# Patient Record
Sex: FEMALE | Race: WHITE | NOT HISPANIC OR LATINO | Employment: OTHER | URBAN - METROPOLITAN AREA
[De-identification: names, ages, dates, MRNs, and addresses within clinical notes are randomized per-mention and may not be internally consistent; named-entity substitution may affect disease eponyms.]

---

## 2017-01-13 ENCOUNTER — HOSPITAL ENCOUNTER (OUTPATIENT)
Dept: PHYSICAL THERAPY | Facility: HOSPITAL | Age: 67
Setting detail: RECURRING SERIES
Discharge: HOME | End: 2017-03-15
Attending: INTERNAL MEDICINE | Admitting: INTERNAL MEDICINE

## 2017-04-18 ENCOUNTER — CONVERSION ENCOUNTER (OUTPATIENT)
Dept: FAMILY MEDICINE CLINIC | Facility: CLINIC | Age: 67
End: 2017-04-18

## 2017-04-18 LAB
ALBUMIN SERPL-MCNC: 4.1 G/DL (ref 3.6–5.1)
ALBUMIN/GLOB SERPL: ABNORMAL {RATIO} (ref 1–2.5)
ALP SERPL-CCNC: 64 UNITS/L (ref 33–130)
ALT SERPL-CCNC: 13 UNITS/L (ref 6–29)
AST SERPL-CCNC: 17 UNITS/L (ref 10–35)
BASOPHILS # BLD AUTO: ABNORMAL 10*3/MM3 (ref 0–200)
BASOPHILS NFR BLD AUTO: 0.4 %
BILIRUB SERPL-MCNC: 0.3 MG/DL (ref 0.2–1.2)
BUN SERPL-MCNC: 26 MG/DL (ref 7–25)
BUN/CREAT SERPL: ABNORMAL (ref 6–22)
CALCIUM SERPL-MCNC: 9.4 MG/DL (ref 8.6–10.4)
CHLORIDE SERPL-SCNC: 105 MMOL/L (ref 98–110)
CHOLEST SERPL-MCNC: 209 MG/DL (ref 125–200)
CHOLEST/HDLC SERPL: ABNORMAL {RATIO}
CO2 CONTENT VENOUS: 28 MMOL/L (ref 20–31)
CONV NEUTROPHILS/100 LEUKOCYTES IN BODY FLUID BY MANUAL COUNT: 60.7 %
CONV TOTAL PROTEIN: 6.7 G/DL (ref 6.1–8.1)
CREAT UR-MCNC: 1.05 MG/DL (ref 0.5–0.99)
EOSINOPHIL # BLD AUTO: 1.6 %
EOSINOPHIL # BLD AUTO: ABNORMAL 10*3/MM3 (ref 15–500)
ERYTHROCYTE [DISTWIDTH] IN BLOOD BY AUTOMATED COUNT: 12.6 % (ref 11–15)
GLOBULIN UR ELPH-MCNC: ABNORMAL G/DL (ref 1.9–3.7)
GLUCOSE SERPL-MCNC: 104 MG/DL (ref 65–99)
HCT VFR BLD AUTO: 41.9 % (ref 35–45)
HDLC SERPL-MCNC: 48 MG/DL
HGB BLD-MCNC: 13.7 G/DL (ref 11.7–15.5)
LDLC SERPL CALC-MCNC: ABNORMAL MG/DL
LYMPHOCYTES # BLD AUTO: ABNORMAL 10*3/MM3 (ref 850–3900)
LYMPHOCYTES NFR BLD AUTO: 30.6 %
MCH RBC QN AUTO: 30.4 PG (ref 27–33)
MCHC RBC AUTO-ENTMCNC: ABNORMAL % (ref 32–36)
MCV RBC AUTO: 92.9 FL (ref 80–100)
MONOCYTES # BLD AUTO: ABNORMAL 10*3/MICROLITER (ref 200–950)
MONOCYTES NFR BLD AUTO: 6.7 %
NEUTROPHILS # BLD AUTO: ABNORMAL 10*3/MM3 (ref 1500–7800)
PLATELET # BLD AUTO: ABNORMAL 10*3/MM3 (ref 140–400)
PMV BLD AUTO: 8.1 FL (ref 7.5–12.5)
POTASSIUM SERPL-SCNC: 4.5 MMOL/L (ref 3.5–5.3)
RBC # BLD AUTO: ABNORMAL 10*6/MM3 (ref 3.8–5.1)
SODIUM SERPL-SCNC: 142 MMOL/L (ref 135–146)
TRIGL SERPL-MCNC: 125 MG/DL
TSH SERPL-ACNC: 0.94 MICROINTL UNITS/ML (ref 0.4–4.5)
WBC # BLD AUTO: ABNORMAL K/UL (ref 3.8–10.8)

## 2017-04-27 ENCOUNTER — HOSPITAL ENCOUNTER (OUTPATIENT)
Dept: MAMMOGRAPHY | Facility: HOSPITAL | Age: 67
Discharge: HOME | End: 2017-04-27
Attending: INTERNAL MEDICINE | Admitting: INTERNAL MEDICINE

## 2018-04-09 ENCOUNTER — HOSPITAL ENCOUNTER (OUTPATIENT)
Dept: FAMILY MEDICINE CLINIC | Facility: CLINIC | Age: 68
Setting detail: SPECIMEN
Discharge: HOME | End: 2018-04-09
Attending: INTERNAL MEDICINE | Admitting: INTERNAL MEDICINE

## 2018-04-09 LAB
ALBUMIN SERPL-MCNC: 3.9 G/DL (ref 3.5–4.8)
ALBUMIN/GLOB SERPL: 1.6 {RATIO} (ref 1–1.7)
ALP SERPL-CCNC: 56 IU/L (ref 32–91)
ALT SERPL-CCNC: 27 IU/L (ref 14–54)
ANION GAP SERPL CALC-SCNC: 9.6 MMOL/L (ref 10–20)
AST SERPL-CCNC: 25 IU/L (ref 15–41)
BASOPHILS # BLD AUTO: 0.1 10*3/UL (ref 0–0.2)
BASOPHILS NFR BLD AUTO: 1 % (ref 0–2)
BILIRUB SERPL-MCNC: 0.5 MG/DL (ref 0.3–1.2)
BUN SERPL-MCNC: 24 MG/DL (ref 8–20)
BUN/CREAT SERPL: 24 (ref 5.4–26.2)
CALCIUM SERPL-MCNC: 9.2 MG/DL (ref 8.9–10.3)
CHLORIDE SERPL-SCNC: 106 MMOL/L (ref 101–111)
CHOLEST SERPL-MCNC: 251 MG/DL
CHOLEST/HDLC SERPL: 4.6 {RATIO}
CONV CO2: 29 MMOL/L (ref 22–32)
CONV LDL CHOLESTEROL DIRECT: 170 MG/DL (ref 0–100)
CONV TOTAL PROTEIN: 6.3 G/DL (ref 6.1–7.9)
CREAT UR-MCNC: 1 MG/DL (ref 0.4–1)
DIFFERENTIAL METHOD BLD: (no result)
EOSINOPHIL # BLD AUTO: 0.1 10*3/UL (ref 0–0.3)
EOSINOPHIL # BLD AUTO: 3 % (ref 0–3)
ERYTHROCYTE [DISTWIDTH] IN BLOOD BY AUTOMATED COUNT: 12.9 % (ref 11.5–14.5)
GLOBULIN UR ELPH-MCNC: 2.4 G/DL (ref 2.5–3.8)
GLUCOSE SERPL-MCNC: 103 MG/DL (ref 65–99)
HCT VFR BLD AUTO: 41.4 % (ref 35–49)
HDLC SERPL-MCNC: 55 MG/DL
HGB BLD-MCNC: 13.9 G/DL (ref 12–15)
LDLC/HDLC SERPL: 3.1 {RATIO}
LIPID INTERPRETATION: ABNORMAL
LYMPHOCYTES # BLD AUTO: 2.2 10*3/UL (ref 0.8–4.8)
LYMPHOCYTES NFR BLD AUTO: 43 % (ref 18–42)
MCH RBC QN AUTO: 31 PG (ref 26–32)
MCHC RBC AUTO-ENTMCNC: 33.5 G/DL (ref 32–36)
MCV RBC AUTO: 92.7 FL (ref 80–94)
MONOCYTES # BLD AUTO: 0.4 10*3/UL (ref 0.1–1.3)
MONOCYTES NFR BLD AUTO: 9 % (ref 2–11)
NEUTROPHILS # BLD AUTO: 2.3 10*3/UL (ref 2.3–8.6)
NEUTROPHILS NFR BLD AUTO: 44 % (ref 50–75)
NRBC BLD AUTO-RTO: 0 /100{WBCS}
NRBC/RBC NFR BLD MANUAL: 0 10*3/UL
PLATELET # BLD AUTO: 263 10*3/UL (ref 150–450)
PMV BLD AUTO: 7.5 FL (ref 7.4–10.4)
POTASSIUM SERPL-SCNC: 4.6 MMOL/L (ref 3.6–5.1)
RBC # BLD AUTO: 4.47 10*6/UL (ref 4–5.4)
SODIUM SERPL-SCNC: 140 MMOL/L (ref 136–144)
TRIGL SERPL-MCNC: 87 MG/DL
VLDLC SERPL CALC-MCNC: 25.8 MG/DL
WBC # BLD AUTO: 5.1 10*3/UL (ref 4.5–11.5)

## 2018-04-30 ENCOUNTER — HOSPITAL ENCOUNTER (OUTPATIENT)
Dept: OTHER | Facility: HOSPITAL | Age: 68
Discharge: HOME | End: 2018-04-30
Attending: INTERNAL MEDICINE | Admitting: INTERNAL MEDICINE

## 2018-05-14 ENCOUNTER — HOSPITAL ENCOUNTER (OUTPATIENT)
Dept: MAMMOGRAPHY | Facility: HOSPITAL | Age: 68
Discharge: HOME | End: 2018-05-14
Attending: INTERNAL MEDICINE | Admitting: INTERNAL MEDICINE

## 2018-06-12 ENCOUNTER — HOSPITAL ENCOUNTER (OUTPATIENT)
Dept: MRI IMAGING | Facility: HOSPITAL | Age: 68
Discharge: HOME | End: 2018-06-12
Attending: INTERNAL MEDICINE | Admitting: INTERNAL MEDICINE

## 2018-06-26 ENCOUNTER — HOSPITAL ENCOUNTER (OUTPATIENT)
Dept: MAMMOGRAPHY | Facility: HOSPITAL | Age: 68
Discharge: HOME | End: 2018-06-26
Attending: INTERNAL MEDICINE | Admitting: INTERNAL MEDICINE

## 2018-09-04 ENCOUNTER — HOSPITAL ENCOUNTER (OUTPATIENT)
Dept: FAMILY MEDICINE CLINIC | Facility: CLINIC | Age: 68
Setting detail: SPECIMEN
Discharge: HOME | End: 2018-09-04
Attending: INTERNAL MEDICINE | Admitting: INTERNAL MEDICINE

## 2018-09-04 LAB
ALBUMIN SERPL-MCNC: 3.9 G/DL (ref 3.5–4.8)
ALBUMIN/GLOB SERPL: 1.4 {RATIO} (ref 1–1.7)
ALP SERPL-CCNC: 56 IU/L (ref 32–91)
ALT SERPL-CCNC: 22 IU/L (ref 14–54)
ANION GAP SERPL CALC-SCNC: 12.5 MMOL/L (ref 10–20)
AST SERPL-CCNC: 25 IU/L (ref 15–41)
BILIRUB SERPL-MCNC: 0.7 MG/DL (ref 0.3–1.2)
BUN SERPL-MCNC: 24 MG/DL (ref 8–20)
BUN/CREAT SERPL: 21.8 (ref 5.4–26.2)
CALCIUM SERPL-MCNC: 9.1 MG/DL (ref 8.9–10.3)
CHLORIDE SERPL-SCNC: 104 MMOL/L (ref 101–111)
CHOLEST SERPL-MCNC: 235 MG/DL
CHOLEST/HDLC SERPL: 4.8 {RATIO}
CONV CO2: 28 MMOL/L (ref 22–32)
CONV LDL CHOLESTEROL DIRECT: 178 MG/DL (ref 0–100)
CONV TOTAL PROTEIN: 6.6 G/DL (ref 6.1–7.9)
CREAT UR-MCNC: 1.1 MG/DL (ref 0.4–1)
GLOBULIN UR ELPH-MCNC: 2.7 G/DL (ref 2.5–3.8)
GLUCOSE SERPL-MCNC: 107 MG/DL (ref 65–99)
HDLC SERPL-MCNC: 49 MG/DL
LDLC/HDLC SERPL: 3.7 {RATIO}
LIPID INTERPRETATION: ABNORMAL
POTASSIUM SERPL-SCNC: 4.5 MMOL/L (ref 3.6–5.1)
SODIUM SERPL-SCNC: 140 MMOL/L (ref 136–144)
TRIGL SERPL-MCNC: 88 MG/DL
VLDLC SERPL CALC-MCNC: 8.2 MG/DL

## 2018-12-28 ENCOUNTER — HOSPITAL ENCOUNTER (OUTPATIENT)
Dept: MAMMOGRAPHY | Facility: HOSPITAL | Age: 68
Discharge: HOME | End: 2018-12-28
Attending: INTERNAL MEDICINE | Admitting: INTERNAL MEDICINE

## 2019-06-06 ENCOUNTER — HOSPITAL ENCOUNTER (OUTPATIENT)
Dept: FAMILY MEDICINE CLINIC | Facility: CLINIC | Age: 69
Setting detail: SPECIMEN
Discharge: HOME | End: 2019-06-06
Attending: INTERNAL MEDICINE | Admitting: INTERNAL MEDICINE

## 2019-06-06 LAB
ALBUMIN SERPL-MCNC: 3.6 G/DL (ref 3.5–4.8)
ALBUMIN/GLOB SERPL: 1.2 {RATIO} (ref 1–1.7)
ALP SERPL-CCNC: 67 IU/L (ref 32–91)
ALT SERPL-CCNC: 55 IU/L (ref 14–54)
ANION GAP SERPL CALC-SCNC: 13.6 MMOL/L (ref 10–20)
AST SERPL-CCNC: 50 IU/L (ref 15–41)
BASOPHILS # BLD AUTO: 0.1 10*3/UL (ref 0–0.2)
BASOPHILS NFR BLD AUTO: 1 % (ref 0–2)
BILIRUB SERPL-MCNC: 1 MG/DL (ref 0.3–1.2)
BUN SERPL-MCNC: 23 MG/DL (ref 8–20)
BUN/CREAT SERPL: 20.9 (ref 5.4–26.2)
CALCIUM SERPL-MCNC: 8.9 MG/DL (ref 8.9–10.3)
CHLORIDE SERPL-SCNC: 103 MMOL/L (ref 101–111)
CHOLEST SERPL-MCNC: 193 MG/DL
CHOLEST/HDLC SERPL: 3.7 {RATIO}
CONV CO2: 27 MMOL/L (ref 22–32)
CONV LDL CHOLESTEROL DIRECT: 129 MG/DL (ref 0–100)
CONV TOTAL PROTEIN: 6.7 G/DL (ref 6.1–7.9)
CREAT UR-MCNC: 1.1 MG/DL (ref 0.4–1)
DIFFERENTIAL METHOD BLD: (no result)
EOSINOPHIL # BLD AUTO: 0.3 10*3/UL (ref 0–0.3)
EOSINOPHIL # BLD AUTO: 3 % (ref 0–3)
ERYTHROCYTE [DISTWIDTH] IN BLOOD BY AUTOMATED COUNT: 13.1 % (ref 11.5–14.5)
GLOBULIN UR ELPH-MCNC: 3.1 G/DL (ref 2.5–3.8)
GLUCOSE SERPL-MCNC: 116 MG/DL (ref 65–99)
HCT VFR BLD AUTO: 38.3 % (ref 35–49)
HDLC SERPL-MCNC: 52 MG/DL
HGB BLD-MCNC: 12.6 G/DL (ref 12–15)
LDLC/HDLC SERPL: 2.5 {RATIO}
LIPID INTERPRETATION: ABNORMAL
LYMPHOCYTES # BLD AUTO: 1.5 10*3/UL (ref 0.8–4.8)
LYMPHOCYTES NFR BLD AUTO: 19 % (ref 18–42)
MCH RBC QN AUTO: 30.6 PG (ref 26–32)
MCHC RBC AUTO-ENTMCNC: 32.9 G/DL (ref 32–36)
MCV RBC AUTO: 93 FL (ref 80–94)
MONOCYTES # BLD AUTO: 0.8 10*3/UL (ref 0.1–1.3)
MONOCYTES NFR BLD AUTO: 10 % (ref 2–11)
NEUTROPHILS # BLD AUTO: 5.3 10*3/UL (ref 2.3–8.6)
NEUTROPHILS NFR BLD AUTO: 67 % (ref 50–75)
NRBC BLD AUTO-RTO: 0 /100{WBCS}
NRBC/RBC NFR BLD MANUAL: 0 10*3/UL
PLATELET # BLD AUTO: 267 10*3/UL (ref 150–450)
PMV BLD AUTO: 7.9 FL (ref 7.4–10.4)
POTASSIUM SERPL-SCNC: 4.6 MMOL/L (ref 3.6–5.1)
RBC # BLD AUTO: 4.12 10*6/UL (ref 4–5.4)
SODIUM SERPL-SCNC: 139 MMOL/L (ref 136–144)
TRIGL SERPL-MCNC: 88 MG/DL
TSH SERPL-ACNC: 1.18 UIU/ML (ref 0.34–5.6)
VLDLC SERPL CALC-MCNC: 13 MG/DL
WBC # BLD AUTO: 7.9 10*3/UL (ref 4.5–11.5)

## 2019-06-07 LAB — 25(OH)D3 SERPL-MCNC: 37 NG/ML (ref 30–100)

## 2019-06-11 ENCOUNTER — CONVERSION ENCOUNTER (OUTPATIENT)
Dept: FAMILY MEDICINE CLINIC | Facility: CLINIC | Age: 69
End: 2019-06-11

## 2019-06-17 ENCOUNTER — TELEPHONE (OUTPATIENT)
Dept: FAMILY MEDICINE CLINIC | Facility: CLINIC | Age: 69
End: 2019-06-17

## 2019-06-17 DIAGNOSIS — I21.19 STEMI INVOLVING OTH CORONARY ARTERY OF INFERIOR WALL (HCC): Primary | ICD-10-CM

## 2019-06-17 NOTE — TELEPHONE ENCOUNTER
You are wanting patient to have a 2D Echo. Please create order/referral and diagnosis in chart then this will fall in to my work queue to process referral.   PER Epic TEAM

## 2019-06-18 ENCOUNTER — OFFICE VISIT (OUTPATIENT)
Dept: CARDIOLOGY | Facility: CLINIC | Age: 69
End: 2019-06-18

## 2019-06-18 VITALS
WEIGHT: 156.4 LBS | SYSTOLIC BLOOD PRESSURE: 124 MMHG | BODY MASS INDEX: 25.13 KG/M2 | HEIGHT: 66 IN | DIASTOLIC BLOOD PRESSURE: 64 MMHG | HEART RATE: 73 BPM

## 2019-06-18 DIAGNOSIS — E78.5 DYSLIPIDEMIA: ICD-10-CM

## 2019-06-18 DIAGNOSIS — I25.119 CORONARY ARTERY DISEASE INVOLVING NATIVE CORONARY ARTERY OF NATIVE HEART WITH ANGINA PECTORIS (HCC): ICD-10-CM

## 2019-06-18 DIAGNOSIS — I21.9 MYOCARDIAL INFARCTION LESS THAN 4 WEEKS AGO (HCC): Primary | ICD-10-CM

## 2019-06-18 DIAGNOSIS — I10 ESSENTIAL HYPERTENSION: ICD-10-CM

## 2019-06-18 PROCEDURE — 99204 OFFICE O/P NEW MOD 45 MIN: CPT | Performed by: NURSE PRACTITIONER

## 2019-06-18 RX ORDER — CARVEDILOL 3.12 MG/1
3.12 TABLET ORAL 2 TIMES DAILY
COMMUNITY
Start: 2019-06-11 | End: 2019-12-10 | Stop reason: SDUPTHER

## 2019-06-18 RX ORDER — ESCITALOPRAM OXALATE 10 MG/1
TABLET ORAL
COMMUNITY
Start: 2019-05-16 | End: 2019-12-10 | Stop reason: SDUPTHER

## 2019-06-18 RX ORDER — ATORVASTATIN CALCIUM 40 MG/1
40 TABLET, FILM COATED ORAL DAILY
Qty: 30 TABLET | Refills: 11 | Status: SHIPPED | OUTPATIENT
Start: 2019-06-18 | End: 2019-08-21 | Stop reason: SDUPTHER

## 2019-06-18 RX ORDER — ASPIRIN 81 MG
81 TABLET,CHEWABLE ORAL DAILY
Refills: 0 | COMMUNITY
Start: 2019-06-04 | End: 2022-04-04

## 2019-06-18 RX ORDER — BUPROPION HYDROCHLORIDE 150 MG/1
TABLET ORAL EVERY 24 HOURS
COMMUNITY
Start: 2018-08-06 | End: 2019-12-10 | Stop reason: SDUPTHER

## 2019-06-18 NOTE — PROGRESS NOTES
Cardiology Office Visit Note      Referring physician: Dr. Koo    Reason For Followup: Recent MI    HPI:  Jennifer Blackmon is a 68 y.o. female.  She has a history of coronary artery disease.  Back in the late May she was an inpatient [facility] where she was admitted due to chest pain radiating to her left arm.  She underwent a Lexiscan Myoview.  Lexiscan Myoview did not show any reversible ischemia or fixed defects.  She was discharged from the hospital.  She unfortunately continued to have chest pain.      She went on a trip with her family to [place] and while there started having severe chest pain and was flown to [facility] where she underwent emergent left heart catheterization.  The LAD had proximal 30 to 40% stenosis with mild irregularities the first diagonal was small and had mild irregularities circumflex was moderate caliber and had proximal 20 to 30% stenosis gives off a small first marginal which had a proximal 20 to 30% stenosis the second OM is within normal limits right coronary artery is dominant and had a 99% stenosis in the mid segment ejection fraction was estimated at 45%.  The patient underwent angioplasty with drug-eluting stent placement to the right coronary artery.    At this time she denies chest pain recurrent arm pain, dyspnea PND orthopnea palpitations near syncope lower extremity edema or feelings of her heart racing.  She is been compliant with medications.    Past Medical History:   Diagnosis Date   • Allergic rhinitis    • DDD (degenerative disc disease), lumbar    • Depression    • Hyperlipidemia    • Insomnia    • Osteoarthritis    • Toenail fungus        Past Surgical History:   Procedure Laterality Date   • ADENOIDECTOMY      Adenoids removed   • APPENDECTOMY     • BREAST IMPLANT SURGERY     • CATARACT EXTRACTION Bilateral 2017   • CERVICAL DISC SURGERY      ruptured and removed    •  SECTION     • CHEST SURGERY       attempted pectus repair    • KNEE ARTHROSCOPY Right    • LUMBAR DISC SURGERY      ruptured and removed    • NASAL SEPTAL RECONSTRUCTION     • REFRACTIVE SURGERY      RK/AK both eyes    • SOMNOPLASTY RADIAL FREQUENCY ABLATION     • TONSILLECTOMY     • UVULOPALATOPHARYNGOPLASTY           Current Outpatient Medications:   •  buPROPion XL (WELLBUTRIN XL) 150 MG 24 hr tablet, Daily., Disp: , Rfl:   •  ticagrelor (BRILINTA) 90 MG tablet tablet, Every 12 (Twelve) Hours., Disp: , Rfl:   •  vitamin E 100 UNIT capsule, Daily., Disp: , Rfl:   •  atorvastatin (LIPITOR) 40 MG tablet, Take 1 tablet by mouth Daily., Disp: 30 tablet, Rfl: 11  •  HUNTER LOW DOSE 81 MG chewable tablet, Chew 81 mg Daily., Disp: , Rfl: 0  •  carvedilol (COREG) 3.125 MG tablet, 3.125 mg 2 (Two) Times a Day., Disp: , Rfl:   •  escitalopram (LEXAPRO) 10 MG tablet, , Disp: , Rfl:     Social History     Socioeconomic History   • Marital status:      Spouse name: Not on file   • Number of children: Not on file   • Years of education: Not on file   • Highest education level: Not on file   Tobacco Use   • Smoking status: Former Smoker   Substance and Sexual Activity   • Alcohol use: No     Frequency: Never   • Drug use: No       Family History   Problem Relation Age of Onset   • Alzheimer's disease Mother    • Other Mother         CVA, acute myocardial infarction   • Leukemia Father    • Other Father         Parkinson's    • Heart attack Sister    • Heart attack Brother    • Bipolar disorder Daughter          Review of Systems   Constitution: Negative for decreased appetite, diaphoresis and weakness.   HENT: Negative for congestion, hearing loss and nosebleeds.    Cardiovascular: Negative for chest pain, claudication, dyspnea on exertion, irregular heartbeat, leg swelling, near-syncope, orthopnea, palpitations, paroxysmal nocturnal dyspnea and syncope.   Respiratory: Negative for cough, shortness of breath and sleep disturbances due to breathing.   "  Endocrine: Negative for polyuria.   Hematologic/Lymphatic: Does not bruise/bleed easily.   Skin: Negative for itching and rash.   Musculoskeletal: Negative for back pain, muscle weakness and myalgias.   Gastrointestinal: Negative for abdominal pain, change in bowel habit and nausea.   Genitourinary: Negative for dysuria, flank pain, frequency and hesitancy.   Neurological: Negative for dizziness and tremors.   Psychiatric/Behavioral: Negative for altered mental status. The patient does not have insomnia.        Objective     /64   Pulse 73   Ht 167.6 cm (66\")   Wt 70.9 kg (156 lb 6.4 oz)   BMI 25.24 kg/m² .    Physical Exam   Constitutional: She is oriented to person, place, and time. She appears well-developed and well-nourished. No distress.   HENT:   Head: Normocephalic and atraumatic.   Eyes: Pupils are equal, round, and reactive to light.   Neck: Normal range of motion. Neck supple. No JVD present.   Cardiovascular: Normal rate, regular rhythm, S1 normal, S2 normal, normal heart sounds and intact distal pulses.   No murmur heard.  Pulmonary/Chest: Effort normal and breath sounds normal.   Abdominal: Soft. Normal appearance. She exhibits no distension. There is no tenderness.   Musculoskeletal: Normal range of motion. She exhibits no edema.   Neurological: She is alert and oriented to person, place, and time. Gait normal.   Skin: Skin is warm and dry.   Psychiatric: She has a normal mood and affect. Her speech is normal and behavior is normal. Thought content normal.         EKG: normal EKG, normal sinus rhythm, unchanged from previous tracings, normal sinus rhythm, diffuse T wave inversion in inferior and lateral leads.  Heart rate 73  .   Diagnoses and all orders for this visit:    1. Myocardial infarction less than 4 weeks ago (CMS/HCC) (Primary)  -     Adult Transthoracic Echo Complete W/ Cont if Necessary Per Protocol; Future  -     Ambulatory Referral to Cardiac Rehab    2. Coronary artery " disease involving native coronary artery of native heart with angina pectoris (CMS/HCC)  -     Adult Transthoracic Echo Complete W/ Cont if Necessary Per Protocol; Future  -     Ambulatory Referral to Cardiac Rehab  -No signs and symptoms of chest pain or angina at this time    3. Dyslipidemia continue statin    4. Essential hypertension: Stable on current medications    Other orders  -     atorvastatin (LIPITOR) 40 MG tablet; Take 1 tablet by mouth Daily.  Dispense: 30 tablet; Refill: 11       Problem List Items Addressed This Visit        Cardiovascular and Mediastinum    Myocardial infarction less than 4 weeks ago (CMS/[facility]) - Primary    Relevant Medications    carvedilol (COREG) 3.125 MG tablet    ticagrelor (BRILINTA) 90 MG tablet tablet    Other Relevant Orders    Adult Transthoracic Echo Complete W/ Cont if Necessary Per Protocol    Ambulatory Referral to Cardiac Rehab    Coronary artery disease involving native coronary artery of native heart with angina pectoris (CMS/HCC)    Relevant Medications    carvedilol (COREG) 3.125 MG tablet    ticagrelor (BRILINTA) 90 MG tablet tablet    Other Relevant Orders    Adult Transthoracic Echo Complete W/ Cont if Necessary Per Protocol    Ambulatory Referral to Cardiac Rehab    Essential hypertension    Relevant Medications    carvedilol (COREG) 3.125 MG tablet       Other    Dyslipidemia          Plan: As above, will proceed with 2D echocardiogram to assess LV function and for wall motion abnormalities.  We will also refer patient to cardiac rehab.  We will follow-up in this office in 8 weeks for follow-up advised to contact office sooner if new or worsening problems      NIKHIL Sanchez  6/18/2019 4:40 PM

## 2019-06-21 ENCOUNTER — HOSPITAL ENCOUNTER (OUTPATIENT)
Dept: CARDIOLOGY | Facility: HOSPITAL | Age: 69
Discharge: HOME | End: 2019-06-21
Admitting: NURSE PRACTITIONER

## 2019-06-21 VITALS
DIASTOLIC BLOOD PRESSURE: 61 MMHG | HEIGHT: 66 IN | SYSTOLIC BLOOD PRESSURE: 114 MMHG | OXYGEN SATURATION: 96 % | RESPIRATION RATE: 20 BRPM | HEART RATE: 67 BPM | WEIGHT: 156 LBS | BODY MASS INDEX: 25.07 KG/M2

## 2019-06-21 DIAGNOSIS — I25.119 CORONARY ARTERY DISEASE INVOLVING NATIVE CORONARY ARTERY OF NATIVE HEART WITH ANGINA PECTORIS (HCC): ICD-10-CM

## 2019-06-21 DIAGNOSIS — I21.9 MYOCARDIAL INFARCTION LESS THAN 4 WEEKS AGO (HCC): ICD-10-CM

## 2019-06-21 PROCEDURE — 0399T HC MYOCARDL STRAIN IMAG QUAN ASSMT PER SESS: CPT

## 2019-06-21 PROCEDURE — 93306 TTE W/DOPPLER COMPLETE: CPT

## 2019-06-23 LAB
BH CV ECHO MEAS - % IVS THICK: 15.4 %
BH CV ECHO MEAS - % LVPW THICK: 37.7 %
BH CV ECHO MEAS - ACS: 1.2 CM
BH CV ECHO MEAS - AO MAX PG (FULL): 3.3 MMHG
BH CV ECHO MEAS - AO MAX PG: 5.8 MMHG
BH CV ECHO MEAS - AO MEAN PG (FULL): 2.4 MMHG
BH CV ECHO MEAS - AO MEAN PG: 3.8 MMHG
BH CV ECHO MEAS - AO ROOT AREA (BSA CORRECTED): 1.6
BH CV ECHO MEAS - AO ROOT AREA: 6.8 CM^2
BH CV ECHO MEAS - AO ROOT DIAM: 2.9 CM
BH CV ECHO MEAS - AO V2 MAX: 120.5 CM/SEC
BH CV ECHO MEAS - AO V2 MEAN: 95.3 CM/SEC
BH CV ECHO MEAS - AO V2 VTI: 29.4 CM
BH CV ECHO MEAS - ASC AORTA: 3.1 CM
BH CV ECHO MEAS - AVA(I,A): 1.5 CM^2
BH CV ECHO MEAS - AVA(I,D): 1.5 CM^2
BH CV ECHO MEAS - AVA(V,A): 1.7 CM^2
BH CV ECHO MEAS - AVA(V,D): 1.7 CM^2
BH CV ECHO MEAS - BSA(HAYCOCK): 1.8 M^2
BH CV ECHO MEAS - BSA: 1.8 M^2
BH CV ECHO MEAS - BZI_BMI: 25.2 KILOGRAMS/M^2
BH CV ECHO MEAS - BZI_METRIC_HEIGHT: 167.6 CM
BH CV ECHO MEAS - BZI_METRIC_WEIGHT: 70.8 KG
BH CV ECHO MEAS - EDV(CUBED): 66.9 ML
BH CV ECHO MEAS - EDV(MOD-SP2): 32.3 ML
BH CV ECHO MEAS - EDV(MOD-SP4): 61.1 ML
BH CV ECHO MEAS - EDV(TEICH): 72.5 ML
BH CV ECHO MEAS - EF(CUBED): 61 %
BH CV ECHO MEAS - EF(MOD-BP): 68 %
BH CV ECHO MEAS - EF(MOD-SP2): 63.2 %
BH CV ECHO MEAS - EF(MOD-SP4): 65.1 %
BH CV ECHO MEAS - EF(TEICH): 53.1 %
BH CV ECHO MEAS - ESV(CUBED): 26.1 ML
BH CV ECHO MEAS - ESV(MOD-SP2): 11.9 ML
BH CV ECHO MEAS - ESV(MOD-SP4): 21.3 ML
BH CV ECHO MEAS - ESV(TEICH): 34 ML
BH CV ECHO MEAS - FS: 26.9 %
BH CV ECHO MEAS - IVS/LVPW: 1.2
BH CV ECHO MEAS - IVSD: 1.2 CM
BH CV ECHO MEAS - IVSS: 1.3 CM
BH CV ECHO MEAS - LA DIMENSION(2D): 2.9 CM
BH CV ECHO MEAS - LV DIASTOLIC VOL/BSA (35-75): 34 ML/M^2
BH CV ECHO MEAS - LV MASS(C)D: 142.5 GRAMS
BH CV ECHO MEAS - LV MASS(C)DI: 79.2 GRAMS/M^2
BH CV ECHO MEAS - LV MASS(C)S: 128.4 GRAMS
BH CV ECHO MEAS - LV MASS(C)SI: 71.4 GRAMS/M^2
BH CV ECHO MEAS - LV MAX PG: 2.5 MMHG
BH CV ECHO MEAS - LV MEAN PG: 1.4 MMHG
BH CV ECHO MEAS - LV SYSTOLIC VOL/BSA (12-30): 11.8 ML/M^2
BH CV ECHO MEAS - LV V1 MAX: 79.1 CM/SEC
BH CV ECHO MEAS - LV V1 MEAN: 55.9 CM/SEC
BH CV ECHO MEAS - LV V1 VTI: 17.5 CM
BH CV ECHO MEAS - LVIDD: 4.1 CM
BH CV ECHO MEAS - LVIDS: 3 CM
BH CV ECHO MEAS - LVOT AREA: 2.5 CM^2
BH CV ECHO MEAS - LVOT DIAM: 1.8 CM
BH CV ECHO MEAS - LVPWD: 0.97 CM
BH CV ECHO MEAS - LVPWS: 1.3 CM
BH CV ECHO MEAS - MV A MAX VEL: 98.8 CM/SEC
BH CV ECHO MEAS - MV DEC SLOPE: 322.5 CM/SEC^2
BH CV ECHO MEAS - MV DEC TIME: 0.25 SEC
BH CV ECHO MEAS - MV E MAX VEL: 79.4 CM/SEC
BH CV ECHO MEAS - MV E/A: 0.8
BH CV ECHO MEAS - MV MAX PG: 4.2 MMHG
BH CV ECHO MEAS - MV MEAN PG: 1.8 MMHG
BH CV ECHO MEAS - MV V2 MAX: 102.5 CM/SEC
BH CV ECHO MEAS - MV V2 MEAN: 57.4 CM/SEC
BH CV ECHO MEAS - MV V2 VTI: 28.9 CM
BH CV ECHO MEAS - MVA(VTI): 1.5 CM^2
BH CV ECHO MEAS - PA MAX PG: 2.1 MMHG
BH CV ECHO MEAS - PA MEAN PG: 1.3 MMHG
BH CV ECHO MEAS - PA V2 MAX: 73.2 CM/SEC
BH CV ECHO MEAS - PA V2 MEAN: 54.8 CM/SEC
BH CV ECHO MEAS - PA V2 VTI: 14.9 CM
BH CV ECHO MEAS - PI END-D VEL: 114 CM/SEC
BH CV ECHO MEAS - PI MAX PG: 7.4 MMHG
BH CV ECHO MEAS - PI MAX VEL: 133.5 CM/SEC
BH CV ECHO MEAS - RAP SYSTOLE: 5 MMHG
BH CV ECHO MEAS - RVDD: 2.5 CM
BH CV ECHO MEAS - RVSP: 19.4 MMHG
BH CV ECHO MEAS - SI(AO): 111.3 ML/M^2
BH CV ECHO MEAS - SI(CUBED): 22.7 ML/M^2
BH CV ECHO MEAS - SI(LVOT): 24.6 ML/M^2
BH CV ECHO MEAS - SI(MOD-SP2): 11.3 ML/M^2
BH CV ECHO MEAS - SI(MOD-SP4): 22.1 ML/M^2
BH CV ECHO MEAS - SI(TEICH): 21.4 ML/M^2
BH CV ECHO MEAS - SV(AO): 200.3 ML
BH CV ECHO MEAS - SV(CUBED): 40.8 ML
BH CV ECHO MEAS - SV(LVOT): 44.2 ML
BH CV ECHO MEAS - SV(MOD-SP2): 20.4 ML
BH CV ECHO MEAS - SV(MOD-SP4): 39.8 ML
BH CV ECHO MEAS - SV(TEICH): 38.4 ML
BH CV ECHO MEAS - TR MAX VEL: 189.8 CM/SEC

## 2019-06-23 PROCEDURE — 93306 TTE W/DOPPLER COMPLETE: CPT | Performed by: INTERNAL MEDICINE

## 2019-06-23 PROCEDURE — 0399T ADULT TRANSTHORACIC ECHO COMPLETE W/ CONT IF NECESSARY PER PROTOCOL: CPT | Performed by: INTERNAL MEDICINE

## 2019-06-25 DIAGNOSIS — R94.5 ABNORMAL LIVER FUNCTION: Primary | ICD-10-CM

## 2019-06-26 ENCOUNTER — LAB (OUTPATIENT)
Dept: FAMILY MEDICINE CLINIC | Facility: CLINIC | Age: 69
End: 2019-06-26

## 2019-06-26 DIAGNOSIS — R94.5 ABNORMAL LIVER FUNCTION: ICD-10-CM

## 2019-06-26 LAB
ALBUMIN SERPL-MCNC: 3.9 G/DL (ref 3.5–4.8)
ALBUMIN/GLOB SERPL: 1.3 G/DL (ref 1–1.7)
ALP SERPL-CCNC: 80 U/L (ref 32–91)
ALT SERPL W P-5'-P-CCNC: 32 U/L (ref 14–54)
ANION GAP SERPL CALCULATED.3IONS-SCNC: 11 MMOL/L (ref 10–20)
AST SERPL-CCNC: 27 U/L (ref 15–41)
BILIRUB SERPL-MCNC: 0.7 MG/DL (ref 0.3–1.2)
BUN BLD-MCNC: 21 MG/DL (ref 8–20)
BUN/CREAT SERPL: 21 (ref 5.4–26.2)
CALCIUM SPEC-SCNC: 9.2 MG/DL (ref 8.9–10.3)
CHLORIDE SERPL-SCNC: 102 MMOL/L (ref 101–111)
CO2 SERPL-SCNC: 25 MMOL/L (ref 22–32)
CREAT BLD-MCNC: 1 MG/DL (ref 0.4–1)
GFR SERPL CREATININE-BSD FRML MDRD: 55 ML/MIN/1.73
GLOBULIN UR ELPH-MCNC: 3.1 GM/DL (ref 2.5–3.8)
GLUCOSE BLD-MCNC: 112 MG/DL (ref 65–99)
POTASSIUM BLD-SCNC: 4.4 MMOL/L (ref 3.6–5.1)
PROT SERPL-MCNC: 7 G/DL (ref 6.1–7.9)
SODIUM BLD-SCNC: 138 MMOL/L (ref 136–144)

## 2019-06-26 PROCEDURE — 80053 COMPREHEN METABOLIC PANEL: CPT | Performed by: INTERNAL MEDICINE

## 2019-06-27 VITALS
WEIGHT: 157.6 LBS | HEART RATE: 78 BPM | BODY MASS INDEX: 25.33 KG/M2 | OXYGEN SATURATION: 98 % | SYSTOLIC BLOOD PRESSURE: 110 MMHG | DIASTOLIC BLOOD PRESSURE: 70 MMHG | HEIGHT: 66 IN | RESPIRATION RATE: 20 BRPM

## 2019-06-28 NOTE — PROGRESS NOTES
Vital Signs:    Patient Profile:    68 Years Old Female  Height:     66 inches  Weight:     157.6 pounds  BMI:        25.43     O2 Sat:     98 %  Temp:       98.2 degrees F oral  Pulse rate: 78 / minute  Resp:       20 per minute  BP Sittin / 70  (right arm)    Cuff size:  regular      Problems: Active problems were reviewed with the patient during this visit.  Medications: Medications were reviewed with the patient during this visit.  Allergies: Allergies were reviewed with the patient during this visit.  No Known Allergy.        Vitals Entered By: Rere Padilla CMA (2019 4:06 PM)      Referring Provider:  Ana María Genao MD  Primary Provider:  Helen Koo    CC:  Hospital f/u of heart attack.    History of Present Illness:  here to follow up after 2 hospitalizations  first presented to [facility] on  with c/o chest pain and tingling down left arm  was admitted overnight, had normal myoview and ct chest  so dc'd home    went to [place] for a family reunion  was still having chest and arm pain  but worsened in severity, so EMS was called  and pt was re-admited on   had a cardiac cath, and found to have 99% stenosis of RCA  had a ALISA placed, started on brilinta and statin and beta blocker  no echo was done as far as she can recall  pt was dc'd on     since dc, has some fatigue  but chest pain has resolved  and so has arm pain  very upset that this was not caught at [facility]  and that she didn't feel like she was being listened to  also c/o not seeing physican but for brief minutes  during her hospital stay at [facility]      Past Medical History:     Reviewed history from 2018 and no changes required:        Osteoarthritis        Depression        Insomnia        Allergic rhinitis        Toe fungus        hpld        lumbar ddd                pap - no h/o abnormal         dexa - 3/2015         mammogram - 3/2016        colonoscopy 2014 - 10 years                eye - melchor    Past  Surgical History:     Reviewed history from 04/16/2018 and no changes required:        Appendectomy-1952        Chest surgery (attempted pectus repair)-1961        Breast implants-1984        Ruptured lumbar disk - removed        Ruptured cervical disk - removed        RK/AK both eyes        Sleep apnea surgery (Tonsillectomy, Adenoids removed, Uvulopalatopharyngoplasty, septal reconstruction, somnoplasty)        R knee Arthroscopic surgery        c section        cataract surgery b/l 2017    Family History Summary:      Reviewed history Last on 09/11/2018 and no changes required:06/27/2019  Father - Has Family History of Other Cancer - leukemia - Entered On: 3/2/2016    General Comments - FH:  Mother past away of CVA,Alzheimers disease  Mother had an acute myocardial infarction (65)  Father passed away of leukemia, ?parkinson's  2 children - daughter - bipolar; daughter - healthy  3 brothers - one with heart attack (58); one with mental health issues  1 sister - heart attack (50s)    Social History:     Reviewed history from 02/14/2018 and no changes required:        Patient is a former smoker.        Passive Smoke: N        Alcohol Use: N        Drug Use: N        HIV/High Risk: N        Regular Exercise: N        Hx Domestic Abuse: N        Mormonism Affecting Care: N        Marital Status:         Children:         Occupation:         Risk Factors:     Smoked Tobacco Use:  Former smoker     Cigarettes:  Yes        Year quit:  1974        Years Since Last Quit:  43  Smokeless Tobacco Use:  Never  Passive smoke exposure:  no  Drug use:  no  HIV high-risk behavior:  no  Caffeine use:  1 drinks per day  Alcohol use:  no  Exercise:  no  Seatbelt use:  100 %  Sun Exposure:  occasionally    Previous Tobacco Use: Signed On - 09/11/2018  Smoked Tobacco Use:  Former smoker     Cigarettes:  Yes        Year quit:  1974        Years Since Last Quit:  45 years, 5 months, 26 days  Smokeless Tobacco Use:  Never  Passive  smoke exposure:  no  Drug use:  no  HIV high-risk behavior:  no  Caffeine use:  1 drinks per day    Previous Alcohol Use: Signed On - 09/11/2018  Alcohol use:  no  Exercise:  no  Seatbelt use:  100 %  Sun Exposure:  occasionally    Colonoscopy History:     Date of Last Colonoscopy:  01/01/2014    Mammogram History:     Date of Last Mammogram:  04/30/2018    PAP Smear History:     Date of Last PAP Smear:  01/01/2009        Physical Exam   Height:  66  Weight:  153.8  BP:  110/70 mm HG    Medication List:  ASPIR-81 81 MG ORAL TABLET DELAYED RELEASE (ASPIRIN) take one daily  COREG 3.125 MG ORAL TABLET (CARVEDILOL) take one tablet twice daily  ATORVASTATIN CALCIUM 20 MG ORAL TABLET (ATORVASTATIN CALCIUM) take one tab daily  BRILINTA 90 MG ORAL TABLET (TICAGRELOR) PO BID  CYCLOBENZAPRINE HCL 10 MG ORAL TABLET (CYCLOBENZAPRINE HCL) Take 1/2 to 1 talet 3 times a day as needed for pain  CENTRUM ORAL TABLET (MULTIPLE VITAMINS-MINERALS)   VITAMIN E CAPSULE (VITAMIN E CAPS)   VITAMIN B-6 TABLET (PYRIDOXINE HCL TABS)   ESCITALOPRAM TABS 10MG (ESCITALOPRAM OXALATE) TAKE 1 TABLET TWICE A DAY  MONTELUKAST SOD TABS 10MG (MONTELUKAST SODIUM) TAKE 1 TABLET DAILY  BUPROPION HCL XL TABS 150MG (BUPROPION HCL) TAKE 3 TABLETS IN THE MORNING      Surgical History   Appendectomy-1952  Chest surgery (attempted pectus repair)-1961  Breast implants-1984  Ruptured lumbar disk - removed  Ruptured cervical disk - removed  RK/AK both eyes  Sleep apnea surgery (Tonsillectomy, Adenoids removed, Uvulopalatopharyngoplasty, septal reconstruction, somnoplasty)  R knee Arthroscopic surgery  c section  cataract surgery b/l 2017,    Risk Factors  Tobacco Use: Former smoker  Passive smoke exposure: no  Exercise: no  Exercise: 0 times per week  Illicit Drug use: no      Physical Examination   General Appearance   In no acute distress.  Alert & oriented.  Behavior and affect appropriate to situation  HEENT   PERRLA, EOMI, TM's normal.  Pharynx  clear  Cardiovascular   Regular rate and rhythm  Lungs   Clear to auscultation        Impression & Recommendations:    Problem # 1:  CAD CORONARY ARTERY DISEASE (ICD-414.01) (INI70-J63.10)  Assessment: New  not sure why pt was given brilinta instead of plavix  will get records from [facility]  will order echo  agree with statin and coreg  may need ACEi or ARB if bp can tolerate  Her updated medication list for this problem includes:     Aspir-81 81 Mg Oral Tablet Delayed Release (Aspirin) ..... Take one daily     Coreg 3.125 Mg Oral Tablet (Carvedilol) ..... Take one tablet twice daily     Brilinta 90 Mg Oral Tablet (Ticagrelor) ..... Po bid      Medications Added to Medication List This Visit:  1)  Aspir-81 81 Mg Oral Tablet Delayed Release (Aspirin) .... Take one daily  2)  Coreg 3.125 Mg Oral Tablet (Carvedilol) .... Take one tablet twice daily  3)  Atorvastatin Calcium 40 Mg Oral Tablet (Atorvastatin calcium) .... Take one daily  4)  Atorvastatin Calcium 20 Mg Oral Tablet (Atorvastatin calcium) .... Take one tab daily  5)  Brilinta 90 Mg Oral Tablet (Ticagrelor) .... Po bid    Other Orders:  Transitional Care Mngmt [facility], high complex within 7 day dischrg ([id number])      Patient Instructions:  1)  During this office visit we discussed the pertinent aspects of the visit and the treatment recommendations.  Patient was given the opportunity to ask questions and discuss other concerns.  2)  pt was contacted within 2 days for tcm note and f/u appt  3)  reviewed dc instructions that pt brought with her  4)  will get records [facility]                Medication Administration    Orders Added:  1)  Transitional Care Mngmt [facility], high complex within 7 day dischrg [[id number]]        Electronically signed by Helen Koo on 06/27/2019 at 7:39 PM  ________________________________________________________________________       Disclaimer: Converted Note message may not contain all data elements that existed in the legacy source system. Please  see [facility] [facility] for the original note details.

## 2019-07-08 ENCOUNTER — TELEPHONE (OUTPATIENT)
Dept: CARDIOLOGY | Facility: CLINIC | Age: 69
End: 2019-07-08

## 2019-07-24 ENCOUNTER — TELEPHONE (OUTPATIENT)
Dept: FAMILY MEDICINE CLINIC | Facility: CLINIC | Age: 69
End: 2019-07-24

## 2019-07-24 NOTE — TELEPHONE ENCOUNTER
Patient left  stating that she has finished script for   ticagrelor (BRILINTA) 90 MG   Patient states that you wanted her to let you know when she finished script for different script could be sent to [facility].

## 2019-07-25 RX ORDER — CLOPIDOGREL BISULFATE 75 MG/1
75 TABLET ORAL DAILY
Qty: 90 TABLET | Refills: 1 | Status: SHIPPED | OUTPATIENT
Start: 2019-07-25 | End: 2020-01-27

## 2019-07-25 NOTE — TELEPHONE ENCOUNTER
Patient was called and informed.     Also, patient states that she is still having pain in joints in back. Patient is asking if there is something she can take. Patient states that she has taken DICLOFENAC in the past.

## 2019-07-25 NOTE — TELEPHONE ENCOUNTER
Ok, we'll restart diclofenac and see if that helps  Does she want me to send that the [facility] as well, or to a local pharmacy?

## 2019-08-13 ENCOUNTER — OFFICE VISIT (OUTPATIENT)
Dept: CARDIOLOGY | Facility: CLINIC | Age: 69
End: 2019-08-13

## 2019-08-13 VITALS
BODY MASS INDEX: 24.59 KG/M2 | HEART RATE: 74 BPM | OXYGEN SATURATION: 98 % | SYSTOLIC BLOOD PRESSURE: 114 MMHG | HEIGHT: 66 IN | WEIGHT: 153 LBS | DIASTOLIC BLOOD PRESSURE: 62 MMHG

## 2019-08-13 DIAGNOSIS — I21.9 MYOCARDIAL INFARCTION LESS THAN 4 WEEKS AGO (HCC): ICD-10-CM

## 2019-08-13 DIAGNOSIS — I10 ESSENTIAL HYPERTENSION: ICD-10-CM

## 2019-08-13 DIAGNOSIS — I25.119 CORONARY ARTERY DISEASE INVOLVING NATIVE CORONARY ARTERY OF NATIVE HEART WITH ANGINA PECTORIS (HCC): Primary | ICD-10-CM

## 2019-08-13 PROBLEM — E78.2 HYPERLIPIDEMIA, MIXED: Status: ACTIVE | Noted: 2019-08-13

## 2019-08-13 PROBLEM — E78.5 DYSLIPIDEMIA: Status: RESOLVED | Noted: 2019-06-18 | Resolved: 2019-08-13

## 2019-08-13 PROBLEM — R07.89 CHEST PAIN, ATYPICAL: Status: ACTIVE | Noted: 2019-08-13

## 2019-08-13 PROCEDURE — 99213 OFFICE O/P EST LOW 20 MIN: CPT | Performed by: INTERNAL MEDICINE

## 2019-08-13 PROCEDURE — 93000 ELECTROCARDIOGRAM COMPLETE: CPT | Performed by: INTERNAL MEDICINE

## 2019-08-13 RX ORDER — UBIDECARENONE 100 MG
100 CAPSULE ORAL
COMMUNITY

## 2019-08-20 ENCOUNTER — TELEPHONE (OUTPATIENT)
Dept: FAMILY MEDICINE CLINIC | Facility: CLINIC | Age: 69
End: 2019-08-20

## 2019-08-20 NOTE — TELEPHONE ENCOUNTER
Patient called stating that she saw Dr. Townsend on 8/13. Patient states that Dr. Townsend suggested that she have atorvastatin (LIPITOR) upped to 40 MG. Patient would like new script sent to [facility]

## 2019-08-21 RX ORDER — ATORVASTATIN CALCIUM 40 MG/1
40 TABLET, FILM COATED ORAL DAILY
Qty: 90 TABLET | Refills: 3 | Status: SHIPPED | OUTPATIENT
Start: 2019-08-21 | End: 2019-08-21 | Stop reason: SDUPTHER

## 2019-08-21 RX ORDER — ATORVASTATIN CALCIUM 40 MG/1
40 TABLET, FILM COATED ORAL DAILY
Qty: 90 TABLET | Refills: 3 | Status: SHIPPED | OUTPATIENT
Start: 2019-08-21 | End: 2020-11-19

## 2019-09-06 DIAGNOSIS — E78.5 HYPERLIPIDEMIA, UNSPECIFIED HYPERLIPIDEMIA TYPE: ICD-10-CM

## 2019-09-06 DIAGNOSIS — Z79.899 HIGH RISK MEDICATIONS (NOT ANTICOAGULANTS) LONG-TERM USE: ICD-10-CM

## 2019-09-06 DIAGNOSIS — R94.5 ABNORMAL LIVER FUNCTION: Primary | ICD-10-CM

## 2019-09-09 ENCOUNTER — LAB (OUTPATIENT)
Dept: FAMILY MEDICINE CLINIC | Facility: CLINIC | Age: 69
End: 2019-09-09

## 2019-09-09 DIAGNOSIS — E78.5 HYPERLIPIDEMIA, UNSPECIFIED HYPERLIPIDEMIA TYPE: ICD-10-CM

## 2019-09-09 DIAGNOSIS — Z79.899 HIGH RISK MEDICATIONS (NOT ANTICOAGULANTS) LONG-TERM USE: ICD-10-CM

## 2019-09-09 DIAGNOSIS — R94.5 ABNORMAL LIVER FUNCTION: ICD-10-CM

## 2019-09-09 LAB
ALBUMIN SERPL-MCNC: 3.6 G/DL (ref 3.5–4.8)
ALBUMIN/GLOB SERPL: 1.6 G/DL (ref 1–1.7)
ALP SERPL-CCNC: 67 U/L (ref 32–91)
ALT SERPL W P-5'-P-CCNC: 32 U/L (ref 14–54)
ANION GAP SERPL CALCULATED.3IONS-SCNC: 14.6 MMOL/L (ref 5–15)
ARTICHOKE IGE QN: 83 MG/DL (ref 0–100)
AST SERPL-CCNC: 27 U/L (ref 15–41)
BILIRUB SERPL-MCNC: 0.5 MG/DL (ref 0.3–1.2)
BUN BLD-MCNC: 27 MG/DL (ref 8–20)
BUN/CREAT SERPL: 24.5 (ref 5.4–26.2)
CALCIUM SPEC-SCNC: 9 MG/DL (ref 8.9–10.3)
CHLORIDE SERPL-SCNC: 102 MMOL/L (ref 101–111)
CHOLEST SERPL-MCNC: 140 MG/DL
CO2 SERPL-SCNC: 28 MMOL/L (ref 22–32)
CREAT BLD-MCNC: 1.1 MG/DL (ref 0.4–1)
GFR SERPL CREATININE-BSD FRML MDRD: 49 ML/MIN/1.73
GLOBULIN UR ELPH-MCNC: 2.3 GM/DL (ref 2.5–3.8)
GLUCOSE BLD-MCNC: 93 MG/DL (ref 65–99)
HBA1C MFR BLD: 5.6 % (ref 3.5–5.6)
HDLC SERPL QL: 3.04
HDLC SERPL-MCNC: 46 MG/DL
LDLC/HDLC SERPL: 1.76 {RATIO}
POTASSIUM BLD-SCNC: 4.6 MMOL/L (ref 3.6–5.1)
PROT SERPL-MCNC: 5.9 G/DL (ref 6.1–7.9)
SODIUM BLD-SCNC: 140 MMOL/L (ref 136–144)
TRIGL SERPL-MCNC: 65 MG/DL
VLDLC SERPL-MCNC: 13 MG/DL

## 2019-09-09 PROCEDURE — 80053 COMPREHEN METABOLIC PANEL: CPT | Performed by: INTERNAL MEDICINE

## 2019-09-09 PROCEDURE — 80061 LIPID PANEL: CPT | Performed by: INTERNAL MEDICINE

## 2019-09-09 PROCEDURE — 83036 HEMOGLOBIN GLYCOSYLATED A1C: CPT | Performed by: INTERNAL MEDICINE

## 2019-09-16 ENCOUNTER — OFFICE VISIT (OUTPATIENT)
Dept: FAMILY MEDICINE CLINIC | Facility: CLINIC | Age: 69
End: 2019-09-16

## 2019-09-16 VITALS
HEIGHT: 66 IN | TEMPERATURE: 97.7 F | BODY MASS INDEX: 25.26 KG/M2 | WEIGHT: 157.2 LBS | SYSTOLIC BLOOD PRESSURE: 114 MMHG | HEART RATE: 65 BPM | RESPIRATION RATE: 12 BRPM | DIASTOLIC BLOOD PRESSURE: 66 MMHG | OXYGEN SATURATION: 97 %

## 2019-09-16 DIAGNOSIS — L98.9 FACIAL SKIN LESION: ICD-10-CM

## 2019-09-16 DIAGNOSIS — M51.36 DDD (DEGENERATIVE DISC DISEASE), LUMBAR: ICD-10-CM

## 2019-09-16 DIAGNOSIS — R92.8 ABNORMAL SCREENING MAMMOGRAM: ICD-10-CM

## 2019-09-16 DIAGNOSIS — M46.1 SACROILIAC INFLAMMATION (HCC): ICD-10-CM

## 2019-09-16 DIAGNOSIS — Z00.00 PREVENTATIVE HEALTH CARE: Primary | ICD-10-CM

## 2019-09-16 PROCEDURE — 96160 PT-FOCUSED HLTH RISK ASSMT: CPT | Performed by: INTERNAL MEDICINE

## 2019-09-16 PROCEDURE — G0439 PPPS, SUBSEQ VISIT: HCPCS | Performed by: INTERNAL MEDICINE

## 2019-09-16 RX ORDER — MONTELUKAST SODIUM 10 MG/1
TABLET ORAL
COMMUNITY
Start: 2019-09-07 | End: 2020-03-05

## 2019-09-16 NOTE — PROGRESS NOTES
The ABCs of the Annual Wellness Visit  Subsequent Medicare Wellness Visit    Chief Complaint   Patient presents with   • Medicare Wellness-subsequent       Subjective   History of Present Illness:  Jennifer Blackmon is a 69 y.o. female who presents for a Subsequent Medicare Wellness Visit.    HEALTH RISK ASSESSMENT    Recent Hospitalizations:  No hospitalization(s) within the last year.    Current Medical Providers:  Patient Care Team:  Helen Koo MD as PCP - General  Provider, No Known as PCP - Family Medicine    Smoking Status:  Social History     Tobacco Use   Smoking Status Former Smoker       Alcohol Consumption:  Social History     Substance and Sexual Activity   Alcohol Use No   • Frequency: Never       Depression Screen:   PHQ-2/PHQ-9 Depression Screening 9/16/2019   Little interest or pleasure in doing things 0   Feeling down, depressed, or hopeless 0   Total Score 0       Fall Risk Screen:  TEENAADI Fall Risk Assessment was completed, and patient is at LOW risk for falls.Assessment completed on:9/16/2019    Health Habits and Functional and Cognitive Screening:  Functional & Cognitive Status 9/16/2019   Do you have difficulty preparing food and eating? No   Do you have difficulty bathing yourself, getting dressed or grooming yourself? No   Do you have difficulty using the toilet? No   Do you have difficulty moving around from place to place? No   Do you have trouble with steps or getting out of a bed or a chair? No   Current Diet Unhealthy Diet   Dental Exam Up to date   Eye Exam Up to date   Exercise (times per week) (No Data)   Do you need help using the phone?  No   Are you deaf or do you have serious difficulty hearing?  No   Do you need help with transportation? No   Do you need help shopping? No   Do you need help preparing meals?  No   Do you need help with housework?  No   Do you need help with laundry? No   Do you need help taking your medications? No   Do you need help managing money? No   Do  you ever drive or ride in a car without wearing a seat belt? No   Have you felt unusual stress, anger or loneliness in the last month? No   Who do you live with? Spouse   If you need help, do you have trouble finding someone available to you? No   Have you been bothered in the last four weeks by sexual problems? No   Do you have difficulty concentrating, remembering or making decisions? No         Does the patient have evidence of cognitive impairment? No    Asprin use counseling:Taking ASA appropriately as indicated    Age-appropriate Screening Schedule:  Refer to the list below for future screening recommendations based on patient's age, sex and/or medical conditions. Orders for these recommended tests are listed in the plan section. The patient has been provided with a written plan.    Health Maintenance   Topic Date Due   • TDAP/TD VACCINES (1 - Tdap) 08/09/1967   • ZOSTER VACCINE (1 of 2) 08/09/1998   • PNEUMOCOCCAL VACCINES (65+ LOW/MEDIUM RISK) (2 of 2 - PPSV23) 04/16/2019   • INFLUENZA VACCINE  08/01/2019   • LIPID PANEL  09/09/2020   • MAMMOGRAM  12/28/2020   • COLONOSCOPY  04/01/2024          The following portions of the patient's history were reviewed and updated as appropriate: allergies, current medications, past family history, past medical history, past social history, past surgical history and problem list.    Outpatient Medications Prior to Visit   Medication Sig Dispense Refill   • atorvastatin (LIPITOR) 40 MG tablet Take 1 tablet by mouth Daily. 90 tablet 3   • HUNTER LOW DOSE 81 MG chewable tablet Chew 81 mg Daily.  0   • buPROPion XL (WELLBUTRIN XL) 150 MG 24 hr tablet Daily.     • calcium citrate-vitamin d (CITRACAL) 200-250 MG-UNIT tablet tablet Take  by mouth Daily.     • carvedilol (COREG) 3.125 MG tablet 3.125 mg 2 (Two) Times a Day.     • clopidogrel (PLAVIX) 75 MG tablet Take 1 tablet by mouth Daily. 90 tablet 1   • coenzyme Q10 100 MG capsule Take 100 mg by mouth.     • diclofenac  (VOLTAREN) 50 MG EC tablet Take 1 tablet by mouth 3 (Three) Times a Day As Needed (back pain). (Patient taking differently: Take 50 mg by mouth 2 (Two) Times a Day.) 270 tablet 0   • escitalopram (LEXAPRO) 10 MG tablet      • montelukast (SINGULAIR) 10 MG tablet      • vitamin E 100 UNIT capsule Daily.       No facility-administered medications prior to visit.        Patient Active Problem List   Diagnosis   • Myocardial infarction less than 4 weeks ago (CMS/[facility])   • Coronary artery disease involving native coronary artery of native heart with angina pectoris (CMS/[facility])   • Essential hypertension   • Hyperlipidemia, mixed   • Chest pain, atypical       Advanced Care Planning:  Patient does not have an advance directive - information provided to the patient today    Review of Systems   Constitutional: Negative for activity change, fatigue and fever.   HENT: Negative for congestion, sinus pain and sore throat.    Eyes: Negative for pain and discharge.   Respiratory: Negative for cough and shortness of breath.    Cardiovascular: Negative for chest pain and palpitations.   Gastrointestinal: Negative for abdominal pain, diarrhea, nausea and vomiting.   Endocrine: Negative for polydipsia, polyphagia and polyuria.   Genitourinary: Negative for dysuria and hematuria.   Musculoskeletal: Positive for back pain and gait problem. Negative for arthralgias and myalgias.   Skin: Negative for rash and wound.   Neurological: Negative for dizziness, weakness and headaches.   Hematological: Negative for adenopathy. Does not bruise/bleed easily.   Psychiatric/Behavioral: Negative for dysphoric mood. The patient is not nervous/anxious.        Compared to one year ago, the patient feels her physical health is worse.  Compared to one year ago, the patient feels her mental health is better.    Reviewed chart for potential of high risk medication in the elderly: yes  Reviewed chart for potential of harmful drug interactions in the  "elderly:yes    Objective         Vitals:    09/16/19 1440   BP: 114/66   BP Location: Left arm   Patient Position: Sitting   Cuff Size: Adult   Pulse: 65   Resp: 12   Temp: 97.7 °F (36.5 °C)   TempSrc: Oral   SpO2: 97%   Weight: 71.3 kg (157 lb 3.2 oz)   Height: 167.6 cm (66\")       Body mass index is 25.37 kg/m².  Discussed the patient's BMI with her. The BMI is in the acceptable range.    Physical Exam   Constitutional: She is oriented to person, place, and time. She appears well-developed and well-nourished. No distress.   HENT:   Head: Normocephalic and atraumatic.   Right Ear: External ear normal.   Left Ear: External ear normal.   Mouth/Throat: Oropharynx is clear and moist.   Eyes: Conjunctivae and EOM are normal. No scleral icterus.   Neck: Normal range of motion. Neck supple.   Cardiovascular: Normal rate, regular rhythm and normal heart sounds. Exam reveals no gallop and no friction rub.   No murmur heard.  Pulmonary/Chest: Effort normal and breath sounds normal. No respiratory distress. She has no wheezes. She has no rales.   Abdominal: Soft. Bowel sounds are normal. There is no tenderness. There is no guarding.   Musculoskeletal: Normal range of motion. She exhibits no edema or deformity.   Lymphadenopathy:     She has no cervical adenopathy.   Neurological: She is alert and oriented to person, place, and time. No cranial nerve deficit. She exhibits normal muscle tone.   Skin: Skin is warm and dry. No rash noted. She is not diaphoretic.   Psychiatric: She has a normal mood and affect. Her behavior is normal.   Vitals reviewed.      Lab Results   Component Value Date    TRIG 65 09/09/2019    HDL 46 09/09/2019    LDL 83 09/09/2019    VLDL 13 09/09/2019    HGBA1C 5.6 09/09/2019        Assessment/Plan   Medicare Risks and Personalized Health Plan  CMS Preventative Services Quick Reference  Advance Directive Discussion  Chronic Pain     The above risks/problems have been discussed with the patient.  Pertinent " information has been shared with the patient in the After Visit Summary.  Follow up plans and orders are seen below in the Assessment/Plan Section.    Diagnoses and all orders for this visit:    1. Preventative health care (Primary)    2. DDD (degenerative disc disease), lumbar  -     Ambulatory Referral to Spine Surgery    3. Sacroiliac inflammation (CMS/HCC)  -     Ambulatory Referral to Spine Surgery    4. Facial skin lesion  -     Ambulatory Referral to Dermatology    5. Abnormal screening mammogram  -     Mammo Diagnostic Digital Tomosynthesis Bilateral With CAD; Future      Pt has not had any luck with PT and meds aren't helping at this point  Will refer to spine surgery for evaluation  Will refer to derm for facial lesion  Due for mammogram    Follow Up:  Return in about 3 months (around 12/16/2019).     An After Visit Summary and PPPS were given to the patient.

## 2019-09-27 ENCOUNTER — HOSPITAL ENCOUNTER (OUTPATIENT)
Dept: MAMMOGRAPHY | Facility: HOSPITAL | Age: 69
Discharge: HOME | End: 2019-09-27
Admitting: INTERNAL MEDICINE

## 2019-09-27 DIAGNOSIS — R92.8 ABNORMAL SCREENING MAMMOGRAM: ICD-10-CM

## 2019-09-27 PROCEDURE — 77066 DX MAMMO INCL CAD BI: CPT

## 2019-09-27 PROCEDURE — G0279 TOMOSYNTHESIS, MAMMO: HCPCS

## 2019-10-09 ENCOUNTER — FLU SHOT (OUTPATIENT)
Dept: FAMILY MEDICINE CLINIC | Facility: CLINIC | Age: 69
End: 2019-10-09

## 2019-10-09 DIAGNOSIS — Z23 IMMUNIZATION DUE: Primary | ICD-10-CM

## 2019-10-09 PROCEDURE — G0008 ADMIN INFLUENZA VIRUS VAC: HCPCS | Performed by: INTERNAL MEDICINE

## 2019-10-09 PROCEDURE — 90653 IIV ADJUVANT VACCINE IM: CPT | Performed by: INTERNAL MEDICINE

## 2019-10-15 ENCOUNTER — OFFICE VISIT (OUTPATIENT)
Dept: ORTHOPEDIC SURGERY | Facility: CLINIC | Age: 69
End: 2019-10-15

## 2019-10-15 VITALS
DIASTOLIC BLOOD PRESSURE: 68 MMHG | HEART RATE: 67 BPM | HEIGHT: 66 IN | WEIGHT: 157 LBS | SYSTOLIC BLOOD PRESSURE: 109 MMHG | BODY MASS INDEX: 25.23 KG/M2

## 2019-10-15 DIAGNOSIS — M48.062 LUMBAR STENOSIS WITH NEUROGENIC CLAUDICATION: ICD-10-CM

## 2019-10-15 DIAGNOSIS — G89.29 CHRONIC BILATERAL THORACIC BACK PAIN: ICD-10-CM

## 2019-10-15 DIAGNOSIS — M41.55 SCOLIOSIS OF THORACOLUMBAR REGION DUE TO DEGENERATIVE DISEASE OF SPINE IN ADULT: ICD-10-CM

## 2019-10-15 DIAGNOSIS — R94.5 ABNORMAL LIVER FUNCTION: Primary | ICD-10-CM

## 2019-10-15 DIAGNOSIS — M54.6 CHRONIC BILATERAL THORACIC BACK PAIN: ICD-10-CM

## 2019-10-15 DIAGNOSIS — M54.50 CHRONIC BILATERAL LOW BACK PAIN, UNSPECIFIED WHETHER SCIATICA PRESENT: Primary | ICD-10-CM

## 2019-10-15 DIAGNOSIS — G89.29 CHRONIC BILATERAL LOW BACK PAIN, UNSPECIFIED WHETHER SCIATICA PRESENT: Primary | ICD-10-CM

## 2019-10-15 PROCEDURE — 99204 OFFICE O/P NEW MOD 45 MIN: CPT | Performed by: PHYSICIAN ASSISTANT

## 2019-10-15 RX ORDER — GABAPENTIN 100 MG/1
CAPSULE ORAL
Qty: 60 CAPSULE | Refills: 0 | Status: SHIPPED | OUTPATIENT
Start: 2019-10-15 | End: 2019-12-11 | Stop reason: SDUPTHER

## 2019-10-15 NOTE — PROGRESS NOTES
[facility] New patient    Patient Name: Jennifer Blackmon    History of Present Illness:  Jennifer Blackmon is a 69 y.o. year old female here today with chief complaint of had concerns including Pain and Initial Evaluation of the Lumbar Spine; Pain and Initial Evaluation of the Left Hip; and Pain and Initial Evaluation of the Right Hip..has chronic dull constant back ach with activity especially walking carrying something. When she walkes carrying weight she limps and her legs don't want to work properly. She notices her left foot rotatating ourward when walking. Legs feel heavy when walking. Improves whith sitting with knees bent or using a shopping cart. Has some mid back painas well.      Had tension headaches in the past which got better with exercises of the neck. No arm symptoms.    Has hx of mI earlier this year with subsaquent placement of stent. Now on plavix.    Has had previous history of cervical and lumbar surgery and she states her lumbar micro discectomy many years ago.    Imaging: We reviewed AP and lateral x-rays of thoracic spine which shows mild scoliosis and age-related thoracic osteophyte formation.  No acute ab normality.  We reviewed AP lateral lumbar spine my interpretation is degenerative scoliosis, at the level disc height loss and facet arthropathy.    Impression:   1. Chronic bilateral low back pain, unspecified whether sciatica present    2. Lumbar stenosis with neurogenic claudication    3. Scoliosis of thoracolumbar region due to degenerative disease of spine in adult    4. Chronic bilateral thoracic back pain         Plan: Physical therapy for lumbar traction, gabapentin, compounding pain medication.  At home exercises and follow-up here in 6 weeks.    Vitals:  Vitals:    10/15/19 1029   BP: 109/68   Pulse: 67       Patient's Family and Social History:  Family History   Problem Relation Age of Onset   • Alzheimer's disease Mother    • Other Mother         CVA, acute  myocardial infarction   • Leukemia Father    • Other Father         Parkinson's    • Heart attack Sister    • Heart attack Brother    • Bipolar disorder Daughter      Social History     Socioeconomic History   • Marital status:      Spouse name: Not on file   • Number of children: Not on file   • Years of education: Not on file   • Highest education level: Not on file   Tobacco Use   • Smoking status: Former Smoker   • Smokeless tobacco: Never Used   Substance and Sexual Activity   • Alcohol use: No     Frequency: Never   • Drug use: No   • Sexual activity: Not Currently       Patients Medical and Surgical History:  Past Medical History:   Diagnosis Date   • Allergic rhinitis    • DDD (degenerative disc disease), lumbar    • Depression    • Depression    • Hyperlipidemia    • Hypertension    • Insomnia    • Osteoarthritis    • Toenail fungus      Past Surgical History:   Procedure Laterality Date   • ADENOIDECTOMY      Adenoids removed   • APPENDECTOMY     • AUGMENTATION MAMMAPLASTY     • BREAST IMPLANT SURGERY     • CATARACT EXTRACTION Bilateral 2017   • CERVICAL DISC SURGERY      ruptured and removed    •  SECTION     • CHEST SURGERY      attempted pectus repair    • HYSTERECTOMY     • KNEE ARTHROSCOPY Right    • LUMBAR DISC SURGERY      ruptured and removed    • NASAL SEPTAL RECONSTRUCTION     • PECTUS CARNATUM REPAIR      attempted   • REFRACTIVE SURGERY      RK/AK both eyes    • SOMNOPLASTY RADIAL FREQUENCY ABLATION     • TONSILLECTOMY     • UVULOPALATOPHARYNGOPLASTY         Review of Systems   Constitutional: Negative for activity change, appetite change and fatigue.   HENT: Negative for congestion.    Eyes: Negative for visual disturbance.   Respiratory: Negative for shortness of breath.    Gastrointestinal: Negative for abdominal pain.   Genitourinary: Negative for flank pain.   Musculoskeletal: Positive for arthralgias, back pain and gait problem.   Skin: Negative for wound.    Neurological: Positive for weakness. Negative for headaches.   Psychiatric/Behavioral: Negative for behavioral problems. The patient is not nervous/anxious.        Physical Exam   Constitutional: She is oriented to person, place, and time. She appears well-developed.   HENT:   Head: Normocephalic and atraumatic.   Eyes: Conjunctivae are normal.   Neck: Normal range of motion.   Cardiovascular: Normal rate.   Pulmonary/Chest: Effort normal.   Abdominal: There is no guarding.   Musculoskeletal: She exhibits tenderness.        Right hip: Normal.        Left hip: Normal.        Lumbar back: She exhibits decreased range of motion, tenderness and pain.   Neurological: She is oriented to person, place, and time.   Reflex Scores:       Patellar reflexes are 2+ on the right side and 2+ on the left side.  Skin: Skin is warm.   Psychiatric: Her behavior is normal.   Vitals reviewed.    Ortho Exam    Orders Placed This Encounter   Procedures   • XR Spine Lumbar 2 or 3 View     Scheduling Instructions:      rm 20      LSpine ap/lat      NP - lbp with bilateral hip and TSpine pain.  Pt has had pain for years.  Previous lumbar spine surgery 1987      10:33     Order Specific Question:   Reason for Exam:     Answer:   back pain   • XR Spine Thoracic 2 View     Scheduling Instructions:      rm 20      TSpine ap/lat      NP - lbp with bilateral hip and TSpine pain.  Pt has had pain for years.  Previous lumbar spine surgery 1987      10:33     Order Specific Question:   Reason for Exam:     Answer:   back pain   • Ambulatory Referral to Physical Therapy Evaluate and treat; Soft Tissue Mobilizaton (lumbar traction); ROM     Referral Priority:   Routine     Referral Type:   Therapy     Referral Reason:   Specialty Services Required     Requested Specialty:   Physical Therapy     Number of Visits Requested:   1

## 2019-10-16 ENCOUNTER — LAB (OUTPATIENT)
Dept: FAMILY MEDICINE CLINIC | Facility: CLINIC | Age: 69
End: 2019-10-16

## 2019-10-16 DIAGNOSIS — R94.5 ABNORMAL LIVER FUNCTION: ICD-10-CM

## 2019-10-16 LAB
ANION GAP SERPL CALCULATED.3IONS-SCNC: 10.6 MMOL/L (ref 5–15)
BUN BLD-MCNC: 23 MG/DL (ref 8–23)
BUN/CREAT SERPL: 22.8 (ref 7–25)
CALCIUM SPEC-SCNC: 9.3 MG/DL (ref 8.6–10.5)
CHLORIDE SERPL-SCNC: 98 MMOL/L (ref 98–107)
CO2 SERPL-SCNC: 29.4 MMOL/L (ref 22–29)
CREAT BLD-MCNC: 1.01 MG/DL (ref 0.57–1)
GFR SERPL CREATININE-BSD FRML MDRD: 54 ML/MIN/1.73
GLUCOSE BLD-MCNC: 101 MG/DL (ref 65–99)
POTASSIUM BLD-SCNC: 4.9 MMOL/L (ref 3.5–5.2)
SODIUM BLD-SCNC: 138 MMOL/L (ref 136–145)

## 2019-10-16 PROCEDURE — 36415 COLL VENOUS BLD VENIPUNCTURE: CPT

## 2019-10-16 PROCEDURE — 80048 BASIC METABOLIC PNL TOTAL CA: CPT | Performed by: INTERNAL MEDICINE

## 2019-11-26 ENCOUNTER — OFFICE VISIT (OUTPATIENT)
Dept: ORTHOPEDIC SURGERY | Facility: CLINIC | Age: 69
End: 2019-11-26

## 2019-11-26 VITALS
BODY MASS INDEX: 25.39 KG/M2 | HEIGHT: 66 IN | SYSTOLIC BLOOD PRESSURE: 121 MMHG | DIASTOLIC BLOOD PRESSURE: 72 MMHG | HEART RATE: 69 BPM | WEIGHT: 158 LBS

## 2019-11-26 DIAGNOSIS — M48.062 LUMBAR STENOSIS WITH NEUROGENIC CLAUDICATION: Primary | ICD-10-CM

## 2019-11-26 PROCEDURE — 99213 OFFICE O/P EST LOW 20 MIN: CPT | Performed by: PHYSICIAN ASSISTANT

## 2019-12-01 NOTE — PROGRESS NOTES
[facility]-up     patient Name: Jennifer Blackmon    History of Present Illness:  Jennifer Blackmon is a 69 y.o. year old female here today with chief complaint of had concerns including Follow-up and Pain of the Lumbar Spine..has chronic dull constant back ach with activity especially walking carrying something. When she walkes carrying weight she limps and her legs don't want to work properly. She notices her left foot rotatating ourward when walking. Legs feel heavy when walking. Improves whith sitting with knees bent or using a shopping cart. Has some mid back painas well.      Had tension headaches in the past which got better with exercises of the neck. No arm symptoms.    Has hx of mI earlier this year with subsaquent placement of stent. Now on plavix.    Has had previous history of cervical and lumbar surgery and she states her lumbar micro discectomy many years ago.    When she was here last she was sent for a course of physical therapy and isometric home exercises.  She states she has been doing exercise at home regularly and continues to have significant pain.    Imaging: AP and lateral x-rays of thoracic spine which shows mild scoliosis and age-related thoracic osteophyte formation.  No acute ab normality.  AP lateral lumbar spine my interpretation is degenerative scoliosis, at the level disc height loss and facet arthropathy.    Impression:   1. Lumbar stenosis with neurogenic claudication         Plan: MRI of lumbar spine follow-up after.    Vitals:  Vitals:    11/26/19 1244   BP: 121/72   Pulse: 69       Patient's Family and Social History:  Family History   Problem Relation Age of Onset   • Alzheimer's disease Mother    • Other Mother         CVA, acute myocardial infarction   • Leukemia Father    • Other Father         Parkinson's    • Heart attack Sister    • Heart attack Brother    • Bipolar disorder Daughter      Social History     Socioeconomic History   • Marital status:       Spouse name: Not on file   • Number of children: Not on file   • Years of education: Not on file   • Highest education level: Not on file   Tobacco Use   • Smoking status: Former Smoker   • Smokeless tobacco: Never Used   Substance and Sexual Activity   • Alcohol use: No     Frequency: Never   • Drug use: No   • Sexual activity: Not Currently       Patients Medical and Surgical History:  Past Medical History:   Diagnosis Date   • Allergic rhinitis    • DDD (degenerative disc disease), lumbar    • Depression    • Depression    • Heart attack (CMS/HCC)     2019   • Hyperlipidemia    • Hypertension    • Insomnia    • Osteoarthritis    • Toenail fungus      Past Surgical History:   Procedure Laterality Date   • ADENOIDECTOMY      Adenoids removed   • APPENDECTOMY     • AUGMENTATION MAMMAPLASTY     • BREAST IMPLANT SURGERY     • CATARACT EXTRACTION Bilateral 2017   • CERVICAL DISC SURGERY      ruptured and removed    •  SECTION     • CHEST SURGERY      attempted pectus repair    • EYE SURGERY  1997    AK   • HYSTERECTOMY     • KNEE ARTHROSCOPY Right    • LUMBAR DISC SURGERY      ruptured and removed    • NASAL SEPTAL RECONSTRUCTION     • PECTUS CARNATUM REPAIR      attempted   • RECTAL SURGERY      Repair   • REFRACTIVE SURGERY      RK/AK both eyes    • REFRACTIVE SURGERY     • SOMNOPLASTY RADIAL FREQUENCY ABLATION     • TONSILLECTOMY     • UVULOPALATOPHARYNGOPLASTY         Review of Systems   Constitutional: Negative for activity change, appetite change and fatigue.   HENT: Negative for congestion.    Eyes: Negative for visual disturbance.   Respiratory: Negative for shortness of breath.    Gastrointestinal: Negative for abdominal pain.   Genitourinary: Negative for flank pain.   Musculoskeletal: Positive for arthralgias, back pain and gait problem.   Skin: Negative for wound.   Neurological: Positive for weakness. Negative for headaches.   Psychiatric/Behavioral: Negative for  behavioral problems. The patient is not nervous/anxious.        Physical Exam   Constitutional: She is oriented to person, place, and time. She appears well-developed.   HENT:   Head: Normocephalic and atraumatic.   Eyes: Conjunctivae are normal.   Neck: Normal range of motion.   Cardiovascular: Normal rate.   Pulmonary/Chest: Effort normal.   Abdominal: There is no guarding.   Musculoskeletal: She exhibits tenderness.        Right hip: Normal.        Left hip: Normal.        Lumbar back: She exhibits decreased range of motion, tenderness and pain.   Neurological: She is oriented to person, place, and time.   Reflex Scores:       Patellar reflexes are 2+ on the right side and 2+ on the left side.  Skin: Skin is warm.   Psychiatric: Her behavior is normal.   Vitals reviewed.    Ortho Exam    Orders Placed This Encounter   Procedures   • MRI Lumbar Spine Without Contrast

## 2019-12-09 ENCOUNTER — HOSPITAL ENCOUNTER (OUTPATIENT)
Dept: MRI IMAGING | Facility: HOSPITAL | Age: 69
Discharge: HOME | End: 2019-12-09
Admitting: PHYSICIAN ASSISTANT

## 2019-12-09 PROCEDURE — 72148 MRI LUMBAR SPINE W/O DYE: CPT

## 2019-12-10 RX ORDER — CARVEDILOL 3.12 MG/1
TABLET ORAL
Qty: 180 TABLET | Refills: 4 | Status: SHIPPED | OUTPATIENT
Start: 2019-12-10 | End: 2021-01-04

## 2019-12-10 RX ORDER — ESCITALOPRAM OXALATE 10 MG/1
TABLET ORAL
Qty: 180 TABLET | Refills: 4 | Status: SHIPPED | OUTPATIENT
Start: 2019-12-10 | End: 2020-03-30 | Stop reason: SDUPTHER

## 2019-12-10 RX ORDER — BUPROPION HYDROCHLORIDE 150 MG/1
TABLET ORAL
Qty: 270 TABLET | Refills: 4 | Status: SHIPPED | OUTPATIENT
Start: 2019-12-10 | End: 2021-01-04

## 2019-12-11 ENCOUNTER — OFFICE VISIT (OUTPATIENT)
Dept: ORTHOPEDIC SURGERY | Facility: CLINIC | Age: 69
End: 2019-12-11

## 2019-12-11 VITALS
SYSTOLIC BLOOD PRESSURE: 102 MMHG | DIASTOLIC BLOOD PRESSURE: 67 MMHG | HEIGHT: 66 IN | BODY MASS INDEX: 25.07 KG/M2 | WEIGHT: 156 LBS | HEART RATE: 69 BPM

## 2019-12-11 DIAGNOSIS — M48.062 LUMBAR STENOSIS WITH NEUROGENIC CLAUDICATION: Primary | ICD-10-CM

## 2019-12-11 PROCEDURE — 99214 OFFICE O/P EST MOD 30 MIN: CPT | Performed by: PHYSICIAN ASSISTANT

## 2019-12-11 RX ORDER — GABAPENTIN 100 MG/1
CAPSULE ORAL
Qty: 60 CAPSULE | Refills: 0 | Status: SHIPPED | OUTPATIENT
Start: 2019-12-11 | End: 2020-01-10

## 2019-12-11 NOTE — PROGRESS NOTES
[facility]     patient Name: Jennifer Blackmon    History of Present Illness:  Jennifer Blackmon is a 69 y.o. year old female here today with chief complaint of had concerns including Follow-up and Pain of the Lumbar Spine..has chronic dull constant back ach with activity especially walking carrying something. When she walkes carrying weight she limps and her legs don't want to work properly. She notices her left foot rotatating ourward when walking. Legs feel heavy when walking. Improves whith sitting with knees bent or using a shopping cart. Has some mid back painas well.      Had tension headaches in the past which got better with exercises of the neck. No arm symptoms.    Has hx of mI earlier this year with subsaquent placement of stent. Now on plavix.    Has had previous history of cervical and lumbar surgery and she states her lumbar micro discectomy many years ago.    When she was here last she was sent for a course of physical therapy and isometric home exercises.  She states she has been doing exercise at home regularly and continues to have significant pain.    Imaging:     My interpretation of Mri lumbar L2-L5 severe stenosis, degenerative scoliosis    Impression:   No diagnosis found.     Plan: We have discussed using an inversion table regularly and getting started on gabapentin.  We will also send her for pain management and epidural injections.    Vitals:  Vitals:    12/11/19 0814   BP: 102/67   Pulse: 69       Patient's Family and Social History:  Family History   Problem Relation Age of Onset   • Alzheimer's disease Mother    • Other Mother         CVA, acute myocardial infarction   • Leukemia Father    • Other Father         Parkinson's    • Heart attack Sister    • Heart attack Brother    • Bipolar disorder Daughter      Social History     Socioeconomic History   • Marital status:      Spouse name: Not on file   • Number of children: Not on file   • Years of  education: Not on file   • Highest education level: Not on file   Tobacco Use   • Smoking status: Former Smoker   • Smokeless tobacco: Never Used   Substance and Sexual Activity   • Alcohol use: No     Frequency: Never   • Drug use: No   • Sexual activity: Not Currently       Patients Medical and Surgical History:  Past Medical History:   Diagnosis Date   • Allergic rhinitis    • DDD (degenerative disc disease), lumbar    • Depression    • Depression    • Heart attack (CMS/HCC)     2019   • Hyperlipidemia    • Hypertension    • Insomnia    • Osteoarthritis    • Toenail fungus      Past Surgical History:   Procedure Laterality Date   • ADENOIDECTOMY      Adenoids removed   • APPENDECTOMY     • AUGMENTATION MAMMAPLASTY     • BREAST IMPLANT SURGERY     • CATARACT EXTRACTION Bilateral 2017   • CERVICAL DISC SURGERY      ruptured and removed    •  SECTION     • CHEST SURGERY      attempted pectus repair    • EYE SURGERY  1997    AK   • HYSTERECTOMY     • KNEE ARTHROSCOPY Right    • LUMBAR DISC SURGERY      ruptured and removed    • NASAL SEPTAL RECONSTRUCTION     • PECTUS CARNATUM REPAIR      attempted   • RECTAL SURGERY      Repair   • REFRACTIVE SURGERY      RK/AK both eyes    • REFRACTIVE SURGERY     • SOMNOPLASTY RADIAL FREQUENCY ABLATION     • TONSILLECTOMY     • UVULOPALATOPHARYNGOPLASTY         Review of Systems   Constitutional: Negative for activity change, appetite change and fatigue.   HENT: Negative for congestion.    Eyes: Negative for visual disturbance.   Respiratory: Negative for shortness of breath.    Gastrointestinal: Negative for abdominal pain.   Genitourinary: Negative for flank pain.   Musculoskeletal: Positive for arthralgias, back pain and gait problem.   Skin: Negative for wound.   Neurological: Positive for weakness. Negative for headaches.   Psychiatric/Behavioral: Negative for behavioral problems. The patient is not nervous/anxious.        Physical Exam    Constitutional: She is oriented to person, place, and time. She appears well-developed.   HENT:   Head: Normocephalic and atraumatic.   Eyes: Conjunctivae are normal.   Neck: Normal range of motion.   Cardiovascular: Normal rate.   Pulmonary/Chest: Effort normal.   Abdominal: There is no guarding.   Musculoskeletal: She exhibits tenderness.        Right hip: Normal.        Left hip: Normal.        Lumbar back: She exhibits decreased range of motion, tenderness and pain.   Neurological: She is oriented to person, place, and time.   Reflex Scores:       Patellar reflexes are 2+ on the right side and 2+ on the left side.  Skin: Skin is warm.   Psychiatric: Her behavior is normal.   Vitals reviewed.    Ortho Exam    No orders of the defined types were placed in this encounter.

## 2020-01-10 ENCOUNTER — HOSPITAL ENCOUNTER (OUTPATIENT)
Dept: GENERAL RADIOLOGY | Facility: HOSPITAL | Age: 70
Discharge: HOME | End: 2020-01-10
Admitting: INTERNAL MEDICINE

## 2020-01-10 ENCOUNTER — OFFICE VISIT (OUTPATIENT)
Dept: FAMILY MEDICINE CLINIC | Facility: CLINIC | Age: 70
End: 2020-01-10

## 2020-01-10 VITALS
RESPIRATION RATE: 8 BRPM | SYSTOLIC BLOOD PRESSURE: 124 MMHG | HEART RATE: 76 BPM | TEMPERATURE: 98.3 F | DIASTOLIC BLOOD PRESSURE: 60 MMHG | WEIGHT: 158 LBS | BODY MASS INDEX: 25.5 KG/M2

## 2020-01-10 DIAGNOSIS — M79.644 FINGER PAIN, RIGHT: ICD-10-CM

## 2020-01-10 DIAGNOSIS — G89.29 CHRONIC LOW BACK PAIN, UNSPECIFIED BACK PAIN LATERALITY, UNSPECIFIED WHETHER SCIATICA PRESENT: Primary | ICD-10-CM

## 2020-01-10 DIAGNOSIS — M54.50 CHRONIC LOW BACK PAIN, UNSPECIFIED BACK PAIN LATERALITY, UNSPECIFIED WHETHER SCIATICA PRESENT: Primary | ICD-10-CM

## 2020-01-10 PROCEDURE — 99214 OFFICE O/P EST MOD 30 MIN: CPT | Performed by: INTERNAL MEDICINE

## 2020-01-10 PROCEDURE — 73130 X-RAY EXAM OF HAND: CPT

## 2020-01-10 NOTE — PROGRESS NOTES
Subjective   Jennifer Blackmon is a 69 y.o. female.     Here for med check scoliosis and lumbar DDD  Also notes that about 3 weeks ago, was carrying a frozen turkey to the car  Dropped it and caught her finger in the netting  [place] [facility]  But still swollen and painful now  Can passively move finger, but can't actively move it    Back pain is the same  Ins refused to cover gabapentin  Topical cream did not help  Is supposed to have first epidural with pain management on 1/22       The following portions of the patient's history were reviewed and updated as appropriate: allergies, current medications, past family history, past medical history, past social history, past surgical history and problem list.    Review of Systems   Constitutional: Negative for fatigue and fever.   HENT: Negative for congestion, ear pain, rhinorrhea and sore throat.    Eyes: Negative for blurred vision and itching.   Respiratory: Negative for cough and shortness of breath.    Cardiovascular: Negative for chest pain and palpitations.   Gastrointestinal: Negative for abdominal pain, diarrhea and vomiting.   Endocrine: Negative for polydipsia and polyuria.   Genitourinary: Negative for dysuria, frequency, hematuria and urgency.   Musculoskeletal: Positive for back pain and gait problem. Negative for joint swelling and myalgias.   Skin: Negative for rash and skin lesions.   Neurological: Negative for dizziness, numbness and headache.   Psychiatric/Behavioral: Negative for depressed mood. The patient is not nervous/anxious.          Current Outpatient Medications:   •  atorvastatin (LIPITOR) 40 MG tablet, Take 1 tablet by mouth Daily., Disp: 90 tablet, Rfl: 3  •  HUNTER LOW DOSE 81 MG chewable tablet, Chew 81 mg Daily., Disp: , Rfl: 0  •  buPROPion XL (WELLBUTRIN XL) 150 MG 24 hr tablet, TAKE 3 TABLETS IN THE MORNING, Disp: 270 tablet, Rfl: 4  •  calcium citrate-vitamin d (CITRACAL) 200-250 MG-UNIT tablet tablet, Take  by mouth  Daily., Disp: , Rfl:   •  carvedilol (COREG) 3.125 MG tablet, TAKE 1 TABLET TWICE A DAY, Disp: 180 tablet, Rfl: 4  •  clopidogrel (PLAVIX) 75 MG tablet, Take 1 tablet by mouth Daily., Disp: 90 tablet, Rfl: 1  •  coenzyme Q10 100 MG capsule, Take 100 mg by mouth., Disp: , Rfl:   •  diclofenac (VOLTAREN) 50 MG EC tablet, Take 1 tablet by mouth 3 (Three) Times a Day As Needed (back pain). (Patient taking differently: Take 50 mg by mouth 2 (Two) Times a Day.), Disp: 270 tablet, Rfl: 0  •  escitalopram (LEXAPRO) 10 MG tablet, TAKE 1 TABLET TWICE A DAY, Disp: 180 tablet, Rfl: 4  •  montelukast (SINGULAIR) 10 MG tablet, , Disp: , Rfl:   •  gabapentin (NEURONTIN) 100 MG capsule, 1 capsule nightly for 1 week then increase to every 12 hours, Disp: 60 capsule, Rfl: 0  •  vitamin E 100 UNIT capsule, Daily., Disp: , Rfl:     Objective   /60 (BP Location: Left arm, Patient Position: Sitting, Cuff Size: Adult)   Pulse 76   Temp 98.3 °F (36.8 °C) (Oral)   Resp 8   Wt 71.7 kg (158 lb)   BMI 25.50 kg/m²   Physical Exam   Constitutional: She is oriented to person, place, and time. She appears well-developed and well-nourished. No distress.   HENT:   Head: Normocephalic and atraumatic.   Mouth/Throat: Oropharynx is clear and moist. No oropharyngeal exudate.   Eyes: Conjunctivae and EOM are normal. Right eye exhibits no discharge. Left eye exhibits no discharge. No scleral icterus.   Neck: Normal range of motion. Neck supple. No thyromegaly present.   Cardiovascular: Normal rate, regular rhythm and normal heart sounds. Exam reveals no gallop and no friction rub.   No murmur heard.  Pulmonary/Chest: Effort normal and breath sounds normal. No respiratory distress. She has no wheezes. She has no rales.   Musculoskeletal: Normal range of motion. She exhibits deformity. She exhibits no edema.   Lymphadenopathy:     She has no cervical adenopathy.   Neurological: She is alert and oriented to person, place, and time. No cranial  nerve deficit.   Skin: Skin is warm and dry. No rash noted. She is not diaphoretic. No erythema.   Psychiatric: She has a normal mood and affect. Her behavior is normal. Thought content normal.   Vitals reviewed.        Assessment/Plan   Problems Addressed this Visit     None      Visit Diagnoses     Chronic low back pain, unspecified back pain laterality, unspecified whether sciatica present    -  Primary    Finger pain, right        Relevant Orders    Ambulatory Referral to Hand Surgery    XR Hand 3+ View Right          At this point, pt declines gabapentin, will just wait to see how epidural goes  Also, spine surgeon no longer with [facility]  So if epidurals do not help, will need new referral  Will order new xray since fractures are not always obvious right after trauma  If no fracture, may have damaged tendons  Will also refer to hand surgery to evaluate       Procedures

## 2020-01-22 ENCOUNTER — TELEPHONE (OUTPATIENT)
Dept: FAMILY MEDICINE CLINIC | Facility: CLINIC | Age: 70
End: 2020-01-22

## 2020-01-22 NOTE — TELEPHONE ENCOUNTER
Patient left  stating that she was just at [facility] in [place] for injection in lower back. Patient states that because she is on PLAVIX they could not do injection with out permission from PCP. Patient is asking if [facility] can be called at #[phone].

## 2020-01-27 RX ORDER — CLOPIDOGREL BISULFATE 75 MG/1
TABLET ORAL
Qty: 90 TABLET | Refills: 4 | Status: SHIPPED | OUTPATIENT
Start: 2020-01-27 | End: 2021-02-09 | Stop reason: SDUPTHER

## 2020-02-03 PROBLEM — I25.2 OLD MI (MYOCARDIAL INFARCTION): Status: ACTIVE | Noted: 2019-06-18

## 2020-02-07 ENCOUNTER — TELEPHONE (OUTPATIENT)
Dept: FAMILY MEDICINE CLINIC | Facility: CLINIC | Age: 70
End: 2020-02-07

## 2020-02-07 NOTE — TELEPHONE ENCOUNTER
[facility] called to say they need a new letter faxed that it is okay for patient to stop her Plavix 7 days prior to procedures and restart the day following.  Please fax to [phone].

## 2020-02-13 ENCOUNTER — OFFICE VISIT (OUTPATIENT)
Dept: CARDIOLOGY | Facility: CLINIC | Age: 70
End: 2020-02-13

## 2020-02-13 VITALS
HEART RATE: 69 BPM | HEIGHT: 66 IN | OXYGEN SATURATION: 98 % | SYSTOLIC BLOOD PRESSURE: 118 MMHG | BODY MASS INDEX: 25.43 KG/M2 | WEIGHT: 158.2 LBS | DIASTOLIC BLOOD PRESSURE: 60 MMHG

## 2020-02-13 DIAGNOSIS — G89.29 CHRONIC LOW BACK PAIN, UNSPECIFIED BACK PAIN LATERALITY, UNSPECIFIED WHETHER SCIATICA PRESENT: ICD-10-CM

## 2020-02-13 DIAGNOSIS — M54.50 CHRONIC LOW BACK PAIN, UNSPECIFIED BACK PAIN LATERALITY, UNSPECIFIED WHETHER SCIATICA PRESENT: ICD-10-CM

## 2020-02-13 DIAGNOSIS — I10 ESSENTIAL HYPERTENSION: ICD-10-CM

## 2020-02-13 DIAGNOSIS — I25.119 CORONARY ARTERY DISEASE INVOLVING NATIVE CORONARY ARTERY OF NATIVE HEART WITH ANGINA PECTORIS (HCC): Primary | ICD-10-CM

## 2020-02-13 DIAGNOSIS — E78.2 HYPERLIPIDEMIA, MIXED: ICD-10-CM

## 2020-02-13 PROBLEM — M51.36 DEGENERATION OF LUMBAR INTERVERTEBRAL DISC: Status: ACTIVE | Noted: 2020-01-22

## 2020-02-13 PROBLEM — M47.816 LUMBAR SPONDYLOSIS: Status: ACTIVE | Noted: 2020-01-22

## 2020-02-13 PROBLEM — M51.369 DEGENERATION OF LUMBAR INTERVERTEBRAL DISC: Status: ACTIVE | Noted: 2020-01-22

## 2020-02-13 PROCEDURE — 99213 OFFICE O/P EST LOW 20 MIN: CPT | Performed by: INTERNAL MEDICINE

## 2020-02-13 PROCEDURE — 93000 ELECTROCARDIOGRAM COMPLETE: CPT | Performed by: INTERNAL MEDICINE

## 2020-02-13 NOTE — PROGRESS NOTES
Cardiology Office Visit Note      Referring physician:  MD Hanane    Reason For Followup: 6 month/CAD    HPI:  Jennifer Blackmon is a 69 y.o. female with hx CAD with previous PCI to high-grade RCA lesion in May 2019 while in [place]..  A previous Lexiscan Myoview performed earlier that same month had: revealed no  reversible ischemia or fixed defects.      Emergent left heart catheterization May 2019 in [place]:  LAD had proximal 30 to 40% stenosis with mild irregularities the first diagonal   was small and had mild irregularities circumflex was moderate caliber and had proximal 20 to 30% stenosis gives off a small first marginal   which had a proximal 20 to 30% stenosis the second OM is within normal limits.      The right coronary artery is dominant and had a 99% stenosis in the mid segment.  The patient underwent angioplasty with drug-eluting stent placement to the right coronary artery.    LVEF at time of emergent cardiac catheterization was 45%; essentially normal Echo 6/2/19  with EF 68%,     Does report inability to do regular exercise due to back pain and bilat leg pain.  She is known to have scoliosis and spinal stenosis    Specifically  denies chest pain, dyspnea PND, orthopnea, palpitations, near syncope, lower extremity edema or feelings of her heart racing.    She remains compliant with medications as listed and reviewed today.        Past Medical History:   Diagnosis Date   • Allergic rhinitis    • Coronary artery disease involving native coronary artery of native heart with angina pectoris (CMS/HCC) 6/18/2019    Status post successful PCI of the coronary stent deployment to high-grade RCA stenosis after presenting with ST segment elevated microinfarction in May 2019   • DDD (degenerative disc disease), lumbar    • Depression    • Depression    • Essential hypertension 6/18/2019   • Heart attack (CMS/HCC)     6/2/2019   • Hyperlipidemia    • Hyperlipidemia, mixed 8/13/2019   • Hypertension    • Insomnia    •  Myocardial infarction less than 4 weeks ago (CMS/HCC) 2019    Presented initially with ST segment elevated microinfarction treated emergently in [place] May 2019   • Old MI (myocardial infarction) 2019    Presented initially with ST segment elevated microinfarction treated emergently in [place] May 2019   • Osteoarthritis    • Toenail fungus        Past Surgical History:   Procedure Laterality Date   • ADENOIDECTOMY      Adenoids removed   • APPENDECTOMY     • AUGMENTATION MAMMAPLASTY     • BREAST IMPLANT SURGERY     • CATARACT EXTRACTION Bilateral 2017   • CERVICAL DISC SURGERY      ruptured and removed    •  SECTION     • CHEST SURGERY      attempted pectus repair    • EYE SURGERY  1997    AK   • HYSTERECTOMY     • KNEE ARTHROSCOPY Right    • LUMBAR DISC SURGERY      ruptured and removed    • NASAL SEPTAL RECONSTRUCTION     • PECTUS CARNATUM REPAIR      attempted   • RECTAL SURGERY      Repair   • REFRACTIVE SURGERY      RK/AK both eyes    • REFRACTIVE SURGERY     • SOMNOPLASTY RADIAL FREQUENCY ABLATION     • TONSILLECTOMY     • UVULOPALATOPHARYNGOPLASTY           Current Outpatient Medications:   •  atorvastatin (LIPITOR) 40 MG tablet, Take 1 tablet by mouth Daily., Disp: 90 tablet, Rfl: 3  •  HUNTER LOW DOSE 81 MG chewable tablet, Chew 81 mg Daily., Disp: , Rfl: 0  •  buPROPion XL (WELLBUTRIN XL) 150 MG 24 hr tablet, TAKE 3 TABLETS IN THE MORNING, Disp: 270 tablet, Rfl: 4  •  calcium citrate-vitamin d (CITRACAL) 200-250 MG-UNIT tablet tablet, Take  by mouth Daily., Disp: , Rfl:   •  carvedilol (COREG) 3.125 MG tablet, TAKE 1 TABLET TWICE A DAY, Disp: 180 tablet, Rfl: 4  •  clopidogrel (PLAVIX) 75 MG tablet, TAKE 1 TABLET DAILY, Disp: 90 tablet, Rfl: 4  •  coenzyme Q10 100 MG capsule, Take 100 mg by mouth., Disp: , Rfl:   •  diclofenac (VOLTAREN) 50 MG EC tablet, Take 1 tablet by mouth 2 (Two) Times a Day., Disp: 180 tablet, Rfl: 1  •  escitalopram (LEXAPRO) 10 MG  "tablet, TAKE 1 TABLET TWICE A DAY, Disp: 180 tablet, Rfl: 4  •  montelukast (SINGULAIR) 10 MG tablet, , Disp: , Rfl:   •  vitamin E 100 UNIT capsule, Daily., Disp: , Rfl:     Social History     Socioeconomic History   • Marital status:      Spouse name: Not on file   • Number of children: Not on file   • Years of education: Not on file   • Highest education level: Not on file   Tobacco Use   • Smoking status: Former Smoker   • Smokeless tobacco: Never Used   Substance and Sexual Activity   • Alcohol use: No     Frequency: Never   • Drug use: No   • Sexual activity: Not Currently       Family History   Problem Relation Age of Onset   • Alzheimer's disease Mother    • Other Mother         CVA, acute myocardial infarction   • Leukemia Father    • Other Father         Parkinson's    • Heart attack Sister    • Heart attack Brother    • Bipolar disorder Daughter          Review of Systems   General: denies fever, chills, anorexia, weight loss  Eyes: denies blurring, diplopia  Ear/Nose/Throat: denies ear pain, nosebleeds, hoarseness  Cardiovascular: See HPI  Respiratory: denies excessive sputum, hemoptysis, wheezing  Gastrointestinal: denies nausea, vomiting, change in bowel habits, abdominal pain  Genitourinary: denies dysuria and hematuria  Musculoskeletal: denies joint swelling,  weakness  Skin: denies rashes, itching, suspicious lesions  Neurologic: denies focal neuro deficits  Psychiatric: denies depression, anxiety  Endocrine: denies cold intolerance, heat intolerance  Hematologic/Lymphatic: denies abnormal bruising, bleeding  Allergic/Immunologic: denies urticaria or persistent infections      Objective     Visit Vitals  /60   Pulse 69   Ht 167.6 cm (66\")   Wt 71.8 kg (158 lb 3.2 oz)   SpO2 98% Comment: room air   BMI 25.53 kg/m²           Physical Exam  General:     Mildly Obese, well developed,, in no acute distress.    Head:     normocephalic and atraumatic.    Eyes:    PERRL/EOM intact, conjunctiva " "and sclera clear with out nystagmus.    Neck:    no jvd or bruits  Chest Wall: Mild spinal stenosis with prominent pectus excavatum      Lungs:    clear bilaterally to auscultation with adequate global airflow   Heart:    non-displaced PMI; regular rate and rhythm, normal S1, S2 without murmurs, rubs, or gallops  Abdomen:  Soft, nontender without HSM  Msk:    fifth digit R hand bandaged due to recent fx  Pulses:    pulses normal in all 4 extremities.    Extremities:    no clubbing, cyanosis, edema   Neurologic:    no focal sensory or motor deficits  Skin:    intact without lesions or rashes.    Psych:    alert and cooperative; normal mood and affect; normal attention span and concentration.            ECG 12 Lead  Date/Time: 2/13/2020 10:49 AM  Performed by: RAFAEL Townsend MD  Authorized by: RAFAEL Townsend MD   Comparison: compared with previous ECG from 8/13/2019  Similar to previous ECG  Comments: SR HR 69 bpm              Assessment:   1. Coronary artery disease involving native coronary artery of native heart with angina pectoris (CMS/HCC)-status post PCI with intracoronary stent deployment to high-grade RCA occlusion presenting with ST segment elevated microinfarction May 2019  Remains hemodynamically stable and angina free    2. Essential hypertension  Well-regulated on current medications    3. Hyperlipidemia, mixed  Maintained on atorvastatin    4. Chronic low back pain, unspecified back pain laterality, unspecified whether sciatica present  Known to have lumbar disc disease and spinal stenosis of same region        Plan:  Continue current medical regimen\" is factor modification in view of the fact the patient remains hemodynamically stable and angina free.  Return to clinic 1 year or sooner if needed    RAFAEL Townsend MD  2/15/2020 4:25 PM    This report was generated using the Dragon voice recognition system.    "

## 2020-03-05 RX ORDER — MONTELUKAST SODIUM 10 MG/1
TABLET ORAL
Qty: 90 TABLET | Refills: 4 | Status: SHIPPED | OUTPATIENT
Start: 2020-03-05 | End: 2021-12-07

## 2020-03-16 ENCOUNTER — TELEPHONE (OUTPATIENT)
Dept: FAMILY MEDICINE CLINIC | Facility: CLINIC | Age: 70
End: 2020-03-16

## 2020-03-16 NOTE — TELEPHONE ENCOUNTER
Patient called in and stated she suppose to get a back injection from the pain clinic today but they refused to give her the injection because she hadn't stop taking the blood thinner medication for 7 days.    Patient wants to know if it will be ok to stop the blood thinner?    Patient call back [phone]

## 2020-03-30 ENCOUNTER — TELEPHONE (OUTPATIENT)
Dept: FAMILY MEDICINE CLINIC | Facility: CLINIC | Age: 70
End: 2020-03-30

## 2020-03-30 RX ORDER — ESCITALOPRAM OXALATE 20 MG/1
TABLET ORAL
Qty: 90 TABLET | Refills: 3 | Status: SHIPPED | OUTPATIENT
Start: 2020-03-30 | End: 2021-03-18 | Stop reason: SDUPTHER

## 2020-03-30 NOTE — TELEPHONE ENCOUNTER
ANTIONE FROM [employer] CALLED TO GET A PA FOR escitalopram (LEXAPRO) 10 MG tablet. INSURANCE ALLOWS ONLY 1 PER DAY. SINCE IT WAS PRESCRIBED 2ICE A DAY, NEEDS A PA.  IF YOU HAVE ANY QUESTIONS YOU CAN CALL ANTIONE @ [phone] RF#: [id number]  INSURANCE PHONE NUMBER FOR PA [phone]

## 2020-05-06 ENCOUNTER — TELEPHONE (OUTPATIENT)
Dept: FAMILY MEDICINE CLINIC | Facility: CLINIC | Age: 70
End: 2020-05-06

## 2020-05-06 NOTE — TELEPHONE ENCOUNTER
Spoke with patient at 2:38pm.  She said she is overly worried about her balance and unsteadiness.  She will be standing still and fall backwards.  Yesterday she went to dump some food waste over the fence and fell into the fence three times.  She went to a neurologist previously for her lower back and he said her spine was all bent up.  She got one shot from him and they kept rescheduling her 2nd shot and it was going to be June and she told them to forget it.  The neurologist that she saw is no longer with that office.  At this point she is ready to start over with a neuro.  She isn't sure if her problems are muscle related or spine related.  Do you want to see her, have a telephone visit or refer her to a neurologist?

## 2020-05-06 NOTE — TELEPHONE ENCOUNTER
Gave message to patient at 3:10pm and scheduled a telephone visit with Dr Koo on 5/8/2020 at 10:30am.

## 2020-05-06 NOTE — TELEPHONE ENCOUNTER
PATIENT CALLED IN TO REQUEST A CALL BACK FROM DOCTOR KRISTIAN . PATIENT CALL BACK IS  [phone]

## 2020-05-08 ENCOUNTER — OFFICE VISIT (OUTPATIENT)
Dept: FAMILY MEDICINE CLINIC | Facility: CLINIC | Age: 70
End: 2020-05-08

## 2020-05-08 DIAGNOSIS — M48.061 SPINAL STENOSIS OF LUMBAR REGION, UNSPECIFIED WHETHER NEUROGENIC CLAUDICATION PRESENT: Primary | ICD-10-CM

## 2020-05-08 DIAGNOSIS — I25.119 CORONARY ARTERY DISEASE INVOLVING NATIVE CORONARY ARTERY OF NATIVE HEART WITH ANGINA PECTORIS (HCC): ICD-10-CM

## 2020-05-08 DIAGNOSIS — E55.9 VITAMIN D DEFICIENCY: ICD-10-CM

## 2020-05-08 DIAGNOSIS — I10 ESSENTIAL HYPERTENSION: ICD-10-CM

## 2020-05-08 PROCEDURE — 99443 PR PHYS/QHP TELEPHONE EVALUATION 21-30 MIN: CPT | Performed by: INTERNAL MEDICINE

## 2020-05-08 NOTE — PROGRESS NOTES
Subjective   Jennifer Blackmon is a 69 y.o. female.     Today's visit is to discuss spinal stenosis and also CAD  She is overdue for labs    She has been trying injections for pain  But it has not been helping  bc of pandemic, procedure continues to be delayed  Meanwhile, she is having more trouble with balance and falls  So far, no fractures resulting from fall    She has not had any chest pain or PAULINO  No headaches  She has been compliant with her medications         The following portions of the patient's history were reviewed and updated as appropriate: allergies, current medications, past family history, past medical history, past social history, past surgical history and problem list.    Review of Systems   Constitutional: Negative for fatigue and fever.   HENT: Negative for congestion, ear pain, rhinorrhea and sore throat.    Eyes: Negative for blurred vision and itching.   Respiratory: Negative for cough and shortness of breath.    Cardiovascular: Negative for chest pain and palpitations.   Gastrointestinal: Negative for abdominal pain, diarrhea and vomiting.   Endocrine: Negative for polydipsia and polyuria.   Genitourinary: Negative for dysuria, frequency, hematuria and urgency.   Musculoskeletal: Positive for back pain and gait problem. Negative for joint swelling and myalgias.   Skin: Negative for rash and skin lesions.   Neurological: Negative for dizziness, numbness and headache.   Psychiatric/Behavioral: Negative for depressed mood. The patient is not nervous/anxious.          Current Outpatient Medications:   •  atorvastatin (LIPITOR) 40 MG tablet, Take 1 tablet by mouth Daily., Disp: 90 tablet, Rfl: 3  •  HUNTER LOW DOSE 81 MG chewable tablet, Chew 81 mg Daily., Disp: , Rfl: 0  •  buPROPion XL (WELLBUTRIN XL) 150 MG 24 hr tablet, TAKE 3 TABLETS IN THE MORNING, Disp: 270 tablet, Rfl: 4  •  calcium citrate-vitamin d (CITRACAL) 200-250 MG-UNIT tablet tablet, Take  by mouth Daily., Disp: , Rfl:   •   carvedilol (COREG) 3.125 MG tablet, TAKE 1 TABLET TWICE A DAY, Disp: 180 tablet, Rfl: 4  •  clopidogrel (PLAVIX) 75 MG tablet, TAKE 1 TABLET DAILY, Disp: 90 tablet, Rfl: 4  •  coenzyme Q10 100 MG capsule, Take 100 mg by mouth., Disp: , Rfl:   •  diclofenac (VOLTAREN) 50 MG EC tablet, Take 1 tablet by mouth 2 (Two) Times a Day., Disp: 180 tablet, Rfl: 1  •  escitalopram (LEXAPRO) 20 MG tablet, 1/2 tablet twice daily, Disp: 90 tablet, Rfl: 3  •  montelukast (SINGULAIR) 10 MG tablet, TAKE 1 TABLET DAILY, Disp: 90 tablet, Rfl: 4  •  vitamin E 100 UNIT capsule, Daily., Disp: , Rfl:     Objective   There were no vitals taken for this visit.  Physical Exam   Constitutional: She is oriented to person, place, and time.   Neurological: She is alert and oriented to person, place, and time.   Psychiatric: She has a normal mood and affect. Her behavior is normal. Thought content normal.         Assessment/Plan   Problems Addressed this Visit        Cardiovascular and Mediastinum    Coronary artery disease involving native coronary artery of native heart with angina pectoris (CMS/[facility])    Relevant Orders    CBC Auto Differential    Comprehensive Metabolic Panel    Lipid Panel    Essential hypertension    Relevant Orders    CBC Auto Differential    Comprehensive Metabolic Panel    Lipid Panel    TSH      Other Visit Diagnoses     Spinal stenosis of lumbar region, unspecified whether neurogenic claudication present    -  Primary    Relevant Orders    Ambulatory Referral to Neurosurgery    Vitamin D deficiency        Relevant Orders    Vitamin D 25 Hydroxy        I am concerned that falls will lead to fractures  Her heart attack was 6/2/19  So should pt need surgery, will likely have been close enough to 1 year  That pt can hold plavix safely for surgery  Will refer to nsgy  And will put in orders for labs - pt is due to recheck her lipid panel, cmp, etc        I spent 25 total minutes caring for Jennifer today.  Greater than 50% of  this time involved counseling and/or coordination of care as documented in the note above      You have chosen to receive care through a telephone visit. Do you consent to use a telephone visit for your medical care today? Yes      Procedures

## 2020-05-25 ENCOUNTER — TELEPHONE (OUTPATIENT)
Dept: FAMILY MEDICINE CLINIC | Facility: CLINIC | Age: 70
End: 2020-05-25

## 2020-06-08 ENCOUNTER — TELEPHONE (OUTPATIENT)
Dept: FAMILY MEDICINE CLINIC | Facility: CLINIC | Age: 70
End: 2020-06-08

## 2020-06-08 DIAGNOSIS — M47.816 LUMBAR SPONDYLOSIS: ICD-10-CM

## 2020-06-08 DIAGNOSIS — M48.061 SPINAL STENOSIS OF LUMBAR REGION, UNSPECIFIED WHETHER NEUROGENIC CLAUDICATION PRESENT: Primary | ICD-10-CM

## 2020-06-08 DIAGNOSIS — M94.9 DISORDER OF CARTILAGE, UNSPECIFIED: ICD-10-CM

## 2020-07-13 ENCOUNTER — HOSPITAL ENCOUNTER (OUTPATIENT)
Dept: BONE DENSITY | Facility: HOSPITAL | Age: 70
Discharge: HOME | End: 2020-07-13
Admitting: INTERNAL MEDICINE

## 2020-07-13 DIAGNOSIS — M94.9 DISORDER OF CARTILAGE, UNSPECIFIED: ICD-10-CM

## 2020-07-13 DIAGNOSIS — M48.061 SPINAL STENOSIS OF LUMBAR REGION, UNSPECIFIED WHETHER NEUROGENIC CLAUDICATION PRESENT: ICD-10-CM

## 2020-07-13 DIAGNOSIS — M47.816 LUMBAR SPONDYLOSIS: ICD-10-CM

## 2020-07-13 PROCEDURE — 77080 DXA BONE DENSITY AXIAL: CPT

## 2020-10-29 ENCOUNTER — LAB (OUTPATIENT)
Dept: FAMILY MEDICINE CLINIC | Facility: CLINIC | Age: 70
End: 2020-10-29

## 2020-10-29 DIAGNOSIS — I25.119 CORONARY ARTERY DISEASE INVOLVING NATIVE CORONARY ARTERY OF NATIVE HEART WITH ANGINA PECTORIS (HCC): ICD-10-CM

## 2020-10-29 DIAGNOSIS — I10 ESSENTIAL HYPERTENSION: ICD-10-CM

## 2020-10-29 DIAGNOSIS — E55.9 VITAMIN D DEFICIENCY: ICD-10-CM

## 2020-10-29 LAB
25(OH)D3 SERPL-MCNC: 44 NG/ML (ref 30–100)
ALBUMIN SERPL-MCNC: 4.6 G/DL (ref 3.5–5.2)
ALBUMIN/GLOB SERPL: 1.9 G/DL
ALP SERPL-CCNC: 92 U/L (ref 39–117)
ALT SERPL W P-5'-P-CCNC: 27 U/L (ref 1–33)
ANION GAP SERPL CALCULATED.3IONS-SCNC: 6.7 MMOL/L (ref 5–15)
AST SERPL-CCNC: 26 U/L (ref 1–32)
BASOPHILS # BLD AUTO: 0.04 10*3/MM3 (ref 0–0.2)
BASOPHILS NFR BLD AUTO: 0.6 % (ref 0–1.5)
BILIRUB SERPL-MCNC: 0.3 MG/DL (ref 0–1.2)
BUN SERPL-MCNC: 28 MG/DL (ref 8–23)
BUN/CREAT SERPL: 25.7 (ref 7–25)
CALCIUM SPEC-SCNC: 9.5 MG/DL (ref 8.6–10.5)
CHLORIDE SERPL-SCNC: 104 MMOL/L (ref 98–107)
CHOLEST SERPL-MCNC: 138 MG/DL (ref 0–200)
CO2 SERPL-SCNC: 32.3 MMOL/L (ref 22–29)
CREAT SERPL-MCNC: 1.09 MG/DL (ref 0.57–1)
DEPRECATED RDW RBC AUTO: 42.1 FL (ref 37–54)
EOSINOPHIL # BLD AUTO: 0.22 10*3/MM3 (ref 0–0.4)
EOSINOPHIL NFR BLD AUTO: 3.4 % (ref 0.3–6.2)
ERYTHROCYTE [DISTWIDTH] IN BLOOD BY AUTOMATED COUNT: 12 % (ref 12.3–15.4)
GFR SERPL CREATININE-BSD FRML MDRD: 49 ML/MIN/1.73
GLOBULIN UR ELPH-MCNC: 2.4 GM/DL
GLUCOSE SERPL-MCNC: 99 MG/DL (ref 65–99)
HCT VFR BLD AUTO: 42.9 % (ref 34–46.6)
HDLC SERPL-MCNC: 55 MG/DL (ref 40–60)
HGB BLD-MCNC: 13.7 G/DL (ref 12–15.9)
IMM GRANULOCYTES # BLD AUTO: 0.02 10*3/MM3 (ref 0–0.05)
IMM GRANULOCYTES NFR BLD AUTO: 0.3 % (ref 0–0.5)
LDLC SERPL CALC-MCNC: 69 MG/DL (ref 0–100)
LDLC/HDLC SERPL: 1.26 {RATIO}
LYMPHOCYTES # BLD AUTO: 2.39 10*3/MM3 (ref 0.7–3.1)
LYMPHOCYTES NFR BLD AUTO: 37.3 % (ref 19.6–45.3)
MCH RBC QN AUTO: 30.4 PG (ref 26.6–33)
MCHC RBC AUTO-ENTMCNC: 31.9 G/DL (ref 31.5–35.7)
MCV RBC AUTO: 95.1 FL (ref 79–97)
MONOCYTES # BLD AUTO: 0.56 10*3/MM3 (ref 0.1–0.9)
MONOCYTES NFR BLD AUTO: 8.7 % (ref 5–12)
NEUTROPHILS NFR BLD AUTO: 3.18 10*3/MM3 (ref 1.7–7)
NEUTROPHILS NFR BLD AUTO: 49.7 % (ref 42.7–76)
NRBC BLD AUTO-RTO: 0 /100 WBC (ref 0–0.2)
PLATELET # BLD AUTO: 282 10*3/MM3 (ref 140–450)
PMV BLD AUTO: 9.9 FL (ref 6–12)
POTASSIUM SERPL-SCNC: 4.6 MMOL/L (ref 3.5–5.2)
PROT SERPL-MCNC: 7 G/DL (ref 6–8.5)
RBC # BLD AUTO: 4.51 10*6/MM3 (ref 3.77–5.28)
SODIUM SERPL-SCNC: 143 MMOL/L (ref 136–145)
TRIGL SERPL-MCNC: 68 MG/DL (ref 0–150)
TSH SERPL DL<=0.05 MIU/L-ACNC: 1.97 UIU/ML (ref 0.27–4.2)
VLDLC SERPL-MCNC: 14 MG/DL (ref 5–40)
WBC # BLD AUTO: 6.41 10*3/MM3 (ref 3.4–10.8)

## 2020-10-29 PROCEDURE — 80053 COMPREHEN METABOLIC PANEL: CPT | Performed by: INTERNAL MEDICINE

## 2020-10-29 PROCEDURE — 84443 ASSAY THYROID STIM HORMONE: CPT | Performed by: INTERNAL MEDICINE

## 2020-10-29 PROCEDURE — 85025 COMPLETE CBC W/AUTO DIFF WBC: CPT | Performed by: INTERNAL MEDICINE

## 2020-10-29 PROCEDURE — 82306 VITAMIN D 25 HYDROXY: CPT | Performed by: INTERNAL MEDICINE

## 2020-10-29 PROCEDURE — 36415 COLL VENOUS BLD VENIPUNCTURE: CPT

## 2020-10-29 PROCEDURE — 80061 LIPID PANEL: CPT | Performed by: INTERNAL MEDICINE

## 2020-11-19 RX ORDER — ATORVASTATIN CALCIUM 40 MG/1
TABLET, FILM COATED ORAL
Qty: 90 TABLET | Refills: 3 | Status: SHIPPED | OUTPATIENT
Start: 2020-11-19 | End: 2021-12-14 | Stop reason: SDUPTHER

## 2020-11-23 ENCOUNTER — OFFICE VISIT (OUTPATIENT)
Dept: FAMILY MEDICINE CLINIC | Facility: CLINIC | Age: 70
End: 2020-11-23

## 2020-11-23 VITALS
OXYGEN SATURATION: 96 % | HEIGHT: 66 IN | SYSTOLIC BLOOD PRESSURE: 94 MMHG | WEIGHT: 154.2 LBS | DIASTOLIC BLOOD PRESSURE: 58 MMHG | HEART RATE: 68 BPM | TEMPERATURE: 97.1 F | RESPIRATION RATE: 16 BRPM | BODY MASS INDEX: 24.78 KG/M2

## 2020-11-23 DIAGNOSIS — Z12.31 ENCOUNTER FOR SCREENING MAMMOGRAM FOR BREAST CANCER: ICD-10-CM

## 2020-11-23 DIAGNOSIS — M48.061 SPINAL STENOSIS OF LUMBAR REGION, UNSPECIFIED WHETHER NEUROGENIC CLAUDICATION PRESENT: ICD-10-CM

## 2020-11-23 DIAGNOSIS — E78.5 HYPERLIPIDEMIA, UNSPECIFIED HYPERLIPIDEMIA TYPE: ICD-10-CM

## 2020-11-23 DIAGNOSIS — L98.9 LESION OF SKIN OF NOSE: ICD-10-CM

## 2020-11-23 DIAGNOSIS — Z00.00 PREVENTATIVE HEALTH CARE: Primary | ICD-10-CM

## 2020-11-23 DIAGNOSIS — I25.119 CORONARY ARTERY DISEASE INVOLVING NATIVE CORONARY ARTERY OF NATIVE HEART WITH ANGINA PECTORIS (HCC): ICD-10-CM

## 2020-11-23 PROCEDURE — G0439 PPPS, SUBSEQ VISIT: HCPCS | Performed by: INTERNAL MEDICINE

## 2020-11-23 PROCEDURE — 96160 PT-FOCUSED HLTH RISK ASSMT: CPT | Performed by: INTERNAL MEDICINE

## 2020-12-04 ENCOUNTER — HOSPITAL ENCOUNTER (OUTPATIENT)
Dept: MAMMOGRAPHY | Facility: HOSPITAL | Age: 70
Discharge: HOME | End: 2020-12-04
Admitting: INTERNAL MEDICINE

## 2020-12-04 DIAGNOSIS — Z12.31 ENCOUNTER FOR SCREENING MAMMOGRAM FOR BREAST CANCER: ICD-10-CM

## 2020-12-04 PROCEDURE — 77067 SCR MAMMO BI INCL CAD: CPT

## 2020-12-04 PROCEDURE — 77063 BREAST TOMOSYNTHESIS BI: CPT

## 2021-01-04 RX ORDER — BUPROPION HYDROCHLORIDE 150 MG/1
TABLET ORAL
Qty: 270 TABLET | Refills: 3 | Status: SHIPPED | OUTPATIENT
Start: 2021-01-04 | End: 2022-01-10

## 2021-01-04 RX ORDER — CARVEDILOL 3.12 MG/1
TABLET ORAL
Qty: 180 TABLET | Refills: 3 | Status: SHIPPED | OUTPATIENT
Start: 2021-01-04 | End: 2022-01-11 | Stop reason: SDUPTHER

## 2021-02-09 RX ORDER — CLOPIDOGREL BISULFATE 75 MG/1
75 TABLET ORAL DAILY
Qty: 90 TABLET | Refills: 0 | Status: SHIPPED | OUTPATIENT
Start: 2021-02-09 | End: 2021-05-13

## 2021-02-15 ENCOUNTER — OFFICE VISIT (OUTPATIENT)
Dept: CARDIOLOGY | Facility: CLINIC | Age: 71
End: 2021-02-15

## 2021-02-15 VITALS
WEIGHT: 152.8 LBS | DIASTOLIC BLOOD PRESSURE: 62 MMHG | BODY MASS INDEX: 24.56 KG/M2 | HEIGHT: 66 IN | HEART RATE: 69 BPM | OXYGEN SATURATION: 98 % | SYSTOLIC BLOOD PRESSURE: 112 MMHG

## 2021-02-15 DIAGNOSIS — Z01.810 ENCOUNTER FOR PREOPERATIVE ASSESSMENT FOR NONCORONARY CARDIAC SURGERY: ICD-10-CM

## 2021-02-15 DIAGNOSIS — I10 ESSENTIAL HYPERTENSION: ICD-10-CM

## 2021-02-15 DIAGNOSIS — I25.119 CORONARY ARTERY DISEASE INVOLVING NATIVE CORONARY ARTERY OF NATIVE HEART WITH ANGINA PECTORIS (HCC): Primary | ICD-10-CM

## 2021-02-15 PROCEDURE — 93000 ELECTROCARDIOGRAM COMPLETE: CPT | Performed by: INTERNAL MEDICINE

## 2021-02-15 PROCEDURE — 99214 OFFICE O/P EST MOD 30 MIN: CPT | Performed by: INTERNAL MEDICINE

## 2021-02-15 RX ORDER — NEOMYCIN/POLYMYXIN B/PRAMOXINE 3.5-10K-1
CREAM (GRAM) TOPICAL
COMMUNITY

## 2021-02-15 NOTE — PROGRESS NOTES
Cardiology Office Visit Note      Referring physician:  Helen Koo MD    Reason For Followup: Annual check up and Cardiac clearance    HPI:  Jennifer Blackmon is a 70 y.o. female who presents to the office for a 1 year follow up and cardiac clearance. She has a personal medical history of CAD S/P PCI in 2019, HTN, and HLD. She is going to be having nasal biopsy and reconstructive surgery soon at [facility] and they are wanting her to be off of Plavix for 5 days prior to her surgery.     She is doing well from a cardiac standpoint, she denies chest pain, shortness of air, dyspnea on exertion, palpitations or dizziness.     Her medication list was reviewed during her visit today- she voiced compliance    Past Medical History:   Diagnosis Date   • Allergic rhinitis    • Coronary artery disease involving native coronary artery of native heart with angina pectoris (CMS/[facility]) 6/18/2019    Status post successful PCI of the coronary stent deployment to high-grade RCA stenosis after presenting with ST segment elevated microinfarction in May 2019   • DDD (degenerative disc disease), lumbar    • Depression    • Depression    • Essential hypertension 6/18/2019   • Heart attack (CMS/[facility])     6/2/2019   • Hyperlipidemia    • Hyperlipidemia, mixed 8/13/2019   • Hypertension    • Insomnia    • Myocardial infarction less than 4 weeks ago (CMS/[facility]) 6/18/2019    Presented initially with ST segment elevated microinfarction treated emergently in [place] May 2019   • Old MI (myocardial infarction) 6/18/2019    Presented initially with ST segment elevated microinfarction treated emergently in [place] May 2019   • Osteoarthritis    • Toenail fungus        Past Surgical History:   Procedure Laterality Date   • ADENOIDECTOMY      Adenoids removed   • APPENDECTOMY  1952   • AUGMENTATION MAMMAPLASTY     • BREAST IMPLANT SURGERY  1984   • CATARACT EXTRACTION Bilateral 2017   • CERVICAL DISC SURGERY      ruptured and removed    •   SECTION     • CHEST SURGERY      attempted pectus repair    • EYE SURGERY  1997    AK   • HYSTERECTOMY     • KNEE ARTHROSCOPY Right    • LUMBAR DISC SURGERY      ruptured and removed    • NASAL SEPTAL RECONSTRUCTION     • PECTUS CARNATUM REPAIR      attempted   • RECTAL SURGERY      Repair   • REFRACTIVE SURGERY      RK/AK both eyes    • REFRACTIVE SURGERY     • SOMNOPLASTY RADIAL FREQUENCY ABLATION     • TONSILLECTOMY     • UVULOPALATOPHARYNGOPLASTY           Current Outpatient Medications:   •  atorvastatin (LIPITOR) 40 MG tablet, TAKE 1 TABLET DAILY, Disp: 90 tablet, Rfl: 3  •  HUNTER LOW DOSE 81 MG chewable tablet, Chew 81 mg Daily., Disp: , Rfl: 0  •  buPROPion XL (WELLBUTRIN XL) 150 MG 24 hr tablet, TAKE 3 TABLETS IN THE MORNING, Disp: 270 tablet, Rfl: 3  •  Calcium Citrate-Vitamin D 500-400 MG-UNIT chewable tablet, Chew Daily., Disp: , Rfl:   •  carvedilol (COREG) 3.125 MG tablet, TAKE 1 TABLET TWICE A DAY, Disp: 180 tablet, Rfl: 3  •  clopidogrel (PLAVIX) 75 MG tablet, Take 1 tablet by mouth Daily., Disp: 90 tablet, Rfl: 0  •  coenzyme Q10 100 MG capsule, Take 100 mg by mouth., Disp: , Rfl:   •  diclofenac (VOLTAREN) 50 MG EC tablet, TAKE 1 TABLET TWICE A DAY, Disp: 180 tablet, Rfl: 3  •  escitalopram (LEXAPRO) 20 MG tablet, 1/2 tablet twice daily, Disp: 90 tablet, Rfl: 3  •  Melatonin 10 MG sublingual tablet, Place  under the tongue., Disp: , Rfl:   •  montelukast (SINGULAIR) 10 MG tablet, TAKE 1 TABLET DAILY, Disp: 90 tablet, Rfl: 4  •  Multiple Vitamins-Minerals (Multi-Vitamin Gummies) chewable tablet, Chew., Disp: , Rfl:     Social History     Socioeconomic History   • Marital status:      Spouse name: Not on file   • Number of children: Not on file   • Years of education: Not on file   • Highest education level: Not on file   Tobacco Use   • Smoking status: Former Smoker   • Smokeless tobacco: Never Used   Substance and Sexual Activity   • Alcohol use: No     Frequency: Never  "  • Drug use: No   • Sexual activity: Not Currently       Family History   Problem Relation Age of Onset   • Alzheimer's disease Mother    • Other Mother         CVA, acute myocardial infarction   • Leukemia Father    • Other Father         Parkinson's    • Heart attack Sister    • Heart attack Brother    • Bipolar disorder Daughter          Review of Systems   General: denies fever, chills, anorexia, weight loss  Eyes: denies blurring, diplopia  Ear/Nose/Throat: denies ear pain, nosebleeds, hoarseness  Cardiovascular: See HPI  Respiratory: denies excessive sputum, hemoptysis, wheezing  Gastrointestinal: denies nausea, vomiting, change in bowel habits, abdominal pain  Genitourinary: Denies hematuria or dysuria  Musculoskeletal: No significant arthralgias or myalgias  Skin: denies rashes, itching, suspicious lesions  Neurologic: denies focal neuro deficits  Psychiatric: denies depression, anxiety  Endocrine: denies cold intolerance, heat intolerance  Hematologic/Lymphatic: denies abnormal bruising, bleeding  Allergic/Immunologic: denies urticaria or persistent infections      Objective     Visit Vitals  /62   Pulse 69   Ht 167.6 cm (65.98\")   Wt 69.3 kg (152 lb 12.8 oz)   SpO2 98%   BMI 24.67 kg/m²           Physical Exam  General:     Obese, well developed,, in no acute distress.    Head:     normocephalic and atraumatic.  Prominent 1+ centimeter basal cell carcinoma on right side of nose   Eyes:    PERRL/EOM intact, conjunctiva and sclera clear with out nystagmus.    Neck:    no jvd or bruits  Chest Wall:    no deformities   Lungs:    clear bilaterally to auscultation with adequate global airflow   Heart:    non-displaced PMI; regular rate and rhythm, normal S1, S2 without murmurs, rubs, or gallops  Abdomen:  Soft, nontender without HSM  Msk:    no deformity; adequate R OM  Pulses:    pulses normal in all 4 extremities.    Extremities:    no clubbing, cyanosis, edema   Neurologic:    no focal sensory or motor " deficits  Skin:    intact without lesions or rashes.    Psych:    alert and cooperative; normal mood and affect; normal attention span and concentration.            ECG 12 Lead    Date/Time: 2/15/2021 11:07 AM  Performed by: RAFAEL Townsend MD  Authorized by: RAFAEL Townsend MD   Comparison: compared with previous ECG from 2/13/2020  Similar to previous ECG  Rhythm: sinus rhythm    Clinical impression: normal ECG              Assessment:   Problems Addressed this Visit        Other    Coronary artery disease involving native coronary artery of native heart with angina pectoris (CMS/HCC) - Primary    --Post complex PCI to RCA in [place] in 2019 with no recurrence despite known mild to moderate residual disease involving LAD, LAD diagonal branch and LCx OM branch with 20 to 30% lesions described in each  Nonetheless, remains hemodynamically stable well compensated and angina free        Essential hypertension    -Well-regulated on current medication listed and reviewed in detail       Cardiac preoperative clearance  -Currently reports no signs or symptoms of unstable angina pectoris, acute microinfarction or clinical congestive heart failure that would be prohibitive  -Nonetheless, in view of known coronary disease with residual blockage, I have advised her to undergo stress Myoview exam prior to providing cardiac clearance.  I did recommend she go ahead and discontinue her Plavix as she is now more than 1 year out from PCI     Diagnoses       Codes Comments    Coronary artery disease involving native coronary artery of native heart with angina pectoris (CMS/HCC)    -  Primary ICD-10-CM: I25.119  ICD-9-CM: 414.01, 413.9     Essential hypertension     ICD-10-CM: I10  ICD-9-CM: 401.9             Plan:  Schedule stress Myoview exam for cardiac preop clearance  Okay to discontinue Plavix that she is now more than 1 year now from PCI  Continue all other medications as listed and reviewed in detail   Encourage continued  current risk factor modification  Return to clinic 1 year or sooner if needed    RAFAEL Townsend MD  2/15/2021 12:02 EST    This report was generated using the Dragon voice recognition system.

## 2021-02-26 ENCOUNTER — HOSPITAL ENCOUNTER (OUTPATIENT)
Dept: NUCLEAR MEDICINE | Facility: HOSPITAL | Age: 71
Discharge: HOME | End: 2021-02-26

## 2021-02-26 DIAGNOSIS — Z01.810 ENCOUNTER FOR PREOPERATIVE ASSESSMENT FOR NONCORONARY CARDIAC SURGERY: ICD-10-CM

## 2021-02-26 DIAGNOSIS — I25.119 CORONARY ARTERY DISEASE INVOLVING NATIVE CORONARY ARTERY OF NATIVE HEART WITH ANGINA PECTORIS (HCC): ICD-10-CM

## 2021-02-26 DIAGNOSIS — I10 ESSENTIAL HYPERTENSION: ICD-10-CM

## 2021-02-26 PROCEDURE — 78452 HT MUSCLE IMAGE SPECT MULT: CPT | Performed by: INTERNAL MEDICINE

## 2021-02-26 PROCEDURE — 0 TECHNETIUM SESTAMIBI: Performed by: INTERNAL MEDICINE

## 2021-02-26 PROCEDURE — 25010000002 REGADENOSON 0.4 MG/5ML SOLUTION: Performed by: INTERNAL MEDICINE

## 2021-02-26 PROCEDURE — A9500 TC99M SESTAMIBI: HCPCS | Performed by: INTERNAL MEDICINE

## 2021-02-26 PROCEDURE — 93017 CV STRESS TEST TRACING ONLY: CPT

## 2021-02-26 PROCEDURE — 93018 CV STRESS TEST I&R ONLY: CPT | Performed by: NURSE PRACTITIONER

## 2021-02-26 PROCEDURE — 78452 HT MUSCLE IMAGE SPECT MULT: CPT

## 2021-02-26 RX ADMIN — TECHNETIUM TC 99M SESTAMIBI 1 DOSE: 1 INJECTION INTRAVENOUS at 11:50

## 2021-02-26 RX ADMIN — REGADENOSON 0.4 MG: 0.08 INJECTION, SOLUTION INTRAVENOUS at 13:30

## 2021-02-26 RX ADMIN — TECHNETIUM TC 99M SESTAMIBI 1 DOSE: 1 INJECTION INTRAVENOUS at 13:30

## 2021-03-02 ENCOUNTER — TELEPHONE (OUTPATIENT)
Dept: CARDIOLOGY | Facility: CLINIC | Age: 71
End: 2021-03-02

## 2021-03-03 LAB
BH CV NUCLEAR PRIOR STUDY: 3
BH CV STRESS BP STAGE 1: NORMAL
BH CV STRESS BP STAGE 2: NORMAL
BH CV STRESS COMMENTS STAGE 1: NORMAL
BH CV STRESS COMMENTS STAGE 2: NORMAL
BH CV STRESS DOSE REGADENOSON STAGE 1: 0.4
BH CV STRESS DURATION MIN STAGE 1: 0
BH CV STRESS DURATION MIN STAGE 2: 4
BH CV STRESS DURATION SEC STAGE 1: 10
BH CV STRESS DURATION SEC STAGE 2: 0
BH CV STRESS GRADE STAGE 1: 10
BH CV STRESS HR STAGE 1: 88
BH CV STRESS HR STAGE 2: 96
BH CV STRESS METS STAGE 1: 5
BH CV STRESS PROTOCOL 1: NORMAL
BH CV STRESS RECOVERY BP: NORMAL MMHG
BH CV STRESS RECOVERY HR: 81 BPM
BH CV STRESS SPEED STAGE 1: 1.7
BH CV STRESS STAGE 1: 1
BH CV STRESS STAGE 2: 2
LV EF NUC BP: 76 %
MAXIMAL PREDICTED HEART RATE: 148 BPM
PERCENT MAX PREDICTED HR: 48.65 %
STRESS BASELINE BP: NORMAL MMHG
STRESS BASELINE HR: 71 BPM
STRESS PERCENT HR: 57 %
STRESS POST ESTIMATED WORKLOAD: 4.6 METS
STRESS POST EXERCISE DUR MIN: 3 MIN
STRESS POST EXERCISE DUR SEC: 14 SEC
STRESS POST PEAK BP: NORMAL MMHG
STRESS POST PEAK HR: 72 BPM
STRESS TARGET HR: 126 BPM

## 2021-03-03 NOTE — TELEPHONE ENCOUNTER
Can I let the patient know that her stress test is normal and keep annual follow up with Dr Townsend?

## 2021-03-18 RX ORDER — ESCITALOPRAM OXALATE 20 MG/1
TABLET ORAL
Qty: 90 TABLET | Refills: 3 | Status: SHIPPED | OUTPATIENT
Start: 2021-03-18 | End: 2022-03-21

## 2021-05-13 RX ORDER — CLOPIDOGREL BISULFATE 75 MG/1
TABLET ORAL
Qty: 90 TABLET | Refills: 3 | Status: SHIPPED | OUTPATIENT
Start: 2021-05-13 | End: 2022-03-18

## 2021-05-17 ENCOUNTER — LAB (OUTPATIENT)
Dept: FAMILY MEDICINE CLINIC | Facility: CLINIC | Age: 71
End: 2021-05-17

## 2021-05-17 DIAGNOSIS — I10 ESSENTIAL HYPERTENSION: Primary | ICD-10-CM

## 2021-05-17 LAB
ANION GAP SERPL CALCULATED.3IONS-SCNC: 7.1 MMOL/L (ref 5–15)
BUN SERPL-MCNC: 24 MG/DL (ref 8–23)
BUN/CREAT SERPL: 24 (ref 7–25)
CALCIUM SPEC-SCNC: 9.1 MG/DL (ref 8.6–10.5)
CHLORIDE SERPL-SCNC: 104 MMOL/L (ref 98–107)
CO2 SERPL-SCNC: 29.9 MMOL/L (ref 22–29)
CREAT SERPL-MCNC: 1 MG/DL (ref 0.57–1)
GFR SERPL CREATININE-BSD FRML MDRD: 55 ML/MIN/1.73
GLUCOSE SERPL-MCNC: 97 MG/DL (ref 65–99)
POTASSIUM SERPL-SCNC: 4.4 MMOL/L (ref 3.5–5.2)
SODIUM SERPL-SCNC: 141 MMOL/L (ref 136–145)

## 2021-05-17 PROCEDURE — 80048 BASIC METABOLIC PNL TOTAL CA: CPT | Performed by: NURSE PRACTITIONER

## 2021-05-17 PROCEDURE — 36415 COLL VENOUS BLD VENIPUNCTURE: CPT

## 2021-06-03 ENCOUNTER — OFFICE VISIT (OUTPATIENT)
Dept: FAMILY MEDICINE CLINIC | Facility: CLINIC | Age: 71
End: 2021-06-03

## 2021-06-03 VITALS
HEART RATE: 76 BPM | TEMPERATURE: 98.2 F | HEIGHT: 66 IN | WEIGHT: 147 LBS | BODY MASS INDEX: 23.63 KG/M2 | DIASTOLIC BLOOD PRESSURE: 64 MMHG | OXYGEN SATURATION: 95 % | SYSTOLIC BLOOD PRESSURE: 138 MMHG

## 2021-06-03 DIAGNOSIS — N63.15 BREAST LUMP ON RIGHT SIDE AT 3 O'CLOCK POSITION: ICD-10-CM

## 2021-06-03 DIAGNOSIS — N63.25 BREAST LUMP ON LEFT SIDE AT 9 O'CLOCK POSITION: ICD-10-CM

## 2021-06-03 DIAGNOSIS — L03.211 CELLULITIS, FACE: ICD-10-CM

## 2021-06-03 DIAGNOSIS — Z09 HOSPITAL DISCHARGE FOLLOW-UP: Primary | ICD-10-CM

## 2021-06-03 DIAGNOSIS — C44.311 BASAL CELL CARCINOMA OF RIGHT SIDE OF NOSE: ICD-10-CM

## 2021-06-03 PROCEDURE — 1111F DSCHRG MED/CURRENT MED MERGE: CPT | Performed by: NURSE PRACTITIONER

## 2021-06-03 PROCEDURE — 99213 OFFICE O/P EST LOW 20 MIN: CPT | Performed by: NURSE PRACTITIONER

## 2021-06-03 NOTE — PROGRESS NOTES
Transitional Care Follow Up Visit  Subjective     Jennifer Blackmon is a 70 y.o. female who presents for a transitional care management visit.    Within 48 business hours after discharge our office contacted her via telephone to coordinate her care and needs.      I reviewed and discussed the details of that call along with the discharge summary, hospital problems, inpatient lab results, inpatient diagnostic studies, and consultation reports with Jennifer.     Current outpatient and discharge medications have been reconciled for the patient.  Reviewed by: NIKHIL Burgess      No flowsheet data found.  Risk for Readmission (LACE) No data recorded    History of Present Illness   Course During Hospital Stay:  On April 21 had plastic surgery with Dr. Orozco after being diagnosed with basal cell carcinoma on nose.   On may 19 had 2nd surgery with Dr. Orozco.   May 20 started having swelling and bruising.   May 21 was more red, swollen and went for follow up to Dr. Orozco. Was prescibed 2 antibiotics and anbx cream.   May 22 was worse and went to ER. Was admitted and was getting IV anbx and was discharged on 5/25.   Had follow up on 5/28 with Dr. Orozco's office.   Still taking oral anbx.   No fever or chills.   All that is improving.       Pt comes in today also with c/o lump in left breast. Just recently noticed it. Does have a bruise too that she is unsure of injury. Had normal mammogram last fall.      The following portions of the patient's history were reviewed and updated as appropriate: allergies, current medications, past family history, past medical history, past social history, past surgical history and problem list.    Review of Systems    Objective   Physical Exam  Constitutional:       Appearance: She is well-developed.   HENT:      Head:     Eyes:      Pupils: Pupils are equal, round, and reactive to light.   Cardiovascular:      Rate and Rhythm: Normal rate and regular rhythm.   Pulmonary:      Effort:  Pulmonary effort is normal.      Breath sounds: Normal breath sounds.   Chest:      Breasts:         Left: Mass and tenderness present.          Comments: Right breast with mult lumps, scar tissue   Neurological:      Mental Status: She is alert and oriented to person, place, and time.         Assessment/Plan   Diagnoses and all orders for this visit:    1. Hospital discharge follow-up (Primary)    2. Cellulitis, face    3. Basal cell carcinoma of right side of nose    4. Breast lump on left side at 9 o'clock position  -     Mammo Diagnostic Bilateral With CAD; Future  -     US Breast Left Complete; Future    5. Breast lump on right side at 3 o'clock position  -     Mammo Diagnostic Bilateral With CAD; Future      Finish anbx as prescribed  nolan diagnostic mammogram  Left breast US  During this office visit, we discussed the pertinent aspects of the visit and treatment recommendations. Pt verbalizes understanding. Follow up was discussed. Patient was given the opportunity to ask questions and discuss other concerns.

## 2021-06-07 ENCOUNTER — TELEPHONE (OUTPATIENT)
Dept: FAMILY MEDICINE CLINIC | Facility: CLINIC | Age: 71
End: 2021-06-07

## 2021-06-07 NOTE — TELEPHONE ENCOUNTER
Caller: Re Castro BISI    Relationship: Self      Best call back number: [phone]     What is the best time to reach you: DID NOT SPECIFY     Who are you requesting to speak with (clinical staff, provider,  specific staff   member): CLINICAL     Do you know the name of the person who called: RE    What was the call regarding: MS. CASTRO SAYS SHE WAS SEEN FOR A LUMP ON BREAST, SHE SAID , THE LUMP IS GONE, SHE NOW HAS A DEEP BRUISE . SHE WOULD LIKE TO KNOW IF SHE IS STILL NEEDING A MAMMOGRAM     Do you require a callback: YES

## 2021-06-08 DIAGNOSIS — N63.25 BREAST LUMP ON LEFT SIDE AT 9 O'CLOCK POSITION: Primary | ICD-10-CM

## 2021-06-08 NOTE — TELEPHONE ENCOUNTER
If she feels like the lump is resolved, then I am ok for her to wait. She did have mammogram done in Dec.

## 2021-06-09 NOTE — TELEPHONE ENCOUNTER
Spoke with pt- she confirmed the lumb has completely resolved and there is only a bruise. She would like to wait and screen as scheduled. I urged her to moniter and let us know if there is any progression or if the lump reoccurs

## 2021-07-27 ENCOUNTER — OFFICE VISIT (OUTPATIENT)
Dept: FAMILY MEDICINE CLINIC | Facility: CLINIC | Age: 71
End: 2021-07-27

## 2021-07-27 VITALS
RESPIRATION RATE: 16 BRPM | OXYGEN SATURATION: 99 % | WEIGHT: 147 LBS | HEART RATE: 69 BPM | BODY MASS INDEX: 23.63 KG/M2 | DIASTOLIC BLOOD PRESSURE: 70 MMHG | SYSTOLIC BLOOD PRESSURE: 124 MMHG | HEIGHT: 66 IN

## 2021-07-27 DIAGNOSIS — R26.89 BALANCE PROBLEM: Primary | ICD-10-CM

## 2021-07-27 DIAGNOSIS — K59.04 CHRONIC IDIOPATHIC CONSTIPATION: ICD-10-CM

## 2021-07-27 DIAGNOSIS — R26.81 UNSTEADY GAIT WHEN WALKING: ICD-10-CM

## 2021-07-27 DIAGNOSIS — R53.1 WEAKNESS: ICD-10-CM

## 2021-07-27 PROBLEM — C43.9 MALIGNANT MELANOMA OF SKIN: Status: ACTIVE | Noted: 2021-01-26

## 2021-07-27 PROBLEM — F32.A DEPRESSIVE DISORDER: Status: ACTIVE | Noted: 2021-01-26

## 2021-07-27 PROBLEM — M19.90 ARTHRITIS: Status: ACTIVE | Noted: 2021-01-26

## 2021-07-27 PROBLEM — L03.211 CELLULITIS OF FACE: Status: ACTIVE | Noted: 2021-05-22

## 2021-07-27 PROCEDURE — 99214 OFFICE O/P EST MOD 30 MIN: CPT | Performed by: NURSE PRACTITIONER

## 2021-07-27 NOTE — PROGRESS NOTES
"Chief Complaint  Balance Issues  Subjective        Jennifer Blackmon presents to [facility]  Pt comes in today with c/o balance issues, weakness, and increased falls. Maybe 3 falls in the past 1 month. No injuries, but some bruising. No HA's or dizziness. No blurry vision. No vision changes. Feels like mostly caused from muscle weakness.   Pt does have hx of chonic back pain that will wax and wane. Has been to PT in the past and then was referred to neurosurgery, but never went. Not having issues with back at this time.  She is open to going back to PT for strengthening.   Doesn't get any exercise at home.    Also with c/o constipation. Has been an issues since she was a child. Now getting to a point where she feels like she is getting impacted. Can typically go 3-4 days without a BM. Feels like she is drinking plenty of fluids. Not getting that much fiber.   Has tried stool softeners in the past that didn't help much, and has also tried linzess which she didn't take long because it caused diarrhea.        Objective     Vital Signs:   /70 (BP Location: Right arm, Patient Position: Sitting, Cuff Size: Adult)   Pulse 69   Resp 16   Ht 167.6 cm (66\")   Wt 66.7 kg (147 lb)   SpO2 99%   BMI 23.73 kg/m²       BP Readings from Last 3 Encounters:   07/27/21 124/70   06/03/21 138/64   02/15/21 112/62     Wt Readings from Last 3 Encounters:   07/27/21 66.7 kg (147 lb)   06/03/21 66.7 kg (147 lb)   02/15/21 69.3 kg (152 lb 12.8 oz)     Physical Exam  Constitutional:       Appearance: She is well-developed.   Eyes:      Pupils: Pupils are equal, round, and reactive to light.   Cardiovascular:      Rate and Rhythm: Normal rate and regular rhythm.   Pulmonary:      Effort: Pulmonary effort is normal.      Breath sounds: Normal breath sounds.   Abdominal:      General: Abdomen is flat. Bowel sounds are normal.   Neurological:      Mental Status: She is alert and oriented to person, place, " and time.      Motor: Weakness present.      Gait: Gait abnormal.   Psychiatric:         Mood and Affect: Mood normal.         Behavior: Behavior normal.        Assessment and Plan    Diagnoses and all orders for this visit:    1. Balance problem (Primary)  -     Ambulatory Referral to Physical Therapy Evaluate and treat; Strengthening    2. Weakness  -     Ambulatory Referral to Physical Therapy Evaluate and treat; Strengthening    3. Unsteady gait when walking  -     Ambulatory Referral to Physical Therapy Evaluate and treat; Strengthening    4. Chronic idiopathic constipation    will try PT   Recommend pt get a cane and start using it regularly  Start miralax        Follow Up   Return if symptoms worsen or fail to improve.  Patient was given instructions and counseling regarding her condition or for health maintenance advice. Please see specific information pulled into the AVS if appropriate.

## 2021-08-13 ENCOUNTER — TELEPHONE (OUTPATIENT)
Dept: FAMILY MEDICINE CLINIC | Facility: CLINIC | Age: 71
End: 2021-08-13

## 2021-08-13 NOTE — TELEPHONE ENCOUNTER
PATIENT SAID SHE DID NOT PASS OUT AND OTHER THAN A LUMP SHE IS HAVING NO SYMPTOMS. SHE WAS HOLDING HER GRANDBABY AND TRIPPED AND DIDN'T HAVE HANDS TO CATCH HERSELF. I SCHEDULED HER 8/17

## 2021-08-13 NOTE — TELEPHONE ENCOUNTER
Caller: VISHAL CASTRO    Relationship: Child    Best call back number: [phone]    What is the best time to reach you: ANYTIME    Who are you requesting to speak with (clinical staff, provider,  specific staff member): CLINICAL STAFF    Do you know the name of the person who called: VISHAL CASTRO    What was the call regarding: PATIENT'S DAUGHTER CALLED AND SAID HER MOTHER WAS CARRYING SOMETHING TOWARD THE BED LAST NIGHT AND FELL AND HIT A CHAIR    SHE IS COMPLAINING OF NO HEADACHE, BLEEDING OR BRUISING, BUT DOES HAVE A LUMP ON HER HEAD ABOUT THE SIZE OF A GOLF BALL    THE DAUGHTER IS WANTING TO KNOW IF SHE WILL NEED TO BE SEEN FOR THIS, OR IF IT SHOULD RESOLVE ITSELF     THE PATIENT WAS NOT SEEN IN THE EMERGENCY ROOM    Do you require a callback: YES

## 2021-08-13 NOTE — TELEPHONE ENCOUNTER
If she passed out, has a headache, vision changes or nausea then needs to be seen today at [facility] or ER- if not then see Nathalia early next week as on plavix

## 2021-08-17 ENCOUNTER — HOSPITAL ENCOUNTER (OUTPATIENT)
Dept: CT IMAGING | Facility: HOSPITAL | Age: 71
Discharge: HOME | End: 2021-08-17
Admitting: HOSPITALIST

## 2021-08-17 ENCOUNTER — OFFICE VISIT (OUTPATIENT)
Dept: FAMILY MEDICINE CLINIC | Facility: CLINIC | Age: 71
End: 2021-08-17

## 2021-08-17 VITALS
TEMPERATURE: 97.1 F | RESPIRATION RATE: 16 BRPM | DIASTOLIC BLOOD PRESSURE: 80 MMHG | HEIGHT: 66 IN | BODY MASS INDEX: 23.3 KG/M2 | OXYGEN SATURATION: 96 % | WEIGHT: 145 LBS | HEART RATE: 71 BPM | SYSTOLIC BLOOD PRESSURE: 122 MMHG
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
## 2021-08-17 DIAGNOSIS — R26.81 UNSTEADY GAIT: ICD-10-CM
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
## 2021-08-17 PROCEDURE — 99214 OFFICE O/P EST MOD 30 MIN: CPT | Performed by: HOSPITALIST

## 2021-08-17 PROCEDURE — 70450 CT HEAD/BRAIN W/O DYE: CPT

## 2021-08-17 NOTE — PROGRESS NOTES
"Subjective   Jennifer Blackmon is a 71 y.o. female.     Subjective / HPI  Patient seen for fall, she thinks she last her balance resulting in fall. Pt says she has unsteady gait for some time. Do not think really worse than before but lately has few fall secondary to balance issue. Pt advise walker for safety, but reluctant. Pt previous MRI lumbar region reviewed from 2018 and 2018 revealing spinal arthritis / stenotic lesion.     Review of Systems    Objective     /80 (BP Location: Left arm)   Pulse 71   Temp 97.1 °F (36.2 °C) (Infrared)   Resp 16   Ht 167.6 cm (66\")   Wt 65.8 kg (145 lb)   SpO2 96%   BMI 23.40 kg/m²      Physical Exam  Chest ..... bilateral entry, NVB  CVS ... s1s2 no murmur  Abdomen .. soft non tender gut sound audible.  CNS ... slight unsteady gait, worse when closes her eyes and feet together. Able to walk otherwise normal.  Procedures       Assessment/Plan   Diagnoses and all orders for this visit:    1. Fall, initial encounter (Primary)  -     CT Head Without Contrast; Future    2. Unsteady gait  Comments:  Based on MRI lumbar region, may need spine surgery evaluation. pt already getting outpatient physical therapy. pt encouraged to use walker.  Orders:  -     MRI Lumbar Spine With & Without Contrast; Future                "

## 2021-09-01 ENCOUNTER — HOSPITAL ENCOUNTER (OUTPATIENT)
Dept: MRI IMAGING | Facility: HOSPITAL | Age: 71
Discharge: HOME | End: 2021-09-01
Admitting: HOSPITALIST

## 2021-09-01 DIAGNOSIS — M48.062 SPINAL STENOSIS OF LUMBAR REGION WITH NEUROGENIC CLAUDICATION: Primary | ICD-10-CM

## 2021-09-01 DIAGNOSIS — R26.81 UNSTEADY GAIT: ICD-10-CM

## 2021-09-01 LAB — CREAT BLDA-MCNC: 1.1 MG/DL (ref 0.6–1.3)

## 2021-09-01 PROCEDURE — 82565 ASSAY OF CREATININE: CPT

## 2021-09-01 PROCEDURE — 25010000002 GADOTERIDOL PER 1 ML: Performed by: HOSPITALIST

## 2021-09-01 PROCEDURE — 72158 MRI LUMBAR SPINE W/O & W/DYE: CPT

## 2021-09-01 PROCEDURE — A9579 GAD-BASE MR CONTRAST NOS,1ML: HCPCS | Performed by: HOSPITALIST

## 2021-09-01 RX ADMIN — GADOTERIDOL 14 ML: 279.3 INJECTION, SOLUTION INTRAVENOUS at 13:34

## 2021-09-08 NOTE — PROGRESS NOTES
"Subjective   History of Present Illness: Jennifer Blackmon is a 71 y.o. female is being seen for consultation today at the request of Og Rockwell MD for balance issues. She denies any back pain but does have heavy feeling in bilateral lower extremity's.  Patient says she feels unsteady on her feet and often will be standing and fall backwards.  She has had multiple falls recently.  She also complains of some back pain and leg pain especially when she is standing and walking, however the balance issues are far worse than this.  Patient also complains of some weakness in her hands and difficulty using her hands.  No pain numbness or tingling in her upper or lower extremities.        The following portions of the patient's history were reviewed and updated as appropriate: allergies, current medications, past family history, past medical history, past social history, past surgical history and problem list.    Review of Systems   Constitutional: Positive for activity change.   HENT: Negative.    Eyes: Negative.    Respiratory: Negative for chest tightness and shortness of breath.    Cardiovascular: Negative for chest pain.   Gastrointestinal: Negative.    Endocrine: Negative.    Genitourinary: Negative.    Musculoskeletal: Positive for gait problem. Negative for back pain and neck pain.   Skin: Negative.    Allergic/Immunologic: Negative.    Neurological: Positive for weakness. Negative for numbness.   Hematological: Negative.    Psychiatric/Behavioral: Negative.        Objective     /69   Pulse 71   Temp 97.1 °F (36.2 °C)   Ht 167.6 cm (66\")   Wt 65.8 kg (145 lb)   BMI 23.40 kg/m²    Body mass index is 23.4 kg/m².      Neurologic Exam     Mental Status   Oriented to person, place, and time.     Motor Exam     Strength   Strength 5/5 throughout.     Sensory Exam   Light touch normal.   Lower extremity proprioception normal     Gait, Coordination, and Reflexes     Reflexes   Right Murphy: absent  Left " Murphy: absent  Right ankle clonus: absent  Left pendular knee jerk: absent      Assessment/Plan   Independent Review of Radiographic Studies:      I personally reviewed and interpreted the images from the following studies.    MRI lumbar spine: Moderate to severe degenerative changes throughout the lumbar spine.  At L1-2 and L2-3 there is disc degeneration and lateral recess stenosis without significant central or foraminal stenosis.  At L3-4 there is spondylolisthesis with severe central stenosis and some foraminal stenosis.  At L4-5 there is spondylolisthesis and moderate central stenosis and foraminal stenosis.  No significant central or foraminal stenosis at L5-S1    Medical Decision Making:      Jennifer Blackmon is a 71 y.o. female with a history of some symptoms consistent with neurogenic claudication but her primary issue of balance problems.  She does have significant degenerative changes and central stenosis on her MRI.  Central stenosis could potentially be contributing to her balance issues, however other possibility should be ruled out.  She does have a history remotely of cervical spine surgery, and I recommend we get an MRI of her cervical spine to rule out spinal cord compression that could be resulting in her balance issues as well as her hand weakness.  I will see her back when she completes the imaging      There are no diagnoses linked to this encounter.  No follow-ups on file.    This patient was examined wearing appropriate personal protective equipment.     Jennifer Blackmon  reports that she has quit smoking. She has never used smokeless tobacco..                  Merle Navarro MA  09/10/21  13:26 EDT

## 2021-09-10 ENCOUNTER — OFFICE VISIT (OUTPATIENT)
Dept: NEUROSURGERY | Facility: CLINIC | Age: 71
End: 2021-09-10

## 2021-09-10 VITALS
DIASTOLIC BLOOD PRESSURE: 69 MMHG | SYSTOLIC BLOOD PRESSURE: 121 MMHG | BODY MASS INDEX: 23.3 KG/M2 | TEMPERATURE: 97.1 F | WEIGHT: 145 LBS | HEART RATE: 71 BPM | HEIGHT: 66 IN

## 2021-09-10 DIAGNOSIS — M48.02 CERVICAL STENOSIS OF SPINE: Primary | ICD-10-CM

## 2021-09-10 DIAGNOSIS — M48.062 LUMBAR STENOSIS WITH NEUROGENIC CLAUDICATION: ICD-10-CM

## 2021-09-10 DIAGNOSIS — M51.36 LUMBAR DEGENERATIVE DISC DISEASE: ICD-10-CM

## 2021-09-10 DIAGNOSIS — M43.16 SPONDYLOLISTHESIS OF LUMBAR REGION: ICD-10-CM

## 2021-09-10 PROCEDURE — 99204 OFFICE O/P NEW MOD 45 MIN: CPT | Performed by: NEUROLOGICAL SURGERY

## 2021-10-09 ENCOUNTER — HOSPITAL ENCOUNTER (OUTPATIENT)
Dept: MRI IMAGING | Facility: HOSPITAL | Age: 71
Discharge: HOME | End: 2021-10-09
Admitting: NEUROLOGICAL SURGERY

## 2021-10-09 DIAGNOSIS — M48.02 CERVICAL STENOSIS OF SPINE: ICD-10-CM

## 2021-10-09 PROCEDURE — 72141 MRI NECK SPINE W/O DYE: CPT

## 2021-10-20 ENCOUNTER — FLU SHOT (OUTPATIENT)
Dept: FAMILY MEDICINE CLINIC | Facility: CLINIC | Age: 71
End: 2021-10-20

## 2021-10-20 DIAGNOSIS — Z23 IMMUNIZATION DUE: Primary | ICD-10-CM

## 2021-10-20 PROCEDURE — G0008 ADMIN INFLUENZA VIRUS VAC: HCPCS | Performed by: NURSE PRACTITIONER

## 2021-10-20 PROCEDURE — 90662 IIV NO PRSV INCREASED AG IM: CPT | Performed by: NURSE PRACTITIONER

## 2021-10-20 NOTE — PROGRESS NOTES
"Subjective   History of Present Illness: Jennifer Blackmon is a 71 y.o. female is here today for follow-up.for cervical stenosis with new MRI.  Briefly, patient does have symptoms of radiculopathy and neurogenic claudication and back pain which are long-term and have progressed.  She has also been having multiple recent falls and some balance issues.  Overall patient says that the balance issues have improved since she was last seen with using her cane and with physical therapy.  She does continue to have the other symptoms.      Previous Treatment:    The following portions of the patient's history were reviewed and updated as appropriate: allergies, current medications, past family history, past medical history, past social history, past surgical history and problem list.    Review of Systems   Constitutional: Positive for activity change.   Respiratory: Negative for chest tightness and shortness of breath.    Cardiovascular: Negative for chest pain.   Musculoskeletal: Positive for gait problem. Negative for back pain and neck pain.   Neurological: Negative for weakness and numbness.       Objective     BP 99/52   Pulse 75   Temp 97.7 °F (36.5 °C)   Resp 16   Ht 167.6 cm (66\")   Wt 65.8 kg (145 lb)   SpO2 99%   BMI 23.40 kg/m²    Body mass index is 23.4 kg/m².      Neurologic Exam    Assessment/Plan   Independent Review of Radiographic Studies:      I personally reviewed and interpreted the images from the following studies.    MRI cervical spine: Previous C6-7 fusion.  Patient does have degenerative changes from C3-C6 with some foraminal stenosis but no high-grade central stenosis or spinal cord compression    Medical Decision Making:      Jennifer Blackmon is a 71 y.o. female with a history of worsening neurogenic claudication, radiculopathy and low back pain as well as balance issues.  MRI of the cervical spine is reassuring with no evidence of spinal cord compression that could be contributing to the balance " issues.  Additionally, the patient says the balance issues have improved with physical therapy.  In terms of her lumbar issues, she should continue the physical therapy.  I would also recommend epidural steroid injection from L2-4.  Patient can follow-up in 6 to 8 weeks to evaluate her progress.      There are no diagnoses linked to this encounter.  No follow-ups on file.    This patient was examined wearing appropriate personal protective equipment.                      Dr. Dario Snyder IV    10/22/21  12:04 EDT

## 2021-10-22 ENCOUNTER — OFFICE VISIT (OUTPATIENT)
Dept: NEUROSURGERY | Facility: CLINIC | Age: 71
End: 2021-10-22

## 2021-10-22 VITALS
BODY MASS INDEX: 23.3 KG/M2 | RESPIRATION RATE: 16 BRPM | OXYGEN SATURATION: 99 % | HEART RATE: 75 BPM | DIASTOLIC BLOOD PRESSURE: 52 MMHG | SYSTOLIC BLOOD PRESSURE: 99 MMHG | WEIGHT: 145 LBS | HEIGHT: 66 IN | TEMPERATURE: 97.7 F

## 2021-10-22 DIAGNOSIS — M51.36 LUMBAR DEGENERATIVE DISC DISEASE: ICD-10-CM

## 2021-10-22 DIAGNOSIS — M47.812 CERVICAL SPONDYLOSIS WITHOUT MYELOPATHY: ICD-10-CM

## 2021-10-22 DIAGNOSIS — M48.062 LUMBAR STENOSIS WITH NEUROGENIC CLAUDICATION: ICD-10-CM

## 2021-10-22 DIAGNOSIS — M43.16 SPONDYLOLISTHESIS OF LUMBAR REGION: Primary | ICD-10-CM

## 2021-10-22 PROCEDURE — 99214 OFFICE O/P EST MOD 30 MIN: CPT | Performed by: NEUROLOGICAL SURGERY

## 2021-10-25 ENCOUNTER — TELEPHONE (OUTPATIENT)
Dept: NEUROSURGERY | Facility: CLINIC | Age: 71
End: 2021-10-25

## 2021-10-25 NOTE — TELEPHONE ENCOUNTER
Nati from [facility] called and requested a copy of the referral, visit notes and imaging for the patient. Requested information was successful faxed to [phone]

## 2021-12-07 ENCOUNTER — LAB (OUTPATIENT)
Dept: FAMILY MEDICINE CLINIC | Facility: CLINIC | Age: 71
End: 2021-12-07

## 2021-12-07 ENCOUNTER — OFFICE VISIT (OUTPATIENT)
Dept: FAMILY MEDICINE CLINIC | Facility: CLINIC | Age: 71
End: 2021-12-07

## 2021-12-07 VITALS
TEMPERATURE: 97.7 F | DIASTOLIC BLOOD PRESSURE: 70 MMHG | OXYGEN SATURATION: 97 % | SYSTOLIC BLOOD PRESSURE: 112 MMHG | BODY MASS INDEX: 23.63 KG/M2 | HEART RATE: 69 BPM | WEIGHT: 147 LBS | HEIGHT: 66 IN

## 2021-12-07 DIAGNOSIS — Z12.31 ENCOUNTER FOR SCREENING MAMMOGRAM FOR MALIGNANT NEOPLASM OF BREAST: ICD-10-CM

## 2021-12-07 DIAGNOSIS — Z23 IMMUNIZATION DUE: ICD-10-CM

## 2021-12-07 DIAGNOSIS — E55.9 VITAMIN D DEFICIENCY: ICD-10-CM

## 2021-12-07 DIAGNOSIS — Z11.59 NEED FOR HEPATITIS C SCREENING TEST: ICD-10-CM

## 2021-12-07 DIAGNOSIS — Z00.00 MEDICARE ANNUAL WELLNESS VISIT, SUBSEQUENT: Primary | ICD-10-CM

## 2021-12-07 DIAGNOSIS — Z00.00 MEDICARE ANNUAL WELLNESS VISIT, SUBSEQUENT: ICD-10-CM

## 2021-12-07 PROCEDURE — 96160 PT-FOCUSED HLTH RISK ASSMT: CPT | Performed by: NURSE PRACTITIONER

## 2021-12-07 PROCEDURE — 84443 ASSAY THYROID STIM HORMONE: CPT | Performed by: NURSE PRACTITIONER

## 2021-12-07 PROCEDURE — 90732 PPSV23 VACC 2 YRS+ SUBQ/IM: CPT | Performed by: NURSE PRACTITIONER

## 2021-12-07 PROCEDURE — 85025 COMPLETE CBC W/AUTO DIFF WBC: CPT | Performed by: NURSE PRACTITIONER

## 2021-12-07 PROCEDURE — G0439 PPPS, SUBSEQ VISIT: HCPCS | Performed by: NURSE PRACTITIONER

## 2021-12-07 PROCEDURE — G0009 ADMIN PNEUMOCOCCAL VACCINE: HCPCS | Performed by: NURSE PRACTITIONER

## 2021-12-07 PROCEDURE — 1170F FXNL STATUS ASSESSED: CPT | Performed by: NURSE PRACTITIONER

## 2021-12-07 PROCEDURE — 36415 COLL VENOUS BLD VENIPUNCTURE: CPT

## 2021-12-07 PROCEDURE — 82306 VITAMIN D 25 HYDROXY: CPT | Performed by: NURSE PRACTITIONER

## 2021-12-07 PROCEDURE — 1160F RVW MEDS BY RX/DR IN RCRD: CPT | Performed by: NURSE PRACTITIONER

## 2021-12-07 PROCEDURE — 80053 COMPREHEN METABOLIC PANEL: CPT | Performed by: NURSE PRACTITIONER

## 2021-12-07 PROCEDURE — 80061 LIPID PANEL: CPT | Performed by: NURSE PRACTITIONER

## 2021-12-07 PROCEDURE — 86803 HEPATITIS C AB TEST: CPT | Performed by: NURSE PRACTITIONER

## 2021-12-07 NOTE — PROGRESS NOTES
The ABCs of the Annual Wellness Visit  Subsequent Medicare Wellness Visit    Chief Complaint   Patient presents with   • Medicare Wellness-subsequent      Subjective    History of Present Illness:  Jennifer Blackmon is a 71 y.o. female who presents for a Subsequent Medicare Wellness Visit.    PNA 23 - Due.  Covid - Completed.   Flu - Completed.  Mammogram - Due.  Dexa-UTD   Colonoscopy - Due in 2024.  Pap - Full hysterectomy.    The following portions of the patient's history were reviewed and   updated as appropriate: allergies, current medications, past family history, past medical history, past social history, past surgical history and problem list.    Compared to one year ago, the patient feels her physical   health is the same.    Compared to one year ago, the patient feels her mental   health is the same.    Recent Hospitalizations:  She was admitted within the past 365 days at [facility].       Current Medical Providers:  Patient Care Team:  Nathalia Maria APRN as PCP - General (Nurse Practitioner)    Outpatient Medications Prior to Visit   Medication Sig Dispense Refill   • atorvastatin (LIPITOR) 40 MG tablet TAKE 1 TABLET DAILY 90 tablet 3   • HUNTER LOW DOSE 81 MG chewable tablet Chew 81 mg Daily.  0   • buPROPion XL (WELLBUTRIN XL) 150 MG 24 hr tablet TAKE 3 TABLETS IN THE MORNING 270 tablet 3   • Calcium Citrate-Vitamin D 500-400 MG-UNIT chewable tablet Chew Daily.     • carvedilol (COREG) 3.125 MG tablet TAKE 1 TABLET TWICE A  tablet 3   • clopidogrel (PLAVIX) 75 MG tablet TAKE 1 TABLET DAILY 90 tablet 3   • coenzyme Q10 100 MG capsule Take 100 mg by mouth.     • diclofenac (VOLTAREN) 50 MG EC tablet Take 1 tablet by mouth 2 (Two) Times a Day. 180 tablet 0   • escitalopram (LEXAPRO) 20 MG tablet 1/2 tablet twice daily 90 tablet 3   • Melatonin 10 MG sublingual tablet Place  under the tongue.     • Multiple Vitamins-Minerals (Multi-Vitamin Gummies) chewable tablet Chew.     • montelukast  "(SINGULAIR) 10 MG tablet TAKE 1 TABLET DAILY 90 tablet 4     No facility-administered medications prior to visit.       No opioid medication identified on active medication list. I have reviewed chart for other potential  high risk medication/s and harmful drug interactions in the elderly.          Aspirin is on active medication list. Aspirin use is indicated based on review of current medical condition/s. Pros and cons of this therapy have been discussed today. Benefits of this medication outweigh potential harm.  Patient has been encouraged to continue taking this medication.  .      Patient Active Problem List   Diagnosis   • Old MI (myocardial infarction)   • Coronary artery disease involving native coronary artery of native heart with angina pectoris (HCC)   • Essential hypertension   • Hyperlipidemia, mixed   • Chest pain, atypical   • Degeneration of lumbar intervertebral disc   • Low back pain   • Lumbar spondylosis   • Arthritis   • Cellulitis of face   • Depressive disorder   • Malignant melanoma of skin (HCC)     Advance Care Planning  Advance Directive is not on file.  ACP discussion was held with the patient during this visit. Patient does not have an advance directive, information provided.          Objective    Vitals:    12/07/21 1345   BP: 112/70   BP Location: Right arm   Patient Position: Sitting   Cuff Size: Adult   Pulse: 69   Temp: 97.7 °F (36.5 °C)   TempSrc: Skin   SpO2: 97%   Weight: 66.7 kg (147 lb)   Height: 167.6 cm (66\")     BMI Readings from Last 1 Encounters:   12/07/21 23.73 kg/m²   BMI is within normal parameters. No follow-up required.    Does the patient have evidence of cognitive impairment? No    Physical Exam  Constitutional:       Appearance: Normal appearance. She is not ill-appearing.   HENT:      Head: Normocephalic and atraumatic.      Right Ear: Tympanic membrane normal.      Left Ear: Tympanic membrane normal.      Nose: Nose normal.      Mouth/Throat:      Mouth: Mucous " membranes are moist.   Eyes:      Extraocular Movements: Extraocular movements intact.      Pupils: Pupils are equal, round, and reactive to light.   Cardiovascular:      Rate and Rhythm: Normal rate and regular rhythm.      Pulses: Normal pulses.      Heart sounds: Normal heart sounds.   Pulmonary:      Effort: Pulmonary effort is normal.      Breath sounds: Normal breath sounds.   Abdominal:      General: Bowel sounds are normal.      Palpations: Abdomen is soft.   Musculoskeletal:      Cervical back: Normal range of motion and neck supple.   Neurological:      Mental Status: She is alert and oriented to person, place, and time.   Psychiatric:         Mood and Affect: Mood normal.         Behavior: Behavior normal.               HEALTH RISK ASSESSMENT    Smoking Status:  Social History     Tobacco Use   Smoking Status Former Smoker   • Packs/day: 0.50   • Years: 4.00   • Pack years: 2.00   • Types: Cigarettes   • Quit date:    • Years since quittin.9   Smokeless Tobacco Never Used     Alcohol Consumption:  Social History     Substance and Sexual Activity   Alcohol Use No     Fall Risk Screen:    [facility] Fall Risk Assessment was completed, and patient is at HIGH risk for falls. Assessment completed on:2021    Depression Screening:  PHQ-2/PHQ-9 Depression Screening 2021   Little interest or pleasure in doing things 0   Feeling down, depressed, or hopeless 0   Total Score 0       Health Habits and Functional and Cognitive Screening:  Functional & Cognitive Status 2021   Do you have difficulty preparing food and eating? No   Do you have difficulty bathing yourself, getting dressed or grooming yourself? No   Do you have difficulty using the toilet? No   Do you have difficulty moving around from place to place? No   Do you have trouble with steps or getting out of a bed or a chair? No   Current Diet Limited Junk Food        Current Diet Comment -   Dental Exam Up to date   Eye Exam Up to date    Exercise (times per week) -        Exercise Frequency Comment -   Current Exercise Activities Include -   Do you need help using the phone?  No   Are you deaf or do you have serious difficulty hearing?  Yes   Do you need help with transportation? No   Do you need help shopping? No   Do you need help preparing meals?  No   Do you need help with housework?  Yes   Do you need help with laundry? No   Do you need help taking your medications? No   Do you need help managing money? No   Do you ever drive or ride in a car without wearing a seat belt? No   Have you felt unusual stress, anger or loneliness in the last month? Yes   Who do you live with? Spouse   If you need help, do you have trouble finding someone available to you? No   Have you been bothered in the last four weeks by sexual problems? No   Do you have difficulty concentrating, remembering or making decisions? Yes       Age-appropriate Screening Schedule:  Refer to the list below for future screening recommendations based on patient's age, sex and/or medical conditions. Orders for these recommended tests are listed in the plan section. The patient has been provided with a written plan.    Health Maintenance   Topic Date Due   • LIPID PANEL  10/29/2021   • TDAP/TD VACCINES (1 - Tdap) 12/07/2021 (Originally 8/9/1969)   • ZOSTER VACCINE (1 of 2) 08/05/2022 (Originally 8/9/1998)   • DXA SCAN  07/13/2022   • MAMMOGRAM  12/04/2022   • INFLUENZA VACCINE  Completed              Assessment/Plan   CMS Preventative Services Quick Reference  Risk Factors Identified During Encounter  Dementia/Memory   Depression/Dysphoria  Fall Risk-High or Moderate  Glaucoma or Family History of Glaucoma  Hearing Problem  Immunizations Discussed/Encouraged (specific Immunizations; Pneumococcal 23  Polypharmacy  The above risks/problems have been discussed with the patient.  Follow up actions/plans if indicated are seen below in the Assessment/Plan Section.  Pertinent information has  been shared with the patient in the After Visit Summary.    Diagnoses and all orders for this visit:    1. Medicare annual wellness visit, subsequent (Primary)  -     CBC & Differential; Future  -     Comprehensive Metabolic Panel; Future  -     Lipid Panel; Future  -     TSH; Future  -     Vitamin D 25 Hydroxy; Future    2. Vitamin D deficiency  -     Vitamin D 25 Hydroxy; Future    3. Encounter for screening mammogram for malignant neoplasm of breast  -     Mammo Screening Digital Tomosynthesis Bilateral With CAD; Future    4. Need for hepatitis C screening test  -     Hepatitis C Antibody; Future    5. Immunization due  -     Pneumococcal Polysaccharide Vaccine 23-Valent (PPSV23) Greater Than or Equal To 1yo Subcutaneous / IM    check labs  Mammogram  During this visit for their annual exam, we reviewed their personal history, social history and family history. We went over their medications and all the recommended health maintenance items for their age group. They were given the opportunity to ask questions and discuss other concerns.       Follow Up:   Return in about 6 months (around 6/7/2022).     An After Visit Summary and PPPS were made available to the patient.

## 2021-12-08 DIAGNOSIS — N28.9 DECREASED RENAL FUNCTION: Primary | ICD-10-CM

## 2021-12-08 LAB
25(OH)D3 SERPL-MCNC: 41.5 NG/ML (ref 30–100)
ALBUMIN SERPL-MCNC: 4.4 G/DL (ref 3.5–5.2)
ALBUMIN/GLOB SERPL: 1.6 G/DL
ALP SERPL-CCNC: 84 U/L (ref 39–117)
ALT SERPL W P-5'-P-CCNC: 21 U/L (ref 1–33)
ANION GAP SERPL CALCULATED.3IONS-SCNC: 11.6 MMOL/L (ref 5–15)
AST SERPL-CCNC: 23 U/L (ref 1–32)
BASOPHILS # BLD AUTO: 0.06 10*3/MM3 (ref 0–0.2)
BASOPHILS NFR BLD AUTO: 0.8 % (ref 0–1.5)
BILIRUB SERPL-MCNC: 0.5 MG/DL (ref 0–1.2)
BUN SERPL-MCNC: 23 MG/DL (ref 8–23)
BUN/CREAT SERPL: 20.5 (ref 7–25)
CALCIUM SPEC-SCNC: 9.5 MG/DL (ref 8.6–10.5)
CHLORIDE SERPL-SCNC: 101 MMOL/L (ref 98–107)
CHOLEST SERPL-MCNC: 154 MG/DL (ref 0–200)
CO2 SERPL-SCNC: 27.4 MMOL/L (ref 22–29)
CREAT SERPL-MCNC: 1.12 MG/DL (ref 0.57–1)
DEPRECATED RDW RBC AUTO: 39.9 FL (ref 37–54)
EOSINOPHIL # BLD AUTO: 0.32 10*3/MM3 (ref 0–0.4)
EOSINOPHIL NFR BLD AUTO: 4.2 % (ref 0.3–6.2)
ERYTHROCYTE [DISTWIDTH] IN BLOOD BY AUTOMATED COUNT: 12.1 % (ref 12.3–15.4)
GFR SERPL CREATININE-BSD FRML MDRD: 48 ML/MIN/1.73
GLOBULIN UR ELPH-MCNC: 2.8 GM/DL
GLUCOSE SERPL-MCNC: 86 MG/DL (ref 65–99)
HCT VFR BLD AUTO: 37.7 % (ref 34–46.6)
HCV AB SER DONR QL: NORMAL
HDLC SERPL-MCNC: 54 MG/DL (ref 40–60)
HGB BLD-MCNC: 12.8 G/DL (ref 12–15.9)
IMM GRANULOCYTES # BLD AUTO: 0.02 10*3/MM3 (ref 0–0.05)
IMM GRANULOCYTES NFR BLD AUTO: 0.3 % (ref 0–0.5)
LDLC SERPL CALC-MCNC: 85 MG/DL (ref 0–100)
LDLC/HDLC SERPL: 1.57 {RATIO}
LYMPHOCYTES # BLD AUTO: 2.42 10*3/MM3 (ref 0.7–3.1)
LYMPHOCYTES NFR BLD AUTO: 31.7 % (ref 19.6–45.3)
MCH RBC QN AUTO: 31 PG (ref 26.6–33)
MCHC RBC AUTO-ENTMCNC: 34 G/DL (ref 31.5–35.7)
MCV RBC AUTO: 91.3 FL (ref 79–97)
MONOCYTES # BLD AUTO: 0.62 10*3/MM3 (ref 0.1–0.9)
MONOCYTES NFR BLD AUTO: 8.1 % (ref 5–12)
NEUTROPHILS NFR BLD AUTO: 4.2 10*3/MM3 (ref 1.7–7)
NEUTROPHILS NFR BLD AUTO: 54.9 % (ref 42.7–76)
NRBC BLD AUTO-RTO: 0 /100 WBC (ref 0–0.2)
PLATELET # BLD AUTO: 253 10*3/MM3 (ref 140–450)
PMV BLD AUTO: 10.6 FL (ref 6–12)
POTASSIUM SERPL-SCNC: 4.9 MMOL/L (ref 3.5–5.2)
PROT SERPL-MCNC: 7.2 G/DL (ref 6–8.5)
RBC # BLD AUTO: 4.13 10*6/MM3 (ref 3.77–5.28)
SODIUM SERPL-SCNC: 140 MMOL/L (ref 136–145)
TRIGL SERPL-MCNC: 75 MG/DL (ref 0–150)
TSH SERPL DL<=0.05 MIU/L-ACNC: 0.76 UIU/ML (ref 0.27–4.2)
VLDLC SERPL-MCNC: 15 MG/DL (ref 5–40)
WBC NRBC COR # BLD: 7.64 10*3/MM3 (ref 3.4–10.8)

## 2021-12-13 ENCOUNTER — TELEPHONE (OUTPATIENT)
Dept: FAMILY MEDICINE CLINIC | Facility: CLINIC | Age: 71
End: 2021-12-13

## 2021-12-13 NOTE — TELEPHONE ENCOUNTER
Nathalia, When scheduling called the patient this is what she told them,12/13/2021 pt states that she is supposed to have a diagnostic ordered since she has implants. Do you know if this is correct or not?       Please advise   Thanks, Peri

## 2021-12-14 RX ORDER — ATORVASTATIN CALCIUM 40 MG/1
40 TABLET, FILM COATED ORAL DAILY
Qty: 90 TABLET | Refills: 3 | Status: SHIPPED | OUTPATIENT
Start: 2021-12-14 | End: 2023-02-14 | Stop reason: SDUPTHER

## 2021-12-17 NOTE — PROGRESS NOTES
"Subjective   History of Present Illness: Jennifer Blackmon is a 71 y.o. female is here today for follow-up after injections.  Patient says that the injections made a significant difference in her pain.  No other new issues.  Currently she feels she is doing well and overall improving.      Previous Treatment:    The following portions of the patient's history were reviewed and updated as appropriate: allergies, current medications, past family history, past medical history, past social history, past surgical history and problem list.    Review of Systems   Constitutional: Positive for activity change.   HENT: Negative.    Eyes: Negative.    Respiratory: Negative.  Negative for chest tightness and shortness of breath.    Cardiovascular: Negative.  Negative for chest pain.   Gastrointestinal: Negative.    Endocrine: Negative.    Musculoskeletal: Positive for back pain, gait problem and myalgias.   Skin: Negative.    Allergic/Immunologic: Negative.    Neurological: Positive for weakness ( bilateral legs- worse on the left). Negative for numbness.   Hematological: Negative.    Psychiatric/Behavioral: Negative.        Objective     /68   Pulse 70   Temp 97.5 °F (36.4 °C)   Resp 16   Ht 167.6 cm (66\")   Wt 66.7 kg (147 lb)   SpO2 99%   BMI 23.73 kg/m²    Body mass index is 23.73 kg/m².      Neurologic Exam    Assessment/Plan   Independent Review of Radiographic Studies:      I personally reviewed and interpreted the images from the following studies.    No new imaging    Medical Decision Making:      Jennifer Blackmon is a 71 y.o. female here for follow-up after epidural injections.  Overall she is doing well following the injections.  She does have significant lumbar pathology, however she is seeing significant improvement with conservative measures.  I have advised the patient that she can continue with injections and physical therapy.  If the pain gets to the point where it is significantly affecting her life or " she develops new lower extremity pain or weakness she should follow-up.  Otherwise I will see her back as needed      There are no diagnoses linked to this encounter.  No follow-ups on file.    This patient was examined wearing appropriate personal protective equipment.                      Dr. Dario Snyder IV    12/20/21  12:12 EST

## 2021-12-20 ENCOUNTER — OFFICE VISIT (OUTPATIENT)
Dept: NEUROSURGERY | Facility: CLINIC | Age: 71
End: 2021-12-20

## 2021-12-20 VITALS
HEIGHT: 66 IN | TEMPERATURE: 97.5 F | RESPIRATION RATE: 16 BRPM | HEART RATE: 70 BPM | SYSTOLIC BLOOD PRESSURE: 116 MMHG | OXYGEN SATURATION: 99 % | DIASTOLIC BLOOD PRESSURE: 68 MMHG | BODY MASS INDEX: 23.63 KG/M2 | WEIGHT: 147 LBS

## 2021-12-20 DIAGNOSIS — M48.062 LUMBAR STENOSIS WITH NEUROGENIC CLAUDICATION: ICD-10-CM

## 2021-12-20 DIAGNOSIS — M43.16 SPONDYLOLISTHESIS OF LUMBAR REGION: Primary | ICD-10-CM

## 2021-12-20 DIAGNOSIS — M51.36 LUMBAR DEGENERATIVE DISC DISEASE: ICD-10-CM

## 2021-12-20 PROCEDURE — 99212 OFFICE O/P EST SF 10 MIN: CPT | Performed by: NEUROLOGICAL SURGERY

## 2021-12-23 ENCOUNTER — HOSPITAL ENCOUNTER (OUTPATIENT)
Dept: MAMMOGRAPHY | Facility: HOSPITAL | Age: 71
Discharge: HOME | End: 2021-12-23
Admitting: NURSE PRACTITIONER

## 2021-12-23 DIAGNOSIS — Z12.31 ENCOUNTER FOR SCREENING MAMMOGRAM FOR MALIGNANT NEOPLASM OF BREAST: ICD-10-CM

## 2021-12-23 PROCEDURE — 77067 SCR MAMMO BI INCL CAD: CPT

## 2021-12-23 PROCEDURE — 77063 BREAST TOMOSYNTHESIS BI: CPT

## 2022-01-10 RX ORDER — BUPROPION HYDROCHLORIDE 150 MG/1
TABLET ORAL
Qty: 270 TABLET | Refills: 3 | Status: SHIPPED | OUTPATIENT
Start: 2022-01-10 | End: 2022-01-11 | Stop reason: SDUPTHER

## 2022-01-11 RX ORDER — CARVEDILOL 3.12 MG/1
3.12 TABLET ORAL 2 TIMES DAILY
Qty: 180 TABLET | Refills: 3 | Status: SHIPPED | OUTPATIENT
Start: 2022-01-11 | End: 2023-02-14 | Stop reason: SDUPTHER

## 2022-01-11 RX ORDER — BUPROPION HYDROCHLORIDE 150 MG/1
450 TABLET ORAL EVERY MORNING
Qty: 270 TABLET | Refills: 3 | Status: SHIPPED | OUTPATIENT
Start: 2022-01-11 | End: 2023-02-14 | Stop reason: SDUPTHER

## 2022-02-03 ENCOUNTER — TRANSCRIBE ORDERS (OUTPATIENT)
Dept: ADMINISTRATIVE | Facility: HOSPITAL | Age: 72
End: 2022-02-03

## 2022-02-03 DIAGNOSIS — N18.31 CHRONIC KIDNEY DISEASE, STAGE 3A: Primary | ICD-10-CM

## 2022-02-24 ENCOUNTER — APPOINTMENT (OUTPATIENT)
Dept: ULTRASOUND IMAGING | Facility: HOSPITAL | Age: 72
End: 2022-02-24

## 2022-02-25 ENCOUNTER — APPOINTMENT (OUTPATIENT)
Dept: ULTRASOUND IMAGING | Facility: HOSPITAL | Age: 72
End: 2022-02-25

## 2022-02-28 ENCOUNTER — LAB (OUTPATIENT)
Dept: LAB | Facility: HOSPITAL | Age: 72
End: 2022-02-28

## 2022-02-28 ENCOUNTER — HOSPITAL ENCOUNTER (OUTPATIENT)
Dept: ULTRASOUND IMAGING | Facility: HOSPITAL | Age: 72
Discharge: HOME | End: 2022-02-28

## 2022-02-28 ENCOUNTER — TRANSCRIBE ORDERS (OUTPATIENT)
Dept: ADMINISTRATIVE | Facility: HOSPITAL | Age: 72
End: 2022-02-28

## 2022-02-28 DIAGNOSIS — E55.9 VITAMIN D DEFICIENCY, UNSPECIFIED: ICD-10-CM

## 2022-02-28 DIAGNOSIS — N18.31 CHRONIC KIDNEY DISEASE, STAGE 3A: ICD-10-CM

## 2022-02-28 DIAGNOSIS — I10 ESSENTIAL HYPERTENSION: ICD-10-CM

## 2022-02-28 DIAGNOSIS — N18.31 CHRONIC KIDNEY DISEASE (CKD) STAGE G3A/A1, MODERATELY DECREASED GLOMERULAR FILTRATION RATE (GFR) BETWEEN 45-59 ML/MIN/1.73 SQUARE METER AND ALBUMINURIA CREATININE RATIO LESS THAN 30 MG/G (CMS/H*: ICD-10-CM

## 2022-02-28 DIAGNOSIS — N18.31 CHRONIC KIDNEY DISEASE (CKD) STAGE G3A/A1, MODERATELY DECREASED GLOMERULAR FILTRATION RATE (GFR) BETWEEN 45-59 ML/MIN/1.73 SQUARE METER AND ALBUMINURIA CREATININE RATIO LESS THAN 30 MG/G (CMS/H*: Primary | ICD-10-CM

## 2022-02-28 DIAGNOSIS — R80.9 PROTEINURIA, UNSPECIFIED TYPE: ICD-10-CM

## 2022-02-28 LAB
25(OH)D3 SERPL-MCNC: 42.1 NG/ML (ref 30–100)
ALBUMIN SERPL-MCNC: 3.9 G/DL (ref 3.5–5.2)
ALBUMIN/GLOB SERPL: 1.3 G/DL
ALP SERPL-CCNC: 89 U/L (ref 39–117)
ALT SERPL W P-5'-P-CCNC: 25 U/L (ref 1–33)
ANION GAP SERPL CALCULATED.3IONS-SCNC: 8.4 MMOL/L (ref 5–15)
AST SERPL-CCNC: 23 U/L (ref 1–32)
BASOPHILS # BLD AUTO: 0.03 10*3/MM3 (ref 0–0.2)
BASOPHILS NFR BLD AUTO: 0.4 % (ref 0–1.5)
BILIRUB SERPL-MCNC: 0.4 MG/DL (ref 0–1.2)
BUN SERPL-MCNC: 23 MG/DL (ref 8–23)
BUN/CREAT SERPL: 20.5 (ref 7–25)
CALCIUM SPEC-SCNC: 9.8 MG/DL (ref 8.6–10.5)
CHLORIDE SERPL-SCNC: 103 MMOL/L (ref 98–107)
CK SERPL-CCNC: 109 U/L (ref 20–180)
CO2 SERPL-SCNC: 29.6 MMOL/L (ref 22–29)
CREAT SERPL-MCNC: 1.12 MG/DL (ref 0.57–1)
DEPRECATED RDW RBC AUTO: 41.2 FL (ref 37–54)
EGFRCR SERPLBLD CKD-EPI 2021: 52 ML/MIN/1.73
EOSINOPHIL # BLD AUTO: 0.18 10*3/MM3 (ref 0–0.4)
EOSINOPHIL NFR BLD AUTO: 2.2 % (ref 0.3–6.2)
ERYTHROCYTE [DISTWIDTH] IN BLOOD BY AUTOMATED COUNT: 12.4 % (ref 12.3–15.4)
GLOBULIN UR ELPH-MCNC: 3.1 GM/DL
GLUCOSE SERPL-MCNC: 81 MG/DL (ref 65–99)
HCT VFR BLD AUTO: 40.9 % (ref 34–46.6)
HGB BLD-MCNC: 13.6 G/DL (ref 12–15.9)
IMM GRANULOCYTES # BLD AUTO: 0.05 10*3/MM3 (ref 0–0.05)
IMM GRANULOCYTES NFR BLD AUTO: 0.6 % (ref 0–0.5)
LYMPHOCYTES # BLD AUTO: 1.87 10*3/MM3 (ref 0.7–3.1)
LYMPHOCYTES NFR BLD AUTO: 22.5 % (ref 19.6–45.3)
MCH RBC QN AUTO: 30.7 PG (ref 26.6–33)
MCHC RBC AUTO-ENTMCNC: 33.3 G/DL (ref 31.5–35.7)
MCV RBC AUTO: 92.3 FL (ref 79–97)
MONOCYTES # BLD AUTO: 0.84 10*3/MM3 (ref 0.1–0.9)
MONOCYTES NFR BLD AUTO: 10.1 % (ref 5–12)
NEUTROPHILS NFR BLD AUTO: 5.34 10*3/MM3 (ref 1.7–7)
NEUTROPHILS NFR BLD AUTO: 64.2 % (ref 42.7–76)
NRBC BLD AUTO-RTO: 0.1 /100 WBC (ref 0–0.2)
PHOSPHATE SERPL-MCNC: 4.1 MG/DL (ref 2.5–4.5)
PLATELET # BLD AUTO: 273 10*3/MM3 (ref 140–450)
PMV BLD AUTO: 9.4 FL (ref 6–12)
POTASSIUM SERPL-SCNC: 3.9 MMOL/L (ref 3.5–5.2)
PROT SERPL-MCNC: 7 G/DL (ref 6–8.5)
PTH-INTACT SERPL-MCNC: 60.1 PG/ML (ref 15–65)
RBC # BLD AUTO: 4.43 10*6/MM3 (ref 3.77–5.28)
SODIUM SERPL-SCNC: 141 MMOL/L (ref 136–145)
TSH SERPL DL<=0.05 MIU/L-ACNC: 1.07 UIU/ML (ref 0.27–4.2)
URATE SERPL-MCNC: 3.7 MG/DL (ref 2.4–5.7)
WBC NRBC COR # BLD: 8.31 10*3/MM3 (ref 3.4–10.8)

## 2022-02-28 PROCEDURE — 86038 ANTINUCLEAR ANTIBODIES: CPT

## 2022-02-28 PROCEDURE — 84443 ASSAY THYROID STIM HORMONE: CPT

## 2022-02-28 PROCEDURE — 80053 COMPREHEN METABOLIC PANEL: CPT

## 2022-02-28 PROCEDURE — 86334 IMMUNOFIX E-PHORESIS SERUM: CPT

## 2022-02-28 PROCEDURE — 76775 US EXAM ABDO BACK WALL LIM: CPT

## 2022-02-28 PROCEDURE — 82784 ASSAY IGA/IGD/IGG/IGM EACH: CPT

## 2022-02-28 PROCEDURE — 83970 ASSAY OF PARATHORMONE: CPT

## 2022-02-28 PROCEDURE — 85025 COMPLETE CBC W/AUTO DIFF WBC: CPT

## 2022-02-28 PROCEDURE — 84550 ASSAY OF BLOOD/URIC ACID: CPT

## 2022-02-28 PROCEDURE — 82550 ASSAY OF CK (CPK): CPT

## 2022-02-28 PROCEDURE — 82306 VITAMIN D 25 HYDROXY: CPT

## 2022-02-28 PROCEDURE — 36415 COLL VENOUS BLD VENIPUNCTURE: CPT

## 2022-02-28 PROCEDURE — 84100 ASSAY OF PHOSPHORUS: CPT

## 2022-03-01 LAB
IGA SERPL-MCNC: 142 MG/DL (ref 64–422)
IGG SERPL-MCNC: 921 MG/DL (ref 586–1602)
IGM SERPL-MCNC: 24 MG/DL (ref 26–217)
PROT PATTERN SERPL IFE-IMP: ABNORMAL

## 2022-03-02 ENCOUNTER — LAB (OUTPATIENT)
Dept: LAB | Facility: HOSPITAL | Age: 72
End: 2022-03-02

## 2022-03-02 LAB — ANA SER QL: NEGATIVE

## 2022-03-02 PROCEDURE — 82570 ASSAY OF URINE CREATININE: CPT

## 2022-03-02 PROCEDURE — 81001 URINALYSIS AUTO W/SCOPE: CPT

## 2022-03-02 PROCEDURE — 84156 ASSAY OF PROTEIN URINE: CPT

## 2022-03-03 LAB
BACTERIA UR QL AUTO: ABNORMAL /HPF
BILIRUB UR QL STRIP: NEGATIVE
CLARITY UR: CLEAR
COLOR UR: YELLOW
CREAT UR-MCNC: 190.5 MG/DL
GLUCOSE UR STRIP-MCNC: NEGATIVE MG/DL
HGB UR QL STRIP.AUTO: NEGATIVE
HYALINE CASTS UR QL AUTO: ABNORMAL /LPF
KETONES UR QL STRIP: ABNORMAL
LEUKOCYTE ESTERASE UR QL STRIP.AUTO: ABNORMAL
NITRITE UR QL STRIP: NEGATIVE
PH UR STRIP.AUTO: 5.5 [PH] (ref 5–8)
PROT ?TM UR-MCNC: 13.1 MG/DL
PROT UR QL STRIP: NEGATIVE
PROT/CREAT UR: 68.8 MG/G CREA (ref 0–200)
RBC # UR STRIP: ABNORMAL /HPF
REF LAB TEST METHOD: ABNORMAL
SP GR UR STRIP: 1.02 (ref 1–1.03)
SQUAMOUS #/AREA URNS HPF: ABNORMAL /HPF
UROBILINOGEN UR QL STRIP: ABNORMAL
WBC # UR STRIP: ABNORMAL /HPF

## 2022-03-14 ENCOUNTER — OFFICE VISIT (OUTPATIENT)
Dept: CARDIOLOGY | Facility: CLINIC | Age: 72
End: 2022-03-14

## 2022-03-14 VITALS
SYSTOLIC BLOOD PRESSURE: 126 MMHG | HEIGHT: 66 IN | DIASTOLIC BLOOD PRESSURE: 76 MMHG | RESPIRATION RATE: 18 BRPM | HEART RATE: 67 BPM | WEIGHT: 144.8 LBS | BODY MASS INDEX: 23.27 KG/M2

## 2022-03-14 DIAGNOSIS — I25.118 CORONARY ARTERY DISEASE OF NATIVE ARTERY OF NATIVE HEART WITH STABLE ANGINA PECTORIS: Primary | ICD-10-CM

## 2022-03-14 PROCEDURE — 93000 ELECTROCARDIOGRAM COMPLETE: CPT | Performed by: INTERNAL MEDICINE

## 2022-03-14 PROCEDURE — 99214 OFFICE O/P EST MOD 30 MIN: CPT | Performed by: INTERNAL MEDICINE

## 2022-03-17 ENCOUNTER — APPOINTMENT (OUTPATIENT)
Dept: GENERAL RADIOLOGY | Facility: HOSPITAL | Age: 72
End: 2022-03-17

## 2022-03-17 ENCOUNTER — HOSPITAL ENCOUNTER (OUTPATIENT)
Facility: HOSPITAL | Age: 72
Setting detail: OBSERVATION
Discharge: HOME | End: 2022-03-18
Attending: EMERGENCY MEDICINE | Admitting: HOSPITALIST

## 2022-03-17 DIAGNOSIS — I95.1 ORTHOSTATIC HYPOTENSION: ICD-10-CM

## 2022-03-17 DIAGNOSIS — R55 SYNCOPE AND COLLAPSE: Primary | ICD-10-CM

## 2022-03-17 DIAGNOSIS — R55 SYNCOPE, UNSPECIFIED SYNCOPE TYPE: ICD-10-CM

## 2022-03-17 DIAGNOSIS — R41.3 AMNESIA: ICD-10-CM

## 2022-03-17 DIAGNOSIS — S00.93XA CONTUSION OF HEAD, UNSPECIFIED PART OF HEAD, INITIAL ENCOUNTER: ICD-10-CM

## 2022-03-17 LAB
APTT PPP: 24.8 SECONDS (ref 61–76.5)
BASOPHILS # BLD AUTO: 0 10*3/MM3 (ref 0–0.2)
BASOPHILS NFR BLD AUTO: 0.6 % (ref 0–1.5)
D DIMER PPP FEU-MCNC: 1.49 MG/L (FEU) (ref 0–0.59)
DEPRECATED RDW RBC AUTO: 43.3 FL (ref 37–54)
EOSINOPHIL # BLD AUTO: 0.2 10*3/MM3 (ref 0–0.4)
EOSINOPHIL NFR BLD AUTO: 3.7 % (ref 0.3–6.2)
ERYTHROCYTE [DISTWIDTH] IN BLOOD BY AUTOMATED COUNT: 13.4 % (ref 12.3–15.4)
HCT VFR BLD AUTO: 38.7 % (ref 34–46.6)
HGB BLD-MCNC: 12.8 G/DL (ref 12–15.9)
INR PPP: 0.96 (ref 0.93–1.1)
LYMPHOCYTES # BLD AUTO: 2.7 10*3/MM3 (ref 0.7–3.1)
LYMPHOCYTES NFR BLD AUTO: 46.7 % (ref 19.6–45.3)
MCH RBC QN AUTO: 30.9 PG (ref 26.6–33)
MCHC RBC AUTO-ENTMCNC: 33.1 G/DL (ref 31.5–35.7)
MCV RBC AUTO: 93.3 FL (ref 79–97)
MONOCYTES # BLD AUTO: 0.6 10*3/MM3 (ref 0.1–0.9)
MONOCYTES NFR BLD AUTO: 11.2 % (ref 5–12)
NEUTROPHILS NFR BLD AUTO: 2.2 10*3/MM3 (ref 1.7–7)
NEUTROPHILS NFR BLD AUTO: 37.8 % (ref 42.7–76)
NRBC BLD AUTO-RTO: 0.1 /100 WBC (ref 0–0.2)
PLATELET # BLD AUTO: 229 10*3/MM3 (ref 140–450)
PMV BLD AUTO: 7.2 FL (ref 6–12)
PROTHROMBIN TIME: 10.7 SECONDS (ref 9.6–11.7)
RBC # BLD AUTO: 4.15 10*6/MM3 (ref 3.77–5.28)
WBC NRBC COR # BLD: 5.7 10*3/MM3 (ref 3.4–10.8)

## 2022-03-17 PROCEDURE — 80053 COMPREHEN METABOLIC PANEL: CPT | Performed by: EMERGENCY MEDICINE

## 2022-03-17 PROCEDURE — 83880 ASSAY OF NATRIURETIC PEPTIDE: CPT | Performed by: PHYSICIAN ASSISTANT

## 2022-03-17 PROCEDURE — 84443 ASSAY THYROID STIM HORMONE: CPT | Performed by: PHYSICIAN ASSISTANT

## 2022-03-17 PROCEDURE — 93005 ELECTROCARDIOGRAM TRACING: CPT

## 2022-03-17 PROCEDURE — 85025 COMPLETE CBC W/AUTO DIFF WBC: CPT | Performed by: EMERGENCY MEDICINE

## 2022-03-17 PROCEDURE — 99284 EMERGENCY DEPT VISIT MOD MDM: CPT

## 2022-03-17 PROCEDURE — 85730 THROMBOPLASTIN TIME PARTIAL: CPT | Performed by: PHYSICIAN ASSISTANT

## 2022-03-17 PROCEDURE — 93005 ELECTROCARDIOGRAM TRACING: CPT | Performed by: EMERGENCY MEDICINE

## 2022-03-17 PROCEDURE — 85379 FIBRIN DEGRADATION QUANT: CPT | Performed by: PHYSICIAN ASSISTANT

## 2022-03-17 PROCEDURE — 71045 X-RAY EXAM CHEST 1 VIEW: CPT

## 2022-03-17 PROCEDURE — 83735 ASSAY OF MAGNESIUM: CPT | Performed by: EMERGENCY MEDICINE

## 2022-03-17 PROCEDURE — 84484 ASSAY OF TROPONIN QUANT: CPT | Performed by: EMERGENCY MEDICINE

## 2022-03-17 PROCEDURE — 85610 PROTHROMBIN TIME: CPT | Performed by: PHYSICIAN ASSISTANT

## 2022-03-17 RX ORDER — SODIUM CHLORIDE 0.9 % (FLUSH) 0.9 %
10 SYRINGE (ML) INJECTION AS NEEDED
Status: DISCONTINUED | OUTPATIENT
Start: 2022-03-17 | End: 2022-03-18 | Stop reason: HOSPADM

## 2022-03-18 ENCOUNTER — READMISSION MANAGEMENT (OUTPATIENT)
Dept: CALL CENTER | Facility: HOSPITAL | Age: 72
End: 2022-03-18

## 2022-03-18 ENCOUNTER — APPOINTMENT (OUTPATIENT)
Dept: CT IMAGING | Facility: HOSPITAL | Age: 72
End: 2022-03-18

## 2022-03-18 ENCOUNTER — APPOINTMENT (OUTPATIENT)
Dept: CARDIOLOGY | Facility: HOSPITAL | Age: 72
End: 2022-03-18

## 2022-03-18 ENCOUNTER — APPOINTMENT (OUTPATIENT)
Dept: RESPIRATORY THERAPY | Facility: HOSPITAL | Age: 72
End: 2022-03-18

## 2022-03-18 VITALS
OXYGEN SATURATION: 97 % | HEIGHT: 66 IN | DIASTOLIC BLOOD PRESSURE: 52 MMHG | HEART RATE: 61 BPM | BODY MASS INDEX: 23.14 KG/M2 | RESPIRATION RATE: 16 BRPM | WEIGHT: 144 LBS | TEMPERATURE: 97.9 F | SYSTOLIC BLOOD PRESSURE: 117 MMHG

## 2022-03-18 PROBLEM — R55 SYNCOPE AND COLLAPSE: Status: ACTIVE | Noted: 2022-03-18

## 2022-03-18 LAB
ALBUMIN SERPL-MCNC: 4 G/DL (ref 3.5–5.2)
ALBUMIN/GLOB SERPL: 1.5 G/DL
ALP SERPL-CCNC: 90 U/L (ref 39–117)
ALT SERPL W P-5'-P-CCNC: 19 U/L (ref 1–33)
ANION GAP SERPL CALCULATED.3IONS-SCNC: 8 MMOL/L (ref 5–15)
ANION GAP SERPL CALCULATED.3IONS-SCNC: 9 MMOL/L (ref 5–15)
AST SERPL-CCNC: 20 U/L (ref 1–32)
BASOPHILS # BLD AUTO: 0 10*3/MM3 (ref 0–0.2)
BASOPHILS NFR BLD AUTO: 0.6 % (ref 0–1.5)
BILIRUB SERPL-MCNC: 0.3 MG/DL (ref 0–1.2)
BUN SERPL-MCNC: 20 MG/DL (ref 8–23)
BUN SERPL-MCNC: 24 MG/DL (ref 8–23)
BUN/CREAT SERPL: 20.6 (ref 7–25)
BUN/CREAT SERPL: 20.7 (ref 7–25)
CALCIUM SPEC-SCNC: 8.6 MG/DL (ref 8.6–10.5)
CALCIUM SPEC-SCNC: 9.3 MG/DL (ref 8.6–10.5)
CHLORIDE SERPL-SCNC: 103 MMOL/L (ref 98–107)
CHLORIDE SERPL-SCNC: 106 MMOL/L (ref 98–107)
CO2 SERPL-SCNC: 27 MMOL/L (ref 22–29)
CO2 SERPL-SCNC: 29 MMOL/L (ref 22–29)
CREAT SERPL-MCNC: 0.97 MG/DL (ref 0.57–1)
CREAT SERPL-MCNC: 1.16 MG/DL (ref 0.57–1)
DEPRECATED RDW RBC AUTO: 43.3 FL (ref 37–54)
EGFRCR SERPLBLD CKD-EPI 2021: 49.9 ML/MIN/1.73
EGFRCR SERPLBLD CKD-EPI 2021: 61.8 ML/MIN/1.73
EOSINOPHIL # BLD AUTO: 0.3 10*3/MM3 (ref 0–0.4)
EOSINOPHIL NFR BLD AUTO: 4.5 % (ref 0.3–6.2)
ERYTHROCYTE [DISTWIDTH] IN BLOOD BY AUTOMATED COUNT: 13.4 % (ref 12.3–15.4)
GLOBULIN UR ELPH-MCNC: 2.7 GM/DL
GLUCOSE BLDC GLUCOMTR-MCNC: 88 MG/DL (ref 70–105)
GLUCOSE SERPL-MCNC: 103 MG/DL (ref 65–99)
GLUCOSE SERPL-MCNC: 85 MG/DL (ref 65–99)
HCT VFR BLD AUTO: 34.6 % (ref 34–46.6)
HGB BLD-MCNC: 11.7 G/DL (ref 12–15.9)
HOLD SPECIMEN: NORMAL
HOLD SPECIMEN: NORMAL
LYMPHOCYTES # BLD AUTO: 2.9 10*3/MM3 (ref 0.7–3.1)
LYMPHOCYTES NFR BLD AUTO: 48.1 % (ref 19.6–45.3)
MAGNESIUM SERPL-MCNC: 2.1 MG/DL (ref 1.6–2.4)
MCH RBC QN AUTO: 31.6 PG (ref 26.6–33)
MCHC RBC AUTO-ENTMCNC: 33.9 G/DL (ref 31.5–35.7)
MCV RBC AUTO: 93.3 FL (ref 79–97)
MONOCYTES # BLD AUTO: 0.6 10*3/MM3 (ref 0.1–0.9)
MONOCYTES NFR BLD AUTO: 10.5 % (ref 5–12)
NEUTROPHILS NFR BLD AUTO: 2.2 10*3/MM3 (ref 1.7–7)
NEUTROPHILS NFR BLD AUTO: 36.3 % (ref 42.7–76)
NRBC BLD AUTO-RTO: 0.2 /100 WBC (ref 0–0.2)
NT-PROBNP SERPL-MCNC: 377 PG/ML (ref 0–900)
PLATELET # BLD AUTO: 201 10*3/MM3 (ref 140–450)
PMV BLD AUTO: 7.7 FL (ref 6–12)
POTASSIUM SERPL-SCNC: 3.7 MMOL/L (ref 3.5–5.2)
POTASSIUM SERPL-SCNC: 4.1 MMOL/L (ref 3.5–5.2)
PROT SERPL-MCNC: 6.7 G/DL (ref 6–8.5)
RBC # BLD AUTO: 3.71 10*6/MM3 (ref 3.77–5.28)
SARS-COV-2 RNA PNL SPEC NAA+PROBE: NOT DETECTED
SODIUM SERPL-SCNC: 141 MMOL/L (ref 136–145)
SODIUM SERPL-SCNC: 141 MMOL/L (ref 136–145)
TROPONIN T SERPL-MCNC: <0.01 NG/ML (ref 0–0.03)
TSH SERPL DL<=0.05 MIU/L-ACNC: 1.64 UIU/ML (ref 0.27–4.2)
WBC NRBC COR # BLD: 6 10*3/MM3 (ref 3.4–10.8)
WHOLE BLOOD HOLD SPECIMEN: NORMAL
WHOLE BLOOD HOLD SPECIMEN: NORMAL

## 2022-03-18 PROCEDURE — 71275 CT ANGIOGRAPHY CHEST: CPT

## 2022-03-18 PROCEDURE — G0378 HOSPITAL OBSERVATION PER HR: HCPCS

## 2022-03-18 PROCEDURE — 72125 CT NECK SPINE W/O DYE: CPT

## 2022-03-18 PROCEDURE — 0 IOPAMIDOL PER 1 ML: Performed by: EMERGENCY MEDICINE

## 2022-03-18 PROCEDURE — 70450 CT HEAD/BRAIN W/O DYE: CPT

## 2022-03-18 PROCEDURE — 96372 THER/PROPH/DIAG INJ SC/IM: CPT

## 2022-03-18 PROCEDURE — 93306 TTE W/DOPPLER COMPLETE: CPT

## 2022-03-18 PROCEDURE — 36415 COLL VENOUS BLD VENIPUNCTURE: CPT | Performed by: HOSPITALIST

## 2022-03-18 PROCEDURE — 25010000002 HEPARIN (PORCINE) PER 1000 UNITS: Performed by: HOSPITALIST

## 2022-03-18 PROCEDURE — 80048 BASIC METABOLIC PNL TOTAL CA: CPT | Performed by: HOSPITALIST

## 2022-03-18 PROCEDURE — 93306 TTE W/DOPPLER COMPLETE: CPT | Performed by: INTERNAL MEDICINE

## 2022-03-18 PROCEDURE — 99220 PR INITIAL OBSERVATION CARE/DAY 70 MINUTES: CPT | Performed by: HOSPITALIST

## 2022-03-18 PROCEDURE — 85025 COMPLETE CBC W/AUTO DIFF WBC: CPT | Performed by: HOSPITALIST

## 2022-03-18 PROCEDURE — 87635 SARS-COV-2 COVID-19 AMP PRB: CPT | Performed by: EMERGENCY MEDICINE

## 2022-03-18 PROCEDURE — 82962 GLUCOSE BLOOD TEST: CPT

## 2022-03-18 PROCEDURE — 93270 REMOTE 30 DAY ECG REV/REPORT: CPT

## 2022-03-18 RX ORDER — CARVEDILOL 3.12 MG/1
3.12 TABLET ORAL 2 TIMES DAILY
Status: DISCONTINUED | OUTPATIENT
Start: 2022-03-18 | End: 2022-03-18 | Stop reason: HOSPADM

## 2022-03-18 RX ORDER — ACETAMINOPHEN 325 MG/1
650 TABLET ORAL EVERY 4 HOURS PRN
Status: DISCONTINUED | OUTPATIENT
Start: 2022-03-18 | End: 2022-03-18 | Stop reason: HOSPADM

## 2022-03-18 RX ORDER — ASPIRIN 81 MG/1
81 TABLET, CHEWABLE ORAL DAILY
Status: DISCONTINUED | OUTPATIENT
Start: 2022-03-18 | End: 2022-03-18 | Stop reason: HOSPADM

## 2022-03-18 RX ORDER — CHOLECALCIFEROL (VITAMIN D3) 125 MCG
5 CAPSULE ORAL NIGHTLY PRN
Status: DISCONTINUED | OUTPATIENT
Start: 2022-03-18 | End: 2022-03-18 | Stop reason: HOSPADM

## 2022-03-18 RX ORDER — NITROGLYCERIN 0.4 MG/1
0.4 TABLET SUBLINGUAL
Status: DISCONTINUED | OUTPATIENT
Start: 2022-03-18 | End: 2022-03-18 | Stop reason: HOSPADM

## 2022-03-18 RX ORDER — ACETAMINOPHEN 160 MG/5ML
650 SOLUTION ORAL EVERY 4 HOURS PRN
Status: DISCONTINUED | OUTPATIENT
Start: 2022-03-18 | End: 2022-03-18 | Stop reason: HOSPADM

## 2022-03-18 RX ORDER — SODIUM CHLORIDE 0.9 % (FLUSH) 0.9 %
10 SYRINGE (ML) INJECTION AS NEEDED
Status: DISCONTINUED | OUTPATIENT
Start: 2022-03-18 | End: 2022-03-18 | Stop reason: HOSPADM

## 2022-03-18 RX ORDER — ACETAMINOPHEN 650 MG/1
650 SUPPOSITORY RECTAL EVERY 4 HOURS PRN
Status: DISCONTINUED | OUTPATIENT
Start: 2022-03-18 | End: 2022-03-18 | Stop reason: HOSPADM

## 2022-03-18 RX ORDER — ONDANSETRON 4 MG/1
4 TABLET, FILM COATED ORAL EVERY 6 HOURS PRN
Status: DISCONTINUED | OUTPATIENT
Start: 2022-03-18 | End: 2022-03-18 | Stop reason: HOSPADM

## 2022-03-18 RX ORDER — SODIUM CHLORIDE 0.9 % (FLUSH) 0.9 %
10 SYRINGE (ML) INJECTION EVERY 12 HOURS SCHEDULED
Status: DISCONTINUED | OUTPATIENT
Start: 2022-03-18 | End: 2022-03-18 | Stop reason: HOSPADM

## 2022-03-18 RX ORDER — HEPARIN SODIUM 5000 [USP'U]/ML
5000 INJECTION, SOLUTION INTRAVENOUS; SUBCUTANEOUS EVERY 12 HOURS SCHEDULED
Status: DISCONTINUED | OUTPATIENT
Start: 2022-03-18 | End: 2022-03-18 | Stop reason: HOSPADM

## 2022-03-18 RX ORDER — ESCITALOPRAM OXALATE 10 MG/1
20 TABLET ORAL DAILY
Status: DISCONTINUED | OUTPATIENT
Start: 2022-03-18 | End: 2022-03-18 | Stop reason: HOSPADM

## 2022-03-18 RX ORDER — ALUMINA, MAGNESIA, AND SIMETHICONE 2400; 2400; 240 MG/30ML; MG/30ML; MG/30ML
15 SUSPENSION ORAL EVERY 6 HOURS PRN
Status: DISCONTINUED | OUTPATIENT
Start: 2022-03-18 | End: 2022-03-18 | Stop reason: HOSPADM

## 2022-03-18 RX ORDER — BUPROPION HYDROCHLORIDE 150 MG/1
450 TABLET ORAL EVERY MORNING
Status: DISCONTINUED | OUTPATIENT
Start: 2022-03-18 | End: 2022-03-18 | Stop reason: HOSPADM

## 2022-03-18 RX ORDER — ONDANSETRON 2 MG/ML
4 INJECTION INTRAMUSCULAR; INTRAVENOUS EVERY 6 HOURS PRN
Status: DISCONTINUED | OUTPATIENT
Start: 2022-03-18 | End: 2022-03-18 | Stop reason: HOSPADM

## 2022-03-18 RX ORDER — ATORVASTATIN CALCIUM 20 MG/1
20 TABLET, FILM COATED ORAL DAILY
Status: DISCONTINUED | OUTPATIENT
Start: 2022-03-18 | End: 2022-03-18 | Stop reason: HOSPADM

## 2022-03-18 RX ADMIN — ATORVASTATIN CALCIUM 20 MG: 20 TABLET, FILM COATED ORAL at 10:52

## 2022-03-18 RX ADMIN — BUPROPION HYDROCHLORIDE 450 MG: 150 TABLET, EXTENDED RELEASE ORAL at 10:53

## 2022-03-18 RX ADMIN — HEPARIN SODIUM 5000 UNITS: 5000 INJECTION, SOLUTION INTRAVENOUS; SUBCUTANEOUS at 08:38

## 2022-03-18 RX ADMIN — ESCITALOPRAM OXALATE 20 MG: 10 TABLET ORAL at 10:53

## 2022-03-18 RX ADMIN — IOPAMIDOL 100 ML: 755 INJECTION, SOLUTION INTRAVENOUS at 01:45

## 2022-03-18 RX ADMIN — SODIUM CHLORIDE 1000 ML: 9 INJECTION, SOLUTION INTRAVENOUS at 00:12

## 2022-03-18 RX ADMIN — CARVEDILOL 3.12 MG: 3.12 TABLET, FILM COATED ORAL at 10:53

## 2022-03-18 RX ADMIN — ASPIRIN 81 MG CHEWABLE TABLET 81 MG: 81 TABLET CHEWABLE at 10:52

## 2022-03-18 RX ADMIN — Medication 10 ML: at 08:38

## 2022-03-18 NOTE — PROGRESS NOTES
[facility] Daily Progress Note    Patient Name: Jennifer Blackmon  : 1950  MRN: [id number]  Primary Care Physician:  Nathalia Maria APRN  Date of admission: 3/17/2022      Subjective      Chief Complaint: Fall and memory loss      Patient Reports     3/18/22: Admits to recent stressors at home.  Has anxiety and is on Cymbalta and Wellbutrin.  She was walking a dog when the next thing she remembers is her grandson helping her stand up from her fall.  Denies tongue biting or incontinence.  No new home medications.    Review of Systems   All other systems reviewed and are negative.         Objective      Vitals:   Temp:  [97.9 °F (36.6 °C)-99 °F (37.2 °C)] 97.9 °F (36.6 °C)  Heart Rate:  [58-70] 66  Resp:  [16-18] 16  BP: (102-148)/(46-66) 104/49    Physical Exam  HENT:      Head: Normocephalic.      Nose: Nose normal.   Eyes:      General: No scleral icterus.     Extraocular Movements: Extraocular movements intact.      Pupils: Pupils are equal, round, and reactive to light.   Cardiovascular:      Rate and Rhythm: Normal rate and regular rhythm.   Pulmonary:      Effort: Pulmonary effort is normal.   Abdominal:      General: Bowel sounds are normal.   Musculoskeletal:         General: Normal range of motion.      Cervical back: Normal range of motion.   Skin:     General: Skin is warm.   Neurological:      Mental Status: She is alert. Mental status is at baseline.   Psychiatric:         Mood and Affect: Mood normal.            Result Review    Result Review:  I have personally reviewed the results from the time of this admission to 3/18/2022 09:54 EDT and agree with these findings:  [x]  Laboratory  []  Microbiology  [x]  Radiology  []  EKG/Telemetry   []  Cardiology/Vascular   []  Pathology  [x]  Old records  []  Other:            Assessment/Plan      Brief Patient Summary:  71-year-old female with history of anxiety and admission for fall due to possible syncope  without prodrome associated with brief episode of amnesia      aspirin, 81 mg, Oral, Daily  atorvastatin, 20 mg, Oral, Daily  buPROPion XL, 450 mg, Oral, QAM  carvedilol, 3.125 mg, Oral, BID  escitalopram, 20 mg, Oral, Daily  heparin (porcine), 5,000 Units, Subcutaneous, Q12H  sodium chloride, 10 mL, Intravenous, Q12H             Active Hospital Problems:  Active Hospital Problems    Diagnosis    • Syncope and collapse        Possible syncope   -No prodromal symptoms and the patient does not remember what happened  -s/p CT head in the ED--> no acute intracranial pathology  -Consulted PT OT  - obtain TTE (last TTE done 6/21/19 which showed normal LVEF, mild diastolic dysfunction, and mild mitral and pulmonic valve regurg)     CKD stage III:  -Renal function at baseline     Elevated d-dimer  -CT A chest -->PE ruled out       CAD  - continue atorvastatin, Coreg and aspirin   - can f/u as an outpatient to determine if pt still needs to be on dual antiplatelet therapy     Depression/anxiety  - continue home regimen of buproprion XL 150mg daily and Lexapro 20mg daily    DVT prophylaxis:  Medical DVT prophylaxis orders are present.    CODE STATUS:    Level Of Support Discussed With: Patient  Code Status (Patient has no pulse and is not breathing): CPR (Attempt to Resuscitate)  Medical Interventions (Patient has pulse or is breathing): Full Support      Disposition:  I expect patient to be discharged home.    This patient has been examined wearing appropriate Personal Protective Equipment and discussed with nursing. 03/18/22      Electronically signed by Luis E Nicole DO, 03/18/22, 09:54 EDT.  [facility] Hospitalist Team

## 2022-03-18 NOTE — ED PROVIDER NOTES
Care was assumed pending CT results.  HPI, ROS, PE, and MDM performed per previous provider.  Patient will be admitted for further syncopal work-up.  Upon reassessment, patient is pink warm and dry no distress noted vital signs are stable.  Hospitalist was paged for admission. Spoke with Dr. Zuniga who accepted admission.     CT Head Without Contrast    Result Date: 3/18/2022  1.  No acute intracranial abnormality. 2.  Age commensurate parenchymal volume loss. Moderate chronic microvascular changes. Electronically signed by:  Ruben Hui  3/18/2022 12:07 AM    CT Cervical Spine Without Contrast    Result Date: 3/18/2022  1. No acute fracture or traumatic subluxation in the cervical spine. 2. Mild-moderate degenerative disc disease and facet arthropathy. Mild degenerative listhesis at C3-4. 3. Generalized osteopenia. Electronically signed by:  Cheko Brown M.D.  3/18/2022 12:18 AM    XR Chest 1 View    Result Date: 3/18/2022  Normal exam.  No change from prior study. Electronically signed by:  Pranav Blake M.D.  3/18/2022 12:06 AM    CT Chest Pulmonary Embolism    Result Date: 3/18/2022  No CTA evidence of pulmonary embolism or acute aortic pathology. No acute cardiopulmonary process seen. Atherosclerosis including coronary calcifications. Electronically signed by:  Gaurav Sandoval M.D.  3/18/2022 12:11 AM    Medications   sodium chloride 0.9 % flush 10 mL (has no administration in time range)   sodium chloride 0.9 % bolus 1,000 mL (0 mL Intravenous Stopped 3/18/22 0201)   iopamidol (ISOVUE-370) 76 % injection 100 mL (100 mL Intravenous Given 3/18/22 0145)     Labs Reviewed   COMPREHENSIVE METABOLIC PANEL - Abnormal; Notable for the following components:       Result Value    BUN 24 (*)     Creatinine 1.16 (*)     eGFR 49.9 (*)     All other components within normal limits    Narrative:     GFR Normal >60  Chronic Kidney Disease <60  Kidney Failure <15     CBC WITH AUTO DIFFERENTIAL - Abnormal; Notable for  the following components:    Neutrophil % 37.8 (*)     Lymphocyte % 46.7 (*)     All other components within normal limits   APTT - Abnormal; Notable for the following components:    PTT 24.8 (*)     All other components within normal limits   D-DIMER, QUANTITATIVE - Abnormal; Notable for the following components:    D-Dimer, Quantitative 1.49 (*)     All other components within normal limits    Narrative:     Reference Range  --------------------------------------------------------------------     < 0.50   Negative Predictive Value  0.50-0.59   Indeterminate    >= 0.60   Probable VTE             A very low percentage of patients with DVT may yield D-Dimer results   below the cut-off of 0.50 mg/L FEU.  This is known to be more   prevalent in patients with distal DVT.             Results of this test should always be interpreted in conjunction with   the patient's medical history, clinical presentation and other   findings.  Clinical diagnosis should not be based on the result of   INNOVANCE D-Dimer alone.   COVID-19,CEPHEID/ELIZABET,COR/YENI/PAD/ARLET IN-HOUSE,NP SWAB IN TRANSPORT MEDIA 3-4 HR TAT, RT-PCR - Normal    Narrative:     Fact sheet for providers: https://www.fda.gov/media/362459/download     Fact sheet for patients: https://www.fda.gov/media/194594/download  Fact sheet for providers: https://www.fda.gov/media/102625/download    Fact sheet for patients: https://www.fda.gov/media/840449/download    Test performed by PCR.   MAGNESIUM - Normal   TROPONIN (IN-HOUSE) - Normal    Narrative:     Troponin T Reference Range:  <= 0.03 ng/mL-   Negative for AMI  >0.03 ng/mL-     Abnormal for myocardial necrosis.  Clinicians would have to utilize clinical acumen, EKG, Troponin and serial changes to determine if it is an Acute Myocardial Infarction or myocardial injury due to an underlying chronic condition.       Results may be falsely decreased if patient taking Biotin.     PROTIME-INR - Normal   BNP (IN-HOUSE) - Normal     Narrative:     Among patients with dyspnea, NT-proBNP is highly sensitive for the detection of acute congestive heart failure. In addition NT-proBNP of <300 pg/ml effectively rules out acute congestive heart failure with 99% negative predictive value.    Results may be falsely decreased if patient taking Biotin.     TSH - Normal   POCT GLUCOSE FINGERSTICK - Normal   COVID PRE-OP / PRE-PROCEDURE SCREENING ORDER (NO ISOLATION)    Narrative:     The following orders were created for panel order COVID PRE-OP / PRE-PROCEDURE SCREENING ORDER (NO ISOLATION) - Swab, Nasopharynx.  Procedure                               Abnormality         Status                     ---------                               -----------         ------                     COVID-19,CEPHEID/ELIZABET,CO...[[id number]]  Normal              Final result                 Please view results for these tests on the individual orders.   RAINBOW DRAW    Narrative:     The following orders were created for panel order [place] Draw.  Procedure                               Abnormality         Status                     ---------                               -----------         ------                     Green Top (Gel)[[id number]]                                  Final result               Lavender Top[[id number]]                                     Final result               Gold Top - SST[[id number]]                                   Final result               Light Blue Top[[id number]]                                   Final result                 Please view results for these tests on the individual orders.   POCT GLUCOSE FINGERSTICK   CBC AND DIFFERENTIAL    Narrative:     The following orders were created for panel order CBC & Differential.  Procedure                               Abnormality         Status                     ---------                               -----------         ------                     CBC Auto Differential[[id number]]        Abnormal             Final result                 Please view results for these tests on the individual orders.   GREEN TOP   LAVENDER TOP   GOLD TOP - SST   LIGHT BLUE TOP          Rashmi Araujo, APRN  03/18/22 0256

## 2022-03-18 NOTE — H&P
[facility] Medicine Services      Patient Name: Jennifer Blackmon  : 1950  MRN: [id number]  Primary Care Physician:  Nathalia Maria APRN  Date of admission: 3/17/2022      Subjective      Chief Complaint: Syncope    History of Present Illness:   Ms. Blackmon is a 71-year-old woman with a history notable for CAD c/b MI status post PCI (), hypertension, and hyperlipidemia presenting after syncopal episode while walking her dog Thursday evening.    Patient stated she was feeling fine earlier in the day.  She went out to walk her puppy in the evening. The next thing she remembers is being back home with her grandson next to her telling her that she had fallen.  Patient denied any headaches, feeling dizzy, chest pain, shortness of breath, or focal weakness prior to her episode of passing out.  She does not recall walking back to her house.  Stated she has a history of falls in the past.  However that improved after undergoing physical therapy sessions.  She recently was evaluated by her cardiologist and was told everything was fine.  At time of evaluation, patient had no acute complaints.    Review of Systems   Constitutional: Negative for diaphoresis, fever and malaise/fatigue.   HENT: Negative.    Eyes: Negative.    Cardiovascular: Negative for chest pain, dyspnea on exertion, leg swelling and palpitations.   Respiratory: Negative for shortness of breath.    Endocrine: Negative.    Skin: Negative.    Musculoskeletal: Negative.    Gastrointestinal: Positive for constipation. Negative for abdominal pain, nausea and vomiting.   Genitourinary: Negative.    Neurological: Negative for dizziness, focal weakness, light-headedness, loss of balance and numbness.   Psychiatric/Behavioral: Negative.         Personal History     Past Medical History:   Diagnosis Date   • Allergic rhinitis    • Basal cell carcinoma (BCC) of nostril    • Coronary artery disease involving native coronary artery of  native heart with angina pectoris (HCC) 2019    Status post successful PCI of the coronary stent deployment to high-grade RCA stenosis after presenting with ST segment elevated microinfarction in May 2019   • DDD (degenerative disc disease), lumbar    • Depression    • Depression    • Essential hypertension 2019   • Heart attack (HCC)     2019   • Hyperlipidemia    • Hyperlipidemia, mixed 2019   • Hypertension    • Insomnia    • Myocardial infarction less than 4 weeks ago (HCC) 2019    Presented initially with ST segment elevated microinfarction treated emergently in [place] May 2019   • Old MI (myocardial infarction) 2019    Presented initially with ST segment elevated microinfarction treated emergently in [place] May 2019   • Osteoarthritis    • Toenail fungus        Past Surgical History:   Procedure Laterality Date   • ADENOIDECTOMY      Adenoids removed   • APPENDECTOMY     • AUGMENTATION MAMMAPLASTY     • BREAST IMPLANT SURGERY     • CATARACT EXTRACTION Bilateral 2017   • CERVICAL DISC SURGERY      ruptured and removed    •  SECTION     • CHEST SURGERY      attempted pectus repair    • EYE SURGERY  1997    AK   • HYSTERECTOMY     • KNEE ARTHROSCOPY Right    • LUMBAR DISC SURGERY      ruptured and removed    • NASAL SEPTAL RECONSTRUCTION     • PECTUS CARNATUM REPAIR      attempted   • RECTAL SURGERY      Repair   • REFRACTIVE SURGERY      RK/AK both eyes    • REFRACTIVE SURGERY     • SOMNOPLASTY RADIAL FREQUENCY ABLATION     • TONSILLECTOMY     • UVULOPALATOPHARYNGOPLASTY         Family History: family history includes ADD / ADHD in her daughter; Alcohol abuse in her brother; Alzheimer's disease in her mother; Anxiety disorder in her brother; Bipolar disorder in her daughter; Depression in her brother; HIV in her daughter; Heart attack in her brother and sister; Leukemia in her father; No Known Problems in her brother; Other in her  daughter, father, and mother. Otherwise pertinent FHx was reviewed and not pertinent to current issue.    Social History:  reports that she quit smoking about 50 years ago. Her smoking use included cigarettes. She has a 2.00 pack-year smoking history. She has never used smokeless tobacco. She reports that she does not drink alcohol and does not use drugs.    Home Medications:  Prior to Admission Medications     Prescriptions Last Dose Informant Patient Reported? Taking?    atorvastatin (LIPITOR) 40 MG tablet   No No    Take 1 tablet by mouth Daily.    Patient taking differently:  Take 20 mg by mouth Daily.    HUNTER LOW DOSE 81 MG chewable tablet   Yes No    Chew 81 mg Daily.    buPROPion XL (WELLBUTRIN XL) 150 MG 24 hr tablet   No No    Take 3 tablets by mouth Every Morning.    Calcium Citrate-Vitamin D 500-400 MG-UNIT chewable tablet   Yes No    Chew Daily.    carvedilol (COREG) 3.125 MG tablet   No No    Take 1 tablet by mouth 2 (Two) Times a Day.    clopidogrel (PLAVIX) 75 MG tablet   No No    TAKE 1 TABLET DAILY    coenzyme Q10 100 MG capsule   Yes No    Take 100 mg by mouth.    diclofenac (VOLTAREN) 50 MG EC tablet   No No    TAKE 1 TABLET TWICE A DAY    escitalopram (LEXAPRO) 20 MG tablet   No No    1/2 tablet twice daily    Melatonin 10 MG sublingual tablet   Yes No    Place  under the tongue.    Multiple Vitamins-Minerals (Multi-Vitamin Gummies) chewable tablet   Yes No    Chew.            Allergies:  No Known Allergies    Objective      Vitals:   Temp:  [99 °F (37.2 °C)] 99 °F (37.2 °C)  Heart Rate:  [61-70] 61  Resp:  [18] 18  BP: (102-141)/(49-66) 125/59    Physical Exam     General:  Appears in no acute distress, non-toxic appearing  Eyes: normal conjunctiva, EOM intact bilaterally, pupils equal and reactive to light  HEENT:  No JVD. Thyroid not visibly enlarged. No mucosal pallor or cyanosis  Respiratory: Respirations regular and unlabored at rest. Bilateral breath sounds with good air entry in all  fields. No crackles, rubs or wheezes auscultated  Cardiovascular: S1S2 Regular rate and rhythm. No murmur, rub or gallop. No pretibial pitting edema  Gastrointestinal: Abdomen soft, flat, non tender. Bowel sounds present. No rebound or guarding. No ascites  Musculoskeletal: Moves all extremities voluntarily. No abnormal movements  Extremities: No digital clubbing or cyanosis  Skin: Color pink. Skin warm and dry to touch. Healed surgical scar at R side of nasal tip  Neuro: AAO x3, CN II-XII grossly intact, comprehension intact, follows commands appropriately; gross motor and sensation to touch intact  Psych: Mood and affect normal, pleasant and cooperative        Result Review    Result Review:  I have personally reviewed the results from the time of this admission to 3/18/2022 04:08 EDT and agree with these findings:  [x]  Laboratory  []  Microbiology  [x]  Radiology  [x]  EKG/Telemetry   []  Cardiology/Vascular   []  Pathology  []  Old records  []  Other:    Most notable findings include: Negative troponin, elevated but stable creatinine, normal TSH level, elevated D-dimer, and trace pyuria and ketonuria with noted epithelial cells on UA.  CT angio of chest negative for PE.  CT head negative for acute stroke, hemorrhage, or mass.  CT of cervical spine revealed no acute fracture or traumatic subluxation.  Admission EKG unchanged compared to prior done on 2/15/2021.    Assessment/Plan      Ms. Blackmon is a 71-year-old woman with a history notable for CAD c/b MI status post PCI (2019), hypertension, hyperlipidemia and other comorbidities admitted for syncope work-up. Exam unremarkable, but pt noted to test positive for orthostatic hypotension.    Plan:     #Syncope  May have been secondary to orthostatic hypotension. However, pt without any prodromal symptoms. Will need to exclude cardiac etiology. Low suspicion for neurologic event at this time. CT head negative for acute pathology.   - continuous cardiac  monitoring  - obtain TTE (last TTE done 6/21/19 which showed normal LVEF, mild diastolic dysfunction, and mild mitral and pulmonic valve regurg)    #CKD stage 3  Renal function at baseline  - renally dose meds as needed    #Elevated d-dimer  CT angio of chest negative for PE  - no further w/u needed at this time    #CAD  - continue atorvastatin 40mg daily  - carvedilol 3.125mg BID  - aspirin 81mg daily  - can f/u as an outpatient to determine if pt still needs to be on dual antiplatelet therapy    #Depression  - continue home regimen of buproprion XL 150mg daily and Lexapro 20mg daily    DVT prophylaxis:  Medical DVT prophylaxis orders are present.    CODE STATUS:  FULL code     Admission Status:  I believe this patient meets observation status.    I discussed the patient's findings and my recommendations with patient.      Signature: Electronically signed by Laya Zuniga MD, 03/18/22, 4:15 AM EDT.

## 2022-03-18 NOTE — ED PROVIDER NOTES
Subjective   Chief Complaint: Fall    Patient is a 89-year-old  female with past medical history significant for CAD status post successful PCI, lumbar DDD, depression, HTN, HLD, osteoarthritis presenting to ED for evaluation status post fall today.  Patient reports that she sustained an unwitnessed fall today while walking the dog, stating that she does not remember the event itself and only recalls the event as told by her grandson.  Patient reports that she remembers walking the dog and then talking about the events after the fall on her couch in her house when she was surrounded by her family.  She reports that she fell backwards hitting the occipital region of her head with no open wound.  Patient is unsure as to if she lost consciousness prior to or after the fall.  At ED, patient reports that she has a mild headache that she describes as an achy pain that is rated as a 3/10.  Patient reports that she falls often, suffers from hypotension and bradycardia as well as generalized lower extremity weakness.  Patient denies any dizziness, shortness of breath, chest pain at this time.  Patient denies any altered mental status, vision disturbances, or continued amnesia.  Patient denies any pain or known injury to the upper or lower extremities.  Patient was able to walk in the ER without difficulty on ambulation.  Patient denies any known fever or chills.  Patient does report use of anticoagulation therapy reporting that her cardiologist discontinued her Plavix 1 week ago however she continues to take aspirin daily.  Patient denies any history of seizures.    Location: Headache    Quality: Tension type    Duration: Constant    Timin day    Severity: Mild    Associated Symptoms: None    PCP:Nathalia Maria      History provided by:  Patient      Review of Systems   Constitutional: Negative for activity change, chills, fatigue and fever.   HENT: Negative for congestion, sore throat and trouble swallowing.     Eyes: Negative for visual disturbance.   Respiratory: Negative for chest tightness, shortness of breath and wheezing.    Cardiovascular: Negative for chest pain.   Gastrointestinal: Negative for abdominal pain, nausea and vomiting.   Genitourinary: Negative for dysuria.   Musculoskeletal: Negative for back pain, joint swelling and neck pain.   Skin: Negative for rash.   Neurological: Positive for syncope and headaches. Negative for dizziness, seizures, weakness, light-headedness and numbness.   Psychiatric/Behavioral: Negative for behavioral problems, confusion and hallucinations.   All other systems reviewed and are negative.      Past Medical History:   Diagnosis Date   • Allergic rhinitis    • Coronary artery disease involving native coronary artery of native heart with angina pectoris (HCC) 2019    Status post successful PCI of the coronary stent deployment to high-grade RCA stenosis after presenting with ST segment elevated microinfarction in May 2019   • DDD (degenerative disc disease), lumbar    • Depression    • Depression    • Essential hypertension 2019   • Heart attack ([facility])     2019   • Hyperlipidemia    • Hyperlipidemia, mixed 2019   • Hypertension    • Insomnia    • Myocardial infarction less than 4 weeks ago (HCC) 2019    Presented initially with ST segment elevated microinfarction treated emergently in [place] May 2019   • Old MI (myocardial infarction) 2019    Presented initially with ST segment elevated microinfarction treated emergently in [place] May 2019   • Osteoarthritis    • Toenail fungus        No Known Allergies    Past Surgical History:   Procedure Laterality Date   • ADENOIDECTOMY      Adenoids removed   • APPENDECTOMY     • AUGMENTATION MAMMAPLASTY     • BREAST IMPLANT SURGERY     • CATARACT EXTRACTION Bilateral 2017   • CERVICAL DISC SURGERY      ruptured and removed    •  SECTION     • CHEST SURGERY      attempted pectus  repair    • EYE SURGERY  1997    AK   • HYSTERECTOMY     • KNEE ARTHROSCOPY Right    • LUMBAR DISC SURGERY      ruptured and removed    • NASAL SEPTAL RECONSTRUCTION     • PECTUS CARNATUM REPAIR      attempted   • RECTAL SURGERY      Repair   • REFRACTIVE SURGERY      RK/AK both eyes    • REFRACTIVE SURGERY     • SOMNOPLASTY RADIAL FREQUENCY ABLATION     • TONSILLECTOMY     • UVULOPALATOPHARYNGOPLASTY         Family History   Problem Relation Age of Onset   • Alzheimer's disease Mother    • Other Mother         CVA, acute myocardial infarction   • Leukemia Father    • Other Father         Parkinson's    • Heart attack Sister    • No Known Problems Brother    • Bipolar disorder Daughter    • Heart attack Brother    • Alcohol abuse Brother    • Anxiety disorder Brother    • Depression Brother    • ADD / ADHD Daughter    • Other Daughter         alcohol and drug addiction   • HIV Daughter        Social History     Socioeconomic History   • Marital status:    • Number of children: 2   Tobacco Use   • Smoking status: Former Smoker     Packs/day: 0.50     Years: 4.00     Pack years: 2.00     Types: Cigarettes     Quit date:      Years since quittin.2   • Smokeless tobacco: Never Used   Vaping Use   • Vaping Use: Never used   Substance and Sexual Activity   • Alcohol use: No   • Drug use: No   • Sexual activity: Not Currently           Objective   Physical Exam  Vitals and nursing note reviewed.   Constitutional:       Appearance: Normal appearance. She is well-developed and normal weight. She is not ill-appearing or toxic-appearing.   HENT:      Head: Normocephalic and atraumatic.      Comments: Subjective left occipital tenderness     Right Ear: Tympanic membrane normal.      Left Ear: Tympanic membrane normal.      Ears:      Comments: No hemotympanum     Nose: Nose normal.      Mouth/Throat:      Mouth: Mucous membranes are moist.   Eyes:      Pupils: Pupils are equal, round, and reactive to  light.   Cardiovascular:      Rate and Rhythm: Normal rate and regular rhythm.      Pulses: Normal pulses.      Heart sounds: Normal heart sounds. No murmur heard.  Pulmonary:      Effort: Pulmonary effort is normal. No respiratory distress.      Breath sounds: Normal breath sounds. No stridor. No wheezing.   Abdominal:      General: Bowel sounds are normal. There is no distension.      Palpations: Abdomen is soft.      Tenderness: There is no abdominal tenderness.   Musculoskeletal:         General: Normal range of motion.      Cervical back: Normal range of motion.   Skin:     General: Skin is warm and dry.      Capillary Refill: Capillary refill takes less than 2 seconds.      Findings: No rash.   Neurological:      General: No focal deficit present.      Mental Status: She is alert and oriented to person, place, and time.      Cranial Nerves: No cranial nerve deficit.      Sensory: No sensory deficit.      Motor: No weakness.      Coordination: Coordination normal.      Gait: Gait normal.      Deep Tendon Reflexes: Reflexes normal.   Psychiatric:         Mood and Affect: Mood normal.         Behavior: Behavior normal.         ECG 12 Lead      Date/Time: 3/17/2022 11:51 PM  Performed by: Narda Cardenas PA  Authorized by: Koko Diane MD   Interpreted by physician  Previous ECG: no previous ECG available  Rhythm: sinus rhythm  Rate: normal  BPM: 69  QRS axis: normal  Conduction: conduction normal  ST Segments: ST segments normal  T Waves: T waves normal  Other: no other findings  Clinical impression: normal ECG                 ED Course  ED Course as of 03/19/22 1701   Fri Mar 18, 2022   0036 Waiting for patient to go to CT [MM]   0146 Patient care transferred to Rashmi Araujo NP pending results of CTs imaging.  Expect admission for syncope and collapse with amnesia, head injury for cardiac rule out and possible neurology consultation. [MM]      ED Course User Index  [MM] Narda Cardenas PA    /56   " Pulse 61   Temp 99 °F (37.2 °C)   Resp 18   Ht 167.6 cm (66\")   Wt 65.6 kg (144 lb 10 oz)   SpO2 100%   BMI 23.34 kg/m²   Labs Reviewed   COMPREHENSIVE METABOLIC PANEL - Abnormal; Notable for the following components:       Result Value    BUN 24 (*)     Creatinine 1.16 (*)     eGFR 49.9 (*)     All other components within normal limits    Narrative:     GFR Normal >60  Chronic Kidney Disease <60  Kidney Failure <15     CBC WITH AUTO DIFFERENTIAL - Abnormal; Notable for the following components:    Neutrophil % 37.8 (*)     Lymphocyte % 46.7 (*)     All other components within normal limits   APTT - Abnormal; Notable for the following components:    PTT 24.8 (*)     All other components within normal limits   D-DIMER, QUANTITATIVE - Abnormal; Notable for the following components:    D-Dimer, Quantitative 1.49 (*)     All other components within normal limits    Narrative:     Reference Range  --------------------------------------------------------------------     < 0.50   Negative Predictive Value  0.50-0.59   Indeterminate    >= 0.60   Probable VTE             A very low percentage of patients with DVT may yield D-Dimer results   below the cut-off of 0.50 mg/L FEU.  This is known to be more   prevalent in patients with distal DVT.             Results of this test should always be interpreted in conjunction with   the patient's medical history, clinical presentation and other   findings.  Clinical diagnosis should not be based on the result of   INNOVANCE D-Dimer alone.   MAGNESIUM - Normal   TROPONIN (IN-HOUSE) - Normal    Narrative:     Troponin T Reference Range:  <= 0.03 ng/mL-   Negative for AMI  >0.03 ng/mL-     Abnormal for myocardial necrosis.  Clinicians would have to utilize clinical acumen, EKG, Troponin and serial changes to determine if it is an Acute Myocardial Infarction or myocardial injury due to an underlying chronic condition.       Results may be falsely decreased if patient taking " Biotin.     PROTIME-INR - Normal   BNP (IN-HOUSE) - Normal    Narrative:     Among patients with dyspnea, NT-proBNP is highly sensitive for the detection of acute congestive heart failure. In addition NT-proBNP of <300 pg/ml effectively rules out acute congestive heart failure with 99% negative predictive value.    Results may be falsely decreased if patient taking Biotin.     TSH - Normal   POCT GLUCOSE FINGERSTICK - Normal   RAINBOW DRAW    Narrative:     The following orders were created for panel order [place] Draw.  Procedure                               Abnormality         Status                     ---------                               -----------         ------                     Green Top (Gel)[[id number]]                                  Final result               Lavender Top[[id number]]                                     Final result               Gold Top - SST[[id number]]                                   Final result               Light Blue Top[[id number]]                                   Final result                 Please view results for these tests on the individual orders.   POCT GLUCOSE FINGERSTICK   CBC AND DIFFERENTIAL    Narrative:     The following orders were created for panel order CBC & Differential.  Procedure                               Abnormality         Status                     ---------                               -----------         ------                     CBC Auto Differential[[id number]]        Abnormal            Final result                 Please view results for these tests on the individual orders.   GREEN TOP   LAVENDER TOP   GOLD TOP - SST   LIGHT BLUE TOP     Medications   sodium chloride 0.9 % flush 10 mL (has no administration in time range)   sodium chloride 0.9 % bolus 1,000 mL (1,000 mL Intravenous New Bag 3/18/22 0012)     No radiology results for the last day                                               MDM  Number of Diagnoses or Management  Options  Diagnosis management comments: MEDICAL DECISION  Epic Chart Review: Chart reviewed by myself showing  Patient previously seen by neurosurgery on 9/10/2021 for complaints of balance issues, unsteadiness and multiple falls.  Patient saw Dr. Snyder.  MRI Lumbar spine    IMPRESSION:  1.Changes from prior left L5-S1 hemilaminotomy.  2.Multilevel degenerative changes of lumbar spine as described above,  most prominent at L3-4 where there is moderate to severe spinal canal  stenosis. There are also several levels with significant neural  foraminal stenosis.     Electronically Signed By-Serjio Sutton MD On:9/1/2021 2:49 PM  This report was finalized on [id number] by  Serjio Sutton MD.    MRI Cervical Spine  IMPRESSION:  Mild-to-moderate multilevel degenerative changes of cervical spine as  described above.     Electronically Signed By-Serjio Sutton MD On:10/9/2021 11:39 AM  This report was finalized on [id number] by  Serjio Sutton MD.    Stress test 3/20/2021 shows  · Findings consistent with a normal ECG stress test.  · Left ventricular ejection fraction is hyperdynamic (Calculated EF > 70%). .  · Myocardial perfusion imaging indicates a normal myocardial perfusion study with no evidence of ischemia.  · Impressions are consistent with a low risk study.  · This is normal Cardiolite imaging stress test with no evidence of ischemia or myocardial infarction. Left ventricle size and function is normal on gated SPECT imaging    Comorbidities: CAD, depression, hypertension, hyperlipidemia, previous MI  Differentials: Syncope, symptomatic bradycardia, orthostatic hypotension, brain tumor, intracranial hemorrhage; this list is not all inclusive and does not constitute the entirety of considered causes  Radiology interpretation:  Images reviewed by me and interpreted by radiologist, as above  Lab interpretation:  Labs viewed by me significant for, as above  EKG interpretation: Reviewed by myself  interpreted by ER physician, sinus rhythm with a rate of 69 with nonspecific T wave abnormalities, no obvious ST elevation or depression.    While in the ED IV was placed and labs were obtained appropriate PPE was worn during exam and throughout all encounters with the patient.  Patient had the above evaluation.  IV established, lab work obtained.  Patient physical exam unremarkable, neurological exam is grossly normal as well.  Vital signs are stable, she is awake, alert and oriented with no focal neurological deficits.  Orthostatic vitals obtained, laying 124/57 with a rate of 63, sitting 141/64 with a heart rate 62, standing 106/66 with a rate of 66, patient asymptomatic during these vitals but appears to be orthostatically hypotensive especially going from sitting to standing.  Patient given 1 L IV fluids.  Lab work significant for CMP BUN 24, creatinine 1.16.  CBC unremarkable, normal troponin, magnesium.  D-dimer elevated 1.49.  CT head, cervical spine and CT pulmonary embolism were ordered.  On chart review patient appears to have had problems with falls and balance over the last few months, she reports multiple falls.  Patient has been seen by neurosurgery, Dr. Snyder, had MRI of lumbar and cervical spine which showed degenerative disc disease and stenosis.  Patient mildly hypotensive, she is bradycardic, question possible cardiac etiology as source of patient's syncopal episodes.  Patient did have stress test on 3/20/2021 which was essentially within normal limits.  Patient has seen Dr. Saravia in the past.      Patient care transferred to Rashmi Araujo NP pending results of CT head, neck and PE protocol.  Expect patient to be admitted for syncope with LOC, head injury, with possible suspected symptomatic bradycardia for further work-up and evaluation.         Amount and/or Complexity of Data Reviewed  Clinical lab tests: ordered and reviewed  Tests in the radiology section of CPT®: ordered    Patient  Progress  Patient progress: stable      Final diagnoses:   Syncope and collapse   Contusion of head, unspecified part of head, initial encounter   Amnesia   Orthostatic hypotension       ED Disposition  ED Disposition     None          No follow-up provider specified.       Medication List      No changes were made to your prescriptions during this visit.          Narda Cardenas PA  03/19/22 8525

## 2022-03-18 NOTE — OUTREACH NOTE
Prep Survey    Flowsheet Row Responses   [facility] patient discharged from? [facility]   Is LACE score < 7 ? Yes   Emergency Room discharge w/ pulse ox? No   Eligibility [facility]   Date of Admission 03/17/22   Date of Discharge 03/18/22   Discharge Disposition Home or Self Care   Discharge diagnosis Possible syncope    Does the patient have one of the following disease processes/diagnoses(primary or secondary)? Other   Does the patient have Home health ordered? No   Is there a DME ordered? No   Prep survey completed? Yes          ALEXEY GANDHI - Registered Nurse

## 2022-03-19 ENCOUNTER — TRANSITIONAL CARE MANAGEMENT TELEPHONE ENCOUNTER (OUTPATIENT)
Dept: CALL CENTER | Facility: HOSPITAL | Age: 72
End: 2022-03-19

## 2022-03-19 LAB
BH CV ECHO MEAS - ACS: 1.83 CM
BH CV ECHO MEAS - AO MAX PG: 8.6 MMHG
BH CV ECHO MEAS - AO MEAN PG: 4.9 MMHG
BH CV ECHO MEAS - AO ROOT DIAM: 3 CM
BH CV ECHO MEAS - AO V2 MAX: 146.6 CM/SEC
BH CV ECHO MEAS - AO V2 VTI: 32.8 CM
BH CV ECHO MEAS - AVA(I,D): 1.45 CM2
BH CV ECHO MEAS - EDV(CUBED): 80.2 ML
BH CV ECHO MEAS - EDV(MOD-SP4): 57.5 ML
BH CV ECHO MEAS - EF(MOD-BP): 70 %
BH CV ECHO MEAS - EF(MOD-SP4): 70.2 %
BH CV ECHO MEAS - ESV(CUBED): 14.5 ML
BH CV ECHO MEAS - ESV(MOD-SP4): 17.1 ML
BH CV ECHO MEAS - FS: 43.5 %
BH CV ECHO MEAS - IVS/LVPW: 1.21 CM
BH CV ECHO MEAS - IVSD: 1.18 CM
BH CV ECHO MEAS - LA DIMENSION(2D): 3.4 CM
BH CV ECHO MEAS - LV DIASTOLIC VOL/BSA (35-75): 33.1 CM2
BH CV ECHO MEAS - LV MASS(C)D: 159.2 GRAMS
BH CV ECHO MEAS - LV MAX PG: 2.4 MMHG
BH CV ECHO MEAS - LV MEAN PG: 1.23 MMHG
BH CV ECHO MEAS - LV SYSTOLIC VOL/BSA (12-30): 9.9 CM2
BH CV ECHO MEAS - LV V1 MAX: 77.5 CM/SEC
BH CV ECHO MEAS - LV V1 VTI: 19.5 CM
BH CV ECHO MEAS - LVIDD: 4.3 CM
BH CV ECHO MEAS - LVIDS: 2.44 CM
BH CV ECHO MEAS - LVOT AREA: 2.44 CM2
BH CV ECHO MEAS - LVOT DIAM: 1.76 CM
BH CV ECHO MEAS - LVPWD: 0.98 CM
BH CV ECHO MEAS - MR MAX PG: 68.2 MMHG
BH CV ECHO MEAS - MR MAX VEL: 410.9 CM/SEC
BH CV ECHO MEAS - MV A MAX VEL: 70.1 CM/SEC
BH CV ECHO MEAS - MV DEC SLOPE: 319.7 CM/SEC2
BH CV ECHO MEAS - MV DEC TIME: 0.3 MSEC
BH CV ECHO MEAS - MV E MAX VEL: 96.1 CM/SEC
BH CV ECHO MEAS - MV E/A: 1.37
BH CV ECHO MEAS - MV MAX PG: 4.8 MMHG
BH CV ECHO MEAS - MV MEAN PG: 2.01 MMHG
BH CV ECHO MEAS - MV V2 VTI: 34.3 CM
BH CV ECHO MEAS - MVA(VTI): 1.39 CM2
BH CV ECHO MEAS - PA V2 MAX: 87.2 CM/SEC
BH CV ECHO MEAS - RAP SYSTOLE: 3 MMHG
BH CV ECHO MEAS - RV MAX PG: 0.96 MMHG
BH CV ECHO MEAS - RV V1 MAX: 48.9 CM/SEC
BH CV ECHO MEAS - RV V1 VTI: 11.4 CM
BH CV ECHO MEAS - RVDD: 2.6 CM
BH CV ECHO MEAS - RVSP: 31.3 MMHG
BH CV ECHO MEAS - SI(MOD-SP4): 23.2 ML/M2
BH CV ECHO MEAS - SV(LVOT): 47.7 ML
BH CV ECHO MEAS - SV(MOD-SP4): 40.4 ML
BH CV ECHO MEAS - TR MAX PG: 28.3 MMHG
BH CV ECHO MEAS - TR MAX VEL: 264.7 CM/SEC
MAXIMAL PREDICTED HEART RATE: 147 BPM
QT INTERVAL: 413 MS
STRESS TARGET HR: 125 BPM

## 2022-03-19 NOTE — OUTREACH NOTE
Call Center TCM Note    Flowsheet Row Responses   [facility] patient discharged from? [facility]   Does the patient have one of the following disease processes/diagnoses(primary or secondary)? Other   TCM attempt successful? Yes   Call start time 1800   Call end time 1807   Discharge diagnosis Possible syncope    Meds reviewed with patient/caregiver? Yes   Is the patient having any side effects they believe may be caused by any medication additions or changes? No   Does the patient have all medications ordered at discharge? Yes   Is the patient taking all medications as directed (includes completed medication regime)? Yes   Does the patient have a primary care provider?  Yes   Does the patient have an appointment with their PCP within 7 days of discharge? Greater than 7 days   Comments regarding PCP Hospital d/c f/u appt is on 3/28/22 at 1:00 pm    What is preventing the patient from scheduling follow up appointments within 7 days of discharge? Earlier appointment not available   Nursing Interventions Verified appointment date/time/provider   Has the patient kept scheduled appointments due by today? N/A   Psychosocial issues? No   Did the patient receive a copy of their discharge instructions? Yes   Nursing interventions Reviewed instructions with patient   What is the patient's perception of their health status since discharge? Improving   Is the patient/caregiver able to teach back signs and symptoms related to disease process for when to call PCP? Yes   Is the patient/caregiver able to teach back signs and symptoms related to disease process for when to call 911? Yes   Is the patient/caregiver able to teach back the hierarchy of who to call/visit for symptoms/problems? PCP, Specialist, Home health nurse, Urgent Care, ED, 911 Yes   If the patient is a current smoker, are they able to teach back resources for cessation? Not a smoker   TCM call completed? Yes          Patrizia Funez RN    3/19/2022, 18:08  EDT

## 2022-03-21 RX ORDER — ESCITALOPRAM OXALATE 20 MG/1
TABLET ORAL
Qty: 90 TABLET | Refills: 3 | Status: SHIPPED | OUTPATIENT
Start: 2022-03-21 | End: 2023-02-13

## 2022-03-23 LAB
Lab: 3
TOAL ENROLLMENT DAYS: 2

## 2022-03-23 PROCEDURE — 93228 REMOTE 30 DAY ECG REV/REPORT: CPT | Performed by: INTERNAL MEDICINE

## 2022-03-28 ENCOUNTER — OFFICE VISIT (OUTPATIENT)
Dept: FAMILY MEDICINE CLINIC | Facility: CLINIC | Age: 72
End: 2022-03-28

## 2022-03-28 ENCOUNTER — TELEPHONE (OUTPATIENT)
Dept: CARDIOLOGY | Facility: CLINIC | Age: 72
End: 2022-03-28

## 2022-03-28 VITALS
DIASTOLIC BLOOD PRESSURE: 72 MMHG | HEIGHT: 66 IN | BODY MASS INDEX: 23.3 KG/M2 | HEART RATE: 73 BPM | WEIGHT: 145 LBS | SYSTOLIC BLOOD PRESSURE: 130 MMHG | RESPIRATION RATE: 17 BRPM | TEMPERATURE: 97.3 F | OXYGEN SATURATION: 96 %

## 2022-03-28 DIAGNOSIS — N18.31 STAGE 3A CHRONIC KIDNEY DISEASE: ICD-10-CM

## 2022-03-28 DIAGNOSIS — I25.119 CORONARY ARTERY DISEASE INVOLVING NATIVE CORONARY ARTERY OF NATIVE HEART WITH ANGINA PECTORIS: ICD-10-CM

## 2022-03-28 DIAGNOSIS — R55 SYNCOPE AND COLLAPSE: ICD-10-CM

## 2022-03-28 DIAGNOSIS — Z09 HOSPITAL DISCHARGE FOLLOW-UP: Primary | ICD-10-CM

## 2022-03-28 PROCEDURE — 99495 TRANSJ CARE MGMT MOD F2F 14D: CPT | Performed by: NURSE PRACTITIONER

## 2022-03-28 PROCEDURE — 1111F DSCHRG MED/CURRENT MED MERGE: CPT | Performed by: NURSE PRACTITIONER

## 2022-03-28 NOTE — ASSESSMENT & PLAN NOTE
[facility]  General Surgery  Progress Note    Subjective:     History of Present Illness:  The patient is a 74-year-old male who has undergone colonoscopies were is found to have some diverticular changes.  Earlier today he underwent a Gastrografin or barium enema at the [facility] imaging center.  He had crampy abdominal pain and distension he presented to the emergency room.    The patient says that he is persistently bradycardic.  He denies any chest pain or shortness of breath.  He does have some left-sided abdominal pain    He was seen at a outpatient clinic abdominal film done there showed non specific bowel gas past    Post-Op Info:  Procedure(s) (LRB):  EARLINE PROCEDURE (N/A)  COLECTOMY, SIGMOID (N/A)  CREATION, COLOSTOMY (N/A)   2 Days Post-Op     No new subjective & objective note has been filed under this hospital service since the last note was generated.    Assessment/Plan:     * Volvulus of sigmoid colon  Colonic distension and the peritoneal volvulus is likely related to the area apply dry to doing a Gastrografin enema.      Diverticular stricture  Postop day 2 from a sigmoid resection and colostomy.    Transfuse 1 unit  Monitor blood pressure  Remove Mckeon catheter tomorrow.  Continue to monitor left        Sincere Will MD  General Surgery  [facility]   Pt will reach out to Dr. Saravia for clarification on plavix.

## 2022-03-28 NOTE — PROGRESS NOTES
"Transitional Care Follow Up Visit  Subjective     Jennifer Blackmon is a 71 y.o. female who presents for a transitional care management visit.    Within 48 business hours after discharge our office contacted her via telephone to coordinate her care and needs.      I reviewed and discussed the details of that call along with the discharge summary, hospital problems, inpatient lab results, inpatient diagnostic studies, and consultation reports with Jennifer.     Current outpatient and discharge medications have been reconciled for the patient.  Reviewed by: NIKHIL Burgess      Date of TCM Phone Call 3/18/2022   [facility]   Date of Admission 3/17/2022   Date of Discharge 3/18/2022   Discharge Disposition Home or Self Care     Risk for Readmission (LACE) Score: 4 (3/18/2022  6:01 AM)      History of Present Illness   Course During Hospital Stay:  Pt went to Er on 3/17 after she had a fall while walking her puppy. States it was dark and according to daughter fell backwards. They were able to get her up and bring her in the house. Realized once she got in the house she didn't remember falling or being helped up.   Was taken to ER. Had neg CT head, CT chest, CXR, and holter monitor.   She currently denies any HA's or dizziness. Has been having some lower back pain since the fall, but does have chronic back pain and sees pain management.   She was recently taken off her plavix by cardiology that same week, but she is confused on whether she was actually suppose to stop it. She is going to reach out to Dr. Saravia about this.   Pt is not convinced she \"passed out\".      The following portions of the patient's history were reviewed and updated as appropriate: allergies, current medications, past family history, past medical history, past social history, past surgical history and problem list.    Review of Systems    Objective   Physical Exam  Constitutional:       Appearance: She is well-developed.   Eyes: "      Pupils: Pupils are equal, round, and reactive to light.   Cardiovascular:      Rate and Rhythm: Normal rate and regular rhythm.   Pulmonary:      Effort: Pulmonary effort is normal.      Breath sounds: Normal breath sounds.   Neurological:      General: No focal deficit present.      Mental Status: She is alert and oriented to person, place, and time.         Assessment/Plan   Diagnoses and all orders for this visit:    1. Hospital discharge follow-up (Primary)    2. Syncope and collapse  Assessment & Plan:  Unknown cause. negative workup       3. Stage 3a chronic kidney disease (HCC)  Assessment & Plan:  Seeing nephrology. I will need to obtain these records.       4. Coronary artery disease involving native coronary artery of native heart with angina pectoris (HCC)  Assessment & Plan:  Pt will reach out to Dr. Saravia for clarification on plavix.     During this office visit, we discussed the pertinent aspects of the visit and treatment recommendations. Pt verbalizes understanding. Follow up was discussed. Patient was given the opportunity to ask questions and discuss other concerns.

## 2022-04-02 NOTE — PROGRESS NOTES
"Cardiology Clinic Note  Gaurav Saravia MD, PhD    Subjective:     Encounter Date:03/14/2022      Patient ID: Jennifer Blackmon is a 71 y.o. female.    Chief Complaint:  Chief Complaint   Patient presents with   • Follow-up       HPI:  I the pleasure to see this 71-year-old female in follow-up today after 1 year.  History of MI in the past along with PCI 2019, hypertension hyperlipidemia.  Last stress test was in 2021 which revealed no reversible ischemia, preserved EF overall.  Her CV therapies consist of aspirin atorvastatin Coreg Plavix which she is maintained with low bleeding risk.  She denies any new anginal chest pain heart failure signs or symptoms and her vitals are good today 126/76 with heart rates in the 60s.  She is euvolemic without heart failure signs or symptoms and expresses no other complaints.  Last EF again was normal 60 to 65% with normal atrial sizes normal RV size and function no significant valvular abnormality and grade 1 diastolic dysfunction only.    Review of systems otherwise negative x14 point review of systems except was mentioned above    She sees nephrology for CKD, oncology as well      Historical data copied forward from previous encounters in EMR including the history, exam, and assessment/plan has been reviewed and is unchanged unless noted otherwise.    Cardiac medicines reviewed with risk, benefits, and necessity of each discussed.    Risk and benefit of cardiac testing reviewed including death heart attack stroke pain bleeding infection need for vascular /cardiovascular surgery were discussed and the patient     Objective:         /76 (BP Location: Left arm, Patient Position: Sitting)   Pulse 67   Resp 18   Ht 167.6 cm (66\")   Wt 65.7 kg (144 lb 12.8 oz)   BMI 23.37 kg/m²     Physical Exam  Regular rate and rhythm no rubs murmurs or gallops  No heave no lift  No clubbing cyanosis or edema  Pulses 2+    Assessment:         CAD, history of old inferolateral MI  Continue " aspirin Plavix statin and beta-blocker  Stable without anginal chest pain  No indication for ischemic evaluation  Normal ischemic evaluation or at least low risk 2021    Hypertension well-controlled continue regimen  Hyperlipidemia on high intensity statin therapy    Secondary prevention goals were discussed  She is a non-smoker, abstinent since 1972  She is nondiabetic  Diet and exercise per AHA guidelines  Next return to clinic in 1 year    New patient Hemady problems above  Gaurav Saravia MD, PhD      The pleasure to be involved in this patient's cardiovascular care.  Please call with any questions or concerns  Gaurav Saravia MD, PhD    Most recent EKG as reviewed and interpreted by me:    ECG 12 Lead    Date/Time: 3/14/2022 2:34 PM  Performed by: Gaurav Saravia MD  Authorized by: Gaurav Saravia MD   Comparison: not compared with previous ECG   Rhythm: sinus rhythm  Rate: normal  Q waves: II, III, aVF, V5 and V6    QRS axis: normal    Clinical impression: abnormal EKG  Comments: Sinus rhythm  Inferolateral Q waves, old inferolateral infarct  Nonspecific ST-T wave abnormality               Most recent echo as reviewed and interpreted by me:  Results for orders placed during the hospital encounter of 06/21/19    Adult Transthoracic Echo Complete W/ Cont if Necessary Per Protocol    Interpretation Summary  · Left ventricular systolic function is normal.  · Left ventricular diastolic dysfunction (grade I) consistent with impaired relaxation.  · Mild mitral valve regurgitation is present  · Mild pulmonic valve regurgitation is present.      Most recent stress test as reviewed and interpreted by me:  Results for orders placed during the hospital encounter of 02/26/21    Stress Test With Myocardial Perfusion One Day    Interpretation Summary  · Findings consistent with a normal ECG stress test.  · Left ventricular ejection fraction is hyperdynamic (Calculated EF > 70%). .  · Myocardial perfusion  imaging indicates a normal myocardial perfusion study with no evidence of ischemia.  · Impressions are consistent with a low risk study.  · This is normal Cardiolite imaging stress test with no evidence of ischemia or myocardial infarction. Left ventricle size and function is normal on gated SPECT imaging. No wall motion abnormality was noted. Clinical correlation recommended. Further recommendation as per ordering physician..      Most recent cardiac catheterization as reviewed interpreted by me:  No results found for this or any previous visit.    The following portions of the patient's history were reviewed and updated as appropriate: allergies, current medications, past family history, past medical history, past social history, past surgical history and problem list.      ROS:  14 point review of systems negative except as mentioned above    Current Outpatient Medications:   •  atorvastatin (LIPITOR) 40 MG tablet, Take 1 tablet by mouth Daily. (Patient taking differently: Take 20 mg by mouth Daily.), Disp: 90 tablet, Rfl: 3  •  HUNTER LOW DOSE 81 MG chewable tablet, Chew 81 mg Daily., Disp: , Rfl: 0  •  buPROPion XL (WELLBUTRIN XL) 150 MG 24 hr tablet, Take 3 tablets by mouth Every Morning., Disp: 270 tablet, Rfl: 3  •  Calcium Citrate-Vitamin D 500-400 MG-UNIT chewable tablet, Chew Daily., Disp: , Rfl:   •  carvedilol (COREG) 3.125 MG tablet, Take 1 tablet by mouth 2 (Two) Times a Day., Disp: 180 tablet, Rfl: 3  •  coenzyme Q10 100 MG capsule, Take 100 mg by mouth., Disp: , Rfl:   •  Melatonin 10 MG sublingual tablet, Place  under the tongue., Disp: , Rfl:   •  Multiple Vitamins-Minerals (Multi-Vitamin Gummies) chewable tablet, Chew., Disp: , Rfl:   •  escitalopram (LEXAPRO) 20 MG tablet, TAKE ONE-HALF (1/2) TABLET TWICE A DAY, Disp: 90 tablet, Rfl: 3    Problem List:  Patient Active Problem List   Diagnosis   • Old MI (myocardial infarction)   • Coronary artery disease involving native coronary artery of native  heart with angina pectoris (HCC)   • Essential hypertension   • Hyperlipidemia, mixed   • Chest pain, atypical   • Degeneration of lumbar intervertebral disc   • Low back pain   • Lumbar spondylosis   • Arthritis   • Cellulitis of face   • Depressive disorder   • Malignant melanoma of skin (HCC)   • Syncope and collapse   • Stage 3a chronic kidney disease (HCC)     Past Medical History:  Past Medical History:   Diagnosis Date   • Allergic rhinitis    • Basal cell carcinoma (BCC) of nostril    • Coronary artery disease involving native coronary artery of native heart with angina pectoris ([facility]) 2019    Status post successful PCI of the coronary stent deployment to high-grade RCA stenosis after presenting with ST segment elevated microinfarction in May 2019   • DDD (degenerative disc disease), lumbar    • Depression    • Depression    • Essential hypertension 2019   • Heart attack ([facility])     2019   • Hyperlipidemia    • Hyperlipidemia, mixed 2019   • Hypertension    • Insomnia    • Myocardial infarction less than 4 weeks ago (HCC) 2019    Presented initially with ST segment elevated microinfarction treated emergently in [place] May 2019   • Old MI (myocardial infarction) 2019    Presented initially with ST segment elevated microinfarction treated emergently in [place] May 2019   • Osteoarthritis    • Toenail fungus      Past Surgical History:  Past Surgical History:   Procedure Laterality Date   • ADENOIDECTOMY      Adenoids removed   • APPENDECTOMY     • AUGMENTATION MAMMAPLASTY     • BREAST IMPLANT SURGERY     • CATARACT EXTRACTION Bilateral 2017   • CERVICAL DISC SURGERY      ruptured and removed    •  SECTION     • CHEST SURGERY      attempted pectus repair    • EYE SURGERY  1997    AK   • HYSTERECTOMY     • KNEE ARTHROSCOPY Right    • LUMBAR DISC SURGERY      ruptured and removed    • NASAL SEPTAL RECONSTRUCTION     • PECTUS CARNATUM REPAIR       attempted   • RECTAL SURGERY      Repair   • REFRACTIVE SURGERY      RK/AK both eyes    • REFRACTIVE SURGERY     • SOMNOPLASTY RADIAL FREQUENCY ABLATION     • TONSILLECTOMY     • UVULOPALATOPHARYNGOPLASTY       Social History:  Social History     Socioeconomic History   • Marital status:    • Number of children: 2   Tobacco Use   • Smoking status: Former Smoker     Packs/day: 0.50     Years: 4.00     Pack years: 2.00     Types: Cigarettes     Quit date: 1972     Years since quittin.2   • Smokeless tobacco: Never Used   Vaping Use   • Vaping Use: Never used   Substance and Sexual Activity   • Alcohol use: No   • Drug use: No   • Sexual activity: Not Currently     Allergies:  No Known Allergies  Immunizations:  Immunization History   Administered Date(s) Administered   • COVID-19 (MODERNA) 1st, 2nd, 3rd Dose Only 2021, 2021   • FLUAD TRI 65YR+ 10/19/2017   • Flu Vaccine Quad PF 6-35MO 10/27/2016   • Flu Vaccine Quad PF >36MO 10/27/2016   • Fluad Quad 65+ 10/09/2019, 10/20/2020   • Fluzone High Dose =>65 Years ([employer] ONLY) 2015, 10/19/2017, 10/15/2018   • Fluzone High-Dose 65+yrs 10/20/2021   • Influenza, Unspecified 10/09/2019   • Pneumococcal Conjugate 13-Valent (PCV13) 2018   • Pneumococcal Polysaccharide (PPSV23) 2021            In-Office Procedure(s):  No orders to display        ASCVD RIsk Score::  The ASCVD Risk score (Josiane TERE Jr., et al., 2013) failed to calculate for the following reasons:    The patient has a prior MI or stroke diagnosis    Imaging:    Results for orders placed during the hospital encounter of 01/10/20    XR Hand 3+ View Right    Narrative  XR HAND 3+ VW RIGHT-    Date of Exam: 1/10/2020 12:35 PM    Indication: right 5th finger pain and swelling; M79.644-Pain in right  finger(s).    Comparison: None available    Technique: 3 views of the hand were obtained.    FINDINGS: There is a mallet finger deformity with persistent flexion at  the DIP joint  of the fifth digit with small displaced avulsion fracture  fragment from the dorsal base of the DIP joint best seen on the lateral  view. There is mild joint space narrowing diffusely of the DIP joints  with small marginal osteophyte formation. There is moderate joint space  narrowing and osteophyte formation at the PIP joint of second digit.  There is advanced degenerative joint space narrowing, sclerosis, and  osteophyte formation at the first carpometacarpal joint. There is mild  radial subluxation of the first digit at the first metacarpal phalangeal  joint.    Impression  1. Persistent flexion deformity at the DIP joint of the fifth digit  secondary to a small displaced avulsion fracture from the dorsal base of  the distal phalanx of the first digit best seen on the lateral view.  Findings compatible with mallet finger injury pattern.  2. Advanced osteoarthritis at the first carpal metacarpal joint.    Electronically Signed By-Jos Ybarra On:1/10/2020 2:50 PM  This report was finalized on [id number] by  Jos Ybarra, .       Results for orders placed during the hospital encounter of 02/28/22    US Renal Bilateral    Narrative  Examination:    Date of Exam: 2/28/2022 3:07 PM    Indication: N18.31; N18.31-Chronic kidney disease, stage 3a.    Comparison: None available.    Technique: Grayscale and color Doppler ultrasound evaluation of the  kidneys and urinary bladder was performed    Findings:  The right kidney is about 9.5 cm in length by 4 x 4 0.8 cm.    The left kidney is about 9 cm in length by 4.8 x 4.7 cm. There is a  lower pole lesion measuring 2 cm which is not completely cleared of  echoes but is probably a cyst.    The renal cortical thickness and echogenicity are within normal. Neither  kidney appears hydronephrotic. There is color flow to each kidney.    Limited exam of the bladder shows a volume of 67 cc.    Impression  2 cm left renal lesion is most likely a cyst. Otherwise  unremarkable  exam.    Electronically Signed By-Smitha Hurtado MD On:2/28/2022 3:39 PM  This report was finalized on [id number] by  Smitha Hurtado MD.      Results for orders placed during the hospital encounter of 08/17/21    CT Head Without Contrast    Narrative  DATE OF EXAM:  8/17/2021 2:47 PM    PROCEDURE:  CT HEAD WO CONTRAST-    INDICATIONS:  fall; W19.XXXA-Unspecified fall, initial encounter    COMPARISON:  No Comparisons Available    TECHNIQUE:  Routine transaxial cuts were obtained through the head without the  administration of contrast. Automated exposure control and iterative  reconstruction methods were used.    FINDINGS:  There is no acute intracranial hemorrhage. No mass effect or midline  shift. There are moderate areas of white matter hypoattenuation most  suggestive chronic microvascular disease in a patient of this age. Mild  cerebellar atrophy. Posterior fossa without acute abnormality. Globes  are symmetric. There is no retro-orbital abnormality. The mastoid air  cells are well-aerated. Negative for sinus fluid level. Calvarium  intact.    Impression  1. No acute intracranial hemorrhage.  2. Moderate white matter findings most suggestive of chronic  microvascular disease.    Electronically Signed By-Jaylon Martinez MD On:8/17/2021 2:57 PM  This report was finalized on [id number] by  Jaylon Martinez MD.      Lab Review:   Lab on 02/28/2022   Component Date Value   • Creatine Kinase 02/28/2022 109    • Glucose 02/28/2022 81    • BUN 02/28/2022 23    • Creatinine 02/28/2022 1.12 (A)   • Sodium 02/28/2022 141    • Potassium 02/28/2022 3.9    • Chloride 02/28/2022 103    • CO2 02/28/2022 29.6 (A)   • Calcium 02/28/2022 9.8    • Total Protein 02/28/2022 7.0    • Albumin 02/28/2022 3.90    • ALT (SGPT) 02/28/2022 25    • AST (SGOT) 02/28/2022 23    • Alkaline Phosphatase 02/28/2022 89    • Total Bilirubin 02/28/2022 0.4    • Globulin 02/28/2022 3.1    • A/G Ratio 02/28/2022 1.3    • BUN/Creatinine  Ratio 02/28/2022 20.5    • Anion Gap 02/28/2022 8.4    • eGFR 02/28/2022 52.0 (A)   • Phosphorus 02/28/2022 4.1    • PTH, Intact 02/28/2022 60.1    • TSH 02/28/2022 1.070    • Uric Acid 02/28/2022 3.7    • 25 Hydroxy, Vitamin D 02/28/2022 42.1    • Color, UA 03/02/2022 Yellow    • Appearance, UA 03/02/2022 Clear    • pH, UA 03/02/2022 5.5    • Specific [place], UA 03/02/2022 1.021    • Glucose, UA 03/02/2022 Negative    • Ketones, UA 03/02/2022 Trace (A)   • Bilirubin, UA 03/02/2022 Negative    • Blood, UA 03/02/2022 Negative    • Protein, UA 03/02/2022 Negative    • Leuk Esterase, UA 03/02/2022 Trace (A)   • Nitrite, UA 03/02/2022 Negative    • Urobilinogen, UA 03/02/2022 1.0 E.U./dL    • Protein/Creatinine Ratio* 03/02/2022 68.8    • Creatinine, Urine 03/02/2022 190.5    • Total Protein, Urine 03/02/2022 13.1    • Antinuclear Antibodies (* 02/28/2022 Negative    • Immunofixation Result, S* 02/28/2022 Comment (A)   • IgG 02/28/2022 921    • IgA 02/28/2022 142    • IgM 02/28/2022 24 (A)   • WBC 02/28/2022 8.31    • RBC 02/28/2022 4.43    • Hemoglobin 02/28/2022 13.6    • Hematocrit 02/28/2022 40.9    • MCV 02/28/2022 92.3    • MCH 02/28/2022 30.7    • MCHC 02/28/2022 33.3    • RDW 02/28/2022 12.4    • RDW-SD 02/28/2022 41.2    • MPV 02/28/2022 9.4    • Platelets 02/28/2022 273    • Neutrophil % 02/28/2022 64.2    • Lymphocyte % 02/28/2022 22.5    • Monocyte % 02/28/2022 10.1    • Eosinophil % 02/28/2022 2.2    • Basophil % 02/28/2022 0.4    • Immature Grans % 02/28/2022 0.6 (A)   • Neutrophils, Absolute 02/28/2022 5.34    • Lymphocytes, Absolute 02/28/2022 1.87    • Monocytes, Absolute 02/28/2022 0.84    • Eosinophils, Absolute 02/28/2022 0.18    • Basophils, Absolute 02/28/2022 0.03    • Immature Grans, Absolute 02/28/2022 0.05    • nRBC 02/28/2022 0.1    • RBC, UA 03/02/2022 0-2    • WBC, UA 03/02/2022 0-2    • Bacteria, UA 03/02/2022 None Seen    • Squamous Epithelial Cell* 03/02/2022 7-12 (A)   • Hyaline Casts,  UA 03/02/2022 7-12    • Methodology 03/02/2022 Automated Microscopy    Lab on 12/07/2021   Component Date Value   • Glucose 12/07/2021 86    • BUN 12/07/2021 23    • Creatinine 12/07/2021 1.12 (A)   • Sodium 12/07/2021 140    • Potassium 12/07/2021 4.9    • Chloride 12/07/2021 101    • CO2 12/07/2021 27.4    • Calcium 12/07/2021 9.5    • Total Protein 12/07/2021 7.2    • Albumin 12/07/2021 4.40    • ALT (SGPT) 12/07/2021 21    • AST (SGOT) 12/07/2021 23    • Alkaline Phosphatase 12/07/2021 84    • Total Bilirubin 12/07/2021 0.5    • eGFR Non  Amer 12/07/2021 48 (A)   • Globulin 12/07/2021 2.8    • A/G Ratio 12/07/2021 1.6    • BUN/Creatinine Ratio 12/07/2021 20.5    • Anion Gap 12/07/2021 11.6    • Total Cholesterol 12/07/2021 154    • Triglycerides 12/07/2021 75    • HDL Cholesterol 12/07/2021 54    • LDL Cholesterol  12/07/2021 85    • VLDL Cholesterol 12/07/2021 15    • LDL/HDL Ratio 12/07/2021 1.57    • TSH 12/07/2021 0.761    • 25 Hydroxy, Vitamin D 12/07/2021 41.5    • Hepatitis C Ab 12/07/2021 Non-Reactive    • WBC 12/07/2021 7.64    • RBC 12/07/2021 4.13    • Hemoglobin 12/07/2021 12.8    • Hematocrit 12/07/2021 37.7    • MCV 12/07/2021 91.3    • MCH 12/07/2021 31.0    • MCHC 12/07/2021 34.0    • RDW 12/07/2021 12.1 (A)   • RDW-SD 12/07/2021 39.9    • MPV 12/07/2021 10.6    • Platelets 12/07/2021 253    • Neutrophil % 12/07/2021 54.9    • Lymphocyte % 12/07/2021 31.7    • Monocyte % 12/07/2021 8.1    • Eosinophil % 12/07/2021 4.2    • Basophil % 12/07/2021 0.8    • Immature Grans % 12/07/2021 0.3    • Neutrophils, Absolute 12/07/2021 4.20    • Lymphocytes, Absolute 12/07/2021 2.42    • Monocytes, Absolute 12/07/2021 0.62    • Eosinophils, Absolute 12/07/2021 0.32    • Basophils, Absolute 12/07/2021 0.06    • Immature Grans, Absolute 12/07/2021 0.02    • nRBC 12/07/2021 0.0      Recent labs reviewed and interpreted for clinical significance and application            Level of Care:           Gaurav  Cuauhtemoc Saravia MD  04/02/22  .

## 2022-04-04 ENCOUNTER — OFFICE VISIT (OUTPATIENT)
Dept: CARDIOLOGY | Facility: CLINIC | Age: 72
End: 2022-04-04

## 2022-04-04 VITALS
HEIGHT: 66 IN | BODY MASS INDEX: 23.3 KG/M2 | SYSTOLIC BLOOD PRESSURE: 132 MMHG | DIASTOLIC BLOOD PRESSURE: 76 MMHG | OXYGEN SATURATION: 98 % | HEART RATE: 71 BPM | WEIGHT: 145 LBS

## 2022-04-04 DIAGNOSIS — E78.2 HYPERLIPIDEMIA, MIXED: ICD-10-CM

## 2022-04-04 DIAGNOSIS — R55 NEAR SYNCOPE: ICD-10-CM

## 2022-04-04 DIAGNOSIS — I10 ESSENTIAL HYPERTENSION: ICD-10-CM

## 2022-04-04 DIAGNOSIS — I25.119 CORONARY ARTERY DISEASE INVOLVING NATIVE CORONARY ARTERY OF NATIVE HEART WITH ANGINA PECTORIS: Primary | ICD-10-CM

## 2022-04-04 PROCEDURE — 99213 OFFICE O/P EST LOW 20 MIN: CPT | Performed by: NURSE PRACTITIONER

## 2022-04-04 PROCEDURE — 93000 ELECTROCARDIOGRAM COMPLETE: CPT | Performed by: NURSE PRACTITIONER

## 2022-04-04 RX ORDER — CLOPIDOGREL BISULFATE 75 MG/1
75 TABLET ORAL DAILY
COMMUNITY
End: 2022-08-01

## 2022-05-04 ENCOUNTER — HOSPITAL ENCOUNTER (EMERGENCY)
Facility: HOSPITAL | Age: 72
Discharge: HOME | End: 2022-05-04
Attending: EMERGENCY MEDICINE | Admitting: EMERGENCY MEDICINE

## 2022-05-04 ENCOUNTER — APPOINTMENT (OUTPATIENT)
Dept: CT IMAGING | Facility: HOSPITAL | Age: 72
End: 2022-05-04

## 2022-05-04 ENCOUNTER — APPOINTMENT (OUTPATIENT)
Dept: GENERAL RADIOLOGY | Facility: HOSPITAL | Age: 72
End: 2022-05-04

## 2022-05-04 VITALS
HEART RATE: 67 BPM | SYSTOLIC BLOOD PRESSURE: 132 MMHG | DIASTOLIC BLOOD PRESSURE: 61 MMHG | TEMPERATURE: 98.2 F | RESPIRATION RATE: 16 BRPM | HEIGHT: 66 IN | OXYGEN SATURATION: 98 % | BODY MASS INDEX: 23.14 KG/M2 | WEIGHT: 143.96 LBS

## 2022-05-04 DIAGNOSIS — S00.83XA CONTUSION OF CHIN, INITIAL ENCOUNTER: ICD-10-CM

## 2022-05-04 DIAGNOSIS — S09.90XA INJURY OF HEAD, INITIAL ENCOUNTER: ICD-10-CM

## 2022-05-04 DIAGNOSIS — M25.552 LEFT HIP PAIN: ICD-10-CM
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
## 2022-05-04 DIAGNOSIS — M54.2 NECK PAIN: ICD-10-CM

## 2022-05-04 PROCEDURE — 70486 CT MAXILLOFACIAL W/O DYE: CPT

## 2022-05-04 PROCEDURE — 73502 X-RAY EXAM HIP UNI 2-3 VIEWS: CPT

## 2022-05-04 PROCEDURE — 72125 CT NECK SPINE W/O DYE: CPT

## 2022-05-04 PROCEDURE — 70450 CT HEAD/BRAIN W/O DYE: CPT

## 2022-05-04 PROCEDURE — 99283 EMERGENCY DEPT VISIT LOW MDM: CPT

## 2022-05-04 RX ORDER — ACETAMINOPHEN 325 MG/1
650 TABLET ORAL EVERY 6 HOURS PRN
Status: DISCONTINUED | OUTPATIENT
Start: 2022-05-04 | End: 2022-05-04 | Stop reason: HOSPADM

## 2022-05-04 RX ADMIN — ACETAMINOPHEN 650 MG: 325 TABLET ORAL at 14:28

## 2022-05-04 NOTE — ED NOTES
"Pt states that this morning she fell while \"wrestling with my [age] year old.\" Pt is alert and oriented. PT has visible bruising and swelling to the left side of her chin and mouth. Pt states that she is on blood thinners. Pt c/o pain in her left hip and her neck. Pt denies any other symptoms at this time. Pupils are equal.  "

## 2022-05-04 NOTE — DISCHARGE INSTRUCTIONS
Tylenol as needed for discomfort  Heat and ice to the areas of pain  Rest and then advance activity as tolerated    Return to the ER for new or worsening symptoms  Follow-up with your primary care

## 2022-05-04 NOTE — ED PROVIDER NOTES
Subjective   Chief Complaint: Fall      HPI: She is a 71-year-old female presents to the ER today by private vehicle following a fall.  States that she was on the bed roughhousing with her 3-year-old grandson when he went to follow-up the bed and she went to catch him falling to the floor.  States that she landed on her head, did not have loss of consciousness has no headache, dizziness, nausea or vomiting.  She was able to ambulate following the incident.  She did not attempt any treatment with medication prior to arrival.  She has some neck pain, jaw pain.  She also believes that she hit her left hip as she does have some discomfort with movement.    PCP: Candace          Review of Systems   Constitutional: Negative for fever.   Respiratory: Negative for cough and shortness of breath.    Cardiovascular: Negative for chest pain.   Gastrointestinal: Negative for abdominal pain, nausea and vomiting.   Musculoskeletal: Positive for myalgias and neck pain. Negative for neck stiffness.   Skin:        Ecchymosis   Neurological: Positive for headaches. Negative for dizziness and weakness.   Hematological: Bruises/bleeds easily.   Psychiatric/Behavioral: Negative for confusion.       Past Medical History:   Diagnosis Date   • Allergic rhinitis    • Basal cell carcinoma (BCC) of nostril    • Coronary artery disease involving native coronary artery of native heart with angina pectoris ([facility]) 06/18/2019    Status post successful PCI of the coronary stent deployment to high-grade RCA stenosis after presenting with ST segment elevated microinfarction in May 2019   • DDD (degenerative disc disease), lumbar    • Depression    • Depression    • Essential hypertension 06/18/2019   • Heart attack ([facility])     6/2/2019   • Hyperlipidemia    • Hyperlipidemia, mixed 08/13/2019   • Hypertension    • Insomnia    • Myocardial infarction less than 4 weeks ago ([facility]) 06/18/2019    Presented initially with ST segment elevated microinfarction  treated emergently in [place] May 2019   • Old MI (myocardial infarction) 2019    Presented initially with ST segment elevated microinfarction treated emergently in [place] May 2019   • Osteoarthritis    • Toenail fungus        No Known Allergies    Past Surgical History:   Procedure Laterality Date   • ADENOIDECTOMY      Adenoids removed   • APPENDECTOMY     • AUGMENTATION MAMMAPLASTY     • BREAST IMPLANT SURGERY     • CATARACT EXTRACTION Bilateral 2017   • CERVICAL DISC SURGERY      ruptured and removed    •  SECTION     • CHEST SURGERY      attempted pectus repair    • EYE SURGERY  1997    AK   • HYSTERECTOMY     • KNEE ARTHROSCOPY Right    • LUMBAR DISC SURGERY      ruptured and removed    • NASAL SEPTAL RECONSTRUCTION     • PECTUS CARNATUM REPAIR      attempted   • RECTAL SURGERY      Repair   • REFRACTIVE SURGERY      RK/AK both eyes    • REFRACTIVE SURGERY     • SOMNOPLASTY RADIAL FREQUENCY ABLATION     • TONSILLECTOMY     • UVULOPALATOPHARYNGOPLASTY         Family History   Problem Relation Age of Onset   • Alzheimer's disease Mother    • Other Mother         CVA, acute myocardial infarction   • Leukemia Father    • Other Father         Parkinson's    • Heart attack Sister    • No Known Problems Brother    • Bipolar disorder Daughter    • Heart attack Brother    • Alcohol abuse Brother    • Anxiety disorder Brother    • Depression Brother    • ADD / ADHD Daughter    • Other Daughter         alcohol and drug addiction   • HIV Daughter        Social History     Socioeconomic History   • Marital status:    • Number of children: 2   Tobacco Use   • Smoking status: Former Smoker     Packs/day: 0.50     Years: 4.00     Pack years: 2.00     Types: Cigarettes     Quit date:      Years since quittin.3   • Smokeless tobacco: Never Used   Vaping Use   • Vaping Use: Never used   Substance and Sexual Activity   • Alcohol use: No   • Drug use: No   • Sexual  activity: Not Currently           Objective   Physical Exam  Constitutional:       Appearance: She is not ill-appearing or toxic-appearing.   HENT:      Head: Normocephalic. Contusion present. No Chau's sign or abrasion.      Jaw: Trismus and pain on movement present. No malocclusion.        Right Ear: Tympanic membrane normal.      Left Ear: Tympanic membrane normal.      Nose: Nose normal. No rhinorrhea.      Mouth/Throat:      Mouth: Mucous membranes are moist.      Pharynx: Oropharynx is clear.   Eyes:      Extraocular Movements: Extraocular movements intact.      Right eye: No nystagmus.      Left eye: No nystagmus.      Pupils: Pupils are equal, round, and reactive to light.   Neck:      Comments: No midline tenderness, no bony step-off no crepitus no chau signs or raccoon eyes  Cardiovascular:      Rate and Rhythm: Normal rate.      Pulses: Normal pulses.      Heart sounds: Normal heart sounds. No murmur heard.  Pulmonary:      Effort: Pulmonary effort is normal.      Breath sounds: Normal breath sounds. No wheezing.   Abdominal:      General: Bowel sounds are normal.      Palpations: Abdomen is soft.      Tenderness: There is no abdominal tenderness.   Musculoskeletal:         General: Tenderness present. Normal range of motion.      Cervical back: Neck supple. Tenderness present.        Legs:       Comments: No pain with movement, no shortening   Skin:     General: Skin is warm and dry.      Findings: Bruising present.          Neurological:      General: No focal deficit present.      Mental Status: She is alert and oriented to person, place, and time. Mental status is at baseline.      Motor: No weakness.   Psychiatric:         Mood and Affect: Mood normal.         Behavior: Behavior normal.         Thought Content: Thought content normal.         Judgment: Judgment normal.         Procedures           ED Course      /61   Pulse 67   Temp 98.2 °F (36.8 °C) (Oral)   Resp 16   Ht 167.6 cm  "(66\")   Wt 65.3 kg (143 lb 15.4 oz)   SpO2 98% Comment: ROOM AIR  BMI 23.24 kg/m²   Labs Reviewed - No data to display  Medications   acetaminophen (TYLENOL) tablet 650 mg (650 mg Oral Given 5/4/22 1424)     CT Head Without Contrast    Result Date: 5/4/2022   1. No acute intracranial findings. 2. Moderate chronic microvascular disease and mild generalized atrophy, without significant change from 3/20/2022.  Electronically Signed By-Kyara Pandya MD On:5/4/2022 1:21 PM This report was finalized on [id number] by  Kyara Pandya MD.    CT Cervical Spine Without Contrast    Result Date: 5/4/2022  1. No acute cervical spine findings. 2. Degenerative changes of the cervical spine. No significant change from 3/18/2022.  Electronically Signed By-Kyara Pandya MD On:5/4/2022 1:24 PM This report was finalized on [id number] by  Kyara Pandya MD.    CT Facial Bones Without Contrast    Result Date: 5/4/2022   1. Subcutaneous gas within the left jaw soft tissues. Correlate for laceration injury. 2. No acute facial fracture.  Electronically Signed By-Kyara Pandya MD On:5/4/2022 1:26 PM This report was finalized on [id number] by  Kyara Pandya MD.    XR Hip With or Without Pelvis 2 - 3 View Left    Result Date: 5/4/2022   1. No acute pelvic finding. No acute left hip finding. 2. Mild degenerative change of the left hip. 3. Mid lumbar dextroscoliotic curvature with multilevel lumbar endplate degenerative changes.  Electronically Signed By-Kyara Pandya MD On:5/4/2022 1:17 PM This report was finalized on [id number] by  Kyara Pandya MD.                                               MDM  Number of Diagnoses or Management Options  Contusion of chin, initial encounter  Fall, initial encounter  Injury of head, initial encounter  Left hip pain  Neck pain  Diagnosis management comments: While in the emergency room patient was evaluated and imaging was obtained.  CT of the head and neck were negative for acute " findings as well as x-ray of the left hip.  Patient was given a dose of Tylenol while in the emergency room.  Patient was ambulatory independently while in the emergency room without difficulty.  She was advised to take Tylenol as needed for discomfort if she states she cannot take ibuprofen per nephrology.  She was advised to use heat or ice as needed for discomfort, rest and advance activity as tolerated.  Patient gave verbal understanding of these instructions and was advised to follow-up with primary care as needed.  Patient was alert oriented with stable vital signs at time of discharge without further complaints.    Chart review: 3/17/2022 patient was seen in the emergency room for syncope and contusion.    Comorbidities: Osteoarthritis, depression, degenerative disc disease, hypertension, hyperlipidemia, coronary artery disease    Differentials: Contusion, sprain, strain, fracture, head injury, ICH not all inclusive of differentials considered      Pt is aware that discharge does not mean that nothing is wrong but it indicates no emergency is present and they must continue care with follow-up as given below or physician of their choice    Appropriate PPE worn throughout the care of this patient.           Amount and/or Complexity of Data Reviewed  Tests in the radiology section of CPT®: reviewed        Final diagnoses:   Fall, initial encounter   Contusion of chin, initial encounter   Injury of head, initial encounter   Neck pain   Left hip pain       ED Disposition  ED Disposition     ED Disposition   Discharge    Condition   Stable    Comment   --             Nathalia Maria, APRN  [address] [facility]   [address]  [place] [address] [phone]    Schedule an appointment as soon as possible for a visit in 1 week  As needed         Medication List      Changed    atorvastatin 40 MG tablet  Commonly known as: LIPITOR  Take 1 tablet by mouth Daily.  What changed: how much to take             Benedicto  Tamia MARTINEZ, APRN  05/04/22 1546

## 2022-06-07 ENCOUNTER — OFFICE VISIT (OUTPATIENT)
Dept: FAMILY MEDICINE CLINIC | Facility: CLINIC | Age: 72
End: 2022-06-07

## 2022-06-07 VITALS
WEIGHT: 141 LBS | RESPIRATION RATE: 16 BRPM | SYSTOLIC BLOOD PRESSURE: 110 MMHG | BODY MASS INDEX: 22.66 KG/M2 | HEART RATE: 71 BPM | DIASTOLIC BLOOD PRESSURE: 70 MMHG | HEIGHT: 66 IN | TEMPERATURE: 97.6 F | OXYGEN SATURATION: 97 %

## 2022-06-07 DIAGNOSIS — I10 ESSENTIAL HYPERTENSION: Primary | ICD-10-CM

## 2022-06-07 PROBLEM — C43.9 MALIGNANT MELANOMA OF SKIN: Status: RESOLVED | Noted: 2021-01-26 | Resolved: 2022-06-07

## 2022-06-07 PROCEDURE — 99213 OFFICE O/P EST LOW 20 MIN: CPT | Performed by: NURSE PRACTITIONER

## 2022-06-07 NOTE — PROGRESS NOTES
"Chief Complaint  Hypertension and Hyperlipidemia  Subjective        Jennifer Blackmon presents to [facility]  Pt comes in today for follow up on BP. Overall doing well. Had a bad fall in Feb while walking her dog. Was in the hospital for several days. Still with occasional neck pain.  No other concerns today.  Sees Dr. Saravia for cardiology and was last seen in April. No med changes.   Most recent labs looked ok.        Objective     Vital Signs:   /70 (BP Location: Left arm)   Pulse 71   Temp 97.6 °F (36.4 °C) (Infrared)   Resp 16   Ht 167.6 cm (66\")   Wt 64 kg (141 lb)   SpO2 97%   BMI 22.76 kg/m²       BP Readings from Last 3 Encounters:   06/07/22 110/70   05/04/22 132/61   04/04/22 132/76       Wt Readings from Last 3 Encounters:   06/07/22 64 kg (141 lb)   05/04/22 65.3 kg (143 lb 15.4 oz)   04/04/22 65.8 kg (145 lb)     Physical Exam  Constitutional:       Appearance: She is well-developed.   Eyes:      Pupils: Pupils are equal, round, and reactive to light.   Cardiovascular:      Rate and Rhythm: Normal rate and regular rhythm.   Pulmonary:      Effort: Pulmonary effort is normal.      Breath sounds: Normal breath sounds.   Neurological:      Mental Status: She is alert and oriented to person, place, and time.        Result Review :                 Assessment and Plan    Diagnoses and all orders for this visit:    1. Essential hypertension (Primary)  Assessment & Plan:  Hypertension is improving with treatment.  Continue current treatment regimen.  Blood pressure will be reassessed at the next regular appointment.    During this office visit, we discussed the pertinent aspects of the visit and treatment recommendations. Pt verbalizes understanding. Follow up was discussed. Patient was given the opportunity to ask questions and discuss other concerns.         Follow Up   Return in about 6 months (around 12/7/2022) for Medicare Wellness.  Patient was given " instructions and counseling regarding her condition or for health maintenance advice. Please see specific information pulled into the AVS if appropriate.

## 2022-08-01 RX ORDER — CLOPIDOGREL BISULFATE 75 MG/1
TABLET ORAL
Qty: 90 TABLET | Refills: 1 | Status: SHIPPED | OUTPATIENT
Start: 2022-08-01 | End: 2023-02-14 | Stop reason: SDUPTHER

## 2022-08-10 ENCOUNTER — OFFICE VISIT (OUTPATIENT)
Dept: FAMILY MEDICINE CLINIC | Facility: CLINIC | Age: 72
End: 2022-08-10

## 2022-08-10 VITALS
OXYGEN SATURATION: 93 % | WEIGHT: 140 LBS | DIASTOLIC BLOOD PRESSURE: 60 MMHG | TEMPERATURE: 96.6 F | SYSTOLIC BLOOD PRESSURE: 118 MMHG | RESPIRATION RATE: 15 BRPM | HEART RATE: 74 BPM | HEIGHT: 66 IN | BODY MASS INDEX: 22.5 KG/M2

## 2022-08-10 DIAGNOSIS — R41.3 MEMORY DEFICIT: Primary | ICD-10-CM

## 2022-08-10 DIAGNOSIS — F32.A DEPRESSIVE DISORDER: ICD-10-CM

## 2022-08-10 PROCEDURE — 99214 OFFICE O/P EST MOD 30 MIN: CPT | Performed by: NURSE PRACTITIONER

## 2022-08-10 RX ORDER — DONEPEZIL HYDROCHLORIDE 10 MG/1
10 TABLET, FILM COATED ORAL NIGHTLY
Qty: 90 TABLET | Refills: 1 | Status: SHIPPED | OUTPATIENT
Start: 2022-08-10 | End: 2022-12-12

## 2022-08-10 NOTE — PROGRESS NOTES
"Chief Complaint  Memory Loss  Subjective        Jennifer Blackmon presents to [facility]  Pt comes in today with c/o increased falls, memory concerns, and fatigue.   Pt states she forgets things quickly.  Mother had alzheimers and that concerns her.   She is here today with , and both are poor historians.  Does struggle with depression, and she says when her depression worsens or increased stressed she feels off balance and that is when she has falls.  Pt states she feels \"ganged up on\" by family members.    states she just wants to sleep and acts \"like she is 10 years older than she is\".   Pt states she has no desire to get up and do anything such as cooking or house work.   She is currently on wellbutrin 450mg daily and lexapro 20mg daily. She has seen counselor in the past, but hasn't in years.   She no longer drives, because she does admit to getting lost on the road. Also doesn't like to drive at night.   Mentions that family members are scared to drive with her.        Objective     Vital Signs:   /60   Pulse 74   Temp 96.6 °F (35.9 °C)   Resp 15   Ht 167.6 cm (66\")   Wt 63.5 kg (140 lb)   SpO2 93%   BMI 22.60 kg/m²       BP Readings from Last 3 Encounters:   08/10/22 118/60   06/07/22 110/70   05/04/22 132/61       Wt Readings from Last 3 Encounters:   08/10/22 63.5 kg (140 lb)   06/07/22 64 kg (141 lb)   05/04/22 65.3 kg (143 lb 15.4 oz)     Physical Exam  Constitutional:       Appearance: She is well-developed.   Eyes:      Pupils: Pupils are equal, round, and reactive to light.   Cardiovascular:      Rate and Rhythm: Normal rate and regular rhythm.   Pulmonary:      Effort: Pulmonary effort is normal.      Breath sounds: Normal breath sounds.   Neurological:      Mental Status: She is alert and oriented to person, place, and time.       Maximum   Score Patient's   Score Questions   5 5 \"What is the year?Season?Date?Day of the week?Month?\"   5 5 " "\"Where are we now: State?County?Town/city?Hospital?Floor?\"   3  3 Unrelated objects Number of trials:___   5 5 Count backward from 100 by sevens or spell WORLD backwards   3 3 Name 3 things from above   2  2 Identify 2 objects   1 1 Repeat the phrase: No ifs, ands,or buts.   3 3 Take paper in right hand, fold it in half, and put it on the floor.   1 1 Please read this and do what it says. \"Close your eyes\"   1 1 Make up and write a sentence about anything. Noun and verb   1 1 Copy this picture 10 angles must be present.   30 30 Total MMSE       Result Review :                 Assessment and Plan    Diagnoses and all orders for this visit:    1. Memory deficit (Primary)  -     Ambulatory Referral to Neuropsychology  -     Ambulatory Referral to Neurology  -     donepezil (Aricept) 10 MG tablet; Take 1 tablet by mouth Every Night.  Dispense: 90 tablet; Refill: 1    2. Depressive disorder    will start aricept  Referral to neurology  Referral to neuropsych  Discussed counseling again, but pt declines  During this office visit, we discussed the pertinent aspects of the visit and treatment recommendations. Pt verbalizes understanding. Follow up was discussed. Patient was given the opportunity to ask questions and discuss other concerns.     I spent 30 minutes caring for Jennifer on this date of service. This time includes time spent by me in the following activities:preparing for the visit, obtaining and/or reviewing a separately obtained history, performing a medically appropriate examination and/or evaluation , counseling and educating the patient/family/caregiver, ordering medications, tests, or procedures, referring and communicating with other health care professionals  and documenting information in the medical record    Follow Up   Return in about 4 months (around 12/10/2022).  Patient was given instructions and counseling regarding her condition or for health maintenance advice. Please see specific information " pulled into the AVS if appropriate.

## 2022-09-01 ENCOUNTER — APPOINTMENT (OUTPATIENT)
Dept: GENERAL RADIOLOGY | Facility: HOSPITAL | Age: 72
End: 2022-09-01

## 2022-09-01 ENCOUNTER — APPOINTMENT (OUTPATIENT)
Dept: CT IMAGING | Facility: HOSPITAL | Age: 72
End: 2022-09-01

## 2022-09-01 ENCOUNTER — HOSPITAL ENCOUNTER (EMERGENCY)
Facility: HOSPITAL | Age: 72
Discharge: HOME | End: 2022-09-01
Attending: EMERGENCY MEDICINE | Admitting: EMERGENCY MEDICINE

## 2022-09-01 VITALS
SYSTOLIC BLOOD PRESSURE: 126 MMHG | TEMPERATURE: 98.9 F | OXYGEN SATURATION: 99 % | BODY MASS INDEX: 23.3 KG/M2 | HEART RATE: 63 BPM | WEIGHT: 145 LBS | DIASTOLIC BLOOD PRESSURE: 78 MMHG | RESPIRATION RATE: 15 BRPM | HEIGHT: 66 IN

## 2022-09-01 DIAGNOSIS — U07.1 COVID-19: Primary | ICD-10-CM

## 2022-09-01 DIAGNOSIS — R11.2 NAUSEA AND VOMITING, UNSPECIFIED VOMITING TYPE: ICD-10-CM

## 2022-09-01 LAB
ALBUMIN SERPL-MCNC: 3.4 G/DL (ref 3.5–5.2)
ALBUMIN/GLOB SERPL: 1.3 G/DL
ALP SERPL-CCNC: 81 U/L (ref 39–117)
ALT SERPL W P-5'-P-CCNC: 15 U/L (ref 1–33)
ANION GAP SERPL CALCULATED.3IONS-SCNC: 8 MMOL/L (ref 5–15)
APTT PPP: 24.9 SECONDS (ref 61–76.5)
AST SERPL-CCNC: 20 U/L (ref 1–32)
BACTERIA UR QL AUTO: ABNORMAL /HPF
BASOPHILS # BLD AUTO: 0.1 10*3/MM3 (ref 0–0.2)
BASOPHILS NFR BLD AUTO: 0.6 % (ref 0–1.5)
BILIRUB SERPL-MCNC: 0.4 MG/DL (ref 0–1.2)
BILIRUB UR QL STRIP: NEGATIVE
BUN SERPL-MCNC: 19 MG/DL (ref 8–23)
BUN/CREAT SERPL: 22.6 (ref 7–25)
CALCIUM SPEC-SCNC: 8.2 MG/DL (ref 8.6–10.5)
CHLORIDE SERPL-SCNC: 104 MMOL/L (ref 98–107)
CLARITY UR: CLEAR
CO2 SERPL-SCNC: 27 MMOL/L (ref 22–29)
COLOR UR: YELLOW
CREAT SERPL-MCNC: 0.84 MG/DL (ref 0.57–1)
D-LACTATE SERPL-SCNC: 0.9 MMOL/L (ref 0.5–2)
DEPRECATED RDW RBC AUTO: 42 FL (ref 37–54)
EGFRCR SERPLBLD CKD-EPI 2021: 73 ML/MIN/1.73
EOSINOPHIL # BLD AUTO: 0 10*3/MM3 (ref 0–0.4)
EOSINOPHIL NFR BLD AUTO: 0.1 % (ref 0.3–6.2)
ERYTHROCYTE [DISTWIDTH] IN BLOOD BY AUTOMATED COUNT: 13.1 % (ref 12.3–15.4)
GLOBULIN UR ELPH-MCNC: 2.7 GM/DL
GLUCOSE SERPL-MCNC: 112 MG/DL (ref 65–99)
GLUCOSE UR STRIP-MCNC: NEGATIVE MG/DL
HCT VFR BLD AUTO: 40.7 % (ref 34–46.6)
HGB BLD-MCNC: 13.6 G/DL (ref 12–15.9)
HGB UR QL STRIP.AUTO: NEGATIVE
HOLD SPECIMEN: NORMAL
HYALINE CASTS UR QL AUTO: ABNORMAL /LPF
INR PPP: 1.03 (ref 0.93–1.1)
KETONES UR QL STRIP: ABNORMAL
LEUKOCYTE ESTERASE UR QL STRIP.AUTO: ABNORMAL
LYMPHOCYTES # BLD AUTO: 1.5 10*3/MM3 (ref 0.7–3.1)
LYMPHOCYTES NFR BLD AUTO: 17 % (ref 19.6–45.3)
MCH RBC QN AUTO: 30.7 PG (ref 26.6–33)
MCHC RBC AUTO-ENTMCNC: 33.4 G/DL (ref 31.5–35.7)
MCV RBC AUTO: 92 FL (ref 79–97)
MONOCYTES # BLD AUTO: 1.2 10*3/MM3 (ref 0.1–0.9)
MONOCYTES NFR BLD AUTO: 13 % (ref 5–12)
NEUTROPHILS NFR BLD AUTO: 6.3 10*3/MM3 (ref 1.7–7)
NEUTROPHILS NFR BLD AUTO: 69.3 % (ref 42.7–76)
NITRITE UR QL STRIP: NEGATIVE
NRBC BLD AUTO-RTO: 0.1 /100 WBC (ref 0–0.2)
PH UR STRIP.AUTO: 6 [PH] (ref 5–8)
PLATELET # BLD AUTO: 204 10*3/MM3 (ref 140–450)
PMV BLD AUTO: 7.6 FL (ref 6–12)
POTASSIUM SERPL-SCNC: 3.6 MMOL/L (ref 3.5–5.2)
PROT SERPL-MCNC: 6.1 G/DL (ref 6–8.5)
PROT UR QL STRIP: NEGATIVE
PROTHROMBIN TIME: 10.6 SECONDS (ref 9.6–11.7)
RBC # BLD AUTO: 4.43 10*6/MM3 (ref 3.77–5.28)
RBC # UR STRIP: ABNORMAL /HPF
REF LAB TEST METHOD: ABNORMAL
SARS-COV-2 RNA PNL SPEC NAA+PROBE: DETECTED
SODIUM SERPL-SCNC: 139 MMOL/L (ref 136–145)
SP GR UR STRIP: 1.01 (ref 1–1.03)
SQUAMOUS #/AREA URNS HPF: ABNORMAL /HPF
UROBILINOGEN UR QL STRIP: ABNORMAL
WBC # UR STRIP: ABNORMAL /HPF
WBC NRBC COR # BLD: 9.1 10*3/MM3 (ref 3.4–10.8)

## 2022-09-01 PROCEDURE — 36415 COLL VENOUS BLD VENIPUNCTURE: CPT

## 2022-09-01 PROCEDURE — 83605 ASSAY OF LACTIC ACID: CPT

## 2022-09-01 PROCEDURE — 85610 PROTHROMBIN TIME: CPT | Performed by: EMERGENCY MEDICINE

## 2022-09-01 PROCEDURE — 99283 EMERGENCY DEPT VISIT LOW MDM: CPT

## 2022-09-01 PROCEDURE — 85730 THROMBOPLASTIN TIME PARTIAL: CPT | Performed by: EMERGENCY MEDICINE

## 2022-09-01 PROCEDURE — 80053 COMPREHEN METABOLIC PANEL: CPT | Performed by: EMERGENCY MEDICINE

## 2022-09-01 PROCEDURE — 85025 COMPLETE CBC W/AUTO DIFF WBC: CPT | Performed by: EMERGENCY MEDICINE

## 2022-09-01 PROCEDURE — 93005 ELECTROCARDIOGRAM TRACING: CPT | Performed by: EMERGENCY MEDICINE

## 2022-09-01 PROCEDURE — 70450 CT HEAD/BRAIN W/O DYE: CPT

## 2022-09-01 PROCEDURE — 96374 THER/PROPH/DIAG INJ IV PUSH: CPT

## 2022-09-01 PROCEDURE — 87040 BLOOD CULTURE FOR BACTERIA: CPT | Performed by: EMERGENCY MEDICINE

## 2022-09-01 PROCEDURE — 87635 SARS-COV-2 COVID-19 AMP PRB: CPT

## 2022-09-01 PROCEDURE — 71045 X-RAY EXAM CHEST 1 VIEW: CPT

## 2022-09-01 PROCEDURE — 81001 URINALYSIS AUTO W/SCOPE: CPT | Performed by: EMERGENCY MEDICINE

## 2022-09-01 PROCEDURE — 93005 ELECTROCARDIOGRAM TRACING: CPT

## 2022-09-01 PROCEDURE — 25010000002 ONDANSETRON PER 1 MG: Performed by: EMERGENCY MEDICINE

## 2022-09-01 PROCEDURE — 87086 URINE CULTURE/COLONY COUNT: CPT | Performed by: EMERGENCY MEDICINE

## 2022-09-01 RX ORDER — ONDANSETRON 4 MG/1
4 TABLET, ORALLY DISINTEGRATING ORAL EVERY 6 HOURS PRN
Qty: 20 TABLET | Refills: 0 | Status: SHIPPED | OUTPATIENT
Start: 2022-09-01 | End: 2022-09-01 | Stop reason: SDUPTHER

## 2022-09-01 RX ORDER — ONDANSETRON 2 MG/ML
4 INJECTION INTRAMUSCULAR; INTRAVENOUS ONCE
Status: COMPLETED | OUTPATIENT
Start: 2022-09-01 | End: 2022-09-01

## 2022-09-01 RX ORDER — GUAIFENESIN 200 MG/10ML
200 LIQUID ORAL EVERY 4 HOURS PRN
Qty: 180 ML | Refills: 0 | Status: SHIPPED | OUTPATIENT
Start: 2022-09-01 | End: 2022-12-12

## 2022-09-01 RX ORDER — GUAIFENESIN 200 MG/10ML
200 LIQUID ORAL EVERY 4 HOURS PRN
Qty: 180 ML | Refills: 0 | Status: SHIPPED | OUTPATIENT
Start: 2022-09-01 | End: 2022-09-01 | Stop reason: SDUPTHER

## 2022-09-01 RX ORDER — ONDANSETRON 4 MG/1
4 TABLET, ORALLY DISINTEGRATING ORAL EVERY 6 HOURS PRN
Qty: 20 TABLET | Refills: 0 | Status: SHIPPED | OUTPATIENT
Start: 2022-09-01 | End: 2022-12-12

## 2022-09-01 RX ORDER — ACETAMINOPHEN 500 MG
1000 TABLET ORAL ONCE
Status: COMPLETED | OUTPATIENT
Start: 2022-09-01 | End: 2022-09-01

## 2022-09-01 RX ORDER — SODIUM CHLORIDE 0.9 % (FLUSH) 0.9 %
10 SYRINGE (ML) INJECTION AS NEEDED
Status: DISCONTINUED | OUTPATIENT
Start: 2022-09-01 | End: 2022-09-01 | Stop reason: HOSPADM

## 2022-09-01 RX ADMIN — ACETAMINOPHEN 1000 MG: 500 TABLET ORAL at 15:42

## 2022-09-01 RX ADMIN — SODIUM CHLORIDE 1000 ML: 9 INJECTION, SOLUTION INTRAVENOUS at 15:38

## 2022-09-01 RX ADMIN — ONDANSETRON 4 MG: 2 INJECTION INTRAMUSCULAR; INTRAVENOUS at 15:42

## 2022-09-01 NOTE — DISCHARGE INSTRUCTIONS
Stop your Atorvastatin for 5 days while you take this Paxlovid covid 19 medication.  Return to the emergency department for any worsening symptoms signs or concerns.

## 2022-09-01 NOTE — ED PROVIDER NOTES
Subjective   Chief complaint: Patient is a pleasant 72-year-old.  For the last couple days she has had congestion and cough.  She threw up multiple times yesterday.  Generalized weakness.  Her  found her yesterday at 1 point when she was supposed be getting on the computer to look some stuff just staring blankly at the screen.  He states she does have bouts of intermittent confusion.  But with all of her symptoms they did decide to bring her in for evaluation.  She has had subjective fever.  She has been fully vaccinated for COVID.  No abdominal pain or chest pain.  No shortness of breath.    Context: As above    Duration: 2 days    Timing: gradual onset    Severity: No severe pain.    Associated Symptoms: Negative except as noted above        PCP:  LMP:          Review of Systems   Constitutional: Positive for fatigue and fever.   HENT: Positive for congestion.    Eyes: Negative.    Respiratory: Positive for cough.    Gastrointestinal: Negative.    Genitourinary: Negative.    Musculoskeletal: Negative.    Skin: Negative.    Neurological: Negative.    Psychiatric/Behavioral: Positive for confusion.       Past Medical History:   Diagnosis Date   • Allergic rhinitis    • Basal cell carcinoma (BCC) of nostril    • Coronary artery disease involving native coronary artery of native heart with angina pectoris ([facility]) 06/18/2019    Status post successful PCI of the coronary stent deployment to high-grade RCA stenosis after presenting with ST segment elevated microinfarction in May 2019   • DDD (degenerative disc disease), lumbar    • Depression    • Depression    • Essential hypertension 06/18/2019   • Heart attack ([facility])     6/2/2019   • Hyperlipidemia    • Hyperlipidemia, mixed 08/13/2019   • Hypertension    • Insomnia    • Myocardial infarction less than 4 weeks ago ([facility]) 06/18/2019    Presented initially with ST segment elevated microinfarction treated emergently in [place] May 2019   • Old MI (myocardial  infarction) 2019    Presented initially with ST segment elevated microinfarction treated emergently in [place] May 2019   • Osteoarthritis    • Toenail fungus        No Known Allergies    Past Surgical History:   Procedure Laterality Date   • ADENOIDECTOMY      Adenoids removed   • APPENDECTOMY     • AUGMENTATION MAMMAPLASTY     • BREAST IMPLANT SURGERY     • CATARACT EXTRACTION Bilateral 2017   • CERVICAL DISC SURGERY      ruptured and removed    •  SECTION     • CHEST SURGERY      attempted pectus repair    • EYE SURGERY  1997    AK   • HYSTERECTOMY     • KNEE ARTHROSCOPY Right    • LUMBAR DISC SURGERY      ruptured and removed    • NASAL SEPTAL RECONSTRUCTION     • PECTUS CARNATUM REPAIR      attempted   • RECTAL SURGERY      Repair   • REFRACTIVE SURGERY      RK/AK both eyes    • REFRACTIVE SURGERY     • SOMNOPLASTY RADIAL FREQUENCY ABLATION     • TONSILLECTOMY     • UVULOPALATOPHARYNGOPLASTY         Family History   Problem Relation Age of Onset   • Alzheimer's disease Mother    • Other Mother         CVA, acute myocardial infarction   • Leukemia Father    • Other Father         Parkinson's    • Heart attack Sister    • No Known Problems Brother    • Bipolar disorder Daughter    • Heart attack Brother    • Alcohol abuse Brother    • Anxiety disorder Brother    • Depression Brother    • ADD / ADHD Daughter    • Other Daughter         alcohol and drug addiction   • HIV Daughter        Social History     Socioeconomic History   • Marital status:    • Number of children: 2   Tobacco Use   • Smoking status: Former Smoker     Packs/day: 0.50     Years: 4.00     Pack years: 2.00     Types: Cigarettes     Quit date:      Years since quittin.7   • Smokeless tobacco: Never Used   Vaping Use   • Vaping Use: Never used   Substance and Sexual Activity   • Alcohol use: No   • Drug use: No   • Sexual activity: Not Currently           Objective   Physical Exam  Vitals and  nursing note reviewed.   Constitutional:       Appearance: Normal appearance.   HENT:      Head: Normocephalic and atraumatic.   Eyes:      Extraocular Movements: Extraocular movements intact.      Pupils: Pupils are equal, round, and reactive to light.   Cardiovascular:      Rate and Rhythm: Normal rate.      Pulses: Normal pulses.      Heart sounds: Normal heart sounds.   Pulmonary:      Effort: Pulmonary effort is normal.      Breath sounds: Normal breath sounds.   Abdominal:      Tenderness: There is no abdominal tenderness.   Musculoskeletal:         General: Normal range of motion.      Cervical back: Normal range of motion and neck supple.   Skin:     General: Skin is warm and dry.   Neurological:      General: No focal deficit present.      Mental Status: She is alert and oriented to person, place, and time. Mental status is at baseline.   Psychiatric:         Mood and Affect: Mood normal.         Thought Content: Thought content normal.         Judgment: Judgment normal.         Procedures           ED Course            Results for orders placed or performed during the hospital encounter of 09/01/22   COVID-19,CEPHEID/ELIZABET,COR/YENI/PAD/ARLET IN-HOUSE(OR EMERGENT/ADD-ON),NP SWAB IN TRANSPORT MEDIA 3-4 HR TAT, RT-PCR - Swab, Nasopharynx    Specimen: Nasopharynx; Swab   Result Value Ref Range    COVID19 Detected (C) Not Detected - Ref. Range   Comprehensive Metabolic Panel    Specimen: Arm, Right; Blood   Result Value Ref Range    Glucose 112 (H) 65 - 99 mg/dL    BUN 19 8 - 23 mg/dL    Creatinine 0.84 0.57 - 1.00 mg/dL    Sodium 139 136 - 145 mmol/L    Potassium 3.6 3.5 - 5.2 mmol/L    Chloride 104 98 - 107 mmol/L    CO2 27.0 22.0 - 29.0 mmol/L    Calcium 8.2 (L) 8.6 - 10.5 mg/dL    Total Protein 6.1 6.0 - 8.5 g/dL    Albumin 3.40 (L) 3.50 - 5.20 g/dL    ALT (SGPT) 15 1 - 33 U/L    AST (SGOT) 20 1 - 32 U/L    Alkaline Phosphatase 81 39 - 117 U/L    Total Bilirubin 0.4 0.0 - 1.2 mg/dL    Globulin 2.7 gm/dL    A/G  Ratio 1.3 g/dL    BUN/Creatinine Ratio 22.6 7.0 - 25.0    Anion Gap 8.0 5.0 - 15.0 mmol/L    eGFR 73.0 >60.0 mL/min/1.73   Protime-INR    Specimen: Blood   Result Value Ref Range    Protime 10.6 9.6 - 11.7 Seconds    INR 1.03 0.93 - 1.10   aPTT    Specimen: Blood   Result Value Ref Range    PTT 24.9 (L) 61.0 - 76.5 seconds   Urinalysis With Culture If Indicated - Urine, Clean Catch    Specimen: Urine, Clean Catch   Result Value Ref Range    Color, UA Yellow Yellow, Straw    Appearance, UA Clear Clear    pH, UA 6.0 5.0 - 8.0    Specific Gravity, UA 1.015 1.005 - 1.030    Glucose, UA Negative Negative    Ketones, UA Trace (A) Negative    Bilirubin, UA Negative Negative    Blood, UA Negative Negative    Protein, UA Negative Negative    Leuk Esterase, UA Small (1+) (A) Negative    Nitrite, UA Negative Negative    Urobilinogen, UA 1.0 E.U./dL 0.2 - 1.0 E.U./dL   CBC Auto Differential    Specimen: Blood   Result Value Ref Range    WBC 9.10 3.40 - 10.80 10*3/mm3    RBC 4.43 3.77 - 5.28 10*6/mm3    Hemoglobin 13.6 12.0 - 15.9 g/dL    Hematocrit 40.7 34.0 - 46.6 %    MCV 92.0 79.0 - 97.0 fL    MCH 30.7 26.6 - 33.0 pg    MCHC 33.4 31.5 - 35.7 g/dL    RDW 13.1 12.3 - 15.4 %    RDW-SD 42.0 37.0 - 54.0 fl    MPV 7.6 6.0 - 12.0 fL    Platelets 204 140 - 450 10*3/mm3    Neutrophil % 69.3 42.7 - 76.0 %    Lymphocyte % 17.0 (L) 19.6 - 45.3 %    Monocyte % 13.0 (H) 5.0 - 12.0 %    Eosinophil % 0.1 (L) 0.3 - 6.2 %    Basophil % 0.6 0.0 - 1.5 %    Neutrophils, Absolute 6.30 1.70 - 7.00 10*3/mm3    Lymphocytes, Absolute 1.50 0.70 - 3.10 10*3/mm3    Monocytes, Absolute 1.20 (H) 0.10 - 0.90 10*3/mm3    Eosinophils, Absolute 0.00 0.00 - 0.40 10*3/mm3    Basophils, Absolute 0.10 0.00 - 0.20 10*3/mm3    nRBC 0.1 0.0 - 0.2 /100 WBC   Urinalysis, Microscopic Only - Urine, Clean Catch    Specimen: Urine, Clean Catch   Result Value Ref Range    RBC, UA 0-2 (A) None Seen /HPF    WBC, UA 6-12 (A) None Seen /HPF    Bacteria, UA None Seen None Seen  /HPF    Squamous Epithelial Cells, UA 7-12 (A) None Seen, 0-2 /HPF    Hyaline Casts, UA 0-2 None Seen /LPF    Methodology Automated Microscopy    POC Lactate    Specimen: Blood   Result Value Ref Range    Lactate 0.9 0.5 - 2.0 mmol/L   ECG 12 Lead   Result Value Ref Range    QT Interval 401 ms   Gold Top - SST   Result Value Ref Range    Extra Tube Hold for add-ons.             CT Head Without Contrast    Result Date: 9/1/2022   1. No acute intracranial findings. 2. Mild atrophy and moderate chronic microvascular disease changes, similar to the prior study examination.  Electronically Signed By-Kyara Pandya MD On:9/1/2022 3:26 PM This report was finalized on [id number] by  Kyara Pandya MD.    XR Chest 1 View    Result Date: 9/1/2022  No acute process.  Electronically Signed By-Jaylon Martinez MD On:9/1/2022 4:06 PM This report was finalized on [id number] by  Jaylon Martinez MD.    EKG sinus rhythm rate 74 PAC                           MDM  Number of Diagnoses or Management Options  Diagnosis management comments: Patient feels some improvement she still congested after IV fluids and nausea medicine.  Her vitals have remained stable here.  I did discuss her labs and discharging to follow-up outpatient.  Patient and  feel comfortable with this.  The episode that she had where she was staring off at the blank screen is something that she has been doing for a long time.  She is easily redirected.  She is not unconscious or having seizures.  I think there may be some underlying dementia that she is in the process of being worked up for.  Other than that at this point I discussedPaxlovid with pharmacy.  I discussed stopping her statin with the patient and she is a candidate for the pa's weightxlovid.  Patient verbalizes understanding and she would like to take the medication.       Amount and/or Complexity of Data Reviewed  Clinical lab tests: reviewed  Tests in the radiology section of CPT®: reviewed  Tests  in the medicine section of CPT®: reviewed  Independent visualization of images, tracings, or specimens: yes    Patient Progress  Patient progress: stable      Final diagnoses:   None   COVID-19    ED Disposition  ED Disposition     None          No follow-up provider specified.       Medication List      No changes were made to your prescriptions during this visit.          Bassam Arteaga,   09/01/22 1749       Bassam Arteaga,   09/01/22 1753       Bassam Arteaga,   09/01/22 1817

## 2022-09-02 LAB — QT INTERVAL: 401 MS

## 2022-09-03 LAB — BACTERIA SPEC AEROBE CULT: NORMAL

## 2022-09-06 ENCOUNTER — TELEPHONE (OUTPATIENT)
Dept: FAMILY MEDICINE CLINIC | Facility: CLINIC | Age: 72
End: 2022-09-06

## 2022-09-06 LAB
BACTERIA SPEC AEROBE CULT: NORMAL
BACTERIA SPEC AEROBE CULT: NORMAL

## 2022-09-06 NOTE — TELEPHONE ENCOUNTER
Patient has been fever free for over 24 hours. Told her just to make sure she wears her mask and if anything changes to let us know

## 2022-09-06 NOTE — TELEPHONE ENCOUNTER
UNABLE TO WARM TRANSFER     Caller: Re Blackmon    Relationship to patient: Self    Best call back number: [phone]     Patient is needing:     RE  IS SCHEDULED FOR HOSPITAL FOLLOW UP ON 9/7/2022, DIAGNOSIS COVID POSITIVE 9/1/2022, SHE IS WANTING TO KNOW IF SHE IS NEEDING ANOTHER COVID TEST PRIOR TO TOMORROW VISIT .       PLEASE ADVISE

## 2022-09-07 ENCOUNTER — OFFICE VISIT (OUTPATIENT)
Dept: FAMILY MEDICINE CLINIC | Facility: CLINIC | Age: 72
End: 2022-09-07

## 2022-09-07 VITALS
TEMPERATURE: 97.5 F | BODY MASS INDEX: 22.5 KG/M2 | OXYGEN SATURATION: 96 % | DIASTOLIC BLOOD PRESSURE: 66 MMHG | HEART RATE: 73 BPM | SYSTOLIC BLOOD PRESSURE: 109 MMHG | RESPIRATION RATE: 15 BRPM | HEIGHT: 66 IN | WEIGHT: 140 LBS

## 2022-09-07 DIAGNOSIS — U07.1 COVID-19: Primary | ICD-10-CM

## 2022-09-07 PROCEDURE — 99213 OFFICE O/P EST LOW 20 MIN: CPT | Performed by: NURSE PRACTITIONER

## 2022-09-07 NOTE — PROGRESS NOTES
"Chief Complaint  Hospital Follow Up Visit (COVID +)  Subjective        Jennifer Blackmon presents to [facility]  Pt comes in today for follow up from ER. Went to ER on 9/1. Tested positive for covid. Was having N/V, shaking, didn't feel well. Was prescribed paxlovid and now feeling better other than weakness and fatigued. Slowly improving.   No specific concerns today        Objective     Vital Signs:   /66   Pulse 73   Temp 97.5 °F (36.4 °C)   Resp 15   Ht 167.6 cm (66\")   Wt 63.5 kg (140 lb)   SpO2 96%   BMI 22.60 kg/m²       BP Readings from Last 3 Encounters:   09/07/22 109/66   09/01/22 126/78   08/10/22 118/60       Wt Readings from Last 3 Encounters:   09/07/22 63.5 kg (140 lb)   09/01/22 65.8 kg (145 lb)   08/10/22 63.5 kg (140 lb)     Physical Exam  Constitutional:       Appearance: She is well-developed.   Eyes:      Pupils: Pupils are equal, round, and reactive to light.   Cardiovascular:      Rate and Rhythm: Normal rate and regular rhythm.   Pulmonary:      Effort: Pulmonary effort is normal.      Breath sounds: Normal breath sounds.   Neurological:      Mental Status: She is alert and oriented to person, place, and time.        Result Review :                 Assessment and Plan    Diagnoses and all orders for this visit:    1. COVID-19 (Primary)    improving  During this office visit, we discussed the pertinent aspects of the visit and treatment recommendations. Pt verbalizes understanding. Follow up was discussed. Patient was given the opportunity to ask questions and discuss other concerns.         Follow Up   Return if symptoms worsen or fail to improve.  Patient was given instructions and counseling regarding her condition or for health maintenance advice. Please see specific information pulled into the AVS if appropriate.       "

## 2022-10-26 ENCOUNTER — FLU SHOT (OUTPATIENT)
Dept: FAMILY MEDICINE CLINIC | Facility: CLINIC | Age: 72
End: 2022-10-26

## 2022-10-26 DIAGNOSIS — Z23 NEED FOR INFLUENZA VACCINATION: Primary | ICD-10-CM

## 2022-10-26 PROCEDURE — G0008 ADMIN INFLUENZA VIRUS VAC: HCPCS | Performed by: INTERNAL MEDICINE

## 2022-10-26 PROCEDURE — 90662 IIV NO PRSV INCREASED AG IM: CPT | Performed by: INTERNAL MEDICINE

## 2022-12-12 ENCOUNTER — LAB (OUTPATIENT)
Dept: FAMILY MEDICINE CLINIC | Facility: CLINIC | Age: 72
End: 2022-12-12

## 2022-12-12 ENCOUNTER — OFFICE VISIT (OUTPATIENT)
Dept: FAMILY MEDICINE CLINIC | Facility: CLINIC | Age: 72
End: 2022-12-12

## 2022-12-12 VITALS
DIASTOLIC BLOOD PRESSURE: 74 MMHG | RESPIRATION RATE: 15 BRPM | SYSTOLIC BLOOD PRESSURE: 119 MMHG | HEIGHT: 66 IN | HEART RATE: 74 BPM | WEIGHT: 134 LBS | TEMPERATURE: 98.2 F | OXYGEN SATURATION: 96 % | BODY MASS INDEX: 21.53 KG/M2

## 2022-12-12 DIAGNOSIS — E55.9 VITAMIN D DEFICIENCY: ICD-10-CM

## 2022-12-12 DIAGNOSIS — Z78.0 POSTMENOPAUSE: ICD-10-CM

## 2022-12-12 DIAGNOSIS — Z12.31 ENCOUNTER FOR SCREENING MAMMOGRAM FOR MALIGNANT NEOPLASM OF BREAST: ICD-10-CM

## 2022-12-12 DIAGNOSIS — Z00.00 MEDICARE ANNUAL WELLNESS VISIT, SUBSEQUENT: Primary | ICD-10-CM

## 2022-12-12 DIAGNOSIS — F32.A DEPRESSIVE DISORDER: ICD-10-CM

## 2022-12-12 DIAGNOSIS — Z00.00 MEDICARE ANNUAL WELLNESS VISIT, SUBSEQUENT: ICD-10-CM

## 2022-12-12 PROCEDURE — 1160F RVW MEDS BY RX/DR IN RCRD: CPT | Performed by: NURSE PRACTITIONER

## 2022-12-12 PROCEDURE — 82306 VITAMIN D 25 HYDROXY: CPT | Performed by: NURSE PRACTITIONER

## 2022-12-12 PROCEDURE — 85025 COMPLETE CBC W/AUTO DIFF WBC: CPT | Performed by: NURSE PRACTITIONER

## 2022-12-12 PROCEDURE — 84443 ASSAY THYROID STIM HORMONE: CPT | Performed by: NURSE PRACTITIONER

## 2022-12-12 PROCEDURE — 1170F FXNL STATUS ASSESSED: CPT | Performed by: NURSE PRACTITIONER

## 2022-12-12 PROCEDURE — G0439 PPPS, SUBSEQ VISIT: HCPCS | Performed by: NURSE PRACTITIONER

## 2022-12-12 PROCEDURE — 80061 LIPID PANEL: CPT | Performed by: NURSE PRACTITIONER

## 2022-12-12 PROCEDURE — 81001 URINALYSIS AUTO W/SCOPE: CPT | Performed by: NURSE PRACTITIONER

## 2022-12-12 PROCEDURE — 99213 OFFICE O/P EST LOW 20 MIN: CPT | Performed by: NURSE PRACTITIONER

## 2022-12-12 PROCEDURE — 80053 COMPREHEN METABOLIC PANEL: CPT | Performed by: NURSE PRACTITIONER

## 2022-12-12 PROCEDURE — 36415 COLL VENOUS BLD VENIPUNCTURE: CPT

## 2022-12-12 PROCEDURE — 96160 PT-FOCUSED HLTH RISK ASSMT: CPT | Performed by: NURSE PRACTITIONER

## 2022-12-12 NOTE — PROGRESS NOTES
The ABCs of the Annual Wellness Visit  Subsequent Medicare Wellness Visit    Subjective    Jennifer Blackmon is a 72 y.o. female who presents for a Subsequent Medicare Wellness Visit.    The following portions of the patient's history were reviewed and   updated as appropriate: allergies, current medications, past family history, past medical history, past social history, past surgical history and problem list.    Compared to one year ago, the patient feels her physical   health is the same.    Compared to one year ago, the patient feels her mental   health is the same.    Recent Hospitalizations:  This patient has had a [facility] admission record on file within the last 365 days.    Current Medical Providers:  Patient Care Team:  Nathalia Maria APRN as PCP - General (Nurse Practitioner)  Gaurav Saravia MD as Consulting Physician (Cardiology)  Banet, Duane Edward, MD as Consulting Physician (Dermatology)  José Miguel Khan MD as Consulting Physician (Nephrology)    Outpatient Medications Prior to Visit   Medication Sig Dispense Refill   • atorvastatin (LIPITOR) 40 MG tablet Take 1 tablet by mouth Daily. (Patient taking differently: Take 20 mg by mouth Daily.) 90 tablet 3   • buPROPion XL (WELLBUTRIN XL) 150 MG 24 hr tablet Take 3 tablets by mouth Every Morning. 270 tablet 3   • Calcium Citrate-Vitamin D 500-400 MG-UNIT chewable tablet Chew Daily.     • carvedilol (COREG) 3.125 MG tablet Take 1 tablet by mouth 2 (Two) Times a Day. 180 tablet 3   • clopidogrel (PLAVIX) 75 MG tablet TAKE 1 TABLET DAILY 90 tablet 1   • coenzyme Q10 100 MG capsule Take 100 mg by mouth.     • escitalopram (LEXAPRO) 20 MG tablet TAKE ONE-HALF (1/2) TABLET TWICE A DAY 90 tablet 3   • Melatonin 10 MG sublingual tablet Place  under the tongue.     • Multiple Vitamins-Minerals (Multi-Vitamin Gummies) chewable tablet Chew.     • donepezil (Aricept) 10 MG tablet Take 1 tablet by mouth Every Night. 90 tablet 1   •  "guaifenesin (ROBITUSSIN) 100 MG/5ML liquid Take 10 mL by mouth Every 4 (Four) Hours As Needed for Cough. 180 mL 0   • ondansetron ODT (ZOFRAN-ODT) 4 MG disintegrating tablet Place 1 tablet on the tongue Every 6 (Six) Hours As Needed for Nausea. 20 tablet 0     No facility-administered medications prior to visit.       No opioid medication identified on active medication list. I have reviewed chart for other potential  high risk medication/s and harmful drug interactions in the elderly.          Aspirin is not on active medication list.  Aspirin use is not indicated based on review of current medical condition/s. Risk of harm outweighs potential benefits.  .    Patient Active Problem List   Diagnosis   • Old MI (myocardial infarction)   • Coronary artery disease involving native coronary artery of native heart with angina pectoris (HCC)   • Essential hypertension   • Hyperlipidemia, mixed   • Chest pain, atypical   • Degeneration of lumbar intervertebral disc   • Low back pain   • Lumbar spondylosis   • Arthritis   • Cellulitis of face   • Depressive disorder   • Near syncope   • Stage 3a chronic kidney disease (HCC)     Advance Care Planning  Advance Directive is not on file.  ACP discussion was declined by the patient. Patient does not have an advance directive, declines further assistance.     Objective    Vitals:    12/12/22 1336   BP: 119/74   Pulse: 74   Resp: 15   Temp: 98.2 °F (36.8 °C)   SpO2: 96%   Weight: 60.8 kg (134 lb)   Height: 167.6 cm (66\")     Estimated body mass index is 21.63 kg/m² as calculated from the following:    Height as of this encounter: 167.6 cm (66\").    Weight as of this encounter: 60.8 kg (134 lb).    BMI is within normal parameters. No other follow-up for BMI required.      Does the patient have evidence of cognitive impairment? No          HEALTH RISK ASSESSMENT    Smoking Status:  Social History     Tobacco Use   Smoking Status Former   • Packs/day: 0.50   • Years: 4.00   • Pack " years: 2.00   • Types: Cigarettes   • Quit date:    • Years since quittin.9   Smokeless Tobacco Never     Alcohol Consumption:  Social History     Substance and Sexual Activity   Alcohol Use No     Fall Risk Screen:    BESSIE Fall Risk Assessment was completed, and patient is at HIGH risk for falls. Assessment completed on:2022    Depression Screening:  PHQ-2/PHQ-9 Depression Screening 2022   Retired PHQ-9 Total Score -   Retired Total Score -   Little Interest or Pleasure in Doing Things 2-->more than half the days   Feeling Down, Depressed or Hopeless 2-->more than half the days   Trouble Falling or Staying Asleep, or Sleeping Too Much 2-->more than half the days   Feeling Tired or Having Little Energy 2-->more than half the days   Poor Appetite or Overeating 2-->more than half the days   Feeling Bad about Yourself - or that You are a Failure or Have Let Yourself or Your Family Down 2-->more than half the days   Trouble Concentrating on Things, Such as Reading the Newspaper or Watching Television 0-->not at all   Moving or Speaking So Slowly that Other People Could Have Noticed? Or the Opposite - Being So Fidgety 0-->not at all   Thoughts that You Would be Better Off Dead or of Hurting Yourself in Some Way 0-->not at all   PHQ-9: Brief Depression Severity Measure Score 12   If You Checked Off Any Problems, How Difficult Have These Problems Made It For You to Do Your Work, Take Care of Things at Home, or Get Along with Other People? very difficult       Health Habits and Functional and Cognitive Screening:  Functional & Cognitive Status 2022   Do you have difficulty preparing food and eating? Yes   Do you have difficulty bathing yourself, getting dressed or grooming yourself? No   Do you have difficulty using the toilet? No   Do you have difficulty moving around from place to place? No   Do you have trouble with steps or getting out of a bed or a chair? No   Current Diet Other         Current Diet Comment -   Dental Exam Up to date   Eye Exam Up to date   Exercise (times per week) 0 times per week        Exercise Frequency Comment -   Current Exercises Include No Regular Exercise   Current Exercise Activities Include -   Do you need help using the phone?  No   Are you deaf or do you have serious difficulty hearing?  Yes   Do you need help with transportation? Yes   Do you need help shopping? Yes   Do you need help preparing meals?  Yes   Do you need help with housework?  Yes   Do you need help with laundry? No   Do you need help taking your medications? No   Do you need help managing money? No   Do you ever drive or ride in a car without wearing a seat belt? No   Have you felt unusual stress, anger or loneliness in the last month? Yes   Who do you live with? Spouse   If you need help, do you have trouble finding someone available to you? No   Have you been bothered in the last four weeks by sexual problems? No   Do you have difficulty concentrating, remembering or making decisions? Yes       Age-appropriate Screening Schedule:  Refer to the list below for future screening recommendations based on patient's age, sex and/or medical conditions. Orders for these recommended tests are listed in the plan section. The patient has been provided with a written plan.    Health Maintenance   Topic Date Due   • DXA SCAN  07/13/2022   • LIPID PANEL  12/07/2022   • ZOSTER VACCINE (1 of 2) 12/12/2022 (Originally 8/9/1998)   • TDAP/TD VACCINES (1 - Tdap) 12/12/2023 (Originally 8/9/1969)   • MAMMOGRAM  12/23/2022   • INFLUENZA VACCINE  Completed                CMS Preventative Services Quick Reference  Risk Factors Identified During Encounter  Depression/Dysphoria: Referral to Mental Health specialist  Fall Risk-High or Moderate: Discussed Fall Prevention in the home  The above risks/problems have been discussed with the patient.  Pertinent information has been shared with the patient in the After Visit  "Summary.  An After Visit Summary and PPPS were made available to the patient.    Follow Up:   Next Medicare Wellness visit to be scheduled in 1 year.       Additional E&M Note during same encounter follows:  Patient has multiple medical problems which are significant and separately identifiable that require additional work above and beyond the Medicare Wellness Visit.      Chief Complaint  Medicare Wellness-subsequent    Subjective        Pt comes in today for routine MWV and f/u from neuropsych appt. Went about 2 weeks ago for eval. They had mentioned making requesting medication change. Has been struggling with depression and lack of motivation. Still with memory issues. Stopped the aricept 2/2 side effects (bad dreams).   awaiting report from neuropsych.     Jennifer Blackmon is also being seen today for (see above)         Objective   Vital Signs:  /74   Pulse 74   Temp 98.2 °F (36.8 °C)   Resp 15   Ht 167.6 cm (66\")   Wt 60.8 kg (134 lb)   SpO2 96%   BMI 21.63 kg/m²     Physical Exam  Constitutional:       Appearance: She is well-developed.   HENT:      Head: Normocephalic.   Eyes:      Conjunctiva/sclera: Conjunctivae normal.      Pupils: Pupils are equal, round, and reactive to light.   Neck:      Thyroid: No thyromegaly.   Cardiovascular:      Rate and Rhythm: Normal rate and regular rhythm.      Heart sounds: No murmur heard.  Pulmonary:      Effort: Pulmonary effort is normal.      Breath sounds: Normal breath sounds.   Abdominal:      General: Bowel sounds are normal.      Palpations: Abdomen is soft. There is no mass.      Tenderness: There is no abdominal tenderness.   Musculoskeletal:      Cervical back: Neck supple.   Skin:     General: Skin is warm and dry.      Findings: No lesion.   Neurological:      Mental Status: She is alert. She is disoriented.   Psychiatric:         Behavior: Behavior normal.                         Assessment and Plan   Diagnoses and all orders for this " visit:    1. Medicare annual wellness visit, subsequent (Primary)  -     CBC & Differential; Future  -     Comprehensive Metabolic Panel; Future  -     Lipid Panel; Future  -     TSH; Future  -     Vitamin D,25-Hydroxy; Future  -     Urinalysis With Culture If Indicated - Urine, Clean Catch; Future    2. Depressive disorder  -     Ambulatory Referral to Psychiatry    3. Postmenopause  -     DEXA Bone Density Axial; Future    4. Encounter for screening mammogram for malignant neoplasm of breast  -     Mammo Screening Digital Tomosynthesis Bilateral With CAD; Future    5. Vitamin D deficiency  -     Vitamin D,25-Hydroxy; Future    check labs  Referral to psych  dexa  mammo  During this visit for their annual exam, we reviewed their personal history, social history and family history. We went over their medications and all the recommended health maintenance items for their age group. They were given the opportunity to ask questions and discuss other concerns.            Follow Up   Return in about 6 months (around 6/12/2023).  Patient was given instructions and counseling regarding her condition or for health maintenance advice. Please see specific information pulled into the AVS if appropriate.

## 2022-12-13 LAB
25(OH)D3 SERPL-MCNC: 53.2 NG/ML (ref 30–100)
ALBUMIN SERPL-MCNC: 4.1 G/DL (ref 3.5–5.2)
ALBUMIN/GLOB SERPL: 1.3 G/DL
ALP SERPL-CCNC: 82 U/L (ref 39–117)
ALT SERPL W P-5'-P-CCNC: 15 U/L (ref 1–33)
ANION GAP SERPL CALCULATED.3IONS-SCNC: 8.4 MMOL/L (ref 5–15)
AST SERPL-CCNC: 23 U/L (ref 1–32)
BACTERIA UR QL AUTO: ABNORMAL /HPF
BASOPHILS # BLD AUTO: 0.02 10*3/MM3 (ref 0–0.2)
BASOPHILS NFR BLD AUTO: 0.3 % (ref 0–1.5)
BILIRUB SERPL-MCNC: 0.5 MG/DL (ref 0–1.2)
BILIRUB UR QL STRIP: NEGATIVE
BUN SERPL-MCNC: 26 MG/DL (ref 8–23)
BUN/CREAT SERPL: 21.7 (ref 7–25)
CALCIUM SPEC-SCNC: 9.9 MG/DL (ref 8.6–10.5)
CHLORIDE SERPL-SCNC: 100 MMOL/L (ref 98–107)
CHOLEST SERPL-MCNC: 158 MG/DL (ref 0–200)
CLARITY UR: CLEAR
CO2 SERPL-SCNC: 29.6 MMOL/L (ref 22–29)
COLOR UR: YELLOW
CREAT SERPL-MCNC: 1.2 MG/DL (ref 0.57–1)
DEPRECATED RDW RBC AUTO: 39.7 FL (ref 37–54)
EGFRCR SERPLBLD CKD-EPI 2021: 47.6 ML/MIN/1.73
EOSINOPHIL # BLD AUTO: 0.1 10*3/MM3 (ref 0–0.4)
EOSINOPHIL NFR BLD AUTO: 1.3 % (ref 0.3–6.2)
ERYTHROCYTE [DISTWIDTH] IN BLOOD BY AUTOMATED COUNT: 11.7 % (ref 12.3–15.4)
GLOBULIN UR ELPH-MCNC: 3.1 GM/DL
GLUCOSE SERPL-MCNC: 82 MG/DL (ref 65–99)
GLUCOSE UR STRIP-MCNC: NEGATIVE MG/DL
HCT VFR BLD AUTO: 41 % (ref 34–46.6)
HDLC SERPL-MCNC: 58 MG/DL (ref 40–60)
HGB BLD-MCNC: 13.2 G/DL (ref 12–15.9)
HGB UR QL STRIP.AUTO: NEGATIVE
HYALINE CASTS UR QL AUTO: ABNORMAL /LPF
IMM GRANULOCYTES # BLD AUTO: 0.01 10*3/MM3 (ref 0–0.05)
IMM GRANULOCYTES NFR BLD AUTO: 0.1 % (ref 0–0.5)
KETONES UR QL STRIP: NEGATIVE
LDLC SERPL CALC-MCNC: 87 MG/DL (ref 0–100)
LDLC/HDLC SERPL: 1.5 {RATIO}
LEUKOCYTE ESTERASE UR QL STRIP.AUTO: ABNORMAL
LYMPHOCYTES # BLD AUTO: 2.13 10*3/MM3 (ref 0.7–3.1)
LYMPHOCYTES NFR BLD AUTO: 27.3 % (ref 19.6–45.3)
MCH RBC QN AUTO: 30 PG (ref 26.6–33)
MCHC RBC AUTO-ENTMCNC: 32.2 G/DL (ref 31.5–35.7)
MCV RBC AUTO: 93.2 FL (ref 79–97)
MONOCYTES # BLD AUTO: 0.79 10*3/MM3 (ref 0.1–0.9)
MONOCYTES NFR BLD AUTO: 10.1 % (ref 5–12)
NEUTROPHILS NFR BLD AUTO: 4.75 10*3/MM3 (ref 1.7–7)
NEUTROPHILS NFR BLD AUTO: 60.9 % (ref 42.7–76)
NITRITE UR QL STRIP: NEGATIVE
NRBC BLD AUTO-RTO: 0 /100 WBC (ref 0–0.2)
PH UR STRIP.AUTO: 6.5 [PH] (ref 5–8)
PLATELET # BLD AUTO: 250 10*3/MM3 (ref 140–450)
PMV BLD AUTO: 10.2 FL (ref 6–12)
POTASSIUM SERPL-SCNC: 4.7 MMOL/L (ref 3.5–5.2)
PROT SERPL-MCNC: 7.2 G/DL (ref 6–8.5)
PROT UR QL STRIP: NEGATIVE
RBC # BLD AUTO: 4.4 10*6/MM3 (ref 3.77–5.28)
RBC # UR STRIP: ABNORMAL /HPF
REF LAB TEST METHOD: ABNORMAL
SODIUM SERPL-SCNC: 138 MMOL/L (ref 136–145)
SP GR UR STRIP: 1.02 (ref 1–1.03)
SQUAMOUS #/AREA URNS HPF: ABNORMAL /HPF
TRIGL SERPL-MCNC: 65 MG/DL (ref 0–150)
TSH SERPL DL<=0.05 MIU/L-ACNC: 1.32 UIU/ML (ref 0.27–4.2)
UROBILINOGEN UR QL STRIP: ABNORMAL
VLDLC SERPL-MCNC: 13 MG/DL (ref 5–40)
WBC # UR STRIP: ABNORMAL /HPF
WBC NRBC COR # BLD: 7.8 10*3/MM3 (ref 3.4–10.8)

## 2022-12-19 NOTE — PROGRESS NOTES
"Chief Complaint  Memory Loss and Gait Problem    Subjective            Jennifer Blackmon presents to [facility] for memory  History of Present Illness   New patient referred by Nathalia TEJEDA for memory    She has been referred to [facility] 8-2022      Pt states she forgets things quickly. Patient noticed it getting worse past two years.  Mother had alzheimers at age of 70 passed away at age 75 and that concerns her.  She was on aricept in the past she was on it for less then a month, she discontinued it due to the side effects.    Patient states she is having balance issues, when she gets out of a low car she feels unsteady.     Patient states she needs to be on a flat ground.     She has had couple of falls two major ones in the past year.     She uses a cane sometimes.    No neuropathy   She does have scoliosis.  She has a caved in chest, she has had this since birth.    She has no sense of smell and chronic constipation but no history of rem behavior disorder.       CT HEAD WO CONTRAST-   Date of Exam: 9/1/2022 3:16 PM   Indication: ams.   Comparison: CT head without contrast 5/4/2022.  IMPRESSION:   1. No acute intracranial findings.  2. Mild atrophy and moderate chronic microvascular disease changes,  similar to the prior study examination.   Electronically Signed By-Kyara Pandya MD On:9/1/2022 3:26 PM      Maximum   Score Patient's   Score Questions   5 5 \"What is the year?Season?Date?Day of the week?Month?\"   5 5 \"Where are we now: State?County?Town/city?Hospital?Floor?\"   3 3 3 Unrelated objects Number of trials:___   5 5 Count backward from 100 by sevens or spell WORLD backwards   3 1 Name 3 things from above   2 2 Identify 2 objects   1 1 Repeat the phrase: No ifs, ands,or buts.   3 3 Take paper in right hand, fold it in half, and put it on the floor.   1 1 Please read this and do what it says. \"Close your eyes\"   1 1 Make up and write a sentence about " anything. Noun and verb   1 1 Copy this picture 10 angles must be present.   30 28 Total MMSE       Family History   Problem Relation Age of Onset   • Alzheimer's disease Mother    • Other Mother         CVA, acute myocardial infarction   • Leukemia Father    • Other Father         Parkinson's    • Heart attack Sister    • No Known Problems Brother    • Bipolar disorder Daughter    • Heart attack Brother    • Alcohol abuse Brother    • Anxiety disorder Brother    • Depression Brother    • ADD / ADHD Daughter    • Other Daughter         alcohol and drug addiction   • HIV Daughter        Past Medical History:   Diagnosis Date   • Allergic rhinitis    • Basal cell carcinoma (BCC) of nostril    • Coronary artery disease involving native coronary artery of native heart with angina pectoris ([facility]) 2019    Status post successful PCI of the coronary stent deployment to high-grade RCA stenosis after presenting with ST segment elevated microinfarction in May 2019   • DDD (degenerative disc disease), lumbar    • Depression    • Depression    • Essential hypertension 2019   • Heart attack ([facility])     2019   • Hyperlipidemia    • Hyperlipidemia, mixed 2019   • Hypertension    • Insomnia    • Myocardial infarction less than 4 weeks ago ([facility]) 2019    Presented initially with ST segment elevated microinfarction treated emergently in [place] May 2019   • Old MI (myocardial infarction) 2019    Presented initially with ST segment elevated microinfarction treated emergently in [place] May 2019   • Osteoarthritis    • Toenail fungus        Social History     Socioeconomic History   • Marital status:    • Number of children: 2   Tobacco Use   • Smoking status: Former     Packs/day: 0.50     Years: 4.00     Pack years: 2.00     Types: Cigarettes     Quit date: 1972     Years since quittin.0   • Smokeless tobacco: Never   Vaping Use   • Vaping Use: Never used   Substance and Sexual  "Activity   • Alcohol use: No   • Drug use: No   • Sexual activity: Not Currently         Current Outpatient Medications:   •  atorvastatin (LIPITOR) 40 MG tablet, Take 1 tablet by mouth Daily. (Patient taking differently: Take 20 mg by mouth Daily.), Disp: 90 tablet, Rfl: 3  •  buPROPion XL (WELLBUTRIN XL) 150 MG 24 hr tablet, Take 3 tablets by mouth Every Morning., Disp: 270 tablet, Rfl: 3  •  Calcium Citrate-Vitamin D 500-400 MG-UNIT chewable tablet, Chew Daily., Disp: , Rfl:   •  carvedilol (COREG) 3.125 MG tablet, Take 1 tablet by mouth 2 (Two) Times a Day., Disp: 180 tablet, Rfl: 3  •  clopidogrel (PLAVIX) 75 MG tablet, TAKE 1 TABLET DAILY, Disp: 90 tablet, Rfl: 1  •  coenzyme Q10 100 MG capsule, Take 100 mg by mouth., Disp: , Rfl:   •  escitalopram (LEXAPRO) 20 MG tablet, TAKE ONE-HALF (1/2) TABLET TWICE A DAY, Disp: 90 tablet, Rfl: 3  •  Melatonin 10 MG sublingual tablet, Place  under the tongue., Disp: , Rfl:   •  Multiple Vitamins-Minerals (Multi-Vitamin Gummies) chewable tablet, Chew., Disp: , Rfl:     Review of Systems   Constitutional: Positive for appetite change and fatigue.   HENT: Negative.    Eyes: Negative.    Respiratory: Negative.    Cardiovascular: Negative.    Gastrointestinal: Negative.    Endocrine: Negative.    Genitourinary: Negative.    Musculoskeletal: Positive for back pain, neck pain and neck stiffness.   Hematological: Bruises/bleeds easily.   Psychiatric/Behavioral: Positive for decreased concentration.            Objective   Vital Signs:   /64 (BP Location: Left arm, Patient Position: Sitting, Cuff Size: Adult)   Pulse 71   Temp 98.2 °F (36.8 °C)   Ht 167.6 cm (66\")   Wt 59.9 kg (132 lb)   BMI 21.31 kg/m²     Physical Exam   Result Review :                Neurologic Exam           Assessment and Plan    Diagnoses and all orders for this visit:    1. Gait abnormality (Primary)  -     MRI Brain With & Without Contrast; Future    2. Depressive disorder  -     Vitamin B12; " Future  -     Folate; Future  -     Vitamin E; Future        Follow Up   Return in about 1 year (around 12/20/2023).  Patient was given instructions and counseling regarding her condition or for health maintenance advice. Please see specific information pulled into the AVS if appropriate.         This document has been electronically signed by Joseph Seipel, MD on December 20, 2022 15:59 EST

## 2022-12-20 ENCOUNTER — OFFICE VISIT (OUTPATIENT)
Dept: NEUROLOGY | Facility: CLINIC | Age: 72
End: 2022-12-20

## 2022-12-20 VITALS
SYSTOLIC BLOOD PRESSURE: 108 MMHG | BODY MASS INDEX: 21.21 KG/M2 | HEART RATE: 71 BPM | TEMPERATURE: 98.2 F | DIASTOLIC BLOOD PRESSURE: 64 MMHG | WEIGHT: 132 LBS | HEIGHT: 66 IN

## 2022-12-20 DIAGNOSIS — F32.A DEPRESSIVE DISORDER: ICD-10-CM

## 2022-12-20 DIAGNOSIS — R26.9 GAIT ABNORMALITY: Primary | ICD-10-CM

## 2022-12-20 PROCEDURE — 99204 OFFICE O/P NEW MOD 45 MIN: CPT | Performed by: PSYCHIATRY & NEUROLOGY

## 2022-12-29 ENCOUNTER — PATIENT ROUNDING (BHMG ONLY) (OUTPATIENT)
Dept: NEUROLOGY | Facility: CLINIC | Age: 72
End: 2022-12-29

## 2023-01-11 ENCOUNTER — HOSPITAL ENCOUNTER (OUTPATIENT)
Dept: BONE DENSITY | Facility: HOSPITAL | Age: 73
Discharge: HOME | End: 2023-01-11
Payer: MEDICARE

## 2023-01-11 ENCOUNTER — HOSPITAL ENCOUNTER (OUTPATIENT)
Dept: MAMMOGRAPHY | Facility: HOSPITAL | Age: 73
Discharge: HOME | End: 2023-01-11
Payer: MEDICARE

## 2023-01-11 DIAGNOSIS — Z12.31 ENCOUNTER FOR SCREENING MAMMOGRAM FOR MALIGNANT NEOPLASM OF BREAST: ICD-10-CM

## 2023-01-11 DIAGNOSIS — Z78.0 POSTMENOPAUSE: ICD-10-CM

## 2023-01-11 PROCEDURE — 77080 DXA BONE DENSITY AXIAL: CPT

## 2023-01-11 PROCEDURE — 77067 SCR MAMMO BI INCL CAD: CPT

## 2023-01-11 PROCEDURE — 77063 BREAST TOMOSYNTHESIS BI: CPT

## 2023-01-16 DIAGNOSIS — M81.0 AGE-RELATED OSTEOPOROSIS WITHOUT CURRENT PATHOLOGICAL FRACTURE: Primary | ICD-10-CM

## 2023-01-24 ENCOUNTER — HOSPITAL ENCOUNTER (OUTPATIENT)
Dept: MRI IMAGING | Facility: HOSPITAL | Age: 73
Discharge: HOME | End: 2023-01-24
Admitting: PSYCHIATRY & NEUROLOGY
Payer: MEDICARE

## 2023-01-24 DIAGNOSIS — R26.9 GAIT ABNORMALITY: ICD-10-CM

## 2023-01-24 LAB
CREAT BLDA-MCNC: 1.1 MG/DL (ref 0.6–1.3)
EGFRCR SERPLBLD CKD-EPI 2021: 52.8 ML/MIN/1.73

## 2023-01-24 PROCEDURE — 25010000002 GADOTERIDOL PER 1 ML: Performed by: PSYCHIATRY & NEUROLOGY

## 2023-01-24 PROCEDURE — 70553 MRI BRAIN STEM W/O & W/DYE: CPT

## 2023-01-24 PROCEDURE — A9579 GAD-BASE MR CONTRAST NOS,1ML: HCPCS | Performed by: PSYCHIATRY & NEUROLOGY

## 2023-01-24 PROCEDURE — 82565 ASSAY OF CREATININE: CPT

## 2023-01-24 RX ADMIN — GADOTERIDOL 12 ML: 279.3 INJECTION, SOLUTION INTRAVENOUS at 10:31

## 2023-01-25 ENCOUNTER — TELEPHONE (OUTPATIENT)
Dept: NEUROLOGY | Facility: CLINIC | Age: 73
End: 2023-01-25
Payer: MEDICARE

## 2023-01-25 NOTE — TELEPHONE ENCOUNTER
----- Message from Joseph F Seipel, MD sent at 1/25/2023  8:12 AM EST -----  Mri brain normal for age

## 2023-01-30 NOTE — PROGRESS NOTES
Cardiology Office Follow Up Visit      Primary Care Provider:  Nathalia Maria APRN    Reason for f/u:     Fall versus syncope  Coronary artery disease with previous PCI  Hypertension  Dyslipidemia      Subjective     CC:    Denies chest pain or dyspnea    History of Present Illness       Jennifer Blackmon is a 71 y.o. female.  Patient is a very pleasant 71-year-old female with a known history of coronary artery disease with previous PCI to the RCA in 2019.  She is also known to have hypertension, dyslipidemia.    Patient was recently seen in the office by Dr. Saravia in found to be stable from a cardiovascular perspective.    Since that visit the patient reports she was out walking her puppy and suffered a fall with a question of syncope.  She was seen in the emergency room at [facility] she had CTs of her head.  She wore an outpatient event monitor.  The patient reports that she thinks she fell.  The fall was witnessed by her daughter and grandson.  She did lose some memory after the fall but hit her head.    She has had no recurrent falls, dizziness, near syncope, lightheadedness since that day.    She denies any current or recent chest pain, dyspnea, PND, orthopnea, palpitations or feelings of her heart racing.  She remains compliant with medical therapy.    Cardiac event monitor showed no arrhythmias.  No severe bradycardia or atrial fibrillation.  Echocardiogram showed her ejection fraction to be 60 to 65% mild to moderate mitral and mild tricuspid regurgitation.              ASSESSMENT/PLAN:      Diagnoses and all orders for this visit:    1. Coronary artery disease involving native coronary artery of native heart with angina pectoris (HCC) (Primary)  Comments:  Denies chest pain or dyspnea    2. Near syncope  Comments:  Recent fall versus syncope seen in the emergency department    3. Essential hypertension  Comments:  Stable on current medical therapy with some evidence of orthostasis    4.  Hyperlipidemia, mixed  Comments:  Treated with statin therapy            MEDICAL DECISION MAKING:      Patient had a recent episode of fall versus syncope.  The patient says she favors a fall.  She does not remember any prodrome of being lightheaded or dizzy.  She did hit the back of her head on the asphalt.    Since her work-up in the emergency room she has had no recurrent falls or lightheadedness or near syncope.    She denies any exertional chest pain or dyspnea.    She does complain of some mild positional changes with dizziness if she goes from laying to standing too quickly.    Orthostatic blood pressures were obtained in the office.  Sitting blood pressure was 132/76 and standing blood pressure was 114/68.    At this time I encouraged the patient to stay well-hydrated.  I have encouraged her to check her blood pressure periodically at home.  I have made no changes in her medical therapy otherwise.  If she has any recurrent dizziness, near syncope or lightheadedness have asked her to contact the office sooner.  The patient is in agreement to this plan.  Again recent echocardiogram and event monitor did not show any new LV dysfunction, worsening valvular flow abnormalities or significant arrhythmias.  We will continue the current treatment and keep scheduled follow-up    Past Medical History:   Diagnosis Date   • Allergic rhinitis    • Basal cell carcinoma (BCC) of nostril    • Coronary artery disease involving native coronary artery of native heart with angina pectoris (HCC) 06/18/2019    Status post successful PCI of the coronary stent deployment to high-grade RCA stenosis after presenting with ST segment elevated microinfarction in May 2019   • DDD (degenerative disc disease), lumbar    • Depression    • Depression    • Essential hypertension 06/18/2019   • Heart attack (HCC)     6/2/2019   • Hyperlipidemia    • Hyperlipidemia, mixed 08/13/2019   • Hypertension    • Insomnia    • Myocardial infarction less  than 4 weeks ago (HCC) 2019    Presented initially with ST segment elevated microinfarction treated emergently in [place] May 2019   • Old MI (myocardial infarction) 2019    Presented initially with ST segment elevated microinfarction treated emergently in [place] May 2019   • Osteoarthritis    • Toenail fungus        Past Surgical History:   Procedure Laterality Date   • ADENOIDECTOMY      Adenoids removed   • APPENDECTOMY     • AUGMENTATION MAMMAPLASTY     • BREAST IMPLANT SURGERY     • CATARACT EXTRACTION Bilateral    • CERVICAL DISC SURGERY      ruptured and removed    •  SECTION     • CHEST SURGERY      attempted pectus repair    • EYE SURGERY  1997    AK   • HYSTERECTOMY     • KNEE ARTHROSCOPY Right    • LUMBAR DISC SURGERY      ruptured and removed    • NASAL SEPTAL RECONSTRUCTION     • PECTUS CARNATUM REPAIR      attempted   • RECTAL SURGERY      Repair   • REFRACTIVE SURGERY      RK/AK both eyes    • REFRACTIVE SURGERY     • SOMNOPLASTY RADIAL FREQUENCY ABLATION     • TONSILLECTOMY     • UVULOPALATOPHARYNGOPLASTY           Current Outpatient Medications:   •  atorvastatin (LIPITOR) 40 MG tablet, Take 1 tablet by mouth Daily. (Patient taking differently: Take 20 mg by mouth Daily.), Disp: 90 tablet, Rfl: 3  •  buPROPion XL (WELLBUTRIN XL) 150 MG 24 hr tablet, Take 3 tablets by mouth Every Morning., Disp: 270 tablet, Rfl: 3  •  Calcium Citrate-Vitamin D 500-400 MG-UNIT chewable tablet, Chew Daily., Disp: , Rfl:   •  carvedilol (COREG) 3.125 MG tablet, Take 1 tablet by mouth 2 (Two) Times a Day., Disp: 180 tablet, Rfl: 3  •  clopidogrel (PLAVIX) 75 MG tablet, Take 75 mg by mouth Daily., Disp: , Rfl:   •  coenzyme Q10 100 MG capsule, Take 100 mg by mouth., Disp: , Rfl:   •  escitalopram (LEXAPRO) 20 MG tablet, TAKE ONE-HALF (1/2) TABLET TWICE A DAY, Disp: 90 tablet, Rfl: 3  •  Melatonin 10 MG sublingual tablet, Place  under the tongue., Disp: , Rfl:   •   Multiple Vitamins-Minerals (Multi-Vitamin Gummies) chewable tablet, Chew., Disp: , Rfl:     Social History     Socioeconomic History   • Marital status:    • Number of children: 2   Tobacco Use   • Smoking status: Former Smoker     Packs/day: 0.50     Years: 4.00     Pack years: 2.00     Types: Cigarettes     Quit date:      Years since quittin.2   • Smokeless tobacco: Never Used   Vaping Use   • Vaping Use: Never used   Substance and Sexual Activity   • Alcohol use: No   • Drug use: No   • Sexual activity: Not Currently       Family History   Problem Relation Age of Onset   • Alzheimer's disease Mother    • Other Mother         CVA, acute myocardial infarction   • Leukemia Father    • Other Father         Parkinson's    • Heart attack Sister    • No Known Problems Brother    • Bipolar disorder Daughter    • Heart attack Brother    • Alcohol abuse Brother    • Anxiety disorder Brother    • Depression Brother    • ADD / ADHD Daughter    • Other Daughter         alcohol and drug addiction   • HIV Daughter        The following portions of the patient's history were reviewed and updated as appropriate: allergies, current medications, past family history, past medical history, past social history, past surgical history and problem list.    Review of Systems   Constitutional: Negative for decreased appetite and diaphoresis.   HENT: Negative for congestion, hearing loss and nosebleeds.    Cardiovascular: Positive for near-syncope. Negative for chest pain, claudication, dyspnea on exertion, irregular heartbeat, leg swelling, orthopnea, palpitations, paroxysmal nocturnal dyspnea and syncope.   Respiratory: Negative for cough, shortness of breath and sleep disturbances due to breathing.    Endocrine: Negative for polyuria.   Hematologic/Lymphatic: Does not bruise/bleed easily.   Skin: Negative for itching and rash.   Musculoskeletal: Negative for back pain, muscle weakness and myalgias.   Gastrointestinal:  "Negative for abdominal pain, change in bowel habit and nausea.   Genitourinary: Negative for dysuria, flank pain, frequency and hesitancy.   Neurological: Negative for dizziness, tremors and weakness.   Psychiatric/Behavioral: Negative for altered mental status. The patient does not have insomnia.      /76 (BP Location: Left arm, Cuff Size: Adult)   Pulse 71   Ht 167.6 cm (65.98\")   Wt 65.8 kg (145 lb)   SpO2 98%   BMI 23.41 kg/m² .  Objective     Vitals reviewed.   Constitutional:       General: Not in acute distress.     Appearance: Normal appearance. Well-developed.   Eyes:      Pupils: Pupils are equal, round, and reactive to light.   HENT:      Head: Normocephalic and atraumatic.   Neck:      Vascular: No JVD.   Pulmonary:      Effort: Pulmonary effort is normal.      Breath sounds: Normal breath sounds.   Cardiovascular:      Normal rate. Regular rhythm.   Pulses:     Intact distal pulses.   Edema:     Peripheral edema absent.   Abdominal:      General: There is no distension.      Palpations: Abdomen is soft.      Tenderness: There is no abdominal tenderness.   Musculoskeletal: Normal range of motion.      Cervical back: Normal range of motion and neck supple. Skin:     General: Skin is warm and dry.   Neurological:      Mental Status: Alert and oriented to person, place, and time.             ECG 12 Lead    Date/Time: 4/4/2022 2:26 PM  Performed by: Nathalia Jameson APRN  Authorized by: Nathalia Jameson APRN   Comparison: not compared with previous ECG   Rhythm: sinus rhythm  Rate: normal  BPM: 71  Conduction: conduction normal  QRS axis: normal  Other findings: low voltage    Clinical impression: non-specific ECG            EKG ordered by and reviewed by me in office                      " no

## 2023-02-13 RX ORDER — ESCITALOPRAM OXALATE 20 MG/1
TABLET ORAL
Qty: 90 TABLET | Refills: 3 | Status: SHIPPED | OUTPATIENT
Start: 2023-02-13

## 2023-02-14 ENCOUNTER — TELEPHONE (OUTPATIENT)
Dept: FAMILY MEDICINE CLINIC | Facility: CLINIC | Age: 73
End: 2023-02-14
Payer: MEDICARE

## 2023-02-14 RX ORDER — CLOPIDOGREL BISULFATE 75 MG/1
75 TABLET ORAL DAILY
Qty: 90 TABLET | Refills: 3 | Status: SHIPPED | OUTPATIENT
Start: 2023-02-14

## 2023-02-14 RX ORDER — CLOBETASOL PROPIONATE 0.46 MG/ML
SOLUTION TOPICAL 2 TIMES DAILY
Qty: 50 ML | Refills: 3 | Status: SHIPPED | OUTPATIENT
Start: 2023-02-14

## 2023-02-14 RX ORDER — ATORVASTATIN CALCIUM 40 MG/1
20 TABLET, FILM COATED ORAL DAILY
Qty: 45 TABLET | Refills: 3 | Status: SHIPPED | OUTPATIENT
Start: 2023-02-14

## 2023-02-14 RX ORDER — BUPROPION HYDROCHLORIDE 150 MG/1
450 TABLET ORAL EVERY MORNING
Qty: 270 TABLET | Refills: 3 | Status: SHIPPED | OUTPATIENT
Start: 2023-02-14

## 2023-02-14 RX ORDER — CARVEDILOL 3.12 MG/1
3.12 TABLET ORAL 2 TIMES DAILY
Qty: 180 TABLET | Refills: 3 | Status: SHIPPED | OUTPATIENT
Start: 2023-02-14

## 2023-02-14 NOTE — TELEPHONE ENCOUNTER
Patient states she has recently switched to [facility] and they are needing new scripts for the following medications.     1) ATORVASTATIN 40 MG   2) COREG   3) CLOPIDOGREL  4) BUPROPION    Patient is also asking if you would take over a scalp solution that was previously filled by Dermatology.  Medication is cloetasol propionate topical% solution 0.05

## 2023-03-14 ENCOUNTER — OFFICE VISIT (OUTPATIENT)
Dept: CARDIOLOGY | Facility: CLINIC | Age: 73
End: 2023-03-14
Payer: MEDICARE

## 2023-03-14 VITALS
HEIGHT: 66 IN | WEIGHT: 133 LBS | DIASTOLIC BLOOD PRESSURE: 63 MMHG | HEART RATE: 72 BPM | RESPIRATION RATE: 18 BRPM | SYSTOLIC BLOOD PRESSURE: 97 MMHG | BODY MASS INDEX: 21.38 KG/M2

## 2023-03-14 DIAGNOSIS — I25.118 CORONARY ARTERY DISEASE OF NATIVE ARTERY OF NATIVE HEART WITH STABLE ANGINA PECTORIS: Primary | ICD-10-CM

## 2023-03-14 PROCEDURE — 99214 OFFICE O/P EST MOD 30 MIN: CPT | Performed by: INTERNAL MEDICINE

## 2023-03-14 PROCEDURE — 93000 ELECTROCARDIOGRAM COMPLETE: CPT | Performed by: INTERNAL MEDICINE

## 2023-03-14 PROCEDURE — 3078F DIAST BP <80 MM HG: CPT | Performed by: INTERNAL MEDICINE

## 2023-03-14 PROCEDURE — 3074F SYST BP LT 130 MM HG: CPT | Performed by: INTERNAL MEDICINE

## 2023-03-14 NOTE — PROGRESS NOTES
Cardiology Clinic Note  Gaurav Saravia MD, PhD    Subjective:     Encounter Date:03/14/2023      Patient ID: Jennifer Blackmon is a 72 y.o. female.    Chief Complaint:  Chief Complaint   Patient presents with   • Follow-up       HPI:      I the pleasure to see this 72-year-old female in follow-up today after 1 year.  History of MI in the past along with PCI 2019, hypertension hyperlipidemia.  Last stress test was in 2021 which revealed no reversible ischemia, preserved EF overall.  Her CV therapies consist of aspirin atorvastatin Coreg Plavix which she is maintained with low bleeding risk.  She denies any new anginal chest pain heart failure signs or symptoms and her vitals are good today , She is euvolemic without heart failure signs or symptoms and expresses no other complaints.  Last EF again was normal 60 to 65% with normal atrial sizes normal RV size and function no significant valvular abnormality and grade 1 diastolic dysfunction only.  No new issues, again no heart failure signs or symptoms no anginal chest pain, no recent falls syncope or palpitations.  Well compensated and euvolemic.  She has had some weight loss over the last year almost 11 to 12 pounds unintentionally.  We discussed healthy weight of a probably 140 pounds, blood pressure is low, she does not eat or drink well which we discussed and her  to verify today.  We cautioned against frailty anorexia weight loss over time which is a significant fall precaution with progressive weakness and debility.     Review of systems otherwise negative x14 point review of systems except was mentioned above     She sees nephrology for CKD, oncology as well        Historical data copied forward from previous encounters in EMR including the history, exam, and assessment/plan has been reviewed and is unchanged unless noted otherwise.     Cardiac medicines reviewed with risk, benefits, and necessity of each discussed.     Risk and benefit of cardiac testing  reviewed including death heart attack stroke pain bleeding infection need for vascular /cardiovascular surgery were discussed and the patient      Objective:         Objective       Vitals reviewed below     Physical Exam  Regular rate and rhythm no rubs murmurs or gallops  No heave no lift  No clubbing cyanosis or edema  Pulses 2+  Frail  Intact grossly  Walks with a cane  Clear to auscultation  Normocephalic/atraumatic       Assessment:         Assessment          CAD, history of old inferolateral MI, EKG unchanged  Continue  statin and beta-blocker  Continue Plavix monotherapy if needed  Stable without anginal chest pain  No indication for ischemic evaluation  Normal ischemic evaluation or at least low risk 2021     Hypertension well-controlled continue regimen  Hyperlipidemia on high intensity statin therapy     Secondary prevention goals were discussed  She is a non-smoker, abstinent since 1972  She is nondiabetic  Diet and exercise per AHA guidelines  return to clinic in 1 year     Encourage healthy weight of approxihundred 40 pounds, at least 1500 to 2000 gordon/day intake, supplement as needed, she appears dry today on exam    Gaurav Saravia MD, PhD        The pleasure to be involved in this patient's cardiovascular care.  Please call with any questions or concerns  Gaurav Saravia MD, PhD     Most recent EKG as reviewed and interpreted by me:     ECG 12 Lead     Date/Time: 3/14/2022 2:34 PM  Performed by: Gaurav Saravia MD  Authorized by: Gaurav Saravia MD   Comparison: not compared with previous ECG   Rhythm: sinus rhythm  Rate: normal  Q waves: II, III, aVF, V5 and V6     QRS axis: normal    Clinical impression: abnormal EKG  Comments: Sinus rhythm  Inferolateral Q waves, old inferolateral infarct  Nonspecific ST-T wave abnormality     Historical data copied forward from previous encounters in EMR including the history, exam, and assessment/plan has been reviewed and is unchanged unless  "noted otherwise.    Cardiac medicines reviewed with risk, benefits, and necessity of each discussed.    Risk and benefit of cardiac testing reviewed including death heart attack stroke pain bleeding infection need for vascular /cardiovascular surgery were discussed and the patient     Objective:         BP 97/63 (BP Location: Left arm, Patient Position: Sitting)   Pulse 72   Resp 18   Ht 167.6 cm (66\")   Wt 60.3 kg (133 lb)   BMI 21.47 kg/m²       The pleasure to be involved in this patient's cardiovascular care.  Please call with any questions or concerns  Gaurav Saravia MD, PhD    Most recent EKG as reviewed and interpreted by me:    ECG 12 Lead    Date/Time: 3/14/2023 10:19 AM  Performed by: Gaurav Saravia MD  Authorized by: Gaurav Saravia MD   Comparison: not compared with previous ECG   Previous ECG: no previous ECG available  Rhythm: sinus rhythm  Rate: normal  Conduction: conduction normal  QRS axis: normal  Other findings: non-specific ST-T wave changes    Clinical impression: non-specific ECG             Most recent echo as reviewed and interpreted by me:  Results for orders placed during the hospital encounter of 03/17/22    Adult Transthoracic Echo Complete W/ Cont if Necessary Per Protocol    Interpretation Summary  Normal LV size and contractility EF of 60 to 65%  Borderline RV  Mild biatrial enlargement seen.  Agitated saline bubble contrast negative for septal defect  Aortic valve, mitral valve, tricuspid valve appears structurally normal, mild to moderate mitral and mild tricuspid regurgitation seen.  Calculated RV systolic pressure of 32 mmHg  Reveal posterior pericardial effusion seen.  No signs of increased intrapericardial pressures  Proximal aorta appears normal in size.      Most recent stress test as reviewed and interpreted by me:  Results for orders placed during the hospital encounter of 02/26/21    Stress Test With Myocardial Perfusion One Day    Interpretation " Summary  · Findings consistent with a normal ECG stress test.  · Left ventricular ejection fraction is hyperdynamic (Calculated EF > 70%). .  · Myocardial perfusion imaging indicates a normal myocardial perfusion study with no evidence of ischemia.  · Impressions are consistent with a low risk study.  · This is normal Cardiolite imaging stress test with no evidence of ischemia or myocardial infarction. Left ventricle size and function is normal on gated SPECT imaging. No wall motion abnormality was noted. Clinical correlation recommended. Further recommendation as per ordering physician..      Most recent cardiac catheterization as reviewed interpreted by me:  No results found for this or any previous visit.    The following portions of the patient's history were reviewed and updated as appropriate: allergies, current medications, past family history, past medical history, past social history, past surgical history and problem list.      ROS:  14 point review of systems negative except as mentioned above    Current Outpatient Medications:   •  atorvastatin (LIPITOR) 40 MG tablet, Take 0.5 tablets by mouth Daily., Disp: 45 tablet, Rfl: 3  •  buPROPion XL (WELLBUTRIN XL) 150 MG 24 hr tablet, Take 3 tablets by mouth Every Morning., Disp: 270 tablet, Rfl: 3  •  Calcium Citrate-Vitamin D 500-400 MG-UNIT chewable tablet, Chew Daily., Disp: , Rfl:   •  carvedilol (COREG) 3.125 MG tablet, Take 1 tablet by mouth 2 (Two) Times a Day., Disp: 180 tablet, Rfl: 3  •  clobetasol (TEMOVATE) 0.05 % external solution, Apply  topically to the appropriate area as directed 2 (Two) Times a Day., Disp: 50 mL, Rfl: 3  •  clopidogrel (PLAVIX) 75 MG tablet, Take 1 tablet by mouth Daily., Disp: 90 tablet, Rfl: 3  •  coenzyme Q10 100 MG capsule, Take 1 capsule by mouth., Disp: , Rfl:   •  escitalopram (LEXAPRO) 20 MG tablet, TAKE ONE-HALF (1/2) TABLET TWICE A DAY, Disp: 90 tablet, Rfl: 3  •  Melatonin 10 MG sublingual tablet, Place  under the  tongue., Disp: , Rfl:   •  Multiple Vitamins-Minerals (Multi-Vitamin Gummies) chewable tablet, Chew., Disp: , Rfl:     Problem List:  Patient Active Problem List   Diagnosis   • Old MI (myocardial infarction)   • Coronary artery disease involving native coronary artery of native heart with angina pectoris (HCC)   • Essential hypertension   • Hyperlipidemia, mixed   • Chest pain, atypical   • Degeneration of lumbar intervertebral disc   • Low back pain   • Lumbar spondylosis   • Arthritis   • Cellulitis of face   • Depressive disorder   • Near syncope   • Stage 3a chronic kidney disease (HCC)     Past Medical History:  Past Medical History:   Diagnosis Date   • Allergic rhinitis    • Basal cell carcinoma (BCC) of nostril    • Coronary artery disease involving native coronary artery of native heart with angina pectoris (HCC) 2019    Status post successful PCI of the coronary stent deployment to high-grade RCA stenosis after presenting with ST segment elevated microinfarction in May 2019   • DDD (degenerative disc disease), lumbar    • Depression    • Depression    • Essential hypertension 2019   • Heart attack (HCC)     2019   • Hyperlipidemia    • Hyperlipidemia, mixed 2019   • Hypertension    • Insomnia    • Myocardial infarction less than 4 weeks ago (HCC) 2019    Presented initially with ST segment elevated microinfarction treated emergently in [place] May 2019   • Old MI (myocardial infarction) 2019    Presented initially with ST segment elevated microinfarction treated emergently in [place] May 2019   • Osteoarthritis    • Toenail fungus      Past Surgical History:  Past Surgical History:   Procedure Laterality Date   • ADENOIDECTOMY      Adenoids removed   • APPENDECTOMY     • AUGMENTATION MAMMAPLASTY     • BREAST IMPLANT SURGERY     • CATARACT EXTRACTION Bilateral    • CERVICAL DISC SURGERY      ruptured and removed    •  SECTION     • CHEST  SURGERY      attempted pectus repair    • EYE SURGERY  1997    AK   • HYSTERECTOMY     • KNEE ARTHROSCOPY Right    • LUMBAR DISC SURGERY      ruptured and removed    • NASAL SEPTAL RECONSTRUCTION     • PECTUS CARNATUM REPAIR      attempted   • RECTAL SURGERY      Repair   • REFRACTIVE SURGERY      RK/AK both eyes    • REFRACTIVE SURGERY     • SOMNOPLASTY RADIAL FREQUENCY ABLATION     • TONSILLECTOMY     • UVULOPALATOPHARYNGOPLASTY       Social History:  Social History     Socioeconomic History   • Marital status:    • Number of children: 2   Tobacco Use   • Smoking status: Former     Packs/day: 0.50     Years: 4.00     Pack years: 2.00     Types: Cigarettes     Quit date:      Years since quittin.2   • Smokeless tobacco: Never   Vaping Use   • Vaping Use: Never used   Substance and Sexual Activity   • Alcohol use: No   • Drug use: No   • Sexual activity: Not Currently     Allergies:  No Known Allergies  Immunizations:  Immunization History   Administered Date(s) Administered   • COVID-19 (MODERNA) 1st, 2nd, 3rd Dose Only 2021, 2021   • FLUAD TRI 65YR+ 10/19/2017   • Flu Vaccine Quad PF 6-35MO 10/27/2016   • Flu Vaccine Quad PF >36MO 10/27/2016   • Fluad Quad 65+ 10/09/2019, 10/20/2020   • Fluzone High Dose =>65 Years ([employer] ONLY) 2015, 10/19/2017, 10/15/2018   • Fluzone High-Dose 65+yrs 10/20/2021, 10/26/2022   • Influenza, Unspecified 10/09/2019   • Pneumococcal Conjugate 13-Valent (PCV13) 2018   • Pneumococcal Polysaccharide (PPSV23) 2021            In-Office Procedure(s):  No orders to display        ASCVD RIsk Score::  The ASCVD Risk score (Elle DK, et al., 2019) failed to calculate for the following reasons:    The patient has a prior MI or stroke diagnosis    Imaging:    Results for orders placed during the hospital encounter of 22    XR Chest 1 View    Narrative  DATE OF EXAM:  2022 3:55 PM    PROCEDURE:  XR CHEST 1  VW-    INDICATIONS:  cough/fever    COMPARISON:  03/17/2022    TECHNIQUE:  Portable chest radiograph.    FINDINGS:  Stable cardiomegaly. Lungs without consolidation. No pneumothorax or  pleural effusion. Calcified left breast implant noted. Osseous  structures appear intact.    Impression  No acute process.    Electronically Signed By-Jaylon Martinez MD On:9/1/2022 4:06 PM  This report was finalized on [id number] by  Jaylon Martinez MD.       Results for orders placed during the hospital encounter of 09/01/22    CT Head Without Contrast    Narrative  CT HEAD WO CONTRAST-    Date of Exam: 9/1/2022 3:16 PM    Indication: ams.    Comparison: CT head without contrast 5/4/2022.    Technique:  Without contrast, contiguous axial CT images of the head  were obtained from skull base to vertex.  Coronal and sagittal  reconstructions were performed.  Automated exposure control and  iterative reconstruction methods were used.    FINDINGS  No acute intracranial hemorrhage, mass lesion, mass effect, midline  shift or evidence of acute or evolving infarct. Moderate deep white  matter hypodensities are nonspecific but favored to reflect changes of  chronic microvascular disease, and are not thought to be significantly  changed. There is mild generalized atrophy with compensatory prominence  of the ventricles and extra-axial spaces, similar to the prior study.  Calvarium is normal. There is mild ethmoid sinus mucosal thickening.  Mastoid air cells are clear. Mild intracranial carotid artery  calcifications are present. The calvarium is normal.    Impression  1. No acute intracranial findings.  2. Mild atrophy and moderate chronic microvascular disease changes,  similar to the prior study examination.    Electronically Signed By-Kyara Pandya MD On:9/1/2022 3:26 PM  This report was finalized on [id number] by  Kyara Pandya MD.      Results for orders placed during the hospital encounter of 09/01/22    CT Head Without  Contrast    Narrative  CT HEAD WO CONTRAST-    Date of Exam: 9/1/2022 3:16 PM    Indication: ams.    Comparison: CT head without contrast 5/4/2022.    Technique:  Without contrast, contiguous axial CT images of the head  were obtained from skull base to vertex.  Coronal and sagittal  reconstructions were performed.  Automated exposure control and  iterative reconstruction methods were used.    FINDINGS  No acute intracranial hemorrhage, mass lesion, mass effect, midline  shift or evidence of acute or evolving infarct. Moderate deep white  matter hypodensities are nonspecific but favored to reflect changes of  chronic microvascular disease, and are not thought to be significantly  changed. There is mild generalized atrophy with compensatory prominence  of the ventricles and extra-axial spaces, similar to the prior study.  Calvarium is normal. There is mild ethmoid sinus mucosal thickening.  Mastoid air cells are clear. Mild intracranial carotid artery  calcifications are present. The calvarium is normal.    Impression  1. No acute intracranial findings.  2. Mild atrophy and moderate chronic microvascular disease changes,  similar to the prior study examination.    Electronically Signed By-Kyara Pandya MD On:9/1/2022 3:26 PM  This report was finalized on [id number] by  Kyara Pandya MD.      Lab Review:   Hospital Outpatient Visit on 01/24/2023   Component Date Value   • Creatinine 01/24/2023 1.10    • eGFR 01/24/2023 52.8 (L)    Lab on 12/12/2022   Component Date Value   • Glucose 12/12/2022 82    • BUN 12/12/2022 26 (H)    • Creatinine 12/12/2022 1.20 (H)    • Sodium 12/12/2022 138    • Potassium 12/12/2022 4.7    • Chloride 12/12/2022 100    • CO2 12/12/2022 29.6 (H)    • Calcium 12/12/2022 9.9    • Total Protein 12/12/2022 7.2    • Albumin 12/12/2022 4.10    • ALT (SGPT) 12/12/2022 15    • AST (SGOT) 12/12/2022 23    • Alkaline Phosphatase 12/12/2022 82    • Total Bilirubin 12/12/2022 0.5    • Globulin  12/12/2022 3.1    • A/G Ratio 12/12/2022 1.3    • BUN/Creatinine Ratio 12/12/2022 21.7    • Anion Gap 12/12/2022 8.4    • eGFR 12/12/2022 47.6 (L)    • Total Cholesterol 12/12/2022 158    • Triglycerides 12/12/2022 65    • HDL Cholesterol 12/12/2022 58    • LDL Cholesterol  12/12/2022 87    • VLDL Cholesterol 12/12/2022 13    • LDL/HDL Ratio 12/12/2022 1.50    • TSH 12/12/2022 1.320    • 25 Hydroxy, Vitamin D 12/12/2022 53.2    • Color, UA 12/12/2022 Yellow    • Appearance, UA 12/12/2022 Clear    • pH, UA 12/12/2022 6.5    • Specific Gravity, UA 12/12/2022 1.017    • Glucose, UA 12/12/2022 Negative    • Ketones, UA 12/12/2022 Negative    • Bilirubin, UA 12/12/2022 Negative    • Blood, UA 12/12/2022 Negative    • Protein, UA 12/12/2022 Negative    • Leuk Esterase, UA 12/12/2022 Trace (A)    • Nitrite, UA 12/12/2022 Negative    • Urobilinogen, UA 12/12/2022 1.0 E.U./dL    • WBC 12/12/2022 7.80    • RBC 12/12/2022 4.40    • Hemoglobin 12/12/2022 13.2    • Hematocrit 12/12/2022 41.0    • MCV 12/12/2022 93.2    • MCH 12/12/2022 30.0    • MCHC 12/12/2022 32.2    • RDW 12/12/2022 11.7 (L)    • RDW-SD 12/12/2022 39.7    • MPV 12/12/2022 10.2    • Platelets 12/12/2022 250    • Neutrophil % 12/12/2022 60.9    • Lymphocyte % 12/12/2022 27.3    • Monocyte % 12/12/2022 10.1    • Eosinophil % 12/12/2022 1.3    • Basophil % 12/12/2022 0.3    • Immature Grans % 12/12/2022 0.1    • Neutrophils, Absolute 12/12/2022 4.75    • Lymphocytes, Absolute 12/12/2022 2.13    • Monocytes, Absolute 12/12/2022 0.79    • Eosinophils, Absolute 12/12/2022 0.10    • Basophils, Absolute 12/12/2022 0.02    • Immature Grans, Absolute 12/12/2022 0.01    • nRBC 12/12/2022 0.0    • RBC, UA 12/12/2022 0-2    • WBC, UA 12/12/2022 0-2    • Bacteria, UA 12/12/2022 None Seen    • Squamous Epithelial Cell* 12/12/2022 3-6 (A)    • Hyaline Casts, UA 12/12/2022 7-12    • Methodology 12/12/2022 Automated Microscopy      Recent labs reviewed and interpreted for  clinical significance and application            Level of Care:           Gaurav Saravia MD  03/14/23  .

## 2023-04-20 ENCOUNTER — TRANSCRIBE ORDERS (OUTPATIENT)
Dept: LAB | Facility: HOSPITAL | Age: 73
End: 2023-04-20
Payer: MEDICARE

## 2023-04-20 ENCOUNTER — LAB (OUTPATIENT)
Dept: LAB | Facility: HOSPITAL | Age: 73
End: 2023-04-20
Payer: MEDICARE

## 2023-04-20 DIAGNOSIS — E11.22 TYPE 2 DIABETES MELLITUS WITH ESRD (END-STAGE RENAL DISEASE): Primary | ICD-10-CM

## 2023-04-20 DIAGNOSIS — E11.22 TYPE 2 DIABETES MELLITUS WITH DIABETIC CHRONIC KIDNEY DISEASE, UNSPECIFIED CKD STAGE, UNSPECIFIED WHETHER LONG TERM INSULIN USE: ICD-10-CM

## 2023-04-20 DIAGNOSIS — F32.A DEPRESSIVE DISORDER: ICD-10-CM

## 2023-04-20 DIAGNOSIS — N18.6 TYPE 2 DIABETES MELLITUS WITH ESRD (END-STAGE RENAL DISEASE): Primary | ICD-10-CM

## 2023-04-20 DIAGNOSIS — E11.22 TYPE 2 DIABETES MELLITUS WITH DIABETIC CHRONIC KIDNEY DISEASE, UNSPECIFIED CKD STAGE, UNSPECIFIED WHETHER LONG TERM INSULIN USE: Primary | ICD-10-CM

## 2023-04-20 LAB
ALBUMIN SERPL-MCNC: 4.2 G/DL (ref 3.5–5.2)
ALBUMIN/GLOB SERPL: 1.6 G/DL
ALP SERPL-CCNC: 77 U/L (ref 39–117)
ALT SERPL W P-5'-P-CCNC: 18 U/L (ref 1–33)
ANION GAP SERPL CALCULATED.3IONS-SCNC: 7.7 MMOL/L (ref 5–15)
AST SERPL-CCNC: 21 U/L (ref 1–32)
BASOPHILS # BLD AUTO: 0.03 10*3/MM3 (ref 0–0.2)
BASOPHILS NFR BLD AUTO: 0.6 % (ref 0–1.5)
BILIRUB SERPL-MCNC: 0.4 MG/DL (ref 0–1.2)
BUN SERPL-MCNC: 19 MG/DL (ref 8–23)
BUN/CREAT SERPL: 18.6 (ref 7–25)
CALCIUM SPEC-SCNC: 9.8 MG/DL (ref 8.6–10.5)
CHLORIDE SERPL-SCNC: 105 MMOL/L (ref 98–107)
CO2 SERPL-SCNC: 29.3 MMOL/L (ref 22–29)
CREAT SERPL-MCNC: 1.02 MG/DL (ref 0.57–1)
DEPRECATED RDW RBC AUTO: 39.3 FL (ref 37–54)
EGFRCR SERPLBLD CKD-EPI 2021: 57.8 ML/MIN/1.73
EOSINOPHIL # BLD AUTO: 0.17 10*3/MM3 (ref 0–0.4)
EOSINOPHIL NFR BLD AUTO: 3.3 % (ref 0.3–6.2)
ERYTHROCYTE [DISTWIDTH] IN BLOOD BY AUTOMATED COUNT: 11.7 % (ref 12.3–15.4)
GLOBULIN UR ELPH-MCNC: 2.6 GM/DL
GLUCOSE SERPL-MCNC: 91 MG/DL (ref 65–99)
HCT VFR BLD AUTO: 39.6 % (ref 34–46.6)
HGB BLD-MCNC: 13.3 G/DL (ref 12–15.9)
IMM GRANULOCYTES # BLD AUTO: 0.01 10*3/MM3 (ref 0–0.05)
IMM GRANULOCYTES NFR BLD AUTO: 0.2 % (ref 0–0.5)
LYMPHOCYTES # BLD AUTO: 1.58 10*3/MM3 (ref 0.7–3.1)
LYMPHOCYTES NFR BLD AUTO: 30.7 % (ref 19.6–45.3)
MCH RBC QN AUTO: 31.6 PG (ref 26.6–33)
MCHC RBC AUTO-ENTMCNC: 33.6 G/DL (ref 31.5–35.7)
MCV RBC AUTO: 94.1 FL (ref 79–97)
MONOCYTES # BLD AUTO: 0.5 10*3/MM3 (ref 0.1–0.9)
MONOCYTES NFR BLD AUTO: 9.7 % (ref 5–12)
NEUTROPHILS NFR BLD AUTO: 2.85 10*3/MM3 (ref 1.7–7)
NEUTROPHILS NFR BLD AUTO: 55.5 % (ref 42.7–76)
NRBC BLD AUTO-RTO: 0 /100 WBC (ref 0–0.2)
PLATELET # BLD AUTO: 243 10*3/MM3 (ref 140–450)
PMV BLD AUTO: 9.4 FL (ref 6–12)
POTASSIUM SERPL-SCNC: 4.3 MMOL/L (ref 3.5–5.2)
PROT SERPL-MCNC: 6.8 G/DL (ref 6–8.5)
RBC # BLD AUTO: 4.21 10*6/MM3 (ref 3.77–5.28)
RETICS # AUTO: 0.07 10*6/MM3 (ref 0.02–0.13)
RETICS/RBC NFR AUTO: 1.63 % (ref 0.7–1.9)
SODIUM SERPL-SCNC: 142 MMOL/L (ref 136–145)
WBC NRBC COR # BLD: 5.14 10*3/MM3 (ref 3.4–10.8)

## 2023-04-20 PROCEDURE — 36415 COLL VENOUS BLD VENIPUNCTURE: CPT

## 2023-04-20 PROCEDURE — 85025 COMPLETE CBC W/AUTO DIFF WBC: CPT

## 2023-04-20 PROCEDURE — 85045 AUTOMATED RETICULOCYTE COUNT: CPT

## 2023-04-20 PROCEDURE — 84165 PROTEIN E-PHORESIS SERUM: CPT

## 2023-04-20 PROCEDURE — 80053 COMPREHEN METABOLIC PANEL: CPT

## 2023-04-21 ENCOUNTER — TRANSCRIBE ORDERS (OUTPATIENT)
Dept: ADMINISTRATIVE | Facility: HOSPITAL | Age: 73
End: 2023-04-21
Payer: MEDICARE

## 2023-04-21 ENCOUNTER — HOSPITAL ENCOUNTER (OUTPATIENT)
Dept: GENERAL RADIOLOGY | Facility: HOSPITAL | Age: 73
Discharge: HOME | End: 2023-04-21
Payer: MEDICARE

## 2023-04-21 DIAGNOSIS — D47.2 MGUS (MONOCLONAL GAMMOPATHY OF UNKNOWN SIGNIFICANCE): ICD-10-CM

## 2023-04-21 DIAGNOSIS — D47.2 MGUS (MONOCLONAL GAMMOPATHY OF UNKNOWN SIGNIFICANCE): Primary | ICD-10-CM

## 2023-04-21 LAB
ALBUMIN SERPL ELPH-MCNC: 3.6 G/DL (ref 2.9–4.4)
ALBUMIN/GLOB SERPL: 1.3 {RATIO} (ref 0.7–1.7)
ALPHA1 GLOB SERPL ELPH-MCNC: 0.2 G/DL (ref 0–0.4)
ALPHA2 GLOB SERPL ELPH-MCNC: 0.7 G/DL (ref 0.4–1)
B-GLOBULIN SERPL ELPH-MCNC: 1 G/DL (ref 0.7–1.3)
GAMMA GLOB SERPL ELPH-MCNC: 0.9 G/DL (ref 0.4–1.8)
GLOBULIN SER CALC-MCNC: 2.8 G/DL (ref 2.2–3.9)
LABORATORY COMMENT REPORT: ABNORMAL
M PROTEIN SERPL ELPH-MCNC: 0.2 G/DL
PROT SERPL-MCNC: 6.4 G/DL (ref 6–8.5)

## 2023-04-21 PROCEDURE — 77075 RADEX OSSEOUS SURVEY COMPL: CPT

## 2023-05-26 ENCOUNTER — HOSPITAL ENCOUNTER (INPATIENT)
Facility: HOSPITAL | Age: 73
LOS: 7 days | Discharge: NURSING, REHAB OR LONG-TERM CARE | DRG: 522 | End: 2023-06-02
Attending: EMERGENCY MEDICINE | Admitting: STUDENT IN AN ORGANIZED HEALTH CARE EDUCATION/TRAINING PROGRAM
Payer: MEDICARE

## 2023-05-26 ENCOUNTER — APPOINTMENT (OUTPATIENT)
Dept: CT IMAGING | Facility: HOSPITAL | Age: 73
DRG: 522 | End: 2023-05-26
Payer: MEDICARE

## 2023-05-26 DIAGNOSIS — S72.002A LEFT DISPLACED FEMORAL NECK FRACTURE: Primary | ICD-10-CM

## 2023-05-26 DIAGNOSIS — S00.03XA CONTUSION OF LEFT TEMPOROFRONTAL SCALP, INITIAL ENCOUNTER: ICD-10-CM

## 2023-05-26 DIAGNOSIS — S70.12XA CONTUSION OF LEFT HIP AND THIGH, INITIAL ENCOUNTER: ICD-10-CM

## 2023-05-26 DIAGNOSIS — S70.02XA CONTUSION OF LEFT HIP AND THIGH, INITIAL ENCOUNTER: ICD-10-CM

## 2023-05-26 DIAGNOSIS — S06.0X1A CONCUSSION WITH LOSS OF CONSCIOUSNESS OF 30 MINUTES OR LESS, INITIAL ENCOUNTER: ICD-10-CM

## 2023-05-26 LAB
ALBUMIN SERPL-MCNC: 3.8 G/DL (ref 3.5–5.2)
ALBUMIN/GLOB SERPL: 1.7 G/DL
ALP SERPL-CCNC: 79 U/L (ref 39–117)
ALT SERPL W P-5'-P-CCNC: 22 U/L (ref 1–33)
ANION GAP SERPL CALCULATED.3IONS-SCNC: 8 MMOL/L (ref 5–15)
ANION GAP SERPL CALCULATED.3IONS-SCNC: 8 MMOL/L (ref 5–15)
APTT PPP: 26.1 SECONDS (ref 61–76.5)
AST SERPL-CCNC: 26 U/L (ref 1–32)
BASOPHILS # BLD AUTO: 0 10*3/MM3 (ref 0–0.2)
BASOPHILS NFR BLD AUTO: 0.3 % (ref 0–1.5)
BILIRUB SERPL-MCNC: 0.4 MG/DL (ref 0–1.2)
BUN SERPL-MCNC: 18 MG/DL (ref 8–23)
BUN SERPL-MCNC: 19 MG/DL (ref 8–23)
BUN/CREAT SERPL: 17.3 (ref 7–25)
BUN/CREAT SERPL: 20.2 (ref 7–25)
CALCIUM SPEC-SCNC: 8.8 MG/DL (ref 8.6–10.5)
CALCIUM SPEC-SCNC: 8.9 MG/DL (ref 8.6–10.5)
CHLORIDE SERPL-SCNC: 105 MMOL/L (ref 98–107)
CHLORIDE SERPL-SCNC: 106 MMOL/L (ref 98–107)
CK SERPL-CCNC: 105 U/L (ref 20–180)
CO2 SERPL-SCNC: 26 MMOL/L (ref 22–29)
CO2 SERPL-SCNC: 27 MMOL/L (ref 22–29)
CREAT SERPL-MCNC: 0.94 MG/DL (ref 0.57–1)
CREAT SERPL-MCNC: 1.04 MG/DL (ref 0.57–1)
DEPRECATED RDW RBC AUTO: 41.1 FL (ref 37–54)
DEPRECATED RDW RBC AUTO: 42.4 FL (ref 37–54)
EGFRCR SERPLBLD CKD-EPI 2021: 56.5 ML/MIN/1.73
EGFRCR SERPLBLD CKD-EPI 2021: 63.8 ML/MIN/1.73
EOSINOPHIL # BLD AUTO: 0.1 10*3/MM3 (ref 0–0.4)
EOSINOPHIL NFR BLD AUTO: 1.1 % (ref 0.3–6.2)
ERYTHROCYTE [DISTWIDTH] IN BLOOD BY AUTOMATED COUNT: 12.5 % (ref 12.3–15.4)
ERYTHROCYTE [DISTWIDTH] IN BLOOD BY AUTOMATED COUNT: 12.8 % (ref 12.3–15.4)
GLOBULIN UR ELPH-MCNC: 2.3 GM/DL
GLUCOSE SERPL-MCNC: 101 MG/DL (ref 65–99)
GLUCOSE SERPL-MCNC: 130 MG/DL (ref 65–99)
HCT VFR BLD AUTO: 37.4 % (ref 34–46.6)
HCT VFR BLD AUTO: 37.6 % (ref 34–46.6)
HGB BLD-MCNC: 12.3 G/DL (ref 12–15.9)
HGB BLD-MCNC: 12.4 G/DL (ref 12–15.9)
INR PPP: 0.97 (ref 0.93–1.1)
LYMPHOCYTES # BLD AUTO: 1.7 10*3/MM3 (ref 0.7–3.1)
LYMPHOCYTES NFR BLD AUTO: 26 % (ref 19.6–45.3)
MCH RBC QN AUTO: 30.9 PG (ref 26.6–33)
MCH RBC QN AUTO: 31.7 PG (ref 26.6–33)
MCHC RBC AUTO-ENTMCNC: 32.6 G/DL (ref 31.5–35.7)
MCHC RBC AUTO-ENTMCNC: 33 G/DL (ref 31.5–35.7)
MCV RBC AUTO: 95 FL (ref 79–97)
MCV RBC AUTO: 96 FL (ref 79–97)
MONOCYTES # BLD AUTO: 0.6 10*3/MM3 (ref 0.1–0.9)
MONOCYTES NFR BLD AUTO: 9.7 % (ref 5–12)
NEUTROPHILS NFR BLD AUTO: 4.2 10*3/MM3 (ref 1.7–7)
NEUTROPHILS NFR BLD AUTO: 62.9 % (ref 42.7–76)
NRBC BLD AUTO-RTO: 0 /100 WBC (ref 0–0.2)
PLATELET # BLD AUTO: 208 10*3/MM3 (ref 140–450)
PLATELET # BLD AUTO: 226 10*3/MM3 (ref 140–450)
PMV BLD AUTO: 7.4 FL (ref 6–12)
PMV BLD AUTO: 7.4 FL (ref 6–12)
POTASSIUM SERPL-SCNC: 4.5 MMOL/L (ref 3.5–5.2)
POTASSIUM SERPL-SCNC: 4.5 MMOL/L (ref 3.5–5.2)
PROT SERPL-MCNC: 6.1 G/DL (ref 6–8.5)
PROTHROMBIN TIME: 10.4 SECONDS (ref 9.6–11.7)
RBC # BLD AUTO: 3.9 10*6/MM3 (ref 3.77–5.28)
RBC # BLD AUTO: 3.96 10*6/MM3 (ref 3.77–5.28)
SODIUM SERPL-SCNC: 139 MMOL/L (ref 136–145)
SODIUM SERPL-SCNC: 141 MMOL/L (ref 136–145)
TROPONIN T SERPL HS-MCNC: 11 NG/L
WBC NRBC COR # BLD: 10.3 10*3/MM3 (ref 3.4–10.8)
WBC NRBC COR # BLD: 6.7 10*3/MM3 (ref 3.4–10.8)

## 2023-05-26 PROCEDURE — 72192 CT PELVIS W/O DYE: CPT

## 2023-05-26 PROCEDURE — 36415 COLL VENOUS BLD VENIPUNCTURE: CPT | Performed by: STUDENT IN AN ORGANIZED HEALTH CARE EDUCATION/TRAINING PROGRAM

## 2023-05-26 PROCEDURE — 84484 ASSAY OF TROPONIN QUANT: CPT | Performed by: EMERGENCY MEDICINE

## 2023-05-26 PROCEDURE — 85730 THROMBOPLASTIN TIME PARTIAL: CPT | Performed by: EMERGENCY MEDICINE

## 2023-05-26 PROCEDURE — G0378 HOSPITAL OBSERVATION PER HR: HCPCS

## 2023-05-26 PROCEDURE — 85610 PROTHROMBIN TIME: CPT | Performed by: EMERGENCY MEDICINE

## 2023-05-26 PROCEDURE — 25010000002 HYDROMORPHONE 1 MG/ML SOLUTION: Performed by: STUDENT IN AN ORGANIZED HEALTH CARE EDUCATION/TRAINING PROGRAM

## 2023-05-26 PROCEDURE — 82550 ASSAY OF CK (CPK): CPT | Performed by: EMERGENCY MEDICINE

## 2023-05-26 PROCEDURE — 85027 COMPLETE CBC AUTOMATED: CPT | Performed by: STUDENT IN AN ORGANIZED HEALTH CARE EDUCATION/TRAINING PROGRAM

## 2023-05-26 PROCEDURE — 99285 EMERGENCY DEPT VISIT HI MDM: CPT

## 2023-05-26 PROCEDURE — 80053 COMPREHEN METABOLIC PANEL: CPT | Performed by: EMERGENCY MEDICINE

## 2023-05-26 PROCEDURE — 85025 COMPLETE CBC W/AUTO DIFF WBC: CPT | Performed by: EMERGENCY MEDICINE

## 2023-05-26 PROCEDURE — 70450 CT HEAD/BRAIN W/O DYE: CPT

## 2023-05-26 PROCEDURE — 25010000002 HYDROMORPHONE 1 MG/ML SOLUTION: Performed by: EMERGENCY MEDICINE

## 2023-05-26 PROCEDURE — 93005 ELECTROCARDIOGRAM TRACING: CPT | Performed by: EMERGENCY MEDICINE

## 2023-05-26 RX ORDER — AMOXICILLIN 250 MG
2 CAPSULE ORAL 2 TIMES DAILY
Status: DISCONTINUED | OUTPATIENT
Start: 2023-05-26 | End: 2023-06-03 | Stop reason: HOSPADM

## 2023-05-26 RX ORDER — BUPROPION HYDROCHLORIDE 150 MG/1
450 TABLET ORAL DAILY
Status: DISCONTINUED | OUTPATIENT
Start: 2023-05-27 | End: 2023-05-30

## 2023-05-26 RX ORDER — HEPARIN SODIUM 5000 [USP'U]/ML
5000 INJECTION, SOLUTION INTRAVENOUS; SUBCUTANEOUS EVERY 12 HOURS SCHEDULED
Status: DISCONTINUED | OUTPATIENT
Start: 2023-05-27 | End: 2023-05-30

## 2023-05-26 RX ORDER — ESCITALOPRAM OXALATE 10 MG/1
10 TABLET ORAL 2 TIMES DAILY
Status: DISCONTINUED | OUTPATIENT
Start: 2023-05-26 | End: 2023-05-30

## 2023-05-26 RX ORDER — SODIUM CHLORIDE 0.9 % (FLUSH) 0.9 %
10 SYRINGE (ML) INJECTION EVERY 12 HOURS SCHEDULED
Status: DISCONTINUED | OUTPATIENT
Start: 2023-05-26 | End: 2023-06-03 | Stop reason: HOSPADM

## 2023-05-26 RX ORDER — ATORVASTATIN CALCIUM 20 MG/1
20 TABLET, FILM COATED ORAL DAILY
Status: DISCONTINUED | OUTPATIENT
Start: 2023-05-27 | End: 2023-05-30

## 2023-05-26 RX ORDER — ONDANSETRON 4 MG/1
4 TABLET, FILM COATED ORAL EVERY 6 HOURS PRN
Status: DISCONTINUED | OUTPATIENT
Start: 2023-05-26 | End: 2023-05-31 | Stop reason: SDUPTHER

## 2023-05-26 RX ORDER — CLOBETASOL PROPIONATE 0.5 MG/G
CREAM TOPICAL EVERY 12 HOURS SCHEDULED
Status: DISCONTINUED | OUTPATIENT
Start: 2023-05-27 | End: 2023-05-30

## 2023-05-26 RX ORDER — POLYETHYLENE GLYCOL 3350 17 G/17G
17 POWDER, FOR SOLUTION ORAL DAILY PRN
Status: DISCONTINUED | OUTPATIENT
Start: 2023-05-26 | End: 2023-06-03 | Stop reason: HOSPADM

## 2023-05-26 RX ORDER — SODIUM CHLORIDE 0.9 % (FLUSH) 0.9 %
10 SYRINGE (ML) INJECTION AS NEEDED
Status: DISCONTINUED | OUTPATIENT
Start: 2023-05-26 | End: 2023-05-30

## 2023-05-26 RX ORDER — SODIUM CHLORIDE 9 MG/ML
40 INJECTION, SOLUTION INTRAVENOUS AS NEEDED
Status: DISCONTINUED | OUTPATIENT
Start: 2023-05-26 | End: 2023-05-30

## 2023-05-26 RX ORDER — ONDANSETRON 2 MG/ML
4 INJECTION INTRAMUSCULAR; INTRAVENOUS EVERY 6 HOURS PRN
Status: DISCONTINUED | OUTPATIENT
Start: 2023-05-26 | End: 2023-05-30

## 2023-05-26 RX ORDER — CARVEDILOL 3.12 MG/1
3.12 TABLET ORAL 2 TIMES DAILY
Status: DISCONTINUED | OUTPATIENT
Start: 2023-05-26 | End: 2023-06-03 | Stop reason: HOSPADM

## 2023-05-26 RX ORDER — BISACODYL 5 MG/1
5 TABLET, DELAYED RELEASE ORAL DAILY PRN
Status: DISCONTINUED | OUTPATIENT
Start: 2023-05-26 | End: 2023-06-03 | Stop reason: HOSPADM

## 2023-05-26 RX ORDER — NITROGLYCERIN 0.4 MG/1
0.4 TABLET SUBLINGUAL
Status: DISCONTINUED | OUTPATIENT
Start: 2023-05-26 | End: 2023-06-03 | Stop reason: HOSPADM

## 2023-05-26 RX ORDER — CHOLECALCIFEROL (VITAMIN D3) 125 MCG
5 CAPSULE ORAL NIGHTLY PRN
Status: DISCONTINUED | OUTPATIENT
Start: 2023-05-26 | End: 2023-05-30

## 2023-05-26 RX ORDER — BISACODYL 10 MG
10 SUPPOSITORY, RECTAL RECTAL DAILY PRN
Status: DISCONTINUED | OUTPATIENT
Start: 2023-05-26 | End: 2023-06-03 | Stop reason: HOSPADM

## 2023-05-26 RX ORDER — SODIUM CHLORIDE, SODIUM LACTATE, POTASSIUM CHLORIDE, CALCIUM CHLORIDE 600; 310; 30; 20 MG/100ML; MG/100ML; MG/100ML; MG/100ML
50 INJECTION, SOLUTION INTRAVENOUS CONTINUOUS
Status: DISCONTINUED | OUTPATIENT
Start: 2023-05-27 | End: 2023-06-03 | Stop reason: HOSPADM

## 2023-05-26 RX ADMIN — HYDROMORPHONE HYDROCHLORIDE 0.25 MG: 1 INJECTION, SOLUTION INTRAMUSCULAR; INTRAVENOUS; SUBCUTANEOUS at 23:36

## 2023-05-26 RX ADMIN — Medication 10 ML: at 23:36

## 2023-05-26 RX ADMIN — HYDROMORPHONE HYDROCHLORIDE 0.25 MG: 1 INJECTION, SOLUTION INTRAMUSCULAR; INTRAVENOUS; SUBCUTANEOUS at 17:58

## 2023-05-26 RX ADMIN — HYDROMORPHONE HYDROCHLORIDE 0.25 MG: 1 INJECTION, SOLUTION INTRAMUSCULAR; INTRAVENOUS; SUBCUTANEOUS at 21:32

## 2023-05-26 RX ADMIN — ESCITALOPRAM OXALATE 10 MG: 10 TABLET ORAL at 23:36

## 2023-05-26 RX ADMIN — SODIUM CHLORIDE, POTASSIUM CHLORIDE, SODIUM LACTATE AND CALCIUM CHLORIDE 50 ML/HR: 600; 310; 30; 20 INJECTION, SOLUTION INTRAVENOUS at 23:36

## 2023-05-26 NOTE — PROGRESS NOTES
Synopsis  Nursing report ED to floor  Jennifer Blackmon  72 y.o.  female    HPI:   Chief Complaint   Patient presents with    Hip Pain     -pt fell, cant extend left hip, hematoma of back of head/left side, aox4, on plavix        Admitting doctor:   Deepika Ortega DO    Admitting diagnosis:   The primary encounter diagnosis was Left displaced femoral neck fracture. Diagnoses of Contusion of left temporofrontal scalp, initial encounter, Concussion with loss of consciousness of 30 minutes or less, initial encounter, and Contusion of left hip and thigh, initial encounter were also pertinent to this visit.    Code status:   Current Code Status       Date Active Code Status Order ID Comments User Context       Prior            Allergies:   Patient has no known allergies.    Isolation:  No active isolations     Fall Risk:  Fall Risk Assessment was completed, and patient is at high risk for falls.   Predictive Model Details         11 (Low) Factor Value    Calculated 5/26/2023 18:04 Age 72    Risk of Fall Model Musculoskeletal Assessment WDL     Active Peripheral IV Present     Imaging order in this encounter Present     Skin Assessment WDL     Financial Class Other     Magnesium not on file     Diastolic BP 60     Drug Use No     Tobacco Use Quit     Aleksandar Scale not on file     Calcium 8.8 mg/dL     Respiratory Rate 12     Peripheral Vascular Assessment WDL     Albumin 3.8 g/dL     Gastrointestinal Assessment WDL     Number of Distinct Medication Classes administered 1     Creatinine 1.04 mg/dL     Number of administrations of Analgesic Narcotics 1     Cardiac Assessment WDL     ALT 22 U/L     Potassium 4.5 mmol/L     Chloride 106 mmol/L     Total Bilirubin 0.4 mg/dL     Days after Admission 0.083         Weight:       05/26/23  1614   Weight: 60.3 kg (133 lb)       Intake and Output  No intake or output data in the 24 hours ending 05/26/23 1944    Diet:        Most recent vitals:   Vitals:    05/26/23 1624 05/26/23 1659  05/26/23 1701 05/26/23 1901   BP: 132/60   128/65   Pulse: 67   69   Resp: 12      Temp: 97.9 °F (36.6 °C)      TempSrc: Oral      SpO2: 93% (S) (!) 88% 97% 96%   Weight:       Height:           Active LDAs/IV Access:   Lines, Drains & Airways       Active LDAs       Name Placement date Placement time Site Days    Peripheral IV 05/26/23 1612 Right Antecubital 05/26/23  1612  Antecubital  less than 1                    Skin Condition:   Skin Assessments (last day)       None             Labs (abnormal labs have a star):   Labs Reviewed   COMPREHENSIVE METABOLIC PANEL - Abnormal; Notable for the following components:       Result Value    Glucose 101 (*)     Creatinine 1.04 (*)     eGFR 56.5 (*)     All other components within normal limits    Narrative:     GFR Normal >60  Chronic Kidney Disease <60  Kidney Failure <15    The GFR formula is only valid for adults with stable renal function between ages 18 and 70.   SINGLE HSTROPONIN T - Abnormal; Notable for the following components:    HS Troponin T 11 (*)     All other components within normal limits    Narrative:     High Sensitive Troponin T Reference Range:  <10.0 ng/L- Negative Female for AMI  <15.0 ng/L- Negative Male for AMI  >=10 - Abnormal Female indicating possible myocardial injury.  >=15 - Abnormal Male indicating possible myocardial injury.   Clinicians would have to utilize clinical acumen, EKG, Troponin, and serial changes to determine if it is an Acute Myocardial Infarction or myocardial injury due to an underlying chronic condition.        APTT - Abnormal; Notable for the following components:    PTT 26.1 (*)     All other components within normal limits   PROTIME-INR - Normal   CK - Normal   CBC WITH AUTO DIFFERENTIAL - Normal   CBC AND DIFFERENTIAL    Narrative:     The following orders were created for panel order CBC & Differential.  Procedure                               Abnormality         Status                     ---------                                -----------         ------                     CBC Auto Differential[[id number]]        Normal              Final result                 Please view results for these tests on the individual orders.       LOC: Person, Place, Time, and Situation    Telemetry:  Telemetry    Cardiac Monitoring Ordered: yes    EKG:   ECG 12 Lead Syncope   Preliminary Result   HEART RATE= 67  bpm   RR Interval= 892  ms   IA Interval= 174  ms   P Horizontal Axis= 1  deg   P Front Axis= 59  deg   QRSD Interval= 93  ms   QT Interval= 427  ms   QRS Axis= 49  deg   T Wave Axis= 62  deg   - OTHERWISE NORMAL ECG -   Sinus rhythm   Low voltage, precordial leads   When compared with ECG of 01-Sep-2022 11:59:28,   No significant change   Electronically Signed By:    Date and Time of Study: 2023 16:47:45          Medications Given in the ED:   Medications   HYDROmorphone (DILAUDID) injection 0.25 mg (0.25 mg Intravenous Given 23 2481)       Imaging results:  CT Head Without Contrast    Result Date: 2023  Impression: No acute intracranial pathology. Electronically Signed: Poncho Capellan  2023 5:50 PM EDT  Workstation ID: [id number]    CT Pelvis Without Contrast    Result Date: 2023  Acute fracture of the left femoral neck. No additional fracture or dislocation identified. Electronically Signed: Poncho Capellan  2023 5:54 PM EDT  Workstation ID: [id number]     Social issues:   Social History     Socioeconomic History    Marital status:     Number of children: 2   Tobacco Use    Smoking status: Former     Packs/day: 0.50     Years: 4.00     Pack years: 2.00     Types: Cigarettes     Quit date:      Years since quittin.4    Smokeless tobacco: Never   Vaping Use    Vaping Use: Never used   Substance and Sexual Activity    Alcohol use: No    Drug use: No    Sexual activity: Not Currently       NIH Stroke Scale:  Interval: (not recorded)  1a. Level of Consciousness: (not recorded)  1b. LOC Questions:  (not recorded)  1c. LOC Commands: (not recorded)  2. Best Gaze: (not recorded)  3. Visual: (not recorded)  4. Facial Palsy: (not recorded)  5a. Motor Arm, Left: (not recorded)  5b. Motor Arm, Right: (not recorded)  6a. Motor Leg, Left: (not recorded)  6b. Motor Leg, Right: (not recorded)  7. Limb Ataxia: (not recorded)  8. Sensory: (not recorded)  9. Best Language: (not recorded)  10. Dysarthria: (not recorded)  11. Extinction and Inattention (formerly Neglect): (not recorded)    Total (NIH Stroke Scale): (not recorded)     Additional notable assessment information:    Nursing report ED to floor:  VANNESA Rodriguez RN   05/26/23 19:44 EDT        Other

## 2023-05-26 NOTE — H&P
[facility] Medicine Services      Patient Name: Jennifer Blackmon  : 1950  MRN: [id number]  Primary Care Physician:  Nathalia Maria APRN  Date of admission: 2023      Subjective      Chief Complaint: left hip pain    History of Present Illness: Jennifer Blackmon is a 72 y.o. female with PMHx of HTN, HLD, depression who presented to [facility]23 complaining of Left hip pain.  Patient states she fell while bending over to get dog of of crate and hit her left hip and left side of head.  Denies chest pain, dyspnea, fevers, chills, numbness, tingling, syncope.  Due to left hip pain she presented to [facility] for evaluation.    In the ER, vitals 97.9, HR 67, RR 12, /60, 97% @ 2L NC.  Labs notable for , HS troponin 11, glucose 101.  EKG NSR without gross ischemia.  CT head without acute mass, shift, hemorrhage.  CT pelvis showed acute fracture of the left femoral neck.        ROS   12 point ROS reviewed and negative except as mentioned above      Personal History     Past Medical History:   Diagnosis Date   • Allergic rhinitis    • Basal cell carcinoma (BCC) of nostril    • Coronary artery disease involving native coronary artery of native heart with angina pectoris 2019    Status post successful PCI of the coronary stent deployment to high-grade RCA stenosis after presenting with ST segment elevated microinfarction in May 2019   • DDD (degenerative disc disease), lumbar    • Depression    • Depression    • Essential hypertension 2019   • Heart attack     2019   • Hyperlipidemia    • Hyperlipidemia, mixed 2019   • Hypertension    • Insomnia    • Myocardial infarction less than 4 weeks ago 2019    Presented initially with ST segment elevated microinfarction treated emergently in [place] May 2019   • Old MI (myocardial infarction) 2019    Presented initially with ST segment elevated microinfarction treated emergently in [place] May 2019   • Osteoarthritis    • Toenail fungus        Past Surgical History:   Procedure Laterality Date   • ADENOIDECTOMY      Adenoids removed   • APPENDECTOMY     • AUGMENTATION MAMMAPLASTY     • BREAST IMPLANT SURGERY     • CATARACT EXTRACTION Bilateral 2017   • CERVICAL DISC SURGERY      ruptured and removed    •  SECTION     • CHEST SURGERY      attempted pectus repair    • EYE SURGERY  1997    AK   • HYSTERECTOMY     • KNEE ARTHROSCOPY Right    • LUMBAR DISC SURGERY      ruptured and removed    • NASAL SEPTAL RECONSTRUCTION     • PECTUS CARNATUM REPAIR      attempted   • RECTAL SURGERY      Repair   • REFRACTIVE SURGERY      RK/AK both eyes    • REFRACTIVE SURGERY     • SOMNOPLASTY RADIAL FREQUENCY ABLATION     • TONSILLECTOMY     • UVULOPALATOPHARYNGOPLASTY         Family History: family history includes ADD / ADHD in her daughter; Alcohol abuse in her brother; Alzheimer's disease in her mother; Anxiety disorder in her brother; Bipolar disorder in her daughter; Depression in her brother; HIV in her daughter; Heart attack in her brother and sister; Leukemia in her father; No Known Problems in her brother; Other in her daughter, father, and mother. Otherwise pertinent FHx was reviewed and not pertinent to current issue.    Social History:  reports that she quit smoking about 51 years ago. Her smoking use included cigarettes. She has a 2.00 pack-year smoking history. She has never used smokeless tobacco. She reports that she does not drink alcohol and does not use drugs.    Home Medications:  Prior to Admission Medications     Prescriptions Last Dose Informant Patient Reported? Taking?    atorvastatin (LIPITOR) 40 MG tablet   No No    Take 0.5 tablets by mouth Daily.    buPROPion XL (WELLBUTRIN XL) 150 MG 24 hr tablet   No No    Take 3 tablets by mouth Every Morning.    Calcium Citrate-Vitamin D 500-400 MG-UNIT chewable tablet   Yes No    Chew Daily.    carvedilol (COREG)  3.125 MG tablet   No No    Take 1 tablet by mouth 2 (Two) Times a Day.    clobetasol (TEMOVATE) 0.05 % external solution   No No    Apply  topically to the appropriate area as directed 2 (Two) Times a Day.    clopidogrel (PLAVIX) 75 MG tablet   No No    Take 1 tablet by mouth Daily.    coenzyme Q10 100 MG capsule   Yes No    Take 1 capsule by mouth.    escitalopram (LEXAPRO) 20 MG tablet   No No    TAKE ONE-HALF (1/2) TABLET TWICE A DAY    Melatonin 10 MG sublingual tablet   Yes No    Place  under the tongue.    Multiple Vitamins-Minerals (Multi-Vitamin Gummies) chewable tablet   Yes No    Chew.            Allergies:  No Known Allergies    Objective      Vitals:   Temp:  [97.9 °F (36.6 °C)] 97.9 °F (36.6 °C)  Heart Rate:  [67] 67  Resp:  [12] 12  BP: (132)/(60) 132/60  Flow (L/min):  [2] 2    Physical Exam  Constitutional:       General: She is not in acute distress.     Appearance: Normal appearance. She is not toxic-appearing.   HENT:      Head:      Comments: Left parietal hematoma from fall     Nose: Nose normal. No congestion.      Mouth/Throat:      Pharynx: Oropharynx is clear. No oropharyngeal exudate.   Eyes:      General: No scleral icterus.  Cardiovascular:      Rate and Rhythm: Normal rate and regular rhythm.      Heart sounds: No murmur heard.    No friction rub. No gallop.   Pulmonary:      Effort: No respiratory distress.      Breath sounds: No wheezing or rales.   Abdominal:      General: There is no distension.      Tenderness: There is no abdominal tenderness. There is no guarding.   Musculoskeletal:         General: No swelling or deformity.      Cervical back: Normal range of motion. No rigidity.      Right lower leg: No edema.      Left lower leg: No edema.      Comments: Left leg if flexed, abducted, and externally rotated   Skin:     Coloration: Skin is not jaundiced.      Findings: No bruising or lesion.   Neurological:      General: No focal deficit present.      Mental Status: She is alert  "and oriented to person, place, and time.      Motor: Weakness present.          Result Review    Result Review:  I have personally reviewed the results from the time of this admission to 5/26/2023 19:13 EDT and agree with these findings:  [x]  Laboratory  []  Microbiology  [x]  Radiology  [x]  EKG/Telemetry   []  Cardiology/Vascular   []  Pathology  [x]  Old records  []  Other:        Assessment & Plan        Active Hospital Problems:  There are no active hospital problems to display for this patient.    Plan:     #Mechanical Fall  #Left femoral neck fracture    - mechanical fall getting dog out of crate    - CT head without acute mass, shift, hemorrage but does show left parietal extracalvarial hematoma    - CT pelvis shows acute fracture of the left femoral neck    - ortho consulted, surgery planned tomorrow or Sunday    - regular diet, NPO after midnight    - pain control    - pt/ot    - LR @ 50/hr    - hold home plavix in anticipation of surgery    #CAD    - hold home plavix in anticipation of surgery    - continue home statin    - EKG NSR without gross ischemia    - contiue home Coreg    - s/p PCI 2019    - Per cardiology note on 3/14/2023: \"no indication for ischemic evaluation\" and \"normal ischemic evaluation or at least low risk 2021\"    #CKD3a    - Cr 1.04, appears near base, follow labs    #HTN    - continue home Coreg    #HLD    - continue home statin    #Depression    - continue home lexapro    - continue home Wellbutrin    DVT prophylaxis: Heparin BID, will need to hold dose before planned surgery    CODE STATUS:       Admission Status:  I believe this patient meets observation status.    I discussed the patient's findings and my recommendations with patient.    This patient has been examined wearing appropriate Personal Protective Equipment and discussed with Jaspreet. 05/26/23      Signature: Electronically signed by Deepika Ortega DO, 05/26/23, 7:28 PM EDT.    "

## 2023-05-26 NOTE — ED PROVIDER NOTES
Subjective   History of Present Illness  72-year-old female reports she tripped and fell while getting a dog out of a crate.  She states she hit her head.  Her  states that he saw it happened and stated the patient had a loss of consciousness for a minute or 2.  The patient was unable to stand secondary to severe pain in her left hip.  She also reported that she felt lightheaded.  She reports that she had some brief nausea but that is resolved.  She denies neck pain or stiffness.  She denies chest pain or shortness of breath she states that she has some nausea but no actual vomiting or diarrhea        Review of Systems   Constitutional: Negative for chills, diaphoresis, fatigue and fever.   Respiratory: Negative for shortness of breath.    Cardiovascular: Negative for chest pain.   Gastrointestinal: Positive for nausea.   Genitourinary: Positive for pelvic pain.   Musculoskeletal: Positive for joint swelling. Negative for back pain and neck pain.   Neurological: Positive for headaches. Negative for syncope.   All other systems reviewed and are negative.      Past Medical History:   Diagnosis Date   • Allergic rhinitis    • Basal cell carcinoma (BCC) of nostril    • Coronary artery disease involving native coronary artery of native heart with angina pectoris 06/18/2019    Status post successful PCI of the coronary stent deployment to high-grade RCA stenosis after presenting with ST segment elevated microinfarction in May 2019   • DDD (degenerative disc disease), lumbar    • Depression    • Depression    • Essential hypertension 06/18/2019   • Heart attack     6/2/2019   • Hyperlipidemia    • Hyperlipidemia, mixed 08/13/2019   • Hypertension    • Insomnia    • Myocardial infarction less than 4 weeks ago 06/18/2019    Presented initially with ST segment elevated microinfarction treated emergently in [place] May 2019   • Old MI (myocardial infarction) 06/18/2019    Presented initially with ST segment  elevated microinfarction treated emergently in [place] May 2019   • Osteoarthritis    • Toenail fungus        No Known Allergies    Past Surgical History:   Procedure Laterality Date   • ADENOIDECTOMY      Adenoids removed   • APPENDECTOMY     • AUGMENTATION MAMMAPLASTY     • BREAST IMPLANT SURGERY     • CATARACT EXTRACTION Bilateral 2017   • CERVICAL DISC SURGERY      ruptured and removed    •  SECTION     • CHEST SURGERY      attempted pectus repair    • EYE SURGERY  1997    AK   • HYSTERECTOMY     • KNEE ARTHROSCOPY Right    • LUMBAR DISC SURGERY      ruptured and removed    • NASAL SEPTAL RECONSTRUCTION     • PECTUS CARNATUM REPAIR      attempted   • RECTAL SURGERY      Repair   • REFRACTIVE SURGERY      RK/AK both eyes    • REFRACTIVE SURGERY     • SOMNOPLASTY RADIAL FREQUENCY ABLATION     • TONSILLECTOMY     • UVULOPALATOPHARYNGOPLASTY         Family History   Problem Relation Age of Onset   • Alzheimer's disease Mother    • Other Mother         CVA, acute myocardial infarction   • Leukemia Father    • Other Father         Parkinson's    • Heart attack Sister    • No Known Problems Brother    • Bipolar disorder Daughter    • Heart attack Brother    • Alcohol abuse Brother    • Anxiety disorder Brother    • Depression Brother    • ADD / ADHD Daughter    • Other Daughter         alcohol and drug addiction   • HIV Daughter        Social History     Socioeconomic History   • Marital status:    • Number of children: 2   Tobacco Use   • Smoking status: Former     Packs/day: 0.50     Years: 4.00     Pack years: 2.00     Types: Cigarettes     Quit date:      Years since quittin.4   • Smokeless tobacco: Never   Vaping Use   • Vaping Use: Never used   Substance and Sexual Activity   • Alcohol use: No   • Drug use: No   • Sexual activity: Not Currently           Objective   Physical Exam  Alert [place] Coma Scale 15   HEENT: Pupils equal and reactive to light. Conjunctivae  are not injected. Normal tympanic membranes. Oropharynx and nares are normal.  Parietal aspect on the left side.  There is no palpable fracture or step-off   Neck: Supple. Midline heme is noted to the occipital trachea. No JVD. No goiter.   Chest: Clear and equal breath sounds bilaterally, regular rate and rhythm without murmur or rub.   Abdomen: Positive bowel sounds, nontender, nondistended. No rebound or peritoneal signs. No CVA tenderness.   Extremities no clubbing. cyanosis or edema. Motor sensory exam is normal. The full range of motion is intact the patient has point tenderness noted over the left greater trochanter.  Patient was field splinted with adduction type technique   Skin: Warm and dry, no rashes or petechia.   Lymphatic: No regional lymphadenopathy. No calf pain, swelling or Homans sign    Procedures           ED Course      Labs Reviewed   COMPREHENSIVE METABOLIC PANEL - Abnormal; Notable for the following components:       Result Value    Glucose 101 (*)     Creatinine 1.04 (*)     eGFR 56.5 (*)     All other components within normal limits    Narrative:     GFR Normal >60  Chronic Kidney Disease <60  Kidney Failure <15    The GFR formula is only valid for adults with stable renal function between ages 18 and 70.   SINGLE HSTROPONIN T - Abnormal; Notable for the following components:    HS Troponin T 11 (*)     All other components within normal limits    Narrative:     High Sensitive Troponin T Reference Range:  <10.0 ng/L- Negative Female for AMI  <15.0 ng/L- Negative Male for AMI  >=10 - Abnormal Female indicating possible myocardial injury.  >=15 - Abnormal Male indicating possible myocardial injury.   Clinicians would have to utilize clinical acumen, EKG, Troponin, and serial changes to determine if it is an Acute Myocardial Infarction or myocardial injury due to an underlying chronic condition.        APTT - Abnormal; Notable for the following components:    PTT 26.1 (*)     All other  components within normal limits   PROTIME-INR - Normal   CK - Normal   CBC WITH AUTO DIFFERENTIAL - Normal   CBC AND DIFFERENTIAL    Narrative:     The following orders were created for panel order CBC & Differential.  Procedure                               Abnormality         Status                     ---------                               -----------         ------                     CBC Auto Differential[[id number]]        Normal              Final result                 Please view results for these tests on the individual orders.     Medications   HYDROmorphone (DILAUDID) injection 0.25 mg (0.25 mg Intravenous Given 5/26/23 1758)     CT Head Without Contrast    Result Date: 5/26/2023  Impression: No acute intracranial pathology. Electronically Signed: Poncho Capellan  5/26/2023 5:50 PM EDT  Workstation ID: [id number]    CT Pelvis Without Contrast    Result Date: 5/26/2023  Acute fracture of the left femoral neck. No additional fracture or dislocation identified. Electronically Signed: Poncho Capellan  5/26/2023 5:54 PM EDT  Workstation ID: [id number]                                         Medical Decision Making  Pain was controlled in the emergency department [place] Coma Scale remained 15 the case was discussed the hospitalist service we will obtain orthopedic consultation the patient was agreeable with this plan of treatment    Amount and/or Complexity of Data Reviewed  Independent Historian: spouse and EMS     Details: Patient received 50 mics of fentanyl in route to the hospital  Labs: ordered. Decision-making details documented in ED Course.  Radiology: ordered and independent interpretation performed.  ECG/medicine tests: ordered.      Risk  Prescription drug management.  Parenteral controlled substances.  Decision regarding hospitalization.    Risk Details: Risk of orthopedic complication, risk of delayed head injury presentation    Case was discussed with orthopedist.  Anticipate delay in surgery  secondary to Plavix therapy    Final diagnoses:   Left displaced femoral neck fracture   Contusion of left temporofrontal scalp, initial encounter   Concussion with loss of consciousness of 30 minutes or less, initial encounter   Contusion of left hip and thigh, initial encounter       ED Disposition  ED Disposition     ED Disposition   Decision to Admit    Condition   --    Comment   --             No follow-up provider specified.       Medication List      No changes were made to your prescriptions during this visit.          Koko Diane MD  05/26/23 5255

## 2023-05-27 LAB
DEPRECATED RDW RBC AUTO: 42 FL (ref 37–54)
ERYTHROCYTE [DISTWIDTH] IN BLOOD BY AUTOMATED COUNT: 12.7 % (ref 12.3–15.4)
HCT VFR BLD AUTO: 35.7 % (ref 34–46.6)
HGB BLD-MCNC: 11.7 G/DL (ref 12–15.9)
MCH RBC QN AUTO: 31.2 PG (ref 26.6–33)
MCHC RBC AUTO-ENTMCNC: 32.9 G/DL (ref 31.5–35.7)
MCV RBC AUTO: 94.9 FL (ref 79–97)
PLATELET # BLD AUTO: 196 10*3/MM3 (ref 140–450)
PMV BLD AUTO: 7.6 FL (ref 6–12)
QT INTERVAL: 427 MS
RBC # BLD AUTO: 3.76 10*6/MM3 (ref 3.77–5.28)
WBC NRBC COR # BLD: 7.6 10*3/MM3 (ref 3.4–10.8)

## 2023-05-27 PROCEDURE — 25010000002 HEPARIN (PORCINE) PER 1000 UNITS: Performed by: STUDENT IN AN ORGANIZED HEALTH CARE EDUCATION/TRAINING PROGRAM

## 2023-05-27 PROCEDURE — 85027 COMPLETE CBC AUTOMATED: CPT | Performed by: STUDENT IN AN ORGANIZED HEALTH CARE EDUCATION/TRAINING PROGRAM

## 2023-05-27 PROCEDURE — 36415 COLL VENOUS BLD VENIPUNCTURE: CPT | Performed by: STUDENT IN AN ORGANIZED HEALTH CARE EDUCATION/TRAINING PROGRAM

## 2023-05-27 PROCEDURE — 25010000002 HYDROMORPHONE 1 MG/ML SOLUTION: Performed by: STUDENT IN AN ORGANIZED HEALTH CARE EDUCATION/TRAINING PROGRAM

## 2023-05-27 PROCEDURE — 25010000002 ONDANSETRON PER 1 MG: Performed by: STUDENT IN AN ORGANIZED HEALTH CARE EDUCATION/TRAINING PROGRAM

## 2023-05-27 PROCEDURE — G0378 HOSPITAL OBSERVATION PER HR: HCPCS

## 2023-05-27 PROCEDURE — 80048 BASIC METABOLIC PNL TOTAL CA: CPT | Performed by: STUDENT IN AN ORGANIZED HEALTH CARE EDUCATION/TRAINING PROGRAM

## 2023-05-27 RX ORDER — HYDROCODONE BITARTRATE AND ACETAMINOPHEN 5; 325 MG/1; MG/1
1 TABLET ORAL EVERY 6 HOURS PRN
Status: DISCONTINUED | OUTPATIENT
Start: 2023-05-27 | End: 2023-05-30

## 2023-05-27 RX ADMIN — ESCITALOPRAM OXALATE 10 MG: 10 TABLET ORAL at 09:36

## 2023-05-27 RX ADMIN — ATORVASTATIN CALCIUM 20 MG: 20 TABLET, FILM COATED ORAL at 09:36

## 2023-05-27 RX ADMIN — CARVEDILOL 3.12 MG: 3.12 TABLET, FILM COATED ORAL at 20:21

## 2023-05-27 RX ADMIN — ESCITALOPRAM OXALATE 10 MG: 10 TABLET ORAL at 20:21

## 2023-05-27 RX ADMIN — HYDROCODONE BITARTRATE AND ACETAMINOPHEN 1 TABLET: 5; 325 TABLET ORAL at 15:59

## 2023-05-27 RX ADMIN — Medication 5 MG: at 20:41

## 2023-05-27 RX ADMIN — HYDROMORPHONE HYDROCHLORIDE 0.25 MG: 1 INJECTION, SOLUTION INTRAMUSCULAR; INTRAVENOUS; SUBCUTANEOUS at 20:20

## 2023-05-27 RX ADMIN — SENNOSIDES AND DOCUSATE SODIUM 2 TABLET: 50; 8.6 TABLET ORAL at 20:21

## 2023-05-27 RX ADMIN — BUPROPION HYDROCHLORIDE 450 MG: 150 TABLET, EXTENDED RELEASE ORAL at 09:35

## 2023-05-27 RX ADMIN — HEPARIN SODIUM 5000 UNITS: 5000 INJECTION INTRAVENOUS; SUBCUTANEOUS at 20:20

## 2023-05-27 RX ADMIN — ONDANSETRON 4 MG: 2 INJECTION INTRAMUSCULAR; INTRAVENOUS at 20:21

## 2023-05-27 RX ADMIN — CARVEDILOL 3.12 MG: 3.12 TABLET, FILM COATED ORAL at 09:36

## 2023-05-27 RX ADMIN — HEPARIN SODIUM 5000 UNITS: 5000 INJECTION INTRAVENOUS; SUBCUTANEOUS at 09:35

## 2023-05-27 RX ADMIN — CLOBETASOL PROPIONATE CREAM USP, 0.05%: 0.5 CREAM TOPICAL at 20:20

## 2023-05-27 RX ADMIN — Medication 10 ML: at 20:21

## 2023-05-27 RX ADMIN — HYDROMORPHONE HYDROCHLORIDE 0.25 MG: 1 INJECTION, SOLUTION INTRAMUSCULAR; INTRAVENOUS; SUBCUTANEOUS at 05:01

## 2023-05-27 NOTE — NURSING NOTE
Pt arrived on the floor from ED around 2200, she is A&O x4. Consulted ortho for fracture, Dr. Menon stated that he will be in this morning to see the pt and also that he will not do the surgery until Monday or Tuesday. He wants the plavix to leave her system first. Pt's pain has been treated throughout shift, see MAR. Pt is resting with the call light within reach and the bed alarm is set. Ongoing care continues.

## 2023-05-27 NOTE — CONSULTS
Orthopedic Consult      Patient: Jennifer Blackmon    Date of Admission: 5/26/2023  4:05 PM    YOB: 1950    Medical Record Number: [id number]    Attending Physician: Esteban Luis DO  Consulting Physician:   Josiah Menon MD, orthopedic surgery    Chief Complaints: Left displaced femoral neck fracture [S72.002A]  Concussion with loss of consciousness of 30 minutes or less, initial encounter [S06.0X1A]  Contusion of left hip and thigh, initial encounter [S70.02XA, S70.12XA]  Contusion of left temporofrontal scalp, initial encounter [S00.03XA]      History of Present Illness: 72 y.o. female admitted to [facility] to services of Esteban Luis DO with Left displaced femoral neck fracture [S72.002A]  Concussion with loss of consciousness of 30 minutes or less, initial encounter [S06.0X1A]  Contusion of left hip and thigh, initial encounter [S70.02XA, S70.12XA]  Contusion of left temporofrontal scalp, initial encounter [S00.03XA].  was consulted for further evaluation and treatment. Onset of symptoms was sudden in onset associated with a fall.  Symptoms are associated with pain and discomfort in the left hip with shortening and external rotation deformity.  Symptoms are aggravated by flexion and extension of the hip.   Symptoms improve with strict bedrest.     No Known Allergies    Medications:   Home Medications:  Medications Prior to Admission   Medication Sig Dispense Refill Last Dose   • atorvastatin (LIPITOR) 40 MG tablet Take 0.5 tablets by mouth Daily. 45 tablet 3 5/26/2023   • buPROPion XL (WELLBUTRIN XL) 150 MG 24 hr tablet Take 3 tablets by mouth Every Morning. 270 tablet 3 5/26/2023   • Calcium Citrate-Vitamin D 500-400 MG-UNIT chewable tablet Chew Daily.   5/26/2023   • carvedilol (COREG) 3.125 MG tablet Take 1 tablet by mouth 2 (Two) Times a Day. 180 tablet 3 5/26/2023   • clobetasol (TEMOVATE) 0.05 % external solution Apply  topically to the appropriate area as directed 2 (Two) Times a  Day. 50 mL 3 5/26/2023   • clopidogrel (PLAVIX) 75 MG tablet Take 1 tablet by mouth Daily. 90 tablet 3 5/26/2023   • coenzyme Q10 100 MG capsule Take 1 capsule by mouth Daily.   5/26/2023   • escitalopram (LEXAPRO) 20 MG tablet TAKE ONE-HALF (1/2) TABLET TWICE A DAY 90 tablet 3 5/26/2023   • Multiple Vitamins-Minerals (Multi-Vitamin Gummies) chewable tablet Chew 1 tablet/day Daily.   5/26/2023   • Melatonin 10 MG sublingual tablet Place 1 tablet under the tongue At Night As Needed.          Current Medications:  Scheduled Meds:atorvastatin, 20 mg, Oral, Daily  buPROPion XL, 450 mg, Oral, Daily  carvedilol, 3.125 mg, Oral, BID  clobetasol, , Topical, Q12H  escitalopram, 10 mg, Oral, BID  heparin (porcine), 5,000 Units, Subcutaneous, Q12H  senna-docusate sodium, 2 tablet, Oral, BID  sodium chloride, 10 mL, Intravenous, Q12H      Continuous Infusions:lactated ringers, 50 mL/hr, Last Rate: 50 mL/hr (05/26/23 6758)      PRN Meds:.•  senna-docusate sodium **AND** polyethylene glycol **AND** bisacodyl **AND** bisacodyl  •  HYDROmorphone  •  melatonin  •  nitroglycerin  •  ondansetron **OR** ondansetron  •  sodium chloride  •  sodium chloride       Past Medical History:   Diagnosis Date   • Allergic rhinitis    • Basal cell carcinoma (BCC) of nostril    • Coronary artery disease involving native coronary artery of native heart with angina pectoris 06/18/2019    Status post successful PCI of the coronary stent deployment to high-grade RCA stenosis after presenting with ST segment elevated microinfarction in May 2019   • DDD (degenerative disc disease), lumbar    • Depression    • Depression    • Essential hypertension 06/18/2019   • Femoral neck fracture 05/26/2023   • Heart attack     6/2/2019   • Hyperlipidemia    • Hyperlipidemia, mixed 08/13/2019   • Hypertension    • Insomnia    • Myocardial infarction less than 4 weeks ago 06/18/2019    Presented initially with ST segment elevated microinfarction treated emergently in  [place] May 2019   • Old MI (myocardial infarction) 2019    Presented initially with ST segment elevated microinfarction treated emergently in [place] May 2019   • Osteoarthritis    • Toenail fungus         Past Surgical History:   Procedure Laterality Date   • ADENOIDECTOMY      Adenoids removed   • APPENDECTOMY     • AUGMENTATION MAMMAPLASTY     • BREAST IMPLANT SURGERY     • CATARACT EXTRACTION Bilateral 2017   • CERVICAL DISC SURGERY      ruptured and removed    •  SECTION     • CHEST SURGERY      attempted pectus repair    • EYE SURGERY  1997    AK   • HYSTERECTOMY     • KNEE ARTHROSCOPY Right    • LUMBAR DISC SURGERY      ruptured and removed    • NASAL SEPTAL RECONSTRUCTION     • PECTUS CARNATUM REPAIR      attempted   • RECTAL SURGERY      Repair   • REFRACTIVE SURGERY      RK/AK both eyes    • REFRACTIVE SURGERY     • SOMNOPLASTY RADIAL FREQUENCY ABLATION     • TONSILLECTOMY     • UVULOPALATOPHARYNGOPLASTY          Social History     Occupational History   • Occupation: Retired from [employer]   Tobacco Use   • Smoking status: Former     Packs/day: 0.50     Years: 4.00     Pack years: 2.00     Types: Cigarettes     Quit date:      Years since quittin.4   • Smokeless tobacco: Never   Vaping Use   • Vaping Use: Never used   Substance and Sexual Activity   • Alcohol use: No   • Drug use: No   • Sexual activity: Not Currently      Social History     Social History Narrative   • Not on file        Family History   Problem Relation Age of Onset   • Alzheimer's disease Mother    • Other Mother         CVA, acute myocardial infarction   • Leukemia Father    • Other Father         Parkinson's    • Heart attack Sister    • No Known Problems Brother    • Bipolar disorder Daughter    • Heart attack Brother    • Alcohol abuse Brother    • Anxiety disorder Brother    • Depression Brother    • ADD / ADHD Daughter    • Other Daughter         alcohol  and drug addiction   • HIV Daughter          Review of Systems:   HEENT: Patient denies any headaches, vision changes, change in hearing, or tinnitus.  Patient denies any rhinorrhea,epistaxis, sinus pain, mouth or dental problems, sore throat or hoarseness, or dysphagia  Pulmonary: Patient denies any cough, congestion, SOA, or wheezing  Cardiovascular: Patient denies any chest pain, dyspnea, palpitations, weakness, intolerance of exercise, varicosities, swelling of extremities, known murmur  Gastrointestinal:  Patient denies nausea, vomiting, diarrhea, constipation, loss  of appetite, change in appetite, dysphagia, gas, heartburn, melena, change in bowel habits, use of laxatives or other drugs to alter the function of the gastrointestinal tract.  Genital/Urinary: Patient denies dysuria, change in color of urine, change in frequency of urination, pain with urgency, incontinence, retention, or nocturia.  Musculoskeletal: Patient denies increased warmth; redness; or swelling of joints; limitation of function; deformity; crepitation: pain in a joint or an extremity, the neck, or the back, especially with movement.  Neurological: Patient denies dizziness, tremor, ataxia, difficulty in speaking, change in speech, paresthesia, loss of sensation, seizures, syncope, changes in memory.  Endocrine system: Patient denies tremors, palpitations, intolerance of heat or cold, polyuria, polydipsia, polyphagia, diaphoresis, exophthalmos, or goiter.  Psychological: Patient denies thoughts/plans of harming self or other; depression,  insomnia, night terrors, dinora, memory loss, disorientation.  Skin: Patient denies any bruising, rashes, discoloration, pruritus, wounds, ulcers, decubiti, changes in the hair or nails  Hematopoietic: Patient denies history of spontaneous or excessive bleeding, epistaxis, hematuria, melena, fatigue, enlarged or tender lymph nodes, pallor, history of anemia.    Physical Exam: 72 y.o. female  Vitals:     05/26/23 2208 05/27/23 0459 05/27/23 0500 05/27/23 0907   BP: 128/54 115/63  128/57   BP Location: Left arm Left arm  Left arm   Patient Position: Lying Lying  Lying   Pulse: 73 69  72   Resp: 12 14  11   Temp: 98.9 °F (37.2 °C) 98.2 °F (36.8 °C)  98.4 °F (36.9 °C)   TempSrc: Oral Oral  Oral   SpO2: 94% 95%  95%   Weight:   65.7 kg (144 lb 13.5 oz)    Height:         General Appearance:    Alert, cooperative, in no acute distress                      Head:  Normocephalic, without obvious abnormality, atraumatic   Eyes:          Lids and lashes normal, conjunctivae and sclerae normal, no icterus, no pallor, corneas clear, PERRLA   Ears:  Ears appear intact with no abnormalities noted   Throat: No oral lesions, no thrush, oral mucosa moist   Neck: No adenopathy, supple, trachea midline, no thyromegaly, no carotid bruit, no JVD   Back:   No kyphosis present, no scoliosis present, no skin lesions,    erythema or scars, no tenderness to percussion or                   palpation, range of motion normal   Lungs:   Clear to auscultation,respirations regular, even and                unlabored    Heart:  Regular rhythm and normal rate, normal S1 and S2, no         murmur, no gallop, no rub, no click   Chest Wall:  No abnormalities observed   Abdomen:   Normal bowel sounds, no masses, no organomegaly, soft     nontender, nondistended, no guarding, no rebound                tenderness   Rectal:    Deferred   Extremities: Tenderness noted at anterior and lateral aspect of the left hip.  Moves all extremities well, no       edema, no cyanosis, no redness   Pulses: Pulses palpable and equal bilaterally   Skin: No bleeding, bruising or rash   Lymph nodes: No palpable adenopathy   Neurologic: Cranial nerves 2 - 12 grossly intact, sensation intact, DTR     present and equal bilaterally      Left Hip. Skin and soft tissues are swollen and bruised consistent with fracture. There is a shortening with external rotation deformity. Patient  is unable to perform a straight leg raise exam actively or passively. Any rotation or flexion of the hip bothers the patient very significantly. The anterior joint line is exquisitely tender. IT band is painful and tender. Greater trochanter is tender. Patient is unable to bear weight on this affected extremity and has a very marked limp. There is no evidence of compartment syndrome. Dorsalis pedis and posterior tibial artery pulses are palpable. Joint line is exquisitely tender. There is no evidence of a compartmental syndrome.         Diagnostic Tests:  Admission on 05/26/2023   Component Date Value Ref Range Status   • Glucose 05/26/2023 101 (H)  65 - 99 mg/dL Final   • BUN 05/26/2023 18  8 - 23 mg/dL Final   • Creatinine 05/26/2023 1.04 (H)  0.57 - 1.00 mg/dL Final   • Sodium 05/26/2023 141  136 - 145 mmol/L Final   • Potassium 05/26/2023 4.5  3.5 - 5.2 mmol/L Final   • Chloride 05/26/2023 106  98 - 107 mmol/L Final   • CO2 05/26/2023 27.0  22.0 - 29.0 mmol/L Final   • Calcium 05/26/2023 8.8  8.6 - 10.5 mg/dL Final   • Total Protein 05/26/2023 6.1  6.0 - 8.5 g/dL Final   • Albumin 05/26/2023 3.8  3.5 - 5.2 g/dL Final   • ALT (SGPT) 05/26/2023 22  1 - 33 U/L Final   • AST (SGOT) 05/26/2023 26  1 - 32 U/L Final   • Alkaline Phosphatase 05/26/2023 79  39 - 117 U/L Final   • Total Bilirubin 05/26/2023 0.4  0.0 - 1.2 mg/dL Final   • Globulin 05/26/2023 2.3  gm/dL Final   • A/G Ratio 05/26/2023 1.7  g/dL Final   • BUN/Creatinine Ratio 05/26/2023 17.3  7.0 - 25.0 Final   • Anion Gap 05/26/2023 8.0  5.0 - 15.0 mmol/L Final   • eGFR 05/26/2023 56.5 (L)  >60.0 mL/min/1.73 Final   • QT Interval 05/26/2023 427  ms Final   • HS Troponin T 05/26/2023 11 (H)  <10 ng/L Final   • Protime 05/26/2023 10.4  9.6 - 11.7 Seconds Final   • INR 05/26/2023 0.97  0.93 - 1.10 Final   • PTT 05/26/2023 26.1 (L)  61.0 - 76.5 seconds Final   • Creatine Kinase 05/26/2023 105  20 - 180 U/L Final   • WBC 05/26/2023 6.70  3.40 - 10.80 10*3/mm3  Final   • RBC 05/26/2023 3.90  3.77 - 5.28 10*6/mm3 Final   • Hemoglobin 05/26/2023 12.4  12.0 - 15.9 g/dL Final   • Hematocrit 05/26/2023 37.4  34.0 - 46.6 % Final   • MCV 05/26/2023 96.0  79.0 - 97.0 fL Final   • MCH 05/26/2023 31.7  26.6 - 33.0 pg Final   • MCHC 05/26/2023 33.0  31.5 - 35.7 g/dL Final   • RDW 05/26/2023 12.8  12.3 - 15.4 % Final   • RDW-SD 05/26/2023 42.4  37.0 - 54.0 fl Final   • MPV 05/26/2023 7.4  6.0 - 12.0 fL Final   • Platelets 05/26/2023 226  140 - 450 10*3/mm3 Final   • Neutrophil % 05/26/2023 62.9  42.7 - 76.0 % Final   • Lymphocyte % 05/26/2023 26.0  19.6 - 45.3 % Final   • Monocyte % 05/26/2023 9.7  5.0 - 12.0 % Final   • Eosinophil % 05/26/2023 1.1  0.3 - 6.2 % Final   • Basophil % 05/26/2023 0.3  0.0 - 1.5 % Final   • Neutrophils, Absolute 05/26/2023 4.20  1.70 - 7.00 10*3/mm3 Final   • Lymphocytes, Absolute 05/26/2023 1.70  0.70 - 3.10 10*3/mm3 Final   • Monocytes, Absolute 05/26/2023 0.60  0.10 - 0.90 10*3/mm3 Final   • Eosinophils, Absolute 05/26/2023 0.10  0.00 - 0.40 10*3/mm3 Final   • Basophils, Absolute 05/26/2023 0.00  0.00 - 0.20 10*3/mm3 Final   • nRBC 05/26/2023 0.0  0.0 - 0.2 /100 WBC Final   • WBC 05/26/2023 10.30  3.40 - 10.80 10*3/mm3 Final   • RBC 05/26/2023 3.96  3.77 - 5.28 10*6/mm3 Final   • Hemoglobin 05/26/2023 12.3  12.0 - 15.9 g/dL Final   • Hematocrit 05/26/2023 37.6  34.0 - 46.6 % Final   • MCV 05/26/2023 95.0  79.0 - 97.0 fL Final   • MCH 05/26/2023 30.9  26.6 - 33.0 pg Final   • MCHC 05/26/2023 32.6  31.5 - 35.7 g/dL Final   • RDW 05/26/2023 12.5  12.3 - 15.4 % Final   • RDW-SD 05/26/2023 41.1  37.0 - 54.0 fl Final   • MPV 05/26/2023 7.4  6.0 - 12.0 fL Final   • Platelets 05/26/2023 208  140 - 450 10*3/mm3 Final   • Glucose 05/26/2023 130 (H)  65 - 99 mg/dL Final   • BUN 05/26/2023 19  8 - 23 mg/dL Final   • Creatinine 05/26/2023 0.94  0.57 - 1.00 mg/dL Final   • Sodium 05/26/2023 139  136 - 145 mmol/L Final   • Potassium 05/26/2023 4.5  3.5 - 5.2 mmol/L  Final   • Chloride 05/26/2023 105  98 - 107 mmol/L Final   • CO2 05/26/2023 26.0  22.0 - 29.0 mmol/L Final   • Calcium 05/26/2023 8.9  8.6 - 10.5 mg/dL Final   • BUN/Creatinine Ratio 05/26/2023 20.2  7.0 - 25.0 Final   • Anion Gap 05/26/2023 8.0  5.0 - 15.0 mmol/L Final   • eGFR 05/26/2023 63.8  >60.0 mL/min/1.73 Final     No results found.    Assessment:    Left displaced femoral neck fracture          Plan:  The patient voiced understanding of the risks, benefits, and alternative forms of treatment that were discussed and the patient consents to proceed with left total hip arthroplasty through direct anterior approach.   The patient has been on Plavix as of yesterday.  We are waiting for 24 hours to 48 hours to allow the Plavix to be neutralized in the system to minimize the possibility of a hemorrhage.  We will then proceed with surgical intervention in the next 2 days or so.  The patient was seen today for preoperative discussion.   The details of the surgical procedure were explained including the location of probable incisions and a description of the likely hardware/grafts to be used. The patient understands the likely convalescence after surgery as well as the rehabilitation required.  Also, we have thoroughly discussed with the patient the risks, benefits and alternatives to surgery.  Risks include but are not limited to the risk of infection, joint stiffness, limited range of motion, wound healing problems, scar tissue build up, myocardial infarction, stroke, blood clots (including DVT and/or pulmonary embolus along with the risk of death) neurologic and/or vascular injury, limb length discrepancy, fracture, dislocation, nonunion, malunion, continued pain and need for further surgery including hardware failure requiring revision.     Discharge Plan: Unknown at this time based on surgical intervention outcome to rehab      Date: 5/27/2023    Josiah Menon MD      Time: Critical care 30 min     DICTATED  UTILIZING DRAGON DICTATION   No

## 2023-05-27 NOTE — PROGRESS NOTES
[facility] Medicine Services   Daily Progress Note      Patient Name: Jennifer Blackmon  : 1950  MRN: [id number]  Primary Care Physician:  Nathalia Maria APRN  Date of admission: 2023      Subjective      Chief Complaint: Left hip pain, fall    Patient seen and examined this morning.  Still having some left-sided hip pain, denies any fever, chills, or chest pain.  Takes Plavix for history of stents with last stent in 2019 in [place].  Plavix on hold for now for surgery.  No other complaints.    Pertinent positives as noted in HPI/subjective.  All other systems were reviewed and are negative.      Objective      Vitals:   Temp:  [97.9 °F (36.6 °C)-98.9 °F (37.2 °C)] 98.4 °F (36.9 °C)  Heart Rate:  [67-73] 72  Resp:  [11-14] 11  BP: (115-132)/(54-67) 128/57  Flow (L/min):  [2-2.5] 2.5    Physical Exam:    General: Awake, alert, elderly female, lying in bed, NAD  Eyes: PERRL, EOMI, conjunctivae are clear  Cardiovascular: Regular rate and rhythm, no murmurs  Respiratory: Clear to auscultation bilaterally, no wheezing or rales, unlabored breathing  Abdomen: Soft, nontender, positive bowel sounds, no guarding  Neurologic: A&O, CN grossly intact, moves all extremities spontaneously  Musculoskeletal: Limited range of motion at left hip due to pain, left lower extremity externally rotated, intact pulses and sensation distally  Skin: Warm, dry         Result Review    Result Review:  I have personally reviewed the results from the time of this admission to 2023 11:11 EDT and agree with these findings:  [x]  Laboratory  [x]  Microbiology  [x]  Radiology  [x]  EKG/Telemetry   [x]  Cardiology/Vascular   []  Pathology  [x]  Old records  []  Other:          Assessment & Plan      Brief Patient Summary:  Jennifer Blackmon is a 72 y.o. female with PMHx of HTN, HLD, depression who presented to [facility] on 2023 complaining of Left hip pain.  Patient states she fell while bending over to get dog  of of crate and hit her left hip and left side of head. CT head without acute mass, shift, hemorrhage.  CT pelvis showed acute fracture of the left femoral neck.  Ortho consulted.  Plavix on hold for surgery, last dose was on 5/25 per the patient.      atorvastatin, 20 mg, Oral, Daily  buPROPion XL, 450 mg, Oral, Daily  carvedilol, 3.125 mg, Oral, BID  clobetasol, , Topical, Q12H  escitalopram, 10 mg, Oral, BID  heparin (porcine), 5,000 Units, Subcutaneous, Q12H  senna-docusate sodium, 2 tablet, Oral, BID  sodium chloride, 10 mL, Intravenous, Q12H       lactated ringers, 50 mL/hr, Last Rate: 50 mL/hr (05/26/23 3056)         I have utilized all available, immediate resources to obtain, update, or review the patient's current medications including all prescriptions, over-the-counter products, herbals, cannabis/cannabidiol products, and vitamin.mineral/dietary (nutritional) supplements.    Active Hospital Problems:  Active Hospital Problems    Diagnosis    • **Left displaced femoral neck fracture      Plan:     Left hip femoral neck fracture  -s/p mechanical fall  -Imaging findings noted  -Hold Plavix for surgical intervention  -Supportive care and pain control  -PT post op  -Ortho consulted    CAD  -s/p PCI in 2019  -Hold Plavix for surgical intervention  -Continue other home meds and monitor    CKD stage IIIa  -Creatinine at baseline of around 1  -Monitor    Hypertension  Hyperlipidemia  Chronic depression/anxiety  -Continue home meds, monitor    DVT prophylaxis  -Heparin subcu for now    CODE STATUS:    Code Status (Patient has no pulse and is not breathing): CPR (Attempt to Resuscitate)  Medical Interventions (Patient has pulse or is breathing): Full Support  Release to patient: Routine Release      Disposition: Likely needs placement postsurgery    Electronically signed by Esteban Luis DO, 05/27/23, 11:11 EDT.  [facility] Hospitalist Team      Part of this note may be an electronic transcription/translation  of spoken language to printed text using the Dragon Dictation System.

## 2023-05-28 LAB
ANION GAP SERPL CALCULATED.3IONS-SCNC: 10 MMOL/L (ref 5–15)
ANION GAP SERPL CALCULATED.3IONS-SCNC: 8 MMOL/L (ref 5–15)
BUN SERPL-MCNC: 14 MG/DL (ref 8–23)
BUN SERPL-MCNC: 19 MG/DL (ref 8–23)
BUN/CREAT SERPL: 18.2 (ref 7–25)
BUN/CREAT SERPL: 20.4 (ref 7–25)
CALCIUM SPEC-SCNC: 8.6 MG/DL (ref 8.6–10.5)
CALCIUM SPEC-SCNC: 8.8 MG/DL (ref 8.6–10.5)
CHLORIDE SERPL-SCNC: 100 MMOL/L (ref 98–107)
CHLORIDE SERPL-SCNC: 99 MMOL/L (ref 98–107)
CO2 SERPL-SCNC: 26 MMOL/L (ref 22–29)
CO2 SERPL-SCNC: 28 MMOL/L (ref 22–29)
CREAT SERPL-MCNC: 0.77 MG/DL (ref 0.57–1)
CREAT SERPL-MCNC: 0.93 MG/DL (ref 0.57–1)
DEPRECATED RDW RBC AUTO: 39.4 FL (ref 37–54)
EGFRCR SERPLBLD CKD-EPI 2021: 64.6 ML/MIN/1.73
EGFRCR SERPLBLD CKD-EPI 2021: 81.1 ML/MIN/1.73
ERYTHROCYTE [DISTWIDTH] IN BLOOD BY AUTOMATED COUNT: 12.1 % (ref 12.3–15.4)
GLUCOSE SERPL-MCNC: 114 MG/DL (ref 65–99)
GLUCOSE SERPL-MCNC: 90 MG/DL (ref 65–99)
HCT VFR BLD AUTO: 34.6 % (ref 34–46.6)
HGB BLD-MCNC: 11.3 G/DL (ref 12–15.9)
MCH RBC QN AUTO: 30.7 PG (ref 26.6–33)
MCHC RBC AUTO-ENTMCNC: 32.7 G/DL (ref 31.5–35.7)
MCV RBC AUTO: 93.9 FL (ref 79–97)
PLATELET # BLD AUTO: 179 10*3/MM3 (ref 140–450)
PMV BLD AUTO: 7.3 FL (ref 6–12)
POTASSIUM SERPL-SCNC: 3.9 MMOL/L (ref 3.5–5.2)
POTASSIUM SERPL-SCNC: 4.3 MMOL/L (ref 3.5–5.2)
RBC # BLD AUTO: 3.69 10*6/MM3 (ref 3.77–5.28)
SODIUM SERPL-SCNC: 135 MMOL/L (ref 136–145)
SODIUM SERPL-SCNC: 136 MMOL/L (ref 136–145)
WBC NRBC COR # BLD: 8 10*3/MM3 (ref 3.4–10.8)

## 2023-05-28 PROCEDURE — 25010000002 HEPARIN (PORCINE) PER 1000 UNITS: Performed by: STUDENT IN AN ORGANIZED HEALTH CARE EDUCATION/TRAINING PROGRAM

## 2023-05-28 PROCEDURE — 85027 COMPLETE CBC AUTOMATED: CPT | Performed by: STUDENT IN AN ORGANIZED HEALTH CARE EDUCATION/TRAINING PROGRAM

## 2023-05-28 PROCEDURE — 80048 BASIC METABOLIC PNL TOTAL CA: CPT | Performed by: STUDENT IN AN ORGANIZED HEALTH CARE EDUCATION/TRAINING PROGRAM

## 2023-05-28 RX ADMIN — BUPROPION HYDROCHLORIDE 450 MG: 150 TABLET, EXTENDED RELEASE ORAL at 08:44

## 2023-05-28 RX ADMIN — CARVEDILOL 3.12 MG: 3.12 TABLET, FILM COATED ORAL at 20:26

## 2023-05-28 RX ADMIN — HEPARIN SODIUM 5000 UNITS: 5000 INJECTION INTRAVENOUS; SUBCUTANEOUS at 08:44

## 2023-05-28 RX ADMIN — HEPARIN SODIUM 5000 UNITS: 5000 INJECTION INTRAVENOUS; SUBCUTANEOUS at 20:26

## 2023-05-28 RX ADMIN — Medication 5 MG: at 20:27

## 2023-05-28 RX ADMIN — CLOBETASOL PROPIONATE CREAM USP, 0.05%: 0.5 CREAM TOPICAL at 08:45

## 2023-05-28 RX ADMIN — HYDROCODONE BITARTRATE AND ACETAMINOPHEN 1 TABLET: 5; 325 TABLET ORAL at 20:27

## 2023-05-28 RX ADMIN — SENNOSIDES AND DOCUSATE SODIUM 2 TABLET: 50; 8.6 TABLET ORAL at 20:27

## 2023-05-28 RX ADMIN — HYDROCODONE BITARTRATE AND ACETAMINOPHEN 1 TABLET: 5; 325 TABLET ORAL at 04:12

## 2023-05-28 RX ADMIN — Medication 10 ML: at 08:45

## 2023-05-28 RX ADMIN — ATORVASTATIN CALCIUM 20 MG: 20 TABLET, FILM COATED ORAL at 08:45

## 2023-05-28 RX ADMIN — ESCITALOPRAM OXALATE 10 MG: 10 TABLET ORAL at 08:44

## 2023-05-28 RX ADMIN — CLOBETASOL PROPIONATE CREAM USP, 0.05%: 0.5 CREAM TOPICAL at 20:28

## 2023-05-28 RX ADMIN — ESCITALOPRAM OXALATE 10 MG: 10 TABLET ORAL at 20:28

## 2023-05-28 RX ADMIN — CARVEDILOL 3.12 MG: 3.12 TABLET, FILM COATED ORAL at 08:45

## 2023-05-28 NOTE — PROGRESS NOTES
[facility] Medicine Services   Daily Progress Note      Patient Name: Jennifer Blackmon  : 1950  MRN: [id number]  Primary Care Physician:  Nathalia Maria APRN  Date of admission: 2023      Subjective      Chief Complaint: Left hip pain, fall    Patient seen and examined this morning.  Hip pain is improving with p.o. meds.  Surgery planned for tomorrow or Tuesday per Ortho.  No other complaints.    Pertinent positives as noted in HPI/subjective.  All other systems were reviewed and are negative.      Objective      Vitals:   Temp:  [98.1 °F (36.7 °C)-99.2 °F (37.3 °C)] 98.1 °F (36.7 °C)  Heart Rate:  [70] 70  Resp:  [8-15] 12  BP: (128)/(59) 128/59  Flow (L/min):  [2.5] 2.5    Physical Exam:    General: Awake, alert, elderly female, lying in bed, NAD  Cardiovascular: Regular rate and rhythm, no murmurs  Respiratory: Clear to auscultation bilaterally, no wheezing or rales, unlabored breathing  Abdomen: Soft, nontender, positive bowel sounds, no guarding  Musculoskeletal: Limited range of motion at left hip due to pain, left lower extremity externally rotated, intact pulses and sensation distally  Skin: Warm, dry         Result Review    Result Review:  I have personally reviewed the results from the time of this admission to 2023 10:17 EDT and agree with these findings:  [x]  Laboratory  []  Microbiology  []  Radiology  []  EKG/Telemetry   []  Cardiology/Vascular   []  Pathology  []  Old records  []  Other:          Assessment & Plan      Brief Patient Summary:  Jennifer Blackmon is a 72 y.o. female with PMHx of HTN, HLD, depression who presented to [facility] on 2023 complaining of Left hip pain.  Patient states she fell while bending over to get dog of of crate and hit her left hip and left side of head. CT head without acute mass, shift, hemorrhage.  CT pelvis showed acute fracture of the left femoral neck.  Ortho consulted.  Plavix on hold for surgery, last dose was on   per the patient.      atorvastatin, 20 mg, Oral, Daily  buPROPion XL, 450 mg, Oral, Daily  carvedilol, 3.125 mg, Oral, BID  clobetasol, , Topical, Q12H  escitalopram, 10 mg, Oral, BID  heparin (porcine), 5,000 Units, Subcutaneous, Q12H  senna-docusate sodium, 2 tablet, Oral, BID  sodium chloride, 10 mL, Intravenous, Q12H       lactated ringers, 50 mL/hr, Last Rate: 50 mL/hr (05/26/23 9654)         I have utilized all available, immediate resources to obtain, update, or review the patient's current medications including all prescriptions, over-the-counter products, herbals, cannabis/cannabidiol products, and vitamin.mineral/dietary (nutritional) supplements.    Active Hospital Problems:  Active Hospital Problems    Diagnosis    • **Left displaced femoral neck fracture      Plan:     Left hip femoral neck fracture  -s/p mechanical fall  -Imaging findings noted  -Hold Plavix for surgical intervention  -Supportive care and pain control  -PT post op  -Ortho consulted, plan for surgery tomorrow or Tuesday    CAD  -s/p PCI in 2019  -Hold Plavix for surgical intervention  -Continue other home meds and monitor    CKD stage IIIa  -Creatinine at baseline of around 1  -Monitor    Hypertension  Hyperlipidemia  Chronic depression/anxiety  -Continue home meds, monitor    DVT prophylaxis  -Heparin subcu for now    CODE STATUS:    Code Status (Patient has no pulse and is not breathing): CPR (Attempt to Resuscitate)  Medical Interventions (Patient has pulse or is breathing): Full Support  Release to patient: Routine Release      Disposition: Likely needs placement postsurgery    Electronically signed by Esteban Luis DO, 05/28/23, 10:17 EDT.  [facility] Hospitalist Team      Part of this note may be an electronic transcription/translation of spoken language to printed text using the Dragon Dictation System.

## 2023-05-28 NOTE — PLAN OF CARE
Goal Outcome Evaluation:      Pt has been confused during shift, A&O to self and situation at times. Pain treated, see MAR. Pt is resting in bed the call light within reach and the bed alarm is set. Ongoing care will continue

## 2023-05-29 PROCEDURE — 25010000002 HEPARIN (PORCINE) PER 1000 UNITS: Performed by: STUDENT IN AN ORGANIZED HEALTH CARE EDUCATION/TRAINING PROGRAM

## 2023-05-29 PROCEDURE — 85027 COMPLETE CBC AUTOMATED: CPT | Performed by: STUDENT IN AN ORGANIZED HEALTH CARE EDUCATION/TRAINING PROGRAM

## 2023-05-29 PROCEDURE — 36415 COLL VENOUS BLD VENIPUNCTURE: CPT | Performed by: STUDENT IN AN ORGANIZED HEALTH CARE EDUCATION/TRAINING PROGRAM

## 2023-05-29 RX ADMIN — Medication 5 MG: at 20:14

## 2023-05-29 RX ADMIN — ESCITALOPRAM OXALATE 10 MG: 10 TABLET ORAL at 20:14

## 2023-05-29 RX ADMIN — CARVEDILOL 3.12 MG: 3.12 TABLET, FILM COATED ORAL at 09:04

## 2023-05-29 RX ADMIN — HYDROCODONE BITARTRATE AND ACETAMINOPHEN 1 TABLET: 5; 325 TABLET ORAL at 20:14

## 2023-05-29 RX ADMIN — ESCITALOPRAM OXALATE 10 MG: 10 TABLET ORAL at 09:04

## 2023-05-29 RX ADMIN — SENNOSIDES AND DOCUSATE SODIUM 2 TABLET: 50; 8.6 TABLET ORAL at 20:14

## 2023-05-29 RX ADMIN — HEPARIN SODIUM 5000 UNITS: 5000 INJECTION INTRAVENOUS; SUBCUTANEOUS at 20:14

## 2023-05-29 RX ADMIN — SODIUM CHLORIDE, POTASSIUM CHLORIDE, SODIUM LACTATE AND CALCIUM CHLORIDE 50 ML/HR: 600; 310; 30; 20 INJECTION, SOLUTION INTRAVENOUS at 20:07

## 2023-05-29 RX ADMIN — SENNOSIDES AND DOCUSATE SODIUM 2 TABLET: 50; 8.6 TABLET ORAL at 09:04

## 2023-05-29 RX ADMIN — ATORVASTATIN CALCIUM 20 MG: 20 TABLET, FILM COATED ORAL at 09:04

## 2023-05-29 RX ADMIN — BISACODYL 10 MG: 10 SUPPOSITORY RECTAL at 14:34

## 2023-05-29 RX ADMIN — CARVEDILOL 3.12 MG: 3.12 TABLET, FILM COATED ORAL at 20:14

## 2023-05-29 RX ADMIN — Medication 10 ML: at 20:14

## 2023-05-29 RX ADMIN — CLOBETASOL PROPIONATE CREAM USP, 0.05%: 0.5 CREAM TOPICAL at 09:04

## 2023-05-29 RX ADMIN — HEPARIN SODIUM 5000 UNITS: 5000 INJECTION INTRAVENOUS; SUBCUTANEOUS at 09:04

## 2023-05-29 RX ADMIN — CLOBETASOL PROPIONATE CREAM USP, 0.05%: 0.5 CREAM TOPICAL at 20:14

## 2023-05-29 RX ADMIN — BUPROPION HYDROCHLORIDE 450 MG: 150 TABLET, EXTENDED RELEASE ORAL at 09:04

## 2023-05-29 RX ADMIN — Medication 10 ML: at 09:04

## 2023-05-29 RX ADMIN — HYDROCODONE BITARTRATE AND ACETAMINOPHEN 1 TABLET: 5; 325 TABLET ORAL at 03:23

## 2023-05-29 NOTE — PLAN OF CARE
Goal Outcome Evaluation:   Patient disoriented to time and situation. Reoriented frequently. SCDs in place to prevent VTEs. Turned and repositioned frequently to decrease risk of skin injury. PRN pain medication administered for discomfort as needed. Oxygen in use to maintain proper O2 levels. Telemetry monitoring in place. Room near unit station, alarms on and active, nonskid footwear in use, all personal items and gordon light within reach. Plan of care ongoing.

## 2023-05-29 NOTE — PROGRESS NOTES
[facility] Medicine Services   Daily Progress Note      Patient Name: Jennifer Blackmon  : 1950  MRN: [id number]  Primary Care Physician:  Nathalia Maria APRN  Date of admission: 2023      Subjective      Chief Complaint: Left hip pain, fall    Patient seen and examined this morning.  Doing okay, pain controlled so far.  Awaiting surgical intervention possibly tomorrow.    Pertinent positives as noted in HPI/subjective.  All other systems were reviewed and are negative.      Objective      Vitals:   Temp:  [98 °F (36.7 °C)-98.6 °F (37 °C)] 98.1 °F (36.7 °C)  Heart Rate:  [66-74] 66  Resp:  [12-18] 14  BP: (101-133)/(47-71) 132/71  Flow (L/min):  [2-2.5] 2    Physical Exam:    General: Awake, alert, elderly female, lying in bed, NAD  Cardiovascular: Regular rate and rhythm, no murmurs  Respiratory: Clear to auscultation bilaterally, no wheezing or rales, unlabored breathing  Abdomen: Soft, nontender, positive bowel sounds, no guarding  Musculoskeletal: Limited range of motion at left hip due to pain, left lower extremity externally rotated, intact pulses and sensation distally  Skin: Warm, dry         Result Review    Result Review:  I have personally reviewed the results from the time of this admission to 2023 09:54 EDT and agree with these findings:  [x]  Laboratory  []  Microbiology  []  Radiology  []  EKG/Telemetry   []  Cardiology/Vascular   []  Pathology  []  Old records  []  Other:          Assessment & Plan      Brief Patient Summary:  Jennifer Blackmon is a 72 y.o. female with PMHx of HTN, HLD, depression who presented to [facility] on 2023 complaining of Left hip pain.  Patient states she fell while bending over to get dog of of crate and hit her left hip and left side of head. CT head without acute mass, shift, hemorrhage.  CT pelvis showed acute fracture of the left femoral neck.  Ortho consulted.  Plavix on hold for surgery, last dose was on  per the  patient.      atorvastatin, 20 mg, Oral, Daily  buPROPion XL, 450 mg, Oral, Daily  carvedilol, 3.125 mg, Oral, BID  clobetasol, , Topical, Q12H  escitalopram, 10 mg, Oral, BID  heparin (porcine), 5,000 Units, Subcutaneous, Q12H  senna-docusate sodium, 2 tablet, Oral, BID  sodium chloride, 10 mL, Intravenous, Q12H       lactated ringers, 50 mL/hr, Last Rate: 50 mL/hr (05/26/23 6194)         I have utilized all available, immediate resources to obtain, update, or review the patient's current medications including all prescriptions, over-the-counter products, herbals, cannabis/cannabidiol products, and vitamin.mineral/dietary (nutritional) supplements.    Active Hospital Problems:  Active Hospital Problems    Diagnosis    • **Left displaced femoral neck fracture      Plan:     Left hip femoral neck fracture  -s/p mechanical fall  -Imaging findings noted  -Hold Plavix for surgical intervention  -Supportive care and pain control  -PT post op  -Ortho following, surgical intervention planned    CAD  -s/p PCI in 2019  -Hold Plavix for surgical intervention  -Continue other home meds and monitor    CKD stage IIIa  -Creatinine at baseline of around 1  -Monitor    Hypertension  Hyperlipidemia  Chronic depression/anxiety  -Continue home meds, monitor    DVT prophylaxis  -Heparin subcu for now    CODE STATUS:    Code Status (Patient has no pulse and is not breathing): CPR (Attempt to Resuscitate)  Medical Interventions (Patient has pulse or is breathing): Full Support  Release to patient: Routine Release      Disposition: Likely needs placement postsurgery    Electronically signed by Esteban Luis DO, 05/29/23, 09:54 EDT.  [facility] Hospitalist Team      Part of this note may be an electronic transcription/translation of spoken language to printed text using the Dragon Dictation System.

## 2023-05-29 NOTE — PLAN OF CARE
Goal Outcome Evaluation:  Plan of Care Reviewed With: patient        Progress: no change  Outcome Evaluation: pt. to undergo surgery tomorrow, NPO at midnight. Family made aware.

## 2023-05-30 ENCOUNTER — APPOINTMENT (OUTPATIENT)
Dept: GENERAL RADIOLOGY | Facility: HOSPITAL | Age: 73
DRG: 522 | End: 2023-05-30
Payer: MEDICARE

## 2023-05-30 ENCOUNTER — ANESTHESIA (OUTPATIENT)
Dept: PERIOP | Facility: HOSPITAL | Age: 73
DRG: 522 | End: 2023-05-30
Payer: MEDICARE

## 2023-05-30 ENCOUNTER — ANESTHESIA EVENT (OUTPATIENT)
Dept: PERIOP | Facility: HOSPITAL | Age: 73
DRG: 522 | End: 2023-05-30
Payer: MEDICARE

## 2023-05-30 LAB
ABO GROUP BLD: NORMAL
BLD GP AB SCN SERPL QL: NEGATIVE
DEPRECATED RDW RBC AUTO: 40.3 FL (ref 37–54)
DEPRECATED RDW RBC AUTO: 42 FL (ref 37–54)
ERYTHROCYTE [DISTWIDTH] IN BLOOD BY AUTOMATED COUNT: 12.3 % (ref 12.3–15.4)
ERYTHROCYTE [DISTWIDTH] IN BLOOD BY AUTOMATED COUNT: 12.4 % (ref 12.3–15.4)
HCT VFR BLD AUTO: 28.6 % (ref 34–46.6)
HCT VFR BLD AUTO: 31.5 % (ref 34–46.6)
HCT VFR BLD AUTO: 32.8 % (ref 34–46.6)
HGB BLD-MCNC: 10.3 G/DL (ref 12–15.9)
HGB BLD-MCNC: 10.9 G/DL (ref 12–15.9)
HGB BLD-MCNC: 9.6 G/DL (ref 12–15.9)
MCH RBC QN AUTO: 31.1 PG (ref 26.6–33)
MCH RBC QN AUTO: 31.1 PG (ref 26.6–33)
MCHC RBC AUTO-ENTMCNC: 33.2 G/DL (ref 31.5–35.7)
MCHC RBC AUTO-ENTMCNC: 33.5 G/DL (ref 31.5–35.7)
MCV RBC AUTO: 92.6 FL (ref 79–97)
MCV RBC AUTO: 93.7 FL (ref 79–97)
PLATELET # BLD AUTO: 187 10*3/MM3 (ref 140–450)
PLATELET # BLD AUTO: 190 10*3/MM3 (ref 140–450)
PMV BLD AUTO: 7.7 FL (ref 6–12)
PMV BLD AUTO: 7.9 FL (ref 6–12)
RBC # BLD AUTO: 3.09 10*6/MM3 (ref 3.77–5.28)
RBC # BLD AUTO: 3.5 10*6/MM3 (ref 3.77–5.28)
RH BLD: POSITIVE
T&S EXPIRATION DATE: NORMAL
WBC NRBC COR # BLD: 10 10*3/MM3 (ref 3.4–10.8)
WBC NRBC COR # BLD: 8.1 10*3/MM3 (ref 3.4–10.8)

## 2023-05-30 PROCEDURE — 85027 COMPLETE CBC AUTOMATED: CPT | Performed by: INTERNAL MEDICINE

## 2023-05-30 PROCEDURE — 86850 RBC ANTIBODY SCREEN: CPT | Performed by: ORTHOPAEDIC SURGERY

## 2023-05-30 PROCEDURE — 72170 X-RAY EXAM OF PELVIS: CPT

## 2023-05-30 PROCEDURE — 86900 BLOOD TYPING SEROLOGIC ABO: CPT

## 2023-05-30 PROCEDURE — 25010000002 CEFAZOLIN PER 500 MG: Performed by: ORTHOPAEDIC SURGERY

## 2023-05-30 PROCEDURE — 25010000002 SUGAMMADEX 200 MG/2ML SOLUTION: Performed by: NURSE ANESTHETIST, CERTIFIED REGISTERED

## 2023-05-30 PROCEDURE — 86901 BLOOD TYPING SEROLOGIC RH(D): CPT | Performed by: ORTHOPAEDIC SURGERY

## 2023-05-30 PROCEDURE — 25010000002 PHENYLEPHRINE 10 MG/ML SOLUTION 5 ML VIAL: Performed by: NURSE ANESTHETIST, CERTIFIED REGISTERED

## 2023-05-30 PROCEDURE — 86901 BLOOD TYPING SEROLOGIC RH(D): CPT

## 2023-05-30 PROCEDURE — 85014 HEMATOCRIT: CPT | Performed by: ORTHOPAEDIC SURGERY

## 2023-05-30 PROCEDURE — C1713 ANCHOR/SCREW BN/BN,TIS/BN: HCPCS | Performed by: ORTHOPAEDIC SURGERY

## 2023-05-30 PROCEDURE — 86900 BLOOD TYPING SEROLOGIC ABO: CPT | Performed by: ORTHOPAEDIC SURGERY

## 2023-05-30 PROCEDURE — 25010000002 EPINEPHRINE 1 MG/ML SOLUTION: Performed by: ORTHOPAEDIC SURGERY

## 2023-05-30 PROCEDURE — 0SRB04A REPLACEMENT OF LEFT HIP JOINT WITH CERAMIC ON POLYETHYLENE SYNTHETIC SUBSTITUTE, UNCEMENTED, OPEN APPROACH: ICD-10-PCS | Performed by: ORTHOPAEDIC SURGERY

## 2023-05-30 PROCEDURE — 25010000002 HYDROMORPHONE 1 MG/ML SOLUTION: Performed by: NURSE ANESTHETIST, CERTIFIED REGISTERED

## 2023-05-30 PROCEDURE — 76000 FLUOROSCOPY <1 HR PHYS/QHP: CPT

## 2023-05-30 PROCEDURE — 80048 BASIC METABOLIC PNL TOTAL CA: CPT | Performed by: INTERNAL MEDICINE

## 2023-05-30 PROCEDURE — 25010000002 PROPOFOL 200 MG/20ML EMULSION: Performed by: NURSE ANESTHETIST, CERTIFIED REGISTERED

## 2023-05-30 PROCEDURE — 25010000002 CLONIDINE PER 1 MG: Performed by: ORTHOPAEDIC SURGERY

## 2023-05-30 PROCEDURE — 25010000002 DEXAMETHASONE PER 1 MG: Performed by: NURSE ANESTHETIST, CERTIFIED REGISTERED

## 2023-05-30 PROCEDURE — 25010000002 ROPIVACAINE PER 1 MG: Performed by: ORTHOPAEDIC SURGERY

## 2023-05-30 PROCEDURE — 85018 HEMOGLOBIN: CPT | Performed by: ORTHOPAEDIC SURGERY

## 2023-05-30 PROCEDURE — 25010000002 KETOROLAC TROMETHAMINE PER 15 MG: Performed by: ORTHOPAEDIC SURGERY

## 2023-05-30 PROCEDURE — C1776 JOINT DEVICE (IMPLANTABLE): HCPCS | Performed by: ORTHOPAEDIC SURGERY

## 2023-05-30 PROCEDURE — 25010000002 ONDANSETRON PER 1 MG: Performed by: NURSE ANESTHETIST, CERTIFIED REGISTERED

## 2023-05-30 PROCEDURE — 25010000002 FENTANYL CITRATE (PF) 100 MCG/2ML SOLUTION: Performed by: NURSE ANESTHETIST, CERTIFIED REGISTERED

## 2023-05-30 PROCEDURE — 73501 X-RAY EXAM HIP UNI 1 VIEW: CPT

## 2023-05-30 DEVICE — SUT NONABS MAXBRAID/PE NMBR2 C7 38IN BLU 900335: Type: IMPLANTABLE DEVICE | Site: HIP | Status: FUNCTIONAL

## 2023-05-30 DEVICE — SHLL ACET OSSEOTI G7 4H SZF 54MM: Type: IMPLANTABLE DEVICE | Site: HIP | Status: FUNCTIONAL

## 2023-05-30 DEVICE — LINER ACET G7 VIVACIT/E NTRL HXPE SZF 40MM: Type: IMPLANTABLE DEVICE | Site: HIP | Status: FUNCTIONAL

## 2023-05-30 DEVICE — CP HIP UPCHRG OSSEOTI LTD HL CUPS: Type: IMPLANTABLE DEVICE | Site: HIP | Status: FUNCTIONAL

## 2023-05-30 DEVICE — TOTAL HIP PRIMARY: Type: IMPLANTABLE DEVICE | Site: HIP | Status: FUNCTIONAL

## 2023-05-30 DEVICE — HEMOST ABS SURGICEL PWDR 3GM: Type: IMPLANTABLE DEVICE | Site: HIP | Status: FUNCTIONAL

## 2023-05-30 DEVICE — DEV CONTRL TISS STRATAFIX SPIRAL MNCRYL UD 3/0 PLS 30CM: Type: IMPLANTABLE DEVICE | Site: HIP | Status: FUNCTIONAL

## 2023-05-30 DEVICE — STEM FEM/HIP AVENIRCOMPLETE COLAR STFF HA SZ6: Type: IMPLANTABLE DEVICE | Site: HIP | Status: FUNCTIONAL

## 2023-05-30 DEVICE — BIOLOX® DELTA, CERAMIC FEMORAL HEAD, M, Ø 40/0, TAPER 12/14
Type: IMPLANTABLE DEVICE | Site: HIP | Status: FUNCTIONAL
Brand: BIOLOX® DELTA

## 2023-05-30 DEVICE — SCRW ACET CORT TRILOGY S/TAP 6.5X25: Type: IMPLANTABLE DEVICE | Site: HIP | Status: FUNCTIONAL

## 2023-05-30 RX ORDER — NALOXONE HCL 0.4 MG/ML
0.4 VIAL (ML) INJECTION AS NEEDED
Status: DISCONTINUED | OUTPATIENT
Start: 2023-05-30 | End: 2023-05-30 | Stop reason: HOSPADM

## 2023-05-30 RX ORDER — ONDANSETRON 2 MG/ML
4 INJECTION INTRAMUSCULAR; INTRAVENOUS ONCE AS NEEDED
Status: DISCONTINUED | OUTPATIENT
Start: 2023-05-30 | End: 2023-05-30 | Stop reason: HOSPADM

## 2023-05-30 RX ORDER — ONDANSETRON 4 MG/1
4 TABLET, FILM COATED ORAL EVERY 6 HOURS PRN
Status: DISCONTINUED | OUTPATIENT
Start: 2023-05-30 | End: 2023-06-03 | Stop reason: HOSPADM

## 2023-05-30 RX ORDER — OXYCODONE HYDROCHLORIDE 5 MG/1
5 TABLET ORAL ONCE AS NEEDED
Status: DISCONTINUED | OUTPATIENT
Start: 2023-05-30 | End: 2023-05-30 | Stop reason: HOSPADM

## 2023-05-30 RX ORDER — EPHEDRINE SULFATE 5 MG/ML
5 INJECTION INTRAVENOUS ONCE AS NEEDED
Status: DISCONTINUED | OUTPATIENT
Start: 2023-05-30 | End: 2023-05-30 | Stop reason: HOSPADM

## 2023-05-30 RX ORDER — LABETALOL HYDROCHLORIDE 5 MG/ML
5 INJECTION, SOLUTION INTRAVENOUS
Status: DISCONTINUED | OUTPATIENT
Start: 2023-05-30 | End: 2023-05-30 | Stop reason: HOSPADM

## 2023-05-30 RX ORDER — IPRATROPIUM BROMIDE AND ALBUTEROL SULFATE 2.5; .5 MG/3ML; MG/3ML
3 SOLUTION RESPIRATORY (INHALATION) ONCE AS NEEDED
Status: DISCONTINUED | OUTPATIENT
Start: 2023-05-30 | End: 2023-05-30 | Stop reason: HOSPADM

## 2023-05-30 RX ORDER — MEPERIDINE HYDROCHLORIDE 25 MG/ML
12.5 INJECTION INTRAMUSCULAR; INTRAVENOUS; SUBCUTANEOUS
Status: DISCONTINUED | OUTPATIENT
Start: 2023-05-30 | End: 2023-05-30 | Stop reason: HOSPADM

## 2023-05-30 RX ORDER — MAGNESIUM HYDROXIDE 1200 MG/15ML
LIQUID ORAL AS NEEDED
Status: DISCONTINUED | OUTPATIENT
Start: 2023-05-30 | End: 2023-05-30 | Stop reason: HOSPADM

## 2023-05-30 RX ORDER — DEXAMETHASONE SODIUM PHOSPHATE 4 MG/ML
INJECTION, SOLUTION INTRA-ARTICULAR; INTRALESIONAL; INTRAMUSCULAR; INTRAVENOUS; SOFT TISSUE AS NEEDED
Status: DISCONTINUED | OUTPATIENT
Start: 2023-05-30 | End: 2023-05-30 | Stop reason: SURG

## 2023-05-30 RX ORDER — ROCURONIUM BROMIDE 10 MG/ML
INJECTION, SOLUTION INTRAVENOUS AS NEEDED
Status: DISCONTINUED | OUTPATIENT
Start: 2023-05-30 | End: 2023-05-30 | Stop reason: SURG

## 2023-05-30 RX ORDER — DIPHENHYDRAMINE HYDROCHLORIDE 50 MG/ML
12.5 INJECTION INTRAMUSCULAR; INTRAVENOUS ONCE AS NEEDED
Status: DISCONTINUED | OUTPATIENT
Start: 2023-05-30 | End: 2023-05-30 | Stop reason: HOSPADM

## 2023-05-30 RX ORDER — LIDOCAINE HYDROCHLORIDE 10 MG/ML
INJECTION, SOLUTION EPIDURAL; INFILTRATION; INTRACAUDAL; PERINEURAL AS NEEDED
Status: DISCONTINUED | OUTPATIENT
Start: 2023-05-30 | End: 2023-05-30 | Stop reason: SURG

## 2023-05-30 RX ORDER — ONDANSETRON 2 MG/ML
4 INJECTION INTRAMUSCULAR; INTRAVENOUS EVERY 6 HOURS PRN
Status: DISCONTINUED | OUTPATIENT
Start: 2023-05-30 | End: 2023-06-03 | Stop reason: HOSPADM

## 2023-05-30 RX ORDER — SODIUM CHLORIDE 0.9 % (FLUSH) 0.9 %
10 SYRINGE (ML) INJECTION EVERY 12 HOURS SCHEDULED
Status: DISCONTINUED | OUTPATIENT
Start: 2023-05-30 | End: 2023-06-03 | Stop reason: HOSPADM

## 2023-05-30 RX ORDER — ONDANSETRON 2 MG/ML
INJECTION INTRAMUSCULAR; INTRAVENOUS AS NEEDED
Status: DISCONTINUED | OUTPATIENT
Start: 2023-05-30 | End: 2023-05-30 | Stop reason: SURG

## 2023-05-30 RX ORDER — DIPHENHYDRAMINE HYDROCHLORIDE 50 MG/ML
12.5 INJECTION INTRAMUSCULAR; INTRAVENOUS
Status: DISCONTINUED | OUTPATIENT
Start: 2023-05-30 | End: 2023-05-30 | Stop reason: HOSPADM

## 2023-05-30 RX ORDER — SODIUM CHLORIDE 0.9 % (FLUSH) 0.9 %
1-10 SYRINGE (ML) INJECTION AS NEEDED
Status: DISCONTINUED | OUTPATIENT
Start: 2023-05-30 | End: 2023-06-03 | Stop reason: HOSPADM

## 2023-05-30 RX ORDER — HYDRALAZINE HYDROCHLORIDE 20 MG/ML
5 INJECTION INTRAMUSCULAR; INTRAVENOUS
Status: DISCONTINUED | OUTPATIENT
Start: 2023-05-30 | End: 2023-05-30 | Stop reason: HOSPADM

## 2023-05-30 RX ORDER — PROPOFOL 10 MG/ML
INJECTION, EMULSION INTRAVENOUS CONTINUOUS PRN
Status: DISCONTINUED | OUTPATIENT
Start: 2023-05-30 | End: 2023-05-30 | Stop reason: SURG

## 2023-05-30 RX ORDER — LIDOCAINE HYDROCHLORIDE 40 MG/ML
SOLUTION TOPICAL AS NEEDED
Status: DISCONTINUED | OUTPATIENT
Start: 2023-05-30 | End: 2023-05-30 | Stop reason: SURG

## 2023-05-30 RX ORDER — PROPOFOL 10 MG/ML
INJECTION, EMULSION INTRAVENOUS AS NEEDED
Status: DISCONTINUED | OUTPATIENT
Start: 2023-05-30 | End: 2023-05-30 | Stop reason: SURG

## 2023-05-30 RX ORDER — EPHEDRINE SULFATE 5 MG/ML
INJECTION INTRAVENOUS AS NEEDED
Status: DISCONTINUED | OUTPATIENT
Start: 2023-05-30 | End: 2023-05-30 | Stop reason: SURG

## 2023-05-30 RX ORDER — OXYCODONE HYDROCHLORIDE 5 MG/1
10 TABLET ORAL EVERY 4 HOURS PRN
Status: DISCONTINUED | OUTPATIENT
Start: 2023-05-30 | End: 2023-05-30 | Stop reason: HOSPADM

## 2023-05-30 RX ORDER — SODIUM CHLORIDE, SODIUM LACTATE, POTASSIUM CHLORIDE, CALCIUM CHLORIDE 600; 310; 30; 20 MG/100ML; MG/100ML; MG/100ML; MG/100ML
75 INJECTION, SOLUTION INTRAVENOUS CONTINUOUS
Status: DISCONTINUED | OUTPATIENT
Start: 2023-05-30 | End: 2023-06-03 | Stop reason: HOSPADM

## 2023-05-30 RX ORDER — KETAMINE HCL IN NACL, ISO-OSM 100MG/10ML
SYRINGE (ML) INJECTION AS NEEDED
Status: DISCONTINUED | OUTPATIENT
Start: 2023-05-30 | End: 2023-05-30 | Stop reason: SURG

## 2023-05-30 RX ORDER — OXYCODONE HYDROCHLORIDE 5 MG/1
10 TABLET ORAL EVERY 4 HOURS PRN
Status: DISCONTINUED | OUTPATIENT
Start: 2023-05-30 | End: 2023-06-01

## 2023-05-30 RX ORDER — ACETAMINOPHEN 650 MG
TABLET, EXTENDED RELEASE ORAL AS NEEDED
Status: DISCONTINUED | OUTPATIENT
Start: 2023-05-30 | End: 2023-05-30 | Stop reason: HOSPADM

## 2023-05-30 RX ORDER — FENTANYL CITRATE 50 UG/ML
INJECTION, SOLUTION INTRAMUSCULAR; INTRAVENOUS AS NEEDED
Status: DISCONTINUED | OUTPATIENT
Start: 2023-05-30 | End: 2023-05-30 | Stop reason: SURG

## 2023-05-30 RX ORDER — SODIUM CHLORIDE, SODIUM LACTATE, POTASSIUM CHLORIDE, CALCIUM CHLORIDE 600; 310; 30; 20 MG/100ML; MG/100ML; MG/100ML; MG/100ML
INJECTION, SOLUTION INTRAVENOUS CONTINUOUS PRN
Status: DISCONTINUED | OUTPATIENT
Start: 2023-05-30 | End: 2023-05-30 | Stop reason: SURG

## 2023-05-30 RX ORDER — SODIUM CHLORIDE 9 MG/ML
40 INJECTION, SOLUTION INTRAVENOUS AS NEEDED
Status: DISCONTINUED | OUTPATIENT
Start: 2023-05-30 | End: 2023-06-03 | Stop reason: HOSPADM

## 2023-05-30 RX ORDER — FLUMAZENIL 0.1 MG/ML
0.2 INJECTION INTRAVENOUS AS NEEDED
Status: DISCONTINUED | OUTPATIENT
Start: 2023-05-30 | End: 2023-05-30 | Stop reason: HOSPADM

## 2023-05-30 RX ADMIN — CEFAZOLIN 2 G: 2 INJECTION, POWDER, FOR SOLUTION INTRAMUSCULAR; INTRAVENOUS at 10:14

## 2023-05-30 RX ADMIN — HYDROMORPHONE HYDROCHLORIDE 0.5 MG: 1 INJECTION, SOLUTION INTRAMUSCULAR; INTRAVENOUS; SUBCUTANEOUS at 10:47

## 2023-05-30 RX ADMIN — Medication 20 MG: at 10:39

## 2023-05-30 RX ADMIN — PROPOFOL 125 MCG/KG/MIN: 10 INJECTION, EMULSION INTRAVENOUS at 10:10

## 2023-05-30 RX ADMIN — ROCURONIUM BROMIDE 10 MG: 50 INJECTION, SOLUTION INTRAVENOUS at 11:05

## 2023-05-30 RX ADMIN — ROCURONIUM BROMIDE 40 MG: 50 INJECTION, SOLUTION INTRAVENOUS at 10:10

## 2023-05-30 RX ADMIN — SODIUM CHLORIDE, POTASSIUM CHLORIDE, SODIUM LACTATE AND CALCIUM CHLORIDE 75 ML/HR: 600; 310; 30; 20 INJECTION, SOLUTION INTRAVENOUS at 15:54

## 2023-05-30 RX ADMIN — DEXAMETHASONE SODIUM PHOSPHATE 4 MG: 4 INJECTION, SOLUTION INTRAMUSCULAR; INTRAVENOUS at 11:41

## 2023-05-30 RX ADMIN — PHENYLEPHRINE HYDROCHLORIDE 0.2 MCG/KG/MIN: 10 INJECTION INTRAVENOUS at 10:58

## 2023-05-30 RX ADMIN — CARVEDILOL 3.12 MG: 3.12 TABLET, FILM COATED ORAL at 20:31

## 2023-05-30 RX ADMIN — OXYCODONE 10 MG: 5 TABLET ORAL at 23:58

## 2023-05-30 RX ADMIN — SENNOSIDES AND DOCUSATE SODIUM 2 TABLET: 50; 8.6 TABLET ORAL at 20:31

## 2023-05-30 RX ADMIN — LIDOCAINE HYDROCHLORIDE 20 MG: 10 INJECTION, SOLUTION EPIDURAL; INFILTRATION; INTRACAUDAL; PERINEURAL at 10:10

## 2023-05-30 RX ADMIN — SUGAMMADEX 200 MG: 100 INJECTION, SOLUTION INTRAVENOUS at 11:48

## 2023-05-30 RX ADMIN — Medication 10 ML: at 20:32

## 2023-05-30 RX ADMIN — PROPOFOL 120 MG: 10 INJECTION, EMULSION INTRAVENOUS at 10:10

## 2023-05-30 RX ADMIN — SODIUM CHLORIDE, SODIUM LACTATE, POTASSIUM CHLORIDE, AND CALCIUM CHLORIDE: .6; .31; .03; .02 INJECTION, SOLUTION INTRAVENOUS at 11:30

## 2023-05-30 RX ADMIN — ONDANSETRON 4 MG: 2 INJECTION INTRAMUSCULAR; INTRAVENOUS at 11:41

## 2023-05-30 RX ADMIN — FENTANYL CITRATE 50 MCG: 50 INJECTION, SOLUTION INTRAMUSCULAR; INTRAVENOUS at 10:06

## 2023-05-30 RX ADMIN — HYDROMORPHONE HYDROCHLORIDE 0.5 MG: 1 INJECTION, SOLUTION INTRAMUSCULAR; INTRAVENOUS; SUBCUTANEOUS at 10:49

## 2023-05-30 RX ADMIN — SODIUM CHLORIDE, SODIUM LACTATE, POTASSIUM CHLORIDE, AND CALCIUM CHLORIDE: .6; .31; .03; .02 INJECTION, SOLUTION INTRAVENOUS at 10:02

## 2023-05-30 RX ADMIN — FENTANYL CITRATE 50 MCG: 50 INJECTION, SOLUTION INTRAMUSCULAR; INTRAVENOUS at 10:39

## 2023-05-30 RX ADMIN — PROPOFOL 20 MG: 10 INJECTION, EMULSION INTRAVENOUS at 10:47

## 2023-05-30 RX ADMIN — LIDOCAINE HYDROCHLORIDE 1 EACH: 40 SOLUTION TOPICAL at 10:10

## 2023-05-30 RX ADMIN — CEFAZOLIN 2 G: 2 INJECTION, POWDER, FOR SOLUTION INTRAMUSCULAR; INTRAVENOUS at 17:50

## 2023-05-30 RX ADMIN — EPHEDRINE SULFATE 10 MG: 5 INJECTION INTRAVENOUS at 10:59

## 2023-05-30 RX ADMIN — EPHEDRINE SULFATE 10 MG: 5 INJECTION INTRAVENOUS at 12:03

## 2023-05-30 NOTE — ANESTHESIA PROCEDURE NOTES
Airway  Urgency: elective    Date/Time: 5/30/2023 10:12 AM  Airway not difficult    General Information and Staff    Patient location during procedure: OR  Anesthesiologist: Jean-Claude Cooney MD  CRNA/CAA: Blanca Munoz CRNA    Indications and Patient Condition  Indications for airway management: airway protection    Preoxygenated: yes  MILS not maintained throughout  Mask difficulty assessment: 2 - vent by mask + OA or adjuvant +/- NMBA    Final Airway Details  Final airway type: endotracheal airway      Successful airway: ETT  Cuffed: yes   Successful intubation technique: video laryngoscopy and direct laryngoscopy  Facilitating devices/methods: intubating stylet and cricoid pressure  Endotracheal tube insertion site: oral  Blade: Ortega  Blade size: 3  ETT size (mm): 7.0  Cormack-Lehane Classification: grade I - full view of glottis  Placement verified by: capnometry   Measured from: lips  ETT/EBT  to lips (cm): 20  Number of attempts at approach: 2  Assessment: lips, teeth, and gum same as pre-op and atraumatic intubation    Additional Comments  DL x 1 with Duenas 2 grade 3 view with cricoid pressure; Ortega 3 grade 1 view

## 2023-05-30 NOTE — PLAN OF CARE
Goal Outcome Evaluation:      Patient was in severe pain this AM but was taken to OR for L WHITNEY early this morning. Pt stayed in PACU for an extended period of time d/t drowsiness after sedation. Upon arrival to SIPS, patient still extremely drowsy and blood pressures have been on the low side. Fluids started. Unable to ambulate patient at this time d/t the drowsiness. 2L NC. VSS. Call light and personal items within reach. Care plan ongoing.     Progress: improving

## 2023-05-30 NOTE — CASE MANAGEMENT/SOCIAL WORK
Continued Stay Note  MARICEL Thomas     Patient Name: Jennifer Blackmon  MRN: [id number]  Today's Date: 5/30/2023    Admit Date: 5/26/2023    Plan: needs cm assessment  Off floor for surgery   Discharge Plan     Row Name 05/30/23 1708       Plan    Plan needs cm assessment  Off floor for surgery               Ida Sanchez RN   Phone communication or documentation only - no physical contact with patient or family.

## 2023-05-30 NOTE — ANESTHESIA PREPROCEDURE EVALUATION
Anesthesia Evaluation     Patient summary reviewed and Nursing notes reviewed   NPO Solid Status: > 8 hours             Airway   Mallampati: II  TM distance: >3 FB  Neck ROM: full  No difficulty expected  Dental - normal exam     Pulmonary - negative pulmonary ROS and normal exam   (-) not a smoker  Cardiovascular - normal exam    ECG reviewed    (+) hypertension well controlled, past MI  >12 months, CAD, hyperlipidemia,   (-) angina, PAULINO      Neuro/Psych- negative ROS  GI/Hepatic/Renal/Endo    (+)   renal disease CRI,     Musculoskeletal (-) negative ROS    Abdominal  - normal exam    Bowel sounds: normal.   Substance History - negative use     OB/GYN negative ob/gyn ROS         Other                        Anesthesia Plan    ASA 3     general       Anesthetic plan, risks, benefits, and alternatives have been provided, discussed and informed consent has been obtained with: patient.        CODE STATUS:    Code Status (Patient has no pulse and is not breathing): CPR (Attempt to Resuscitate)  Medical Interventions (Patient has pulse or is breathing): Full Support  Release to patient: Routine Release

## 2023-05-30 NOTE — OP NOTE
TOTAL HIP ARTHROPLASTY     PATIENT NAME:  Jennifer Blackmon     YOB: 1950     ATTENDING PHYSICIAN: Esteban Luis DO     DATE OF PROCEDURE: 5/30/2023     PREOPERATIVE DIAGNOSIS: femur neck fracture left hip.     Post-Op Diagnosis Codes:     * Femur neck fracture [S72.009A]    PRINCIPAL DIAGNOSIS: Displaced left femur neck fracture intracapsular, comminuted.    PROCEDURE: Left total hip arthroplasty, direct anterior.    SURGICAL APPROACH: Hip Direct Anterior (Smith-Pace)     SURGEON:  Josiah Menon M.D.    ASSISTANT: Gloria Ramirez first assistant was responsible for performing the following activities: Retraction, Suction, Irrigation, Suturing, Closing and Placing Dressing and their skilled assistance was necessary for the success of this case.       ANESTHESIOLOGIST: Dr. Scot Cooney MD.    ANESTHESIA: General with an LMA.    POSITION: Supine on a [place] table.    DRAINS: None.    COMPLICATIONS: None.    ESTIMATED BLOOD LOSS: 200ml    SPECIMENS:   Order Name Source Comment Collection Info Order Time   TYPE AND SCREEN   Collected By: Mackenzie Suarez RN 5/30/2023  7:58 AM     Release to patient   Routine Release            IMPLANT USED: Praveen press-fit total hip replacement system, a #6 Avenir complete collared HA-coated stem with a standard offset and a standard neck length with a 40 mm diameter ceramic Biolox head, 0 mm neck length, 54 mm Praveen G-7 Osseoti TM coated multi-hole cup with a single dome screw in the posterosuperior quadrant with a highly cross-linked, Vitamin E impregnated, polyethylene liner neutral.    INDICATION: The patient had fallen and sustained a displaced femoral neck fracture this past weekend.  We had to wait because she was on Plavix and was anticoagulated for her cardiac condition.  We waited for the Plavix to be neutralized and then discussed the risk benefits potential complications of surgical intervention with the patient and her  as well.  Accordingly  we have proceeded with hip replacement surgery of the left hip to address the femur neck fracture.  All risks and benefits, potential complications, possibility of death, infection, myocardial infarction, DVT, pulmonary embolism, wound and soft tissue breakdown, dislocation of the prosthesis, limb length discrepancy, etc. were all discussed with the patient and an informed consent was signed.     DETAILS OF PROCEDURE: Surgical timeout was called. Operative extremity was correctly identified in the operating room suite. Patient was given antibiotics per the SCIP protocol.  Patient was placed under appropriate anesthetic. C-arm was used through the entire procedure to accurately evaluate the limb lengths and the stability of the components as well as appropriate positioning of the components in the proximal femur and the acetabulum.  Patient was placed on the Le[place] table, prepped and draped in a standard fashion.  A direct anterior approach was carried out.      The fascia over the TFL was incised. The TFL was retracted laterally. A Cobra retractor was placed on the superior aspect of the femoral neck. A second Cobra was placed on the inferior aspect of the femoral neck. The leg was manipulated and the anterior capsule was carefully identified. The rectus femoris was retracted medially. Distally, the vastus lateralis was mobilized with a Barrett elevator and L-shaped capsulotomy was carried out and the flaps of the capsule were developed. FiberWire sutures were placed in the flaps of the capsule to allow for mobilization and subsequent closure of the soft tissue envelope. The Cobra retractors were placed subperiosteally, one inferior to the femoral neck and one superior to the femoral neck. The dissection was carried out up to the saddle of the greater trochanter laterally. The femoral osteotomy was carried out along the line of the broach. A napkin ring segment of femoral neck was removed to allow easy removal of the  femoral head. A motorized corkscrew was used and the femoral head was removed from the acetabulum.    Retractors were placed around the acetabulum at the 4 o’clock to 7 o’clock and the 11 o’clock positions. Acetabular labrum was resected. The ligamentum teres was resected. Reaming was commenced under direct C-arm control.  Appropriate amounts of flexion, abduction and anteversion were built into the reaming process. Smaller sized reamer was used and the smaller sized reamer was used to medialize the acetabulum up to the teardrop, which was visualized on the C-arm monitors. The reaming size went up progressively up until a good bed of bleeding subchondral bone was exposed. The acetabulum was then lavaged with bacitracin irrigating solution. Diluted Betadine was used in antibiotic solution and the multi-hole cup, 54 mm in diameter with TM outer coating G-7 Osseoti design was then impacted and locked into position on the native acetabulum. Appropriate amounts of flexion, abduction and anteversion were built into the positioning of the cup. A screw was placed in the posterosuperior quadrant to augment the stability of the cup. The neutral polyethylene liner, Vitamin E impregnated, highly cross-linked was impacted and locked into position as well.    Attention was then directed towards the proximal femur.  The proximal femur was delivered into the wound with a combination of adduction, extension and external rotation.  The pigtail device was placed subperiosteally under the proximal femur and attached to the [place] table to stabilize the femur for instrumentation.  The piriformis fossa was cleared of all soft tissue.  The cancellous bone on the internal aspect of the lateral femur was removed with a curette to minimize the malpositioning of the broaches into varus.  The rat tailed device was then used to enter into the femoral canal.  We then commenced broaching with the smallest broach.  The broach was leaned up against  the lateral cortex to minimize varus-valgus malposition.  We broached up to the point that the proximal metaphysis was well filled and there was good torsional stability to the broach.  A #6 Avenir complete broach with a standard offset and a standard neck length was found to be the best fit.  A trial head and neck was placed and the femur was reduced into the acetabulum.  The limb lengths were checked and found to be anatomically accurate.  Intraoperative C-arm images were obtained to determine the limb lengths and the positioning of the components.  These were found to be anatomically accurate.  The femoral canal was then lavaged with bacitracin irrigating solution and dried.  The femoral component a #6 Avenir complete collared HA-coated stem with a standard neck length and a standard offset design implant was impacted into position and a good fit was obtained.  There was good torsional stability.  Some bone graft was packed around the femoral component to promote ingrowth.  The trunnion of the femoral component was dried and the 40 mm diameter, 0 mm neck length, ceramic Biolox head ball was impacted and locked onto the femoral component.  Final reduction was carried out.  The stability and limb length were checked one more time.  There was no tendency towards dislocation of the components in any position of the lower extremity.  Limb lengths were checked on C-arm images found to be anatomical.  The capsule and the subperiosteal structures were infiltrated with an analgesic for postoperative analgesia.  The anterior capsule was repaired with interrupted sutures.  The fascia over the TFL was repaired with interrupted suture.  Subcuticular sutures applied.  Occlusive dressing was applied.  Sponge gauze and needle count was correct.  No complications were encountered.  Patient was reversed from the anesthetic and taken from the operating room to the recovery room in stable condition.  No complications encountered.  I  discussed the satisfactory performance of this procedure with the patient's family and answered all questions for them.     Josiah Menon MD  5/30/2023  10:18 EDT

## 2023-05-30 NOTE — PROGRESS NOTES
[facility] Medicine Services   Daily Progress Note      Patient Name: Jennifer Blackmon  : 1950  MRN: [id number]  Primary Care Physician:  Nathalia Maria APRN  Date of admission: 2023      Subjective      Chief Complaint: Left hip pain, fall    Patient seen and examined this morning.  Going for surgery today.  No other complaints.  PT eval postop.    Pertinent positives as noted in HPI/subjective.  All other systems were reviewed and are negative.      Objective      Vitals:   Temp:  [97.9 °F (36.6 °C)-98.2 °F (36.8 °C)] 98.1 °F (36.7 °C)  Heart Rate:  [63-71] 63  Resp:  [6-14] 7  BP: ()/(40-79) 102/47  Flow (L/min):  [2-6] 6    Physical Exam:    General: Awake, alert, elderly female, lying in bed, NAD  Cardiovascular: Regular rate and rhythm, no murmurs  Respiratory: Clear to auscultation bilaterally, no wheezing or rales, unlabored breathing  Abdomen: Soft, nontender, positive bowel sounds, no guarding  Musculoskeletal: Limited range of motion at left hip due to pain, left lower extremity externally rotated, intact pulses and sensation distally  Skin: Warm, dry         Result Review    Result Review:  I have personally reviewed the results from the time of this admission to 2023 13:18 EDT and agree with these findings:  [x]  Laboratory  []  Microbiology  []  Radiology  []  EKG/Telemetry   []  Cardiology/Vascular   []  Pathology  []  Old records  []  Other:    Wounds (last 24 hours)     LDA Wound     Row Name 23 1305 23 1250 23 1236       Wound 23 1128 Left anterior hip Incision    Wound - Properties Group Placement Date: 23  -TD Placement Time:   -TD Side: Left  -TD Orientation: anterior  -TD Location: hip  -TD Primary Wound Type: Incision  -TD    Dressing Appearance no drainage;dry;intact  -PH dry;intact;no drainage  -PH dry;intact;no drainage  -PH    Closure CHEPE  -PH CHEPE  -PH CHEPE  -PH    Periwound ecchymotic  -PH ecchymotic  -PH ecchymotic  -PH     Retired Wound - Properties Group Placement Date: 05/30/23  -TD Placement Time: 1128  -TD Side: Left  -TD Orientation: anterior  -TD Location: hip  -TD Primary Wound Type: Incision  -TD    Retired Wound - Properties Group Date first assessed: 05/30/23  -TD Time first assessed: 1128  -TD Side: Left  -TD Location: hip  -TD Primary Wound Type: Incision  -TD    Row Name 05/30/23 1221 05/30/23 1206 05/30/23 1205       Wound 05/30/23 1128 Left anterior hip Incision    Wound - Properties Group Placement Date: 05/30/23  -TD Placement Time: 1128  -TD Side: Left  -TD Orientation: anterior  -TD Location: hip  -TD Primary Wound Type: Incision  -TD    Dressing Appearance dry;intact;no drainage  -PH dry;intact;no drainage  -PH --    Closure CHEPE  -PH CHEPE  -PH --    Periwound ecchymotic  -PH ecchymotic  -PH --    Dressing Care -- -- dressing applied  EXOFIN, ZEUS  -MK    Retired Wound - Properties Group Placement Date: 05/30/23  -TD Placement Time: 1128  -TD Side: Left  -TD Orientation: anterior  -TD Location: hip  -TD Primary Wound Type: Incision  -TD    Retired Wound - Properties Group Date first assessed: 05/30/23  -TD Time first assessed: 1128  -TD Side: Left  -TD Location: hip  -TD Primary Wound Type: Incision  -TD    Row Name 05/30/23 1128             Wound 05/30/23 1128 Left anterior hip Incision    Wound - Properties Group Placement Date: 05/30/23  -TD Placement Time: 1128  -TD Side: Left  -TD Orientation: anterior  -TD Location: hip  -TD Primary Wound Type: Incision  -TD    Dressing Appearance dry;intact  -TD      Closure Approximated  exofin,zeus  -TD      Retired Wound - Properties Group Placement Date: 05/30/23  -TD Placement Time: 1128  -TD Side: Left  -TD Orientation: anterior  -TD Location: hip  -TD Primary Wound Type: Incision  -TD    Retired Wound - Properties Group Date first assessed: 05/30/23  -TD Time first assessed: 1128  -TD Side: Left  -TD Location: hip  -TD Primary Wound Type: Incision  -TD          User Key   (r) = Recorded By, (t) = Taken By, (c) = Cosigned By    Initials Name Provider Type    PH Sara Wiggins, RN Registered Nurse    Juanita March RN Registered Nurse    Tracy Hale RN Registered Nurse                  Assessment & Plan      Brief Patient Summary:  Jennifer Blackmon is a 72 y.o. female with PMHx of HTN, HLD, depression who presented [facility] on 5/26/2023 complaining of Left hip pain.  Patient states she fell while bending over to get dog of of crate and hit her left hip and left side of head. CT head without acute mass, shift, hemorrhage.  CT pelvis showed acute fracture of the left femoral neck.  Ortho consulted.  Plavix on hold for surgery, last dose was on 5/25 per the patient.      [MAR Hold] atorvastatin, 20 mg, Oral, Daily  [MAR Hold] buPROPion XL, 450 mg, Oral, Daily  carvedilol, 3.125 mg, Oral, BID  [MAR Hold] clobetasol, , Topical, Q12H  [MAR Hold] escitalopram, 10 mg, Oral, BID  [MAR Hold] heparin (porcine), 5,000 Units, Subcutaneous, Q12H  [MAR Hold] senna-docusate sodium, 2 tablet, Oral, BID  [MAR Hold] sodium chloride, 10 mL, Intravenous, Q12H       lactated ringers, 50 mL/hr, Last Rate: 50 mL/hr (05/29/23 2007)         I have utilized all available, immediate resources to obtain, update, or review the patient's current medications including all prescriptions, over-the-counter products, herbals, cannabis/cannabidiol products, and vitamin.mineral/dietary (nutritional) supplements.    Active Hospital Problems:  Active Hospital Problems    Diagnosis    • **Left displaced femoral neck fracture      Plan:     Left hip femoral neck fracture  -s/p mechanical fall  -Imaging findings noted  -Hold Plavix for surgical intervention  -Supportive care and pain control  -PT post op  -Ortho following, surgical intervention planned    CAD  -s/p PCI in 2019  -Hold Plavix for surgical intervention  -Continue other home meds and monitor    CKD stage IIIa  -Creatinine at  baseline of around 1  -Monitor    Hypertension  Hyperlipidemia  Chronic depression/anxiety  -Continue home meds, monitor    DVT prophylaxis  -Heparin subcu for now    CODE STATUS:    Code Status (Patient has no pulse and is not breathing): CPR (Attempt to Resuscitate)  Medical Interventions (Patient has pulse or is breathing): Full Support  Release to patient: Routine Release      Disposition: Likely needs placement postsurgery    Electronically signed by Esteban Luis DO, 05/30/23, 13:18 EDT.  [facility] Hospitalist Team      Part of this note may be an electronic transcription/translation of spoken language to printed text using the Dragon Dictation System.

## 2023-05-30 NOTE — PLAN OF CARE
Goal Outcome Evaluation:            Patient is A & O x 3.  Patient is confused at times but can answer questions correct at times as well. The patient has been restless most of the shift. Patients pain was treated per the MAR.  The patient did have a small bowel movement. Patient was made NPO at midnight per the order.  Patient has hourly rounds done to ensure her safety and needs were met.  The patient does have a remote sitter because the patient does keep trying to pull her IV out and external cath off.  Patient is able to make her needs known and the call light is with in reach. Will continue with patients care.

## 2023-05-31 LAB
ANION GAP SERPL CALCULATED.3IONS-SCNC: 8 MMOL/L (ref 5–15)
BUN SERPL-MCNC: 18 MG/DL (ref 8–23)
BUN/CREAT SERPL: 23.7 (ref 7–25)
CALCIUM SPEC-SCNC: 8.2 MG/DL (ref 8.6–10.5)
CHLORIDE SERPL-SCNC: 101 MMOL/L (ref 98–107)
CO2 SERPL-SCNC: 28 MMOL/L (ref 22–29)
CREAT SERPL-MCNC: 0.76 MG/DL (ref 0.57–1)
EGFRCR SERPLBLD CKD-EPI 2021: 82.3 ML/MIN/1.73
GLUCOSE SERPL-MCNC: 172 MG/DL (ref 65–99)
HCT VFR BLD AUTO: 22.2 % (ref 34–46.6)
HGB BLD-MCNC: 7.7 G/DL (ref 12–15.9)
POTASSIUM SERPL-SCNC: 4.6 MMOL/L (ref 3.5–5.2)
SODIUM SERPL-SCNC: 137 MMOL/L (ref 136–145)

## 2023-05-31 PROCEDURE — 85014 HEMATOCRIT: CPT | Performed by: ORTHOPAEDIC SURGERY

## 2023-05-31 PROCEDURE — 97162 PT EVAL MOD COMPLEX 30 MIN: CPT

## 2023-05-31 PROCEDURE — 85018 HEMOGLOBIN: CPT | Performed by: ORTHOPAEDIC SURGERY

## 2023-05-31 PROCEDURE — 25010000002 CEFAZOLIN PER 500 MG: Performed by: ORTHOPAEDIC SURGERY

## 2023-05-31 PROCEDURE — 97167 OT EVAL HIGH COMPLEX 60 MIN: CPT

## 2023-05-31 RX ORDER — CLOPIDOGREL BISULFATE 75 MG/1
75 TABLET ORAL DAILY
Status: DISCONTINUED | OUTPATIENT
Start: 2023-05-31 | End: 2023-06-03 | Stop reason: HOSPADM

## 2023-05-31 RX ADMIN — Medication 10 ML: at 20:58

## 2023-05-31 RX ADMIN — Medication 10 ML: at 08:47

## 2023-05-31 RX ADMIN — SENNOSIDES AND DOCUSATE SODIUM 2 TABLET: 50; 8.6 TABLET ORAL at 08:47

## 2023-05-31 RX ADMIN — CARVEDILOL 3.12 MG: 3.12 TABLET, FILM COATED ORAL at 08:47

## 2023-05-31 RX ADMIN — CLOPIDOGREL BISULFATE 75 MG: 75 TABLET ORAL at 13:23

## 2023-05-31 RX ADMIN — APIXABAN 2.5 MG: 2.5 TABLET, FILM COATED ORAL at 20:57

## 2023-05-31 RX ADMIN — SODIUM CHLORIDE, POTASSIUM CHLORIDE, SODIUM LACTATE AND CALCIUM CHLORIDE 75 ML/HR: 600; 310; 30; 20 INJECTION, SOLUTION INTRAVENOUS at 14:46

## 2023-05-31 RX ADMIN — CEFAZOLIN 2 G: 2 INJECTION, POWDER, FOR SOLUTION INTRAMUSCULAR; INTRAVENOUS at 01:09

## 2023-05-31 RX ADMIN — OXYCODONE 10 MG: 5 TABLET ORAL at 20:57

## 2023-05-31 RX ADMIN — OXYCODONE 10 MG: 5 TABLET ORAL at 04:11

## 2023-05-31 RX ADMIN — Medication 10 ML: at 20:57

## 2023-05-31 RX ADMIN — CARVEDILOL 3.12 MG: 3.12 TABLET, FILM COATED ORAL at 20:57

## 2023-05-31 RX ADMIN — OXYCODONE 10 MG: 5 TABLET ORAL at 13:23

## 2023-05-31 RX ADMIN — SENNOSIDES AND DOCUSATE SODIUM 2 TABLET: 50; 8.6 TABLET ORAL at 20:57

## 2023-05-31 RX ADMIN — APIXABAN 2.5 MG: 2.5 TABLET, FILM COATED ORAL at 08:47

## 2023-05-31 NOTE — THERAPY EVALUATION
Patient Name: Jennifer Blackmon  : 1950    MRN: [id number]                              Today's Date: 2023       Admit Date: 2023    Visit Dx:     ICD-10-CM ICD-9-CM   1. Left displaced femoral neck fracture  S72.002A 820.8   2. Contusion of left temporofrontal scalp, initial encounter  S00.03XA 920   3. Concussion with loss of consciousness of 30 minutes or less, initial encounter  S06.0X1A 850.11   4. Contusion of left hip and thigh, initial encounter  S70.02XA 924.00    S70.12XA 924.01     Patient Active Problem List   Diagnosis   • Old MI (myocardial infarction)   • Coronary artery disease involving native coronary artery of native heart with angina pectoris   • Essential hypertension   • Hyperlipidemia, mixed   • Chest pain, atypical   • Degeneration of lumbar intervertebral disc   • Low back pain   • Lumbar spondylosis   • Arthritis   • Cellulitis of face   • Depressive disorder   • Near syncope   • Stage 3a chronic kidney disease   • Left displaced femoral neck fracture     Past Medical History:   Diagnosis Date   • Allergic rhinitis    • Basal cell carcinoma (BCC) of nostril    • Coronary artery disease involving native coronary artery of native heart with angina pectoris 2019    Status post successful PCI of the coronary stent deployment to high-grade RCA stenosis after presenting with ST segment elevated microinfarction in May 2019   • DDD (degenerative disc disease), lumbar    • Depression    • Depression    • Essential hypertension 2019   • Femoral neck fracture 2023   • Heart attack     2019   • Hyperlipidemia    • Hyperlipidemia, mixed 2019   • Hypertension    • Insomnia    • Myocardial infarction less than 4 weeks ago 2019    Presented initially with ST segment elevated microinfarction treated emergently in [place] May 2019   • Old MI (myocardial infarction) 2019    Presented initially with ST segment elevated microinfarction treated  emergently in [place] May 2019   • Osteoarthritis    • Toenail fungus      Past Surgical History:   Procedure Laterality Date   • ADENOIDECTOMY      Adenoids removed   • APPENDECTOMY     • AUGMENTATION MAMMAPLASTY     • BREAST IMPLANT SURGERY     • CATARACT EXTRACTION Bilateral 2017   • CERVICAL DISC SURGERY      ruptured and removed    •  SECTION     • CHEST SURGERY      attempted pectus repair    • EYE SURGERY  1997    AK   • HYSTERECTOMY     • KNEE ARTHROSCOPY Right    • LUMBAR DISC SURGERY      ruptured and removed    • NASAL SEPTAL RECONSTRUCTION     • PECTUS CARNATUM REPAIR      attempted   • RECTAL SURGERY      Repair   • REFRACTIVE SURGERY      RK/AK both eyes    • REFRACTIVE SURGERY     • SOMNOPLASTY RADIAL FREQUENCY ABLATION     • TONSILLECTOMY     • TOTAL HIP ARTHROPLASTY Left 2023    Procedure: TOTAL HIP ARTHROPLASTY ANTERIOR;  Surgeon: Josiah Menon MD;  Location: [facility] MAIN OR;  Service: Orthopedics;  Laterality: Left;   • UVULOPALATOPHARYNGOPLASTY        General Information     Row Name 23 1447          OT Time and Intention    Document Type evaluation  -ES     Mode of Treatment individual therapy;occupational therapy  -ES     Row Name 23 1447          General Information    Patient Profile Reviewed yes  -ES     Existing Precautions/Restrictions fall;left;hip, anterior  -ES     Barriers to Rehab medically complex;cognitive status  -ES     Row Name 23 1447          Occupational Profile    Reason for Services/Referral (Occupational Profile) Pt is a 71 yo female admitted 23 s/p fall, hitting head and L hip resulting in L femoral neck fracture. S/p anterior WHITNEY on 23 with Dr. Menon. CTH (-) acute changes.   PMHx significant for dementia.   At baseline, pt lives with spouse in [facility] without TEENA. She is typically Mod I with BADLs and utilizes RW.  -ES     Row Name 23 1447          Living Environment    People in Home spouse  -ES     Row  Name 23 1447          Home Main Entrance    Number of Stairs, Main Entrance none  -ES     Row Name 23 1447          Stairs Within Home, Primary    Number of Stairs, Within Home, Primary none  -ES     Row Name 23 1447          Cognition    Orientation Status (Cognition) disoriented to;time;situation;place  Unable to state . Does state name.  -ES     Row Name 23 1447          Safety Issues, Functional Mobility    Safety Issues Affecting Function (Mobility) ability to follow commands;at risk behavior observed;insight into deficits/self-awareness;judgment;positioning of assistive device;safety precaution awareness;safety precautions follow-through/compliance;sequencing abilities;awareness of need for assistance;problem-solving  -ES     Impairments Affecting Function (Mobility) balance;cognition;coordination;endurance/activity tolerance;pain;postural/trunk control;strength  -ES     Cognitive Impairments, Mobility Safety/Performance attention;awareness, need for assistance;insight into deficits/self-awareness;judgment;problem-solving/reasoning;safety precaution awareness;safety precaution follow-through;sequencing abilities  -ES           User Key  (r) = Recorded By, (t) = Taken By, (c) = Cosigned By    Initials Name Provider Type    ES Ida Acosta OT Occupational Therapist                 Mobility/ADL's     Row Name 23 1449          Bed Mobility    Bed Mobility supine-sit  -ES     Supine-Sit [place] (Bed Mobility) maximum assist (25% patient effort);verbal cues  -ES     Assistive Device (Bed Mobility) bed rails;draw sheet;head of bed elevated  -ES     Row Name 23 1449          Transfers    Transfers sit-stand transfer  -ES     Row Name 23 1449          Bed-Chair Transfer    Bed-Chair [place] (Transfers) maximum assist (25% patient effort)  -ES     Row Name 23 1449          Sit-Stand Transfer    Sit-Stand [place] (Transfers) maximum assist (25%  patient effort)  -ES     Row Name 05/31/23 1449          Activities of Daily Living    BADL Assessment/Intervention upper body dressing;lower body dressing;toileting  -ES     Row Name 05/31/23 1449          Mobility    Extremity Weight-bearing Status left lower extremity  -ES     Left Lower Extremity (Weight-bearing Status) weight-bearing as tolerated (WBAT)  -ES     Row Name 05/31/23 1449          Upper Body Dressing Assessment/Training    [place] Level (Upper Body Dressing) minimum assist (75% patient effort)  -ES     Row Name 05/31/23 1449          Lower Body Dressing Assessment/Training    [place] Level (Lower Body Dressing) dependent (less than 25% patient effort)  -ES     Row Name 05/31/23 1449          Toileting Assessment/Training    [place] Level (Toileting) maximum assist (25% patient effort)  -ES           User Key  (r) = Recorded By, (t) = Taken By, (c) = Cosigned By    Initials Name Provider Type    ES Ida Acosta OT Occupational Therapist               Obj/Interventions     Row Name 05/31/23 1451          Sensory Assessment (Somatosensory)    Sensory Assessment (Somatosensory) unable/difficult to assess  -     Row Name 05/31/23 1451          Vision Assessment/Intervention    Visual Impairment/Limitations corrective lenses for reading  -ES     Row Name 05/31/23 1451          Range of Motion Comprehensive    General Range of Motion no range of motion deficits identified  -     Row Name 05/31/23 1451          Strength Comprehensive (MMT)    Comment, General Manual Muscle Testing (MMT) Assessment BUE grossly 4/5  -ES     Row Name 05/31/23 1451          Balance    Balance Assessment sitting static balance;sitting dynamic balance;standing static balance  -ES     Static Sitting Balance contact guard  -ES     Dynamic Sitting Balance minimal assist  -ES     Static Standing Balance maximum assist  -ES           User Key  (r) = Recorded By, (t) = Taken By, (c) = Cosigned By    Initials  Name Provider Type    Ida Sesay OT Occupational Therapist               Goals/Plan     Row Name 05/31/23 1457          Dressing Goal 1 (OT)    Activity/Device (Dressing Goal 1, OT) upper body dressing  -ES     [place]/Cues Needed (Dressing Goal 1, OT) minimum assist (75% or more patient effort)  -ES     Time Frame (Dressing Goal 1, OT) 2 weeks  -ES     Row Name 05/31/23 1457          Toileting Goal 1 (OT)    Activity/Device (Toileting Goal 1, OT) toileting skills, all  -ES     [place] Level/Cues Needed (Toileting Goal 1, OT) moderate assist (50-74% patient effort)  -ES     Time Frame (Toileting Goal 1, OT) 2 weeks  -ES     Row Name 05/31/23 1457          Strength Goal 1 (OT)    Strength Goal 1 (OT) BUE 5/5  -ES     Time Frame (Strength Goal 1, OT) 2 weeks  -ES     Row Name 05/31/23 1457          Therapy Assessment/Plan (OT)    Planned Therapy Interventions (OT) activity tolerance training;BADL retraining;cognitive/visual perception retraining;functional balance retraining;neuromuscular control/coordination retraining;occupation/activity based interventions;patient/caregiver education/training;ROM/therapeutic exercise;strengthening exercise;transfer/mobility retraining  -ES           User Key  (r) = Recorded By, (t) = Taken By, (c) = Cosigned By    Initials Name Provider Type    RAFI Ida Acosta OT Occupational Therapist               Clinical Impression     Row Name 05/31/23 1451          Pain Scale: FACES Pre/Post-Treatment    Pain: FACES Scale, Pretreatment 2-->hurts little bit  -ES     Posttreatment Pain Rating 2-->hurts little bit  -ES     Pain Location - hip  -ES     Row Name 05/31/23 1451          Plan of Care Review    Outcome Evaluation Pt is a 71 yo female admitted 5/26/23 s/p fall, hitting head and L hip resulting in L femoral neck fracture. S/p anterior WHITNEY on 5/30/23 with Dr. Menon. CTH (-) acute changes.   PMHx significant for dementia.   At baseline, pt lives with [place]  [facility] without TEENA. She is typically Mod I with BADLs and utilizes RW. Patient disoriented to place, time, and situation. Orientented only to name and spouse. Unable to state her , but appears to attempt to use humor to cover cognitive deficits when questioned. Difficulty following one-step verbal, visual or tactile cues. Max A for all movement and transfers this date due to cognitive impairments. Declines transfer to chair this date, stating she is fatigued. Demos mild BUE weakness, though assessment limited by command-following deficits. Pt is far below stated baseline, and will require rehab at [facility].  -ES     Row Name 23          Therapy Assessment/Plan (OT)    Rehab Potential (OT) good, to achieve stated therapy goals  -ES     Criteria for Skilled Therapeutic Interventions Met (OT) yes  -ES     Therapy Frequency (OT) 5 times/wk  -ES     Predicted Duration of Therapy Intervention (OT) until dc  -ES     Row Name 23          Therapy Plan Review/Discharge Plan (OT)    Anticipated Discharge Disposition (OT) sub acute care setting  -ES     Row Name 23          Vital Signs    O2 Delivery Pre Treatment room air  -ES     O2 Delivery Intra Treatment room air  -ES     O2 Delivery Post Treatment room air  -ES     Pre Patient Position Supine  -ES     Intra Patient Position Sitting  -ES     Post Patient Position Supine  -ES     Row Name 23          Positioning and Restraints    Pre-Treatment Position in bed  -ES     Post Treatment Position bed  -ES     In Bed notified nsg;encouraged to call for assist;call light within reach;exit alarm on  -ES           User Key  (r) = Recorded By, (t) = Taken By, (c) = Cosigned By    Initials Name Provider Type    Ida Sesay OT Occupational Therapist               Outcome Measures     Row Name 23 1450          How much help from another is currently needed...    Putting on and taking off regular lower body clothing? 2  -ES      Bathing (including washing, rinsing, and drying) 2  -ES     Toileting (which includes using toilet bed pan or urinal) 2  -ES     Putting on and taking off regular upper body clothing 2  -ES     Taking care of personal grooming (such as brushing teeth) 2  -ES     Eating meals 3  -ES     AM-PAC 6 Clicks Score (OT) 13  -ES     Row Name 05/31/23 1150 05/31/23 0730       How much help from another person do you currently need...    Turning from your back to your side while in flat bed without using bedrails? 2  -NS 1  -EH    Moving from lying on back to sitting on the side of a flat bed without bedrails? 2  -NS 1  -EH    Moving to and from a bed to a chair (including a wheelchair)? 2  -NS 1  -EH    Standing up from a chair using your arms (e.g., wheelchair, bedside chair)? 2  -NS 1  -EH    Climbing 3-5 steps with a railing? 1  -NS 1  -EH    To walk in hospital room? 1  -NS 1  -EH    AM-PAC 6 Clicks Score (PT) 10  -NS 6  -EH    Highest level of mobility 4 --> Transferred to chair/commode  -NS 2 --> Bed activities/dependent transfer  -EH    Row Name 05/31/23 1458 05/31/23 1150       Functional Assessment    Outcome Measure Options AM-PAC 6 Clicks Daily Activity (OT)  -ES AM-PAC 6 Clicks Basic Mobility (PT)  -NS          User Key  (r) = Recorded By, (t) = Taken By, (c) = Cosigned By    Initials Name Provider Type    ES Ida Acosta OT Occupational Therapist    Vandana Pang, PT Physical Therapist    Ruben Hampton, RN Registered Nurse                  OT Recommendation and Plan  Planned Therapy Interventions (OT): activity tolerance training, BADL retraining, cognitive/visual perception retraining, functional balance retraining, neuromuscular control/coordination retraining, occupation/activity based interventions, patient/caregiver education/training, ROM/therapeutic exercise, strengthening exercise, transfer/mobility retraining  Therapy Frequency (OT): 5 times/wk  Plan of Care Review  Outcome Evaluation: Pt  is a 73 yo female admitted 23 s/p fall, hitting head and L hip resulting in L femoral neck fracture. S/p anterior WHITNEY on 23 with Dr. Menon. CTH (-) acute changes.   PMHx significant for dementia.   At baseline, pt lives with spouse in [facility] without TEENA. She is typically Mod I with BADLs and utilizes RW. Patient disoriented to place, time, and situation. Orientented only to name and spouse. Unable to state her , but appears to attempt to use humor to cover cognitive deficits when questioned. Difficulty following one-step verbal, visual or tactile cues. Max A for all movement and transfers this date due to cognitive impairments. Declines transfer to chair this date, stating she is fatigued. Demos mild BUE weakness, though assessment limited by command-following deficits. Pt is far below stated baseline, and will require rehab at [place].     Time Calculation:    Time Calculation- OT     Row Name 23 1459             Time Calculation- OT    OT Start Time 1430  -ES      OT Stop Time 1450  -ES      OT Time Calculation (min) 20 min  -ES      Total Timed Code Minutes- OT 0 minute(s)  -ES      OT Received On 23  -ES      OT - Next Appointment 23  -ES      OT Goal Re-Cert Due Date 23  -ES            User Key  (r) = Recorded By, (t) = Taken By, (c) = Cosigned By    Initials Name Provider Type    ES Ida Acosta OT Occupational Therapist              Therapy Charges for Today     Code Description Service Date Service Provider Modifiers Qty    [id number]  OT EVAL HIGH COMPLEXITY 4 2023 Ida Acosta OT GO 1               Ida Acosta OT  2023

## 2023-05-31 NOTE — THERAPY EVALUATION
Patient Name: Jennifer Blackmon  : 1950    MRN: [id number]                              Today's Date: 2023       Admit Date: 2023    Visit Dx:     ICD-10-CM ICD-9-CM   1. Left displaced femoral neck fracture  S72.002A 820.8   2. Contusion of left temporofrontal scalp, initial encounter  S00.03XA 920   3. Concussion with loss of consciousness of 30 minutes or less, initial encounter  S06.0X1A 850.11   4. Contusion of left hip and thigh, initial encounter  S70.02XA 924.00    S70.12XA 924.01     Patient Active Problem List   Diagnosis   • Old MI (myocardial infarction)   • Coronary artery disease involving native coronary artery of native heart with angina pectoris   • Essential hypertension   • Hyperlipidemia, mixed   • Chest pain, atypical   • Degeneration of lumbar intervertebral disc   • Low back pain   • Lumbar spondylosis   • Arthritis   • Cellulitis of face   • Depressive disorder   • Near syncope   • Stage 3a chronic kidney disease   • Left displaced femoral neck fracture     Past Medical History:   Diagnosis Date   • Allergic rhinitis    • Basal cell carcinoma (BCC) of nostril    • Coronary artery disease involving native coronary artery of native heart with angina pectoris 2019    Status post successful PCI of the coronary stent deployment to high-grade RCA stenosis after presenting with ST segment elevated microinfarction in May 2019   • DDD (degenerative disc disease), lumbar    • Depression    • Depression    • Essential hypertension 2019   • Femoral neck fracture 2023   • Heart attack     2019   • Hyperlipidemia    • Hyperlipidemia, mixed 2019   • Hypertension    • Insomnia    • Myocardial infarction less than 4 weeks ago 2019    Presented initially with ST segment elevated microinfarction treated emergently in [place] May 2019   • Old MI (myocardial infarction) 2019    Presented initially with ST segment elevated microinfarction treated  emergently in [place] May 2019   • Osteoarthritis    • Toenail fungus      Past Surgical History:   Procedure Laterality Date   • ADENOIDECTOMY      Adenoids removed   • APPENDECTOMY     • AUGMENTATION MAMMAPLASTY     • BREAST IMPLANT SURGERY     • CATARACT EXTRACTION Bilateral 2017   • CERVICAL DISC SURGERY      ruptured and removed    •  SECTION     • CHEST SURGERY      attempted pectus repair    • EYE SURGERY  1997    AK   • HYSTERECTOMY     • KNEE ARTHROSCOPY Right    • LUMBAR DISC SURGERY      ruptured and removed    • NASAL SEPTAL RECONSTRUCTION     • PECTUS CARNATUM REPAIR      attempted   • RECTAL SURGERY      Repair   • REFRACTIVE SURGERY      RK/AK both eyes    • REFRACTIVE SURGERY     • SOMNOPLASTY RADIAL FREQUENCY ABLATION     • TONSILLECTOMY     • UVULOPALATOPHARYNGOPLASTY        General Information     Row Name 23 1139          Physical Therapy Time and Intention    Document Type evaluation  -NS     Mode of Treatment individual therapy;physical therapy  -NS     Row Name 23 1139          General Information    Patient Profile Reviewed yes  -NS     Prior Level of Function --  Pt states that she is typically independent - unsure of accuracy, poor historian.  -NS     Existing Precautions/Restrictions fall;left;hip, anterior  -NS     Barriers to Rehab medically complex;cognitive status  -NS     Row Name 23 1139          Living Environment    People in Home spouse  -NS     Row Name 23 1139          Home Main Entrance    Number of Stairs, Main Entrance none  -NS     Row Name 23 1139          Stairs Within Home, Primary    Number of Stairs, Within Home, Primary none  -NS     Row Name 23 1139          Cognition    Orientation Status (Cognition) disoriented to;time  confused in conversation; difficulty following commands; poor recall  -NS     Row Name 23 1139          Safety Issues, Functional Mobility    Safety Issues Affecting Function  (Mobility) ability to follow commands;judgment;problem-solving;safety precaution awareness;safety precautions follow-through/compliance;sequencing abilities  -NS     Impairments Affecting Function (Mobility) balance;cognition;coordination;endurance/activity tolerance;pain;postural/trunk control;strength  -NS     Cognitive Impairments, Mobility Safety/Performance attention;judgment;problem-solving/reasoning;safety precaution awareness;safety precaution follow-through;sequencing abilities  -NS           User Key  (r) = Recorded By, (t) = Taken By, (c) = Cosigned By    Initials Name Provider Type    Vandana Pang PT Physical Therapist               Mobility     Row Name 05/31/23 1141          Bed Mobility    Bed Mobility supine-sit  -NS     Supine-Sit [place] (Bed Mobility) maximum assist (25% patient effort);verbal cues  -NS     Assistive Device (Bed Mobility) bed rails;draw sheet;head of bed elevated  -NS     [facility] Name 05/31/23 1141          Bed-Chair Transfer    Bed-Chair [place] (Transfers) maximum assist (25% patient effort)  -NS     Comment, (Bed-Chair Transfer) Attempted sit to stand transfer with use of RW and max A but unable to achieve standing. Pt performed stand pivot transfer with max A.  -NS     [facility] Name 05/31/23 1141          Mobility    Extremity Weight-bearing Status left lower extremity  -NS     Left Lower Extremity (Weight-bearing Status) weight-bearing as tolerated (WBAT)  -NS           User Key  (r) = Recorded By, (t) = Taken By, (c) = Cosigned By    Initials Name Provider Type    Vandana Pang PT Physical Therapist               Obj/Interventions     Row Name 05/31/23 1142          Range of Motion Comprehensive    General Range of Motion no range of motion deficits identified  -NS     Row Name 05/31/23 1142          Strength Comprehensive (MMT)    Comment, General Manual Muscle Testing (MMT) Assessment BLE grossly 3/5  -NS     [facility] Name 05/31/23 1142          Balance     Balance Assessment sitting static balance;sitting dynamic balance;sit to stand dynamic balance;standing static balance;standing dynamic balance  -NS     Static Sitting Balance minimal assist  -NS     Dynamic Sitting Balance minimal assist  -NS     Position, Sitting Balance sitting edge of bed  -NS     Sit to Stand Dynamic Balance maximum assist  -NS     Static Standing Balance maximum assist  -NS     Dynamic Standing Balance maximum assist  -NS           User Key  (r) = Recorded By, (t) = Taken By, (c) = Cosigned By    Initials Name Provider Type    NS Vandana Villalta, PT Physical Therapist               Goals/Plan     Row Name 05/31/23 1149          Bed Mobility Goal 1 (PT)    Activity/Assistive Device (Bed Mobility Goal 1, PT) bed mobility activities, all  -NS     [place] Level/Cues Needed (Bed Mobility Goal 1, PT) minimum assist (75% or more patient effort)  -NS     Time Frame (Bed Mobility Goal 1, PT) long term goal (LTG);2 weeks  -NS     Row Name 05/31/23 1149          Transfer Goal 1 (PT)    Activity/Assistive Device (Transfer Goal 1, PT) transfers, all;walker, rolling  -NS     [place] Level/Cues Needed (Transfer Goal 1, PT) minimum assist (75% or more patient effort)  -NS     Time Frame (Transfer Goal 1, PT) long term goal (LTG);2 weeks  -NS     Row Name 05/31/23 1149          Gait Training Goal 1 (PT)    Activity/Assistive Device (Gait Training Goal 1, PT) gait (walking locomotion);walker, rolling  -NS     [place] Level (Gait Training Goal 1, PT) minimum assist (75% or more patient effort)  -NS     Distance (Gait Training Goal 1, PT) 25  -NS     Time Frame (Gait Training Goal 1, PT) long term goal (LTG);2 weeks  -NS     Row Name 05/31/23 1149          Therapy Assessment/Plan (PT)    Planned Therapy Interventions (PT) balance training;bed mobility training;gait training;home exercise program;patient/family education;ROM (range of motion);strengthening;transfer training  -NS           User Key   (r) = Recorded By, (t) = Taken By, (c) = Cosigned By    Initials Name Provider Type    Vandana Pang, PT Physical Therapist               Clinical Impression     Row Name 05/31/23 1144          Pain    Additional Documentation Pain Scale: FACES Pre/Post-Treatment (Group)  -NS     Row Name 05/31/23 1144          Pain Scale: FACES Pre/Post-Treatment    Pain: FACES Scale, Pretreatment 2-->hurts little bit  -NS     Posttreatment Pain Rating 2-->hurts little bit  -NS     Pain Location - Side/Orientation Left  -NS     Pain Location - hip  -NS     Row Name 05/31/23 1144          Plan of Care Review    Plan of Care Reviewed With patient  -NS     Outcome Evaluation Pt is a 73 y/o female who presents with L hip pain after falling while bending over to get her dog out of a crate. She hit her L hip and L side of her head. CT shows acute fracture of the L femoral neck. Pt is now s/p L anterior WHITNEY. Upon evaluation, pt is disoriented to time and confused in conversation with difficulty following commands. She states that she lives with her spouse in a single level home with flat entry and that she is typically independent - unsure of accuracy as pt is a poor historian. Currently, pt appears to be functioning well below baseline. She is requiring max A for supine to sit transition and min A to maintain static sitting. She attempted sit to stand from EOB with RW and max A but was unsuccessful. Pt performed stand pivot transfer from bed to chair with max A. Attempted to educate pt on HEP however, she was unable to follow commands well enough to demonstrate understanding of exercises. She is not currently safe for d/c home and demonstrates deficits in cognition, strength, balance and activity tolerance. Recommend SNF placement at d/c.  -NS     Row Name 05/31/23 1144          Therapy Assessment/Plan (PT)    Criteria for Skilled Interventions Met (PT) yes;meets criteria;skilled treatment is necessary  -NS     Therapy Frequency  (PT) 5 times/wk  -NS     Predicted Duration of Therapy Intervention (PT) until d/c  -NS     Row Name 05/31/23 1144          Vital Signs    Pre Patient Position Supine  -NS     Post Patient Position Sitting  -NS     Row Name 05/31/23 1144          Positioning and Restraints    Pre-Treatment Position in bed  -NS     Post Treatment Position chair  -NS     In Chair notified nsg;reclined;sitting;call light within reach;with family/caregiver  -NS           User Key  (r) = Recorded By, (t) = Taken By, (c) = Cosigned By    Initials Name Provider Type    Vandana Pang, PT Physical Therapist               Outcome Measures     Row Name 05/31/23 1150 05/31/23 0730       How much help from another person do you currently need...    Turning from your back to your side while in flat bed without using bedrails? 2  -NS 1  -EH    Moving from lying on back to sitting on the side of a flat bed without bedrails? 2  -NS 1  -EH    Moving to and from a bed to a chair (including a wheelchair)? 2  -NS 1  -EH    Standing up from a chair using your arms (e.g., wheelchair, bedside chair)? 2  -NS 1  -EH    Climbing 3-5 steps with a railing? 1  -NS 1  -EH    To walk in hospital room? 1  -NS 1  -EH    AM-PAC 6 Clicks Score (PT) 10  -NS 6  -EH    Highest level of mobility 4 --> Transferred to chair/commode  -NS 2 --> Bed activities/dependent transfer  -EH    Row Name 05/31/23 1150          Functional Assessment    Outcome Measure Options AM-PAC 6 Clicks Basic Mobility (PT)  -NS           User Key  (r) = Recorded By, (t) = Taken By, (c) = Cosigned By    Initials Name Provider Type    Vandana Pang, MANOJ Physical Therapist     Ruben Loco, VANNESA Registered Nurse                             Physical Therapy Education     Title: PT OT SLP Therapies (In Progress)     Topic: Physical Therapy (In Progress)     Point: Mobility training (In Progress)     Learning Progress Summary           Patient Acceptance, E, NR,NL by NS at 5/31/2023 1151                    Point: Home exercise program (In Progress)     Learning Progress Summary           Patient Acceptance, E, NR,NL by NS at 5/31/2023 1151                   Point: Body mechanics (Not Started)     Learner Progress:  Not documented in this visit.          Point: Precautions (In Progress)     Learning Progress Summary           Patient Acceptance, E, NR,NL by NS at 5/31/2023 1151                               User Key     Initials Effective Dates Name Provider Type Discipline    NS 06/30/22 -  Vandana Villalta, PT Physical Therapist PT              PT Recommendation and Plan  Planned Therapy Interventions (PT): balance training, bed mobility training, gait training, home exercise program, patient/family education, ROM (range of motion), strengthening, transfer training  Plan of Care Reviewed With: patient  Outcome Evaluation: Pt is a 73 y/o female who presents with L hip pain after falling while bending over to get her dog out of a crate. She hit her L hip and L side of her head. CT shows acute fracture of the L femoral neck. Pt is now s/p L anterior WHITNEY. Upon evaluation, pt is disoriented to time and confused in conversation with difficulty following commands. She states that she lives with her spouse in a single level home with flat entry and that she is typically independent - unsure of accuracy as pt is a poor historian. Currently, pt appears to be functioning well below baseline. She is requiring max A for supine to sit transition and min A to maintain static sitting. She attempted sit to stand from EOB with RW and max A but was unsuccessful. Pt performed stand pivot transfer from bed to chair with max A. Attempted to educate pt on HEP however, she was unable to follow commands well enough to demonstrate understanding of exercises. She is not currently safe for d/c home and demonstrates deficits in cognition, strength, balance and activity tolerance. Recommend SNF placement at d/c.     Time Calculation:     PT Charges     Row Name 05/31/23 1151             Time Calculation    Start Time 0926  -NS      Stop Time 0955  -NS      Time Calculation (min) 29 min  -NS      PT Received On 05/31/23  -NS      PT - Next Appointment 06/01/23  -NS      PT Goal Re-Cert Due Date 06/14/23  -NS         Time Calculation- PT    Total Timed Code Minutes- PT 0 minute(s)  -NS            User Key  (r) = Recorded By, (t) = Taken By, (c) = Cosigned By    Initials Name Provider Type    Vandana Pang, PT Physical Therapist              Therapy Charges for Today     Code Description Service Date Service Provider Modifiers Qty    [id number] HC PT EVAL MOD COMPLEXITY 4 5/31/2023 Vandana Villalta, PT GP 1          PT G-Codes  Outcome Measure Options: AM-PAC 6 Clicks Basic Mobility (PT)  AM-PAC 6 Clicks Score (PT): 10  PT Discharge Summary  Anticipated Discharge Disposition (PT): skilled nursing facility    Vandana Villalta PT  5/31/2023

## 2023-05-31 NOTE — CONSULTS
"Chp welcomed. Pt engaged pleasantly but became distressed when she could not remember details of family, injury, or when she arrived at the hospital. Chp offered emotional support and attempted to reorient pt. Chp attempted to normalize the \"fuzzy memory\" that sometimes comes with a hospital stay. Pt was gracious and pleasant throughout visit. Requested chp come and visit again. Chp to provide routine visits at least once weekly throughout pt's admission, additional support available as requested.   "

## 2023-05-31 NOTE — PLAN OF CARE
Goal Outcome Evaluation:              Outcome Evaluation: Pt is a 73 yo female admitted 23 s/p fall, hitting head and L hip resulting in L femoral neck fracture. S/p anterior WHITNEY on 23 with Dr. Menon. CTH (-) acute changes.   PMHx significant for dementia.   At baseline, pt lives with spouse in [facility] without [facility]. She is typically Mod I with BADLs and utilizes RW. Patient disoriented to place, time, and situation. Orientented only to name and spouse. Unable to state her , but appears to attempt to use humor to cover cognitive deficits when questioned. Difficulty following one-step verbal, visual or tactile cues. Max A for all movement and transfers this date due to cognitive impairments. Declines transfer to chair this date, stating she is fatigued. Demos mild BUE weakness, though assessment limited by command-following deficits. Pt is far below stated baseline, and will require rehab at [place].

## 2023-05-31 NOTE — PLAN OF CARE
Goal Outcome Evaluation:                      Patient with complaint of left hip pain, resolved with oral pain medications. Resting comfortably in bed with no attempts to get up without supervision.

## 2023-05-31 NOTE — CASE MANAGEMENT/SOCIAL WORK
Discharge Planning Assessment  [facility]     Patient Name: Jennifer Blackmon  MRN: [id number]  Today's Date: 5/31/2023    Admit Date: 5/26/2023    Plan: Referral to [facility] pending acceptance and precert. PASRR  approved.   Discharge Needs Assessment     Row Name 05/31/23 1405       Living Environment    People in Home spouse    Name(s) of People in Home  Sarkislizandro    Current Living Arrangements home    Potentially Unsafe Housing Conditions none    Primary Care Provided by self    Provides Primary Care For no one    Family Caregiver if Needed spouse    Family Caregiver Names  Norma    Quality of Family Relationships supportive;involved;helpful    Able to Return to Prior Arrangements yes       Resource/Environmental Concerns    Resource/Environmental Concerns none    Transportation Concerns none       Transition Planning    Patient/Family Anticipates Transition to inpatient rehabilitation facility    Patient/Family Anticipated Services at Transition skilled nursing    Transportation Anticipated family or friend will provide       Discharge Needs Assessment    Readmission Within the Last 30 Days no previous admission in last 30 days    Equipment Currently Used at Home cane, quad tip;walker, rolling    Concerns to be Addressed care coordination/care conferences;discharge planning    Anticipated Changes Related to Illness none    Equipment Needed After Discharge none    Outpatient/Agency/Support Group Needs skilled nursing facility    Discharge Facility/Level of Care Needs nursing facility, skilled    Provided Post Acute Provider List? Yes    Post Acute Provider List Nursing Home    Delivered To Patient    Method of Delivery In person    Patient's Choice of Community Agency(s) [facility], [facility]               Discharge Plan     Row Name 05/31/23 1406       Plan    Plan Referral to [facility] pending acceptance and precert. PASRR  approved.    Patient/Family in Agreement with Plan  yes    Plan Comments Met with patient at bedside.  Confirmed PCP and pharmacy.  Lives at home with spouse, who is able to transport patient at discharge.  Recommended that patient go to snf at [place].  Spoke with patient and  who are in agreement.  Given list for choices and they gave me 1. [facility], 2. [facility].  Referral sent to [facility] pending acceptance and precert.  PASRR  approved.              Continued Care and Services - Admitted Since 5/26/2023     Destination     Service Provider Request Status Selected Services Address Phone Fax Patient Preferred    [facility] Pending - Request Sent N/A [address] [place] [address] [phone] --              Expected Discharge Date and Time     Expected Discharge Date Expected Discharge Time    Jun 2, 2023          Demographic Summary     Row Name 05/31/23 1404       General Information    Admission Type inpatient    Arrived From emergency department    Referral Source admission list    Reason for Consult discharge planning    Preferred Language English       Contact Information    Permission Granted to Share Info With                Functional Status     Row Name 05/31/23 1405       Functional Status    Usual Activity Tolerance good    Current Activity Tolerance moderate       Functional Status, IADL    Medications independent    Meal Preparation independent    Housekeeping independent    Laundry independent    Shopping independent       Mental Status    General Appearance WDL WDL       Mental Status Summary    Recent Changes in Mental Status/Cognitive Functioning no changes                   Ida Sanchez RN   Met with patient in room wearing PPE: mask, face shield/goggles, gloves, gown.      Maintained distance greater than six feet and spent less than 15 minutes in the room.

## 2023-05-31 NOTE — CASE MANAGEMENT/SOCIAL WORK
Continued Stay Note   [facility]     Patient Name: Jennifer Blackmon  MRN: [id number]  Today's Date: 5/31/2023    Admit Date: 5/26/2023    Plan: Referral to [facility]  pending acceptance and precert. PASRR approved.   Discharge Plan     Row Name 05/31/23 1515       Plan    Plan Referral to [facility]  pending acceptance and precert. PASRR approved.    Plan Comments Referrals to [facility].  Liaison at [facility] states not likely she will have a bed till next week.    Row Name 05/31/23 1406                                      Expected Discharge Date and Time     Expected Discharge Date Expected Discharge Time    Jun 1, 2023             Ida Sanchez RN   Phone communication or documentation only - no physical contact with patient or family.

## 2023-05-31 NOTE — PLAN OF CARE
Goal Outcome Evaluation:  Plan of Care Reviewed With: patient           Outcome Evaluation: Pt is a 71 y/o female who presents with L hip pain after falling while bending over to get her dog out of a crate. She hit her L hip and L side of her head. CT shows acute fracture of the L femoral neck. Pt is now s/p L anterior WHITNEY. Upon evaluation, pt is disoriented to time and confused in conversation with difficulty following commands. She states that she lives with her spouse in a single level home with flat entry and that she is typically independent - unsure of accuracy as pt is a poor historian. Currently, pt appears to be functioning well below baseline. She is requiring max A for supine to sit transition and min A to maintain static sitting. She attempted sit to stand from EOB with RW and max A but was unsuccessful. Pt performed stand pivot transfer from bed to chair with max A. Attempted to educate pt on HEP however, she was unable to follow commands well enough to demonstrate understanding of exercises. She is not currently safe for d/c home and demonstrates deficits in cognition, strength, balance and activity tolerance. Recommend SNF placement at d/c.

## 2023-05-31 NOTE — DISCHARGE PLACEMENT REQUEST
"Re Castro (72 y.o. Female)     Date of Birth   1950    Social Security Number       Address   [address] [place] [address] [place] [facility]    Home Phone   [phone]    MRN   [id number]       Protestant   None    Marital Status                               Admission Date   5/26/23    Admission Type   Emergency    Admitting Provider   Deepika Ortega DO    Attending Provider   Esteban Luis DO    Department, Room/Bed   [facility] SURGICAL INPATIENT, 4104/1       Discharge Date       Discharge Disposition       Discharge Destination                               Attending Provider: Esteban Luis DO    Allergies: No Known Allergies    Isolation: None   Infection: None   Code Status: CPR    Ht: 165.1 cm (65\")   Wt: 62.7 kg (138 lb 3.7 oz)    Admission Cmt: None   Principal Problem: Left displaced femoral neck fracture [S72.002A]                 Active Insurance as of 5/26/2023     Primary Coverage     Payor Plan Insurance Group Employer/Plan Group    HUMANA MEDICARE REPLACEMENT HU[employer]NA MEDICARE REPLACEMENT [id number]     Payor Plan Address Payor Plan Phone Number Payor Plan Fax Number Effective Dates    [address] [address] [phone]  1/1/2018 - None Entered    [facility] [id number]       Subscriber Name Subscriber Birth Date Member ID       RE CASTRO 1950 [id number]                 Emergency Contacts      (Rel.) Home Phone Work Phone Mobile Phone    ROMEO CASTRO (Spouse) [phone] -- [phone]              "

## 2023-05-31 NOTE — PROGRESS NOTES
[facility] Medicine Services   Daily Progress Note      Patient Name: Jennifer Blackmon  : 1950  MRN: [id number]  Primary Care Physician:  Nathalia Maria APRN  Date of admission: 2023      Subjective      Chief Complaint: Left hip pain, fall    Patient seen and examined this morning.  Doing well this morning, hip pain controlled.  Awaiting PT eval today.  Agrees for rehab placement.    Pertinent positives as noted in HPI/subjective.  All other systems were reviewed and are negative.      Objective      Vitals:   Temp:  [97.2 °F (36.2 °C)-99.1 °F (37.3 °C)] 98.4 °F (36.9 °C)  Heart Rate:  [63-83] 78  Resp:  [6-17] 13  BP: ()/(40-63) 102/58  Flow (L/min):  [2-6] 4    Physical Exam:    General: Awake, alert, elderly female, lying in bed, NAD  Cardiovascular: Regular rate and rhythm, no murmurs  Respiratory: Clear to auscultation bilaterally, no wheezing or rales, unlabored breathing  Abdomen: Soft, nontender, positive bowel sounds, no guarding  Musculoskeletal: Limited range of motion at left hip due to pain, intact pulses and sensation distally  Skin: Warm, dry         Result Review    Result Review:  I have personally reviewed the results from the time of this admission to 2023 10:26 EDT and agree with these findings:  [x]  Laboratory  []  Microbiology  []  Radiology  []  EKG/Telemetry   []  Cardiology/Vascular   []  Pathology  []  Old records  []  Other:    Wounds (last 24 hours)     LDA Wound     Row Name 23 0730 23 1600 23 1520       Wound 23 1128 Left anterior hip Incision    Wound - Properties Group Placement Date: 23  -TD Placement Time:   -TD Side: Left  -TD Orientation: anterior  -TD Location: hip  -TD Primary Wound Type: Incision  -TD    Dressing Appearance dry;intact;no drainage  -EH dry;intact;no drainage  -TA intact;dry;no drainage  -PH    Closure CHEPE  -EH CHEPE  -TA CHEPE  -PH    Base dressing in place, unable to visualize  -EH dressing in  place, unable to visualize  -TA --    Periwound -- ecchymotic  -TA ecchymotic  -PH    Drainage Amount -- none  -TA --    Retired Wound - Properties Group Placement Date: 05/30/23  -TD Placement Time: 1128  -TD Side: Left  -TD Orientation: anterior  -TD Location: hip  -TD Primary Wound Type: Incision  -TD    Retired Wound - Properties Group Date first assessed: 05/30/23  -TD Time first assessed: 1128  -TD Side: Left  -TD Location: hip  -TD Primary Wound Type: Incision  -TD    Row Name 05/30/23 1450 05/30/23 1420 05/30/23 1405       Wound 05/30/23 1128 Left anterior hip Incision    Wound - Properties Group Placement Date: 05/30/23  -TD Placement Time: 1128 -TD Side: Left  -TD Orientation: anterior  -TD Location: hip  -TD Primary Wound Type: Incision  -TD    Dressing Appearance dry;intact;no drainage  -PH dry;intact;no drainage  -PH dry;intact;no drainage  -PH    Closure CHEPE  -PH CHEPE  -PH CHEPE  -PH    Periwound ecchymotic  -PH ecchymotic  -PH ecchymotic  -PH    Retired Wound - Properties Group Placement Date: 05/30/23  -TD Placement Time: 1128  -TD Side: Left  -TD Orientation: anterior  -TD Location: hip  -TD Primary Wound Type: Incision  -TD    Retired Wound - Properties Group Date first assessed: 05/30/23  -TD Time first assessed: 1128  -TD Side: Left  -TD Location: hip  -TD Primary Wound Type: Incision  -TD    Row Name 05/30/23 1350 05/30/23 1335 05/30/23 1320       Wound 05/30/23 1128 Left anterior hip Incision    Wound - Properties Group Placement Date: 05/30/23  -TD Placement Time: 1128  -TD Side: Left  -TD Orientation: anterior  -TD Location: hip  -TD Primary Wound Type: Incision  -TD    Dressing Appearance dry;intact;no drainage  -PH dry;intact;no drainage  -PH dry;intact;no drainage  -PH    Closure CHEPE  -PH CHEPE  -PH CHEPE  -PH    Periwound ecchymotic  -PH ecchymotic  -PH ecchymotic  -PH    Retired Wound - Properties Group Placement Date: 05/30/23  -TD Placement Time: 1128  -TD Side: Left  -TD Orientation:  anterior  -TD Location: hip  -TD Primary Wound Type: Incision  -TD    Retired Wound - Properties Group Date first assessed: 05/30/23  -TD Time first assessed: 1128  -TD Side: Left  -TD Location: hip  -TD Primary Wound Type: Incision  -TD    Row Name 05/30/23 1305 05/30/23 1250 05/30/23 1236       Wound 05/30/23 1128 Left anterior hip Incision    Wound - Properties Group Placement Date: 05/30/23  -TD Placement Time: 1128  -TD Side: Left  -TD Orientation: anterior  -TD Location: hip  -TD Primary Wound Type: Incision  -TD    Dressing Appearance no drainage;dry;intact  -PH dry;intact;no drainage  -PH dry;intact;no drainage  -PH    Closure CHEPE  -PH CHEPE  -PH CHEPE  -PH    Periwound ecchymotic  -PH ecchymotic  -PH ecchymotic  -PH    Retired Wound - Properties Group Placement Date: 05/30/23  -TD Placement Time: 1128  -TD Side: Left  -TD Orientation: anterior  -TD Location: hip  -TD Primary Wound Type: Incision  -TD    Retired Wound - Properties Group Date first assessed: 05/30/23  -TD Time first assessed: 1128  -TD Side: Left  -TD Location: hip  -TD Primary Wound Type: Incision  -TD    Row Name 05/30/23 1221 05/30/23 1206 05/30/23 1205       Wound 05/30/23 1128 Left anterior hip Incision    Wound - Properties Group Placement Date: 05/30/23  -TD Placement Time: 1128  -TD Side: Left  -TD Orientation: anterior  -TD Location: hip  -TD Primary Wound Type: Incision  -TD    Dressing Appearance dry;intact;no drainage  -PH dry;intact;no drainage  -PH --    Closure CHEPE  -PH CHEPE  -PH --    Periwound ecchymotic  -PH ecchymotic  -PH --    Dressing Care -- -- dressing applied  EXOFIN, ZEUS  -MK    Retired Wound - Properties Group Placement Date: 05/30/23  -TD Placement Time: 1128  -TD Side: Left  -TD Orientation: anterior  -TD Location: hip  -TD Primary Wound Type: Incision  -TD    Retired Wound - Properties Group Date first assessed: 05/30/23  -TD Time first assessed: 1128  -TD Side: Left  -TD Location: hip  -TD Primary Wound Type:  Incision  -TD    Row Name 05/30/23 1128             Wound 05/30/23 1128 Left anterior hip Incision    Wound - Properties Group Placement Date: 05/30/23  -TD Placement Time: 1128  -TD Side: Left  -TD Orientation: anterior  -TD Location: hip  -TD Primary Wound Type: Incision  -TD    Dressing Appearance dry;intact  -TD      Closure Approximated  exofin,ellen  -TD      Retired Wound - Properties Group Placement Date: 05/30/23  -TD Placement Time: 1128  -TD Side: Left  -TD Orientation: anterior  -TD Location: hip  -TD Primary Wound Type: Incision  -TD    Retired Wound - Properties Group Date first assessed: 05/30/23  -TD Time first assessed: 1128  -TD Side: Left  -TD Location: hip  -TD Primary Wound Type: Incision  -TD          User Key  (r) = Recorded By, (t) = Taken By, (c) = Cosigned By    Initials Name Provider Type    PH Sara Wiggins RN Registered Nurse    Juanita March RN Registered Nurse    Tracy Hale RN Registered Nurse    Shannon Fatima RN Registered Nurse     Ruben Loco RN Registered Nurse                  Assessment & Plan      Brief Patient Summary:  Jennifer Blackmon is a 72 y.o. female with PMHx of HTN, HLD, depression who presented to [facility] on 5/26/2023 complaining of Left hip pain.  Patient states she fell while bending over to get dog of of crate and hit her left hip and left side of head. CT head without acute mass, shift, hemorrhage.  CT pelvis showed acute fracture of the left femoral neck.  Ortho consulted.  Plavix on hold for surgery, last dose was on 5/25 per the patient.      apixaban, 2.5 mg, Oral, Q12H  carvedilol, 3.125 mg, Oral, BID  senna-docusate sodium, 2 tablet, Oral, BID  sodium chloride, 10 mL, Intravenous, Q12H  sodium chloride, 10 mL, Intravenous, Q12H       lactated ringers, 50 mL/hr, Last Rate: 50 mL/hr (05/29/23 2007)  lactated ringers, 75 mL/hr, Last Rate: 75 mL/hr (05/30/23 1554)         I have utilized all available, immediate  resources to obtain, update, or review the patient's current medications including all prescriptions, over-the-counter products, herbals, cannabis/cannabidiol products, and vitamin.mineral/dietary (nutritional) supplements.    Active Hospital Problems:  Active Hospital Problems    Diagnosis    • **Left displaced femoral neck fracture      Plan:     Left hip femoral neck fracture  -s/p mechanical fall  -Imaging findings noted  -s/p left hip arthroplasty on 5/30  -Supportive care and pain control  -PT eval  -Low-dose Eliquis for DVT prophylaxis per Ortho  -Rehab placement    CAD  -s/p PCI in 2019  -Resume Plavix if okay with Ortho  -Continue other home meds and monitor    CKD stage IIIa  -Creatinine at baseline of around 1  -Monitor    Hypertension  Hyperlipidemia  Chronic depression/anxiety  -Continue home meds, monitor    DVT prophylaxis  -Low-dose Eliquis per Ortho    CODE STATUS:    Code Status (Patient has no pulse and is not breathing): CPR (Attempt to Resuscitate)  Medical Interventions (Patient has pulse or is breathing): Full Support  Release to patient: Routine Release      Disposition: Rehab placement.    Electronically signed by Esteban Luis DO, 05/31/23, 10:26 EDT.  [facility] Hospitalist Team      Part of this note may be an electronic transcription/translation of spoken language to printed text using the Dragon Dictation System.

## 2023-05-31 NOTE — CASE MANAGEMENT/SOCIAL WORK
Social Work Assessment  [facility]     Patient Name: Jennifer Blackmon  MRN: [id number]  Today's Date: 5/31/2023    Admit Date: 5/26/2023     Discharge Plan     Row Name 05/31/23 1017       Plan    Plan SW was notified pt may need SNF, pending PT/OT evals. RNCM to f/u once formal rec's are made. Precert will be required. PASRR approved.              GREGORIA Hendrickson, W  Medical Social Worker  Ph [phone]  Fax [phone]  [email or web address]

## 2023-05-31 NOTE — PLAN OF CARE
Goal Outcome Evaluation:           Patient is A & O X1.  Patient is day one post op and her pain was treated per the MAR.  The patients resp. Were 9 & 8 at the start of the shift. She is holding at 11 now but her O2 stats were in the upper 70's low 80's so the patient was put on 4L of O2 N/C.  Patient is unable to make her needs known so hourly rounds were done and the patient has a med ( video)  sitter to help watch her.  The call light is with in reach. Will continue with the patients care.

## 2023-05-31 NOTE — DISCHARGE PLACEMENT REQUEST
"Re Blackmon (72 y.o. Female)     Date of Birth   1950    Social Security Number       Address   [address] [place] [address] [place] [facility]    Home Phone   [phone]    MRN   [id number]       Caodaism   None    Marital Status                               Admission Date   23    Admission Type   Emergency    Admitting Provider   Deepika Ortega DO    Attending Provider   Esteban Luis DO    Department, Room/Bed   [facility] SURGICAL INPATIENT, 4104/1       Discharge Date       Discharge Disposition       Discharge Destination                               Attending Provider: Esteban Luis DO    Allergies: No Known Allergies    Isolation: None   Infection: None   Code Status: CPR    Ht: 165.1 cm (65\")   Wt: 62.7 kg (138 lb 3.7 oz)    Admission Cmt: None   Principal Problem: Left displaced femoral neck fracture [S72.002A]                 Active Insurance as of 2023     Primary Coverage     Payor Plan Insurance Group Employer/Plan Group    HUMANA MEDICARE REPLACEMENT HUMANA MEDICARE REPLACEMENT [id number]     Payor Plan Address Payor Plan Phone Number Payor Plan Fax Number Effective Dates    [address] [address] [phone]  2018 - None Entered    [facility] [address]       Subscriber Name Subscriber Birth Date Member ID       RE BLACKMON 1950 [id number]                 Emergency Contacts      (Rel.) Home Phone Work Phone Mobile Phone    ROMEO BLACKMON (Spouse) [phone] -- [phone]               History & Physical      Deepika Ortega DO at 23 [facility]              [facility] Medicine Services      Patient Name: Re Blackmon  : 1950  MRN: [id number]  Primary Care Physician:  Nathalia Maria APRN  Date of admission: 2023      Subjective       Chief Complaint: left hip pain    History of Present Illness: Re Blackmon is a 72 y.o. female with PMHx of HTN, HLD, depression who presented to [facility] [facility] on 2023 complaining of Left hip pain.  Patient states she fell while bending over to get dog of of crate and hit her left hip and left side of head.  Denies chest pain, dyspnea, fevers, chills, numbness, tingling, syncope.  Due to left hip pain she presented to [facility] for evaluation.    In the ER, vitals 97.9, HR 67, RR 12, /60, 97% @ 2L NC.  Labs notable for , HS troponin 11, glucose 101.  EKG NSR without gross ischemia.  CT head without acute mass, shift, hemorrhage.  CT pelvis showed acute fracture of the left femoral neck.        ROS   12 point ROS reviewed and negative except as mentioned above      Personal History     Past Medical History:   Diagnosis Date   • Allergic rhinitis    • Basal cell carcinoma (BCC) of nostril    • Coronary artery disease involving native coronary artery of native heart with angina pectoris 2019    Status post successful PCI of the coronary stent deployment to high-grade RCA stenosis after presenting with ST segment elevated microinfarction in May 2019   • DDD (degenerative disc disease), lumbar    • Depression    • Depression    • Essential hypertension 2019   • Heart attack     2019   • Hyperlipidemia    • Hyperlipidemia, mixed 2019   • Hypertension    • Insomnia    • Myocardial infarction less than 4 weeks ago 2019    Presented initially with ST segment elevated microinfarction treated emergently in [place] May 2019   • Old MI (myocardial infarction) 2019    Presented initially with ST segment elevated microinfarction treated emergently in [place] May 2019   • Osteoarthritis    • Toenail fungus        Past Surgical History:   Procedure Laterality Date   • ADENOIDECTOMY      Adenoids removed   • APPENDECTOMY     • AUGMENTATION MAMMAPLASTY     • BREAST IMPLANT SURGERY     • CATARACT EXTRACTION Bilateral 2017   • CERVICAL DISC SURGERY      ruptured and removed    •  SECTION     • CHEST SURGERY   1961    attempted pectus repair    • EYE SURGERY  08/12/1997    AK   • HYSTERECTOMY     • KNEE ARTHROSCOPY Right    • LUMBAR DISC SURGERY      ruptured and removed    • NASAL SEPTAL RECONSTRUCTION     • PECTUS CARNATUM REPAIR      attempted   • RECTAL SURGERY      Repair   • REFRACTIVE SURGERY      RK/AK both eyes    • REFRACTIVE SURGERY     • SOMNOPLASTY RADIAL FREQUENCY ABLATION     • TONSILLECTOMY     • UVULOPALATOPHARYNGOPLASTY         Family History: family history includes ADD / ADHD in her daughter; Alcohol abuse in her brother; Alzheimer's disease in her mother; Anxiety disorder in her brother; Bipolar disorder in her daughter; Depression in her brother; HIV in her daughter; Heart attack in her brother and sister; Leukemia in her father; No Known Problems in her brother; Other in her daughter, father, and mother. Otherwise pertinent FHx was reviewed and not pertinent to current issue.    Social History:  reports that she quit smoking about 51 years ago. Her smoking use included cigarettes. She has a 2.00 pack-year smoking history. She has never used smokeless tobacco. She reports that she does not drink alcohol and does not use drugs.    Home Medications:  Prior to Admission Medications     Prescriptions Last Dose Informant Patient Reported? Taking?    atorvastatin (LIPITOR) 40 MG tablet   No No    Take 0.5 tablets by mouth Daily.    buPROPion XL (WELLBUTRIN XL) 150 MG 24 hr tablet   No No    Take 3 tablets by mouth Every Morning.    Calcium Citrate-Vitamin D 500-400 MG-UNIT chewable tablet   Yes No    Chew Daily.    carvedilol (COREG) 3.125 MG tablet   No No    Take 1 tablet by mouth 2 (Two) Times a Day.    clobetasol (TEMOVATE) 0.05 % external solution   No No    Apply  topically to the appropriate area as directed 2 (Two) Times a Day.    clopidogrel (PLAVIX) 75 MG tablet   No No    Take 1 tablet by mouth Daily.    coenzyme Q10 100 MG capsule   Yes No    Take 1 capsule by mouth.    escitalopram (LEXAPRO)  20 MG tablet   No No    TAKE ONE-HALF (1/2) TABLET TWICE A DAY    Melatonin 10 MG sublingual tablet   Yes No    Place  under the tongue.    Multiple Vitamins-Minerals (Multi-Vitamin Gummies) chewable tablet   Yes No    Chew.            Allergies:  No Known Allergies    Objective       Vitals:   Temp:  [97.9 °F (36.6 °C)] 97.9 °F (36.6 °C)  Heart Rate:  [67] 67  Resp:  [12] 12  BP: (132)/(60) 132/60  Flow (L/min):  [2] 2    Physical Exam  Constitutional:       General: She is not in acute distress.     Appearance: Normal appearance. She is not toxic-appearing.   HENT:      Head:      Comments: Left parietal hematoma from fall     Nose: Nose normal. No congestion.      Mouth/Throat:      Pharynx: Oropharynx is clear. No oropharyngeal exudate.   Eyes:      General: No scleral icterus.  Cardiovascular:      Rate and Rhythm: Normal rate and regular rhythm.      Heart sounds: No murmur heard.    No friction rub. No gallop.   Pulmonary:      Effort: No respiratory distress.      Breath sounds: No wheezing or rales.   Abdominal:      General: There is no distension.      Tenderness: There is no abdominal tenderness. There is no guarding.   Musculoskeletal:         General: No swelling or deformity.      Cervical back: Normal range of motion. No rigidity.      Right lower leg: No edema.      Left lower leg: No edema.      Comments: Left leg if flexed, abducted, and externally rotated   Skin:     Coloration: Skin is not jaundiced.      Findings: No bruising or lesion.   Neurological:      General: No focal deficit present.      Mental Status: She is alert and oriented to person, place, and time.      Motor: Weakness present.          Result Review    Result Review:  I have personally reviewed the results from the time of this admission to 5/26/2023 19:13 EDT and agree with these findings:  [x]  Laboratory  []  Microbiology  [x]  Radiology  [x]  EKG/Telemetry   []  Cardiology/Vascular   []  Pathology  [x]  Old records  []   "Other:        Assessment & Plan        Active Hospital Problems:  There are no active hospital problems to display for this patient.    Plan:     #Mechanical Fall  #Left femoral neck fracture    - mechanical fall getting dog out of crate    - CT head without acute mass, shift, hemorrage but does show left parietal extracalvarial hematoma    - CT pelvis shows acute fracture of the left femoral neck    - ortho consulted, surgery planned tomorrow or Sunday    - regular diet, NPO after midnight    - pain control    - pt/ot    - LR @ 50/hr    - hold home plavix in anticipation of surgery    #CAD    - hold home plavix in anticipation of surgery    - continue home statin    - EKG NSR without gross ischemia    - contiue home Coreg    - s/p PCI 2019    - Per cardiology note on 3/14/2023: \"no indication for ischemic evaluation\" and \"normal ischemic evaluation or at least low risk 2021\"    #CKD3a    - Cr 1.04, appears near base, follow labs    #HTN    - continue home Coreg    #HLD    - continue home statin    #Depression    - continue home lexapro    - continue home Wellbutrin    DVT prophylaxis: Heparin BID, will need to hold dose before planned surgery    CODE STATUS:       Admission Status:  I believe this patient meets observation status.    I discussed the patient's findings and my recommendations with patient.    This patient has been examined wearing appropriate Personal Protective Equipment and discussed with Jaspreet. 05/26/23      Signature: Electronically signed by Deepika Ortega DO, 05/26/23, 7:28 PM EDT.      Electronically signed by Deepika Ortega DO at 05/26/23 1959          Operative/Procedure Notes (all)      Josiah Menon MD at 05/30/23 1018  Version 1 of 1         TOTAL HIP ARTHROPLASTY     PATIENT NAME:  Jennifer Blackmon     YOB: 1950     ATTENDING PHYSICIAN: Esteban Luis DO     DATE OF PROCEDURE: 5/30/2023     PREOPERATIVE DIAGNOSIS: femur neck fracture left hip.     Post-Op Diagnosis " Codes:     * Femur neck fracture [S72.009A]    PRINCIPAL DIAGNOSIS: Displaced left femur neck fracture intracapsular, comminuted.    PROCEDURE: Left total hip arthroplasty, direct anterior.    SURGICAL APPROACH: Hip Direct Anterior (Smith-Pace)     SURGEON:  Josiah Menon M.D.    ASSISTANT: Gloria Ramirez first assistant was responsible for performing the following activities: Retraction, Suction, Irrigation, Suturing, Closing and Placing Dressing and their skilled assistance was necessary for the success of this case.       ANESTHESIOLOGIST: Dr. Scot Cooney MD.    ANESTHESIA: General with an LMA.    POSITION: Supine on a [place] table.    DRAINS: None.    COMPLICATIONS: None.    ESTIMATED BLOOD LOSS: 200ml    SPECIMENS:   Order Name Source Comment Collection Info Order Time   TYPE AND SCREEN   Collected By: Mackenzie Suarez RN 5/30/2023  7:58 AM     Release to patient   Routine Release            IMPLANT USED: Praveen press-fit total hip replacement system, a #6 Avenir complete collared HA-coated stem with a standard offset and a standard neck length with a 40 mm diameter ceramic Biolox head, 0 mm neck length, 54 mm Praveen G-7 Osseoti TM coated multi-hole cup with a single dome screw in the posterosuperior quadrant with a highly cross-linked, Vitamin E impregnated, polyethylene liner neutral.    INDICATION: The patient had fallen and sustained a displaced femoral neck fracture this past weekend.  We had to wait because she was on Plavix and was anticoagulated for her cardiac condition.  We waited for the Plavix to be neutralized and then discussed the risk benefits potential complications of surgical intervention with the patient and her  as well.  Accordingly we have proceeded with hip replacement surgery of the left hip to address the femur neck fracture.  All risks and benefits, potential complications, possibility of death, infection, myocardial infarction, DVT, pulmonary embolism, wound and  soft tissue breakdown, dislocation of the prosthesis, limb length discrepancy, etc. were all discussed with the patient and an informed consent was signed.     DETAILS OF PROCEDURE: Surgical timeout was called. Operative extremity was correctly identified in the operating room suite. Patient was given antibiotics per the SCIP protocol.  Patient was placed under appropriate anesthetic. C-arm was used through the entire procedure to accurately evaluate the limb lengths and the stability of the components as well as appropriate positioning of the components in the proximal femur and the acetabulum.  Patient was placed on the [place] table, prepped and draped in a standard fashion.  A direct anterior approach was carried out.      The fascia over the TFL was incised. The TFL was retracted laterally. A Cobra retractor was placed on the superior aspect of the femoral neck. A second Cobra was placed on the inferior aspect of the femoral neck. The leg was manipulated and the anterior capsule was carefully identified. The rectus femoris was retracted medially. Distally, the vastus lateralis was mobilized with a Barrett elevator and L-shaped capsulotomy was carried out and the flaps of the capsule were developed. FiberWire sutures were placed in the flaps of the capsule to allow for mobilization and subsequent closure of the soft tissue envelope. The Cobra retractors were placed subperiosteally, one inferior to the femoral neck and one superior to the femoral neck. The dissection was carried out up to the saddle of the greater trochanter laterally. The femoral osteotomy was carried out along the line of the broach. A napkin ring segment of femoral neck was removed to allow easy removal of the femoral head. A motorized corkscrew was used and the femoral head was removed from the acetabulum.    Retractors were placed around the acetabulum at the 4 o’clock to 7 o’clock and the 11 o’clock positions. Acetabular labrum was resected. The  ligamentum teres was resected. Reaming was commenced under direct C-arm control.  Appropriate amounts of flexion, abduction and anteversion were built into the reaming process. Smaller sized reamer was used and the smaller sized reamer was used to medialize the acetabulum up to the teardrop, which was visualized on the C-arm monitors. The reaming size went up progressively up until a good bed of bleeding subchondral bone was exposed. The acetabulum was then lavaged with bacitracin irrigating solution. Diluted Betadine was used in antibiotic solution and the multi-hole cup, 54 mm in diameter with TM outer coating G-7 Osseoti design was then impacted and locked into position on the native acetabulum. Appropriate amounts of flexion, abduction and anteversion were built into the positioning of the cup. A screw was placed in the posterosuperior quadrant to augment the stability of the cup. The neutral polyethylene liner, Vitamin E impregnated, highly cross-linked was impacted and locked into position as well.    Attention was then directed towards the proximal femur.  The proximal femur was delivered into the wound with a combination of adduction, extension and external rotation.  The pigtail device was placed subperiosteally under the proximal femur and attached to the Weaver table to stabilize the femur for instrumentation.  The piriformis fossa was cleared of all soft tissue.  The cancellous bone on the internal aspect of the lateral femur was removed with a curette to minimize the malpositioning of the broaches into varus.  The rat tailed device was then used to enter into the femoral canal.  We then commenced broaching with the smallest broach.  The broach was leaned up against the lateral cortex to minimize varus-valgus malposition.  We broached up to the point that the proximal metaphysis was well filled and there was good torsional stability to the broach.  A #6 Avenir complete broach with a standard offset and a  standard neck length was found to be the best fit.  A trial head and neck was placed and the femur was reduced into the acetabulum.  The limb lengths were checked and found to be anatomically accurate.  Intraoperative C-arm images were obtained to determine the limb lengths and the positioning of the components.  These were found to be anatomically accurate.  The femoral canal was then lavaged with bacitracin irrigating solution and dried.  The femoral component a #6 Avenir complete collared HA-coated stem with a standard neck length and a standard offset design implant was impacted into position and a good fit was obtained.  There was good torsional stability.  Some bone graft was packed around the femoral component to promote ingrowth.  The trunnion of the femoral component was dried and the 40 mm diameter, 0 mm neck length, ceramic Biolox head ball was impacted and locked onto the femoral component.  Final reduction was carried out.  The stability and limb length were checked one more time.  There was no tendency towards dislocation of the components in any position of the lower extremity.  Limb lengths were checked on C-arm images found to be anatomical.  The capsule and the subperiosteal structures were infiltrated with an analgesic for postoperative analgesia.  The anterior capsule was repaired with interrupted sutures.  The fascia over the TFL was repaired with interrupted suture.  Subcuticular sutures applied.  Occlusive dressing was applied.  Sponge gauze and needle count was correct.  No complications were encountered.  Patient was reversed from the anesthetic and taken from the operating room to the recovery room in stable condition.  No complications encountered.  I discussed the satisfactory performance of this procedure with the patient's family and answered all questions for them.     Josiah Menon MD  5/30/2023  10:18 EDT    Electronically signed by Josiah Menon MD at 05/30/23 0367           Physician Progress Notes (last 24 hours)      Esteban Luis DO at 23 1026          [facility] Medicine Services   Daily Progress Note      Patient Name: Jennifer Blackmon  : 1950  MRN: [id number]  Primary Care Physician:  Nathalia Maria APRN  Date of admission: 2023      Subjective       Chief Complaint: Left hip pain, fall    Patient seen and examined this morning.  Doing well this morning, hip pain controlled.  Awaiting PT eval today.  Agrees for rehab placement.    Pertinent positives as noted in HPI/subjective.  All other systems were reviewed and are negative.      Objective       Vitals:   Temp:  [97.2 °F (36.2 °C)-99.1 °F (37.3 °C)] 98.4 °F (36.9 °C)  Heart Rate:  [63-83] 78  Resp:  [6-17] 13  BP: ()/(40-63) 102/58  Flow (L/min):  [2-6] 4    Physical Exam:    General: Awake, alert, elderly female, lying in bed, NAD  Cardiovascular: Regular rate and rhythm, no murmurs  Respiratory: Clear to auscultation bilaterally, no wheezing or rales, unlabored breathing  Abdomen: Soft, nontender, positive bowel sounds, no guarding  Musculoskeletal: Limited range of motion at left hip due to pain, intact pulses and sensation distally  Skin: Warm, dry         Result Review    Result Review:  I have personally reviewed the results from the time of this admission to 2023 10:26 EDT and agree with these findings:  [x]  Laboratory  []  Microbiology  []  Radiology  []  EKG/Telemetry   []  Cardiology/Vascular   []  Pathology  []  Old records  []  Other:    Wounds (last 24 hours)     LDA Wound     Row Name 23 0730 23 1600 23 1520       Wound 23 1128 Left anterior hip Incision    Wound - Properties Group Placement Date: 23  -TD Placement Time:   -TD Side: Left  -TD Orientation: anterior  -TD Location: hip  -TD Primary Wound Type: Incision  -TD    Dressing Appearance dry;intact;no drainage  -EH dry;intact;no drainage  -TA intact;dry;no drainage  -PH    Closure  CHEPE  -EH CHEPE  -TA CHEPE  -PH    Base dressing in place, unable to visualize  -EH dressing in place, unable to visualize  -TA --    Periwound -- ecchymotic  -TA ecchymotic  -PH    Drainage Amount -- none  -TA --    Retired Wound - Properties Group Placement Date: 05/30/23  -TD Placement Time: 1128  -TD Side: Left  -TD Orientation: anterior  -TD Location: hip  -TD Primary Wound Type: Incision  -TD    Retired Wound - Properties Group Date first assessed: 05/30/23  -TD Time first assessed: 1128  -TD Side: Left  -TD Location: hip  -TD Primary Wound Type: Incision  -TD    Row Name 05/30/23 1450 05/30/23 1420 05/30/23 1405       Wound 05/30/23 1128 Left anterior hip Incision    Wound - Properties Group Placement Date: 05/30/23  -TD Placement Time: 1128 -TD Side: Left  -TD Orientation: anterior  -TD Location: hip  -TD Primary Wound Type: Incision  -TD    Dressing Appearance dry;intact;no drainage  -PH dry;intact;no drainage  -PH dry;intact;no drainage  -PH    Closure CHEPE  -PH CHEPE  -PH CHEPE  -PH    Periwound ecchymotic  -PH ecchymotic  -PH ecchymotic  -PH    Retired Wound - Properties Group Placement Date: 05/30/23  -TD Placement Time: 1128  -TD Side: Left  -TD Orientation: anterior  -TD Location: hip  -TD Primary Wound Type: Incision  -TD    Retired Wound - Properties Group Date first assessed: 05/30/23  -TD Time first assessed: 1128  -TD Side: Left  -TD Location: hip  -TD Primary Wound Type: Incision  -TD    Row Name 05/30/23 1350 05/30/23 1335 05/30/23 1320       Wound 05/30/23 1128 Left anterior hip Incision    Wound - Properties Group Placement Date: 05/30/23  -TD Placement Time: 1128  -TD Side: Left  -TD Orientation: anterior  -TD Location: hip  -TD Primary Wound Type: Incision  -TD    Dressing Appearance dry;intact;no drainage  -PH dry;intact;no drainage  -PH dry;intact;no drainage  -PH    Closure CHEPE  -PH CHEPE  -PH CHEPE  -PH    Periwound ecchymotic  -PH ecchymotic  -PH ecchymotic  -PH    Retired Wound - Properties Group  Placement Date: 05/30/23  -TD Placement Time: 1128  -TD Side: Left  -TD Orientation: anterior  -TD Location: hip  -TD Primary Wound Type: Incision  -TD    Retired Wound - Properties Group Date first assessed: 05/30/23  -TD Time first assessed: 1128  -TD Side: Left  -TD Location: hip  -TD Primary Wound Type: Incision  -TD    Row Name 05/30/23 1305 05/30/23 1250 05/30/23 1236       Wound 05/30/23 1128 Left anterior hip Incision    Wound - Properties Group Placement Date: 05/30/23  -TD Placement Time: 1128  -TD Side: Left  -TD Orientation: anterior  -TD Location: hip  -TD Primary Wound Type: Incision  -TD    Dressing Appearance no drainage;dry;intact  -PH dry;intact;no drainage  -PH dry;intact;no drainage  -PH    Closure CHEPE  -PH CHEPE  -PH CHEPE  -PH    Periwound ecchymotic  -PH ecchymotic  -PH ecchymotic  -PH    Retired Wound - Properties Group Placement Date: 05/30/23  -TD Placement Time: 1128  -TD Side: Left  -TD Orientation: anterior  -TD Location: hip  -TD Primary Wound Type: Incision  -TD    Retired Wound - Properties Group Date first assessed: 05/30/23  -TD Time first assessed: 1128  -TD Side: Left  -TD Location: hip  -TD Primary Wound Type: Incision  -TD    Row Name 05/30/23 1221 05/30/23 1206 05/30/23 1205       Wound 05/30/23 1128 Left anterior hip Incision    Wound - Properties Group Placement Date: 05/30/23  -TD Placement Time: 1128  -TD Side: Left  -TD Orientation: anterior  -TD Location: hip  -TD Primary Wound Type: Incision  -TD    Dressing Appearance dry;intact;no drainage  -PH dry;intact;no drainage  -PH --    Closure CHEPE  -PH CHEPE  -PH --    Periwound ecchymotic  -PH ecchymotic  -PH --    Dressing Care -- -- dressing applied  EXOFIN, ZEUS  -MK    Retired Wound - Properties Group Placement Date: 05/30/23  -TD Placement Time: 1128  -TD Side: Left  -TD Orientation: anterior  -TD Location: hip  -TD Primary Wound Type: Incision  -TD    Retired Wound - Properties Group Date first assessed: 05/30/23  -TD Time  first assessed: 1128  -TD Side: Left  -TD Location: hip  -TD Primary Wound Type: Incision  -TD    Row Name 05/30/23 1128             Wound 05/30/23 1128 Left anterior hip Incision    Wound - Properties Group Placement Date: 05/30/23  -TD Placement Time: 1128 -TD Side: Left  -TD Orientation: anterior  -TD Location: hip  -TD Primary Wound Type: Incision  -TD    Dressing Appearance dry;intact  -TD      Closure Approximated  exofin,ellen  -TD      Retired Wound - Properties Group Placement Date: 05/30/23  -TD Placement Time: 1128  -TD Side: Left  -TD Orientation: anterior  -TD Location: hip  -TD Primary Wound Type: Incision  -TD    Retired Wound - Properties Group Date first assessed: 05/30/23 -TD Time first assessed: 1128  -TD Side: Left  -TD Location: hip  -TD Primary Wound Type: Incision  -TD          User Key  (r) = Recorded By, (t) = Taken By, (c) = Cosigned By    Initials Name Provider Type    PH Sara Wiggins, RN Registered Nurse    Juanita March, RN Registered Nurse    Tracy Hale RN Registered Nurse    Shannon Fatima, RN Registered Nurse     Ruben Loco, RN Registered Nurse                  Assessment & Plan      Brief Patient Summary:  Jennifer Blackmon is a 72 y.o. female with PMHx of HTN, HLD, depression who presented to [facility] on 5/26/2023 complaining of Left hip pain.  Patient states she fell while bending over to get dog of of crate and hit her left hip and left side of head. CT head without acute mass, shift, hemorrhage.  CT pelvis showed acute fracture of the left femoral neck.  Ortho consulted.  Plavix on hold for surgery, last dose was on 5/25 per the patient.      apixaban, 2.5 mg, Oral, Q12H  carvedilol, 3.125 mg, Oral, BID  senna-docusate sodium, 2 tablet, Oral, BID  sodium chloride, 10 mL, Intravenous, Q12H  sodium chloride, 10 mL, Intravenous, Q12H       lactated ringers, 50 mL/hr, Last Rate: 50 mL/hr (05/29/23 2007)  lactated ringers, 75 mL/hr, Last Rate:  75 mL/hr (05/30/23 2194)         I have utilized all available, immediate resources to obtain, update, or review the patient's current medications including all prescriptions, over-the-counter products, herbals, cannabis/cannabidiol products, and vitamin.mineral/dietary (nutritional) supplements.    Active Hospital Problems:  Active Hospital Problems    Diagnosis    • **Left displaced femoral neck fracture      Plan:     Left hip femoral neck fracture  -s/p mechanical fall  -Imaging findings noted  -s/p left hip arthroplasty on 5/30  -Supportive care and pain control  -PT eval  -Low-dose Eliquis for DVT prophylaxis per Ortho  -Rehab placement    CAD  -s/p PCI in 2019  -Resume Plavix if okay with Ortho  -Continue other home meds and monitor    CKD stage IIIa  -Creatinine at baseline of around 1  -Monitor    Hypertension  Hyperlipidemia  Chronic depression/anxiety  -Continue home meds, monitor    DVT prophylaxis  -Low-dose Eliquis per Ortho    CODE STATUS:    Code Status (Patient has no pulse and is not breathing): CPR (Attempt to Resuscitate)  Medical Interventions (Patient has pulse or is breathing): Full Support  Release to patient: Routine Release      Disposition: Rehab placement.    Electronically signed by Esteban Luis DO, 05/31/23, 10:26 EDT.  [facility] Hospitalist Team      Part of this note may be an electronic transcription/translation of spoken language to printed text using the Dragon Dictation System.      Electronically signed by Esteban Luis DO at 05/31/23 1028          Physical Therapy Notes (all)      Vandana Villalat, PT at 05/31/23 0955  Version 1 of 1       Goal Outcome Evaluation:  Plan of Care Reviewed With: patient           Outcome Evaluation: Pt is a 73 y/o female who presents with L hip pain after falling while bending over to get her dog out of a crate. She hit her L hip and L side of her head. CT shows acute fracture of the L femoral neck. Pt is now s/p L anterior WHITNEY. Upon  evaluation, pt is disoriented to time and confused in conversation with difficulty following commands. She states that she lives with her spouse in a single level home with flat entry and that she is typically independent - unsure of accuracy as pt is a poor historian. Currently, pt appears to be functioning well below baseline. She is requiring max A for supine to sit transition and min A to maintain static sitting. She attempted sit to stand from EOB with RW and max A but was unsuccessful. Pt performed stand pivot transfer from bed to chair with max A. Attempted to educate pt on HEP however, she was unable to follow commands well enough to demonstrate understanding of exercises. She is not currently safe for d/c home and demonstrates deficits in cognition, strength, balance and activity tolerance. Recommend SNF placement at d/c.           Electronically signed by Vandana Villalta, PT at 23 1151     Vandana Villalta, PT at 23 0955  Version 1 of 1         Patient Name: Jennifer Blackmon  : 1950    MRN: [id number]                              Today's Date: 2023       Admit Date: 2023    Visit Dx:     ICD-10-CM ICD-9-CM   1. Left displaced femoral neck fracture  S72.002A 820.8   2. Contusion of left temporofrontal scalp, initial encounter  S00.03XA 920   3. Concussion with loss of consciousness of 30 minutes or less, initial encounter  S06.0X1A 850.11   4. Contusion of left hip and thigh, initial encounter  S70.02XA 924.00    S70.12XA 924.01     Patient Active Problem List   Diagnosis   • Old MI (myocardial infarction)   • Coronary artery disease involving native coronary artery of native heart with angina pectoris   • Essential hypertension   • Hyperlipidemia, mixed   • Chest pain, atypical   • Degeneration of lumbar intervertebral disc   • Low back pain   • Lumbar spondylosis   • Arthritis   • Cellulitis of face   • Depressive disorder   • Near syncope   • Stage 3a chronic kidney disease    • Left displaced femoral neck fracture     Past Medical History:   Diagnosis Date   • Allergic rhinitis    • Basal cell carcinoma (BCC) of nostril    • Coronary artery disease involving native coronary artery of native heart with angina pectoris 2019    Status post successful PCI of the coronary stent deployment to high-grade RCA stenosis after presenting with ST segment elevated microinfarction in May 2019   • DDD (degenerative disc disease), lumbar    • Depression    • Depression    • Essential hypertension 2019   • Femoral neck fracture 2023   • Heart attack     2019   • Hyperlipidemia    • Hyperlipidemia, mixed 2019   • Hypertension    • Insomnia    • Myocardial infarction less than 4 weeks ago 2019    Presented initially with ST segment elevated microinfarction treated emergently in [place] May 2019   • Old MI (myocardial infarction) 2019    Presented initially with ST segment elevated microinfarction treated emergently in [place] May 2019   • Osteoarthritis    • Toenail fungus      Past Surgical History:   Procedure Laterality Date   • ADENOIDECTOMY      Adenoids removed   • APPENDECTOMY     • AUGMENTATION MAMMAPLASTY     • BREAST IMPLANT SURGERY     • CATARACT EXTRACTION Bilateral    • CERVICAL DISC SURGERY      ruptured and removed    •  SECTION     • CHEST SURGERY      attempted pectus repair    • EYE SURGERY  1997    AK   • HYSTERECTOMY     • KNEE ARTHROSCOPY Right    • LUMBAR DISC SURGERY      ruptured and removed    • NASAL SEPTAL RECONSTRUCTION     • PECTUS CARNATUM REPAIR      attempted   • RECTAL SURGERY      Repair   • REFRACTIVE SURGERY      RK/AK both eyes    • REFRACTIVE SURGERY     • SOMNOPLASTY RADIAL FREQUENCY ABLATION     • TONSILLECTOMY     • UVULOPALATOPHARYNGOPLASTY        General Information     Row Name 23 [id number]          Physical Therapy Time and Intention    Document Type evaluation  -NS     Mode of  Treatment individual therapy;physical therapy  -NS     Row Name 05/31/23 1139          General Information    Patient Profile Reviewed yes  -NS     Prior Level of Function --  Pt states that she is typically independent - unsure of accuracy, poor historian.  -NS     Existing Precautions/Restrictions fall;left;hip, anterior  -NS     Barriers to Rehab medically complex;cognitive status  -NS     Row Name 05/31/23 1139          Living Environment    People in Home spouse  -NS     Row Name 05/31/23 1139          Home Main Entrance    Number of Stairs, Main Entrance none  -NS     Row Name 05/31/23 1139          Stairs Within Home, Primary    Number of Stairs, Within Home, Primary none  -NS     Row Name 05/31/23 1139          Cognition    Orientation Status (Cognition) disoriented to;time  confused in conversation; difficulty following commands; poor recall  -NS     Row Name 05/31/23 1139          Safety Issues, Functional Mobility    Safety Issues Affecting Function (Mobility) ability to follow commands;judgment;problem-solving;safety precaution awareness;safety precautions follow-through/compliance;sequencing abilities  -NS     Impairments Affecting Function (Mobility) balance;cognition;coordination;endurance/activity tolerance;pain;postural/trunk control;strength  -NS     Cognitive Impairments, Mobility Safety/Performance attention;judgment;problem-solving/reasoning;safety precaution awareness;safety precaution follow-through;sequencing abilities  -NS           User Key  (r) = Recorded By, (t) = Taken By, (c) = Cosigned By    Initials Name Provider Type    NS Vandana Villalta, PT Physical Therapist               Mobility     Row Name 05/31/23 1141          Bed Mobility    Bed Mobility supine-sit  -NS     Supine-Sit [place] (Bed Mobility) maximum assist (25% patient effort);verbal cues  -NS     Assistive Device (Bed Mobility) bed rails;draw sheet;head of bed elevated  -NS     Row Name 05/31/23 1141           Bed-Chair Transfer    Bed-Chair [place] (Transfers) maximum assist (25% patient effort)  -NS     Comment, (Bed-Chair Transfer) Attempted sit to stand transfer with use of RW and max A but unable to achieve standing. Pt performed stand pivot transfer with max A.  -NS     Row Name 05/31/23 1141          Mobility    Extremity Weight-bearing Status left lower extremity  -NS     Left Lower Extremity (Weight-bearing Status) weight-bearing as tolerated (WBAT)  -NS           User Key  (r) = Recorded By, (t) = Taken By, (c) = Cosigned By    Initials Name Provider Type    Vandana Pang PT Physical Therapist               Obj/Interventions     Row Name 05/31/23 1142          Range of Motion Comprehensive    General Range of Motion no range of motion deficits identified  -NS     Row Name 05/31/23 1142          Strength Comprehensive (MMT)    Comment, General Manual Muscle Testing (MMT) Assessment BLE grossly 3/5  -NS     Row Name 05/31/23 1142          Balance    Balance Assessment sitting static balance;sitting dynamic balance;sit to stand dynamic balance;standing static balance;standing dynamic balance  -NS     Static Sitting Balance minimal assist  -NS     Dynamic Sitting Balance minimal assist  -NS     Position, Sitting Balance sitting edge of bed  -NS     Sit to Stand Dynamic Balance maximum assist  -NS     Static Standing Balance maximum assist  -NS     Dynamic Standing Balance maximum assist  -NS           User Key  (r) = Recorded By, (t) = Taken By, (c) = Cosigned By    Initials Name Provider Type    Vandana Pang PT Physical Therapist               Goals/Plan     Row Name 05/31/23 1149          Bed Mobility Goal 1 (PT)    Activity/Assistive Device (Bed Mobility Goal 1, PT) bed mobility activities, all  -NS     [place] Level/Cues Needed (Bed Mobility Goal 1, PT) minimum assist (75% or more patient effort)  -NS     Time Frame (Bed Mobility Goal 1, PT) long term goal (LTG);2 weeks  -NS     Row  Name 05/31/23 1149          Transfer Goal 1 (PT)    Activity/Assistive Device (Transfer Goal 1, PT) transfers, all;walker, rolling  -NS     [place] Level/Cues Needed (Transfer Goal 1, PT) minimum assist (75% or more patient effort)  -NS     Time Frame (Transfer Goal 1, PT) long term goal (LTG);2 weeks  -NS     Row Name 05/31/23 1149          Gait Training Goal 1 (PT)    Activity/Assistive Device (Gait Training Goal 1, PT) gait (walking locomotion);walker, rolling  -NS     [place] Level (Gait Training Goal 1, PT) minimum assist (75% or more patient effort)  -NS     Distance (Gait Training Goal 1, PT) 25  -NS     Time Frame (Gait Training Goal 1, PT) long term goal (LTG);2 weeks  -NS     Row Name 05/31/23 1149          Therapy Assessment/Plan (PT)    Planned Therapy Interventions (PT) balance training;bed mobility training;gait training;home exercise program;patient/family education;ROM (range of motion);strengthening;transfer training  -NS           User Key  (r) = Recorded By, (t) = Taken By, (c) = Cosigned By    Initials Name Provider Type    Vandana Pang, PT Physical Therapist               Clinical Impression     Row Name 05/31/23 1144          Pain    Additional Documentation Pain Scale: FACES Pre/Post-Treatment (Group)  -NS     Row Name 05/31/23 1144          Pain Scale: FACES Pre/Post-Treatment    Pain: FACES Scale, Pretreatment 2-->hurts little bit  -NS     Posttreatment Pain Rating 2-->hurts little bit  -NS     Pain Location - Side/Orientation Left  -NS     Pain Location - hip  -NS     Row Name 05/31/23 1144          Plan of Care Review    Plan of Care Reviewed With patient  -NS     Outcome Evaluation Pt is a 71 y/o female who presents with L hip pain after falling while bending over to get her dog out of a crate. She hit her L hip and L side of her head. CT shows acute fracture of the L femoral neck. Pt is now s/p L anterior WHITNEY. Upon evaluation, pt is disoriented to time and confused in  conversation with difficulty following commands. She states that she lives with her spouse in a single level home with flat entry and that she is typically independent - unsure of accuracy as pt is a poor historian. Currently, pt appears to be functioning well below baseline. She is requiring max A for supine to sit transition and min A to maintain static sitting. She attempted sit to stand from EOB with RW and max A but was unsuccessful. Pt performed stand pivot transfer from bed to chair with max A. Attempted to educate pt on HEP however, she was unable to follow commands well enough to demonstrate understanding of exercises. She is not currently safe for d/c home and demonstrates deficits in cognition, strength, balance and activity tolerance. Recommend SNF placement at d/c.  -NS     Row Name 05/31/23 1144          Therapy Assessment/Plan (PT)    Criteria for Skilled Interventions Met (PT) yes;meets criteria;skilled treatment is necessary  -NS     Therapy Frequency (PT) 5 times/wk  -NS     Predicted Duration of Therapy Intervention (PT) until d/c  -NS     Row Name 05/31/23 1144          Vital Signs    Pre Patient Position Supine  -NS     Post Patient Position Sitting  -NS     Row Name 05/31/23 1144          Positioning and Restraints    Pre-Treatment Position in bed  -NS     Post Treatment Position chair  -NS     In Chair notified nsg;reclined;sitting;call light within reach;with family/caregiver  -NS           User Key  (r) = Recorded By, (t) = Taken By, (c) = Cosigned By    Initials Name Provider Type    Vandana Pang, PT Physical Therapist               Outcome Measures     Row Name 05/31/23 1150 05/31/23 0730       How much help from another person do you currently need...    Turning from your back to your side while in flat bed without using bedrails? 2  -NS 1  -EH    Moving from lying on back to sitting on the side of a flat bed without bedrails? 2  -NS 1  -EH    Moving to and from a bed to a chair  (including a wheelchair)? 2  -NS 1  -EH    Standing up from a chair using your arms (e.g., wheelchair, bedside chair)? 2  -NS 1  -EH    Climbing 3-5 steps with a railing? 1  -NS 1  -EH    To walk in hospital room? 1  -NS 1  -EH    AM-PAC 6 Clicks Score (PT) 10  -NS 6  -EH    Highest level of mobility 4 --> Transferred to chair/commode  -NS 2 --> Bed activities/dependent transfer  -    Row Name 05/31/23 1150          Functional Assessment    Outcome Measure Options AM-PAC 6 Clicks Basic Mobility (PT)  -NS           User Key  (r) = Recorded By, (t) = Taken By, (c) = Cosigned By    Initials Name Provider Type    NS Vandana Villalta, MANOJ Physical Therapist    Ruben Hampton RN Registered Nurse                             Physical Therapy Education     Title: PT OT SLP Therapies (In Progress)     Topic: Physical Therapy (In Progress)     Point: Mobility training (In Progress)     Learning Progress Summary           Patient Acceptance, E, NR,NL by NS at 5/31/2023 1151                   Point: Home exercise program (In Progress)     Learning Progress Summary           Patient Acceptance, E, NR,NL by NS at 5/31/2023 1151                   Point: Body mechanics (Not Started)     Learner Progress:  Not documented in this visit.          Point: Precautions (In Progress)     Learning Progress Summary           Patient Acceptance, E, NR,NL by NS at 5/31/2023 1151                               User Key     Initials Effective Dates Name Provider Type [facility] 06/30/22 -  Vandana Villalta PT Physical Therapist PT              PT Recommendation and Plan  Planned Therapy Interventions (PT): balance training, bed mobility training, gait training, home exercise program, patient/family education, ROM (range of motion), strengthening, transfer training  Plan of Care Reviewed With: patient  Outcome Evaluation: Pt is a 71 y/o female who presents with L hip pain after falling while bending over to get her dog out of a crate. She  hit her L hip and L side of her head. CT shows acute fracture of the L femoral neck. Pt is now s/p L anterior WHITNEY. Upon evaluation, pt is disoriented to time and confused in conversation with difficulty following commands. She states that she lives with her spouse in a single level home with flat entry and that she is typically independent - unsure of accuracy as pt is a poor historian. Currently, pt appears to be functioning well below baseline. She is requiring max A for supine to sit transition and min A to maintain static sitting. She attempted sit to stand from EOB with RW and max A but was unsuccessful. Pt performed stand pivot transfer from bed to chair with max A. Attempted to educate pt on HEP however, she was unable to follow commands well enough to demonstrate understanding of exercises. She is not currently safe for d/c home and demonstrates deficits in cognition, strength, balance and activity tolerance. Recommend SNF placement at d/c.     Time Calculation:    PT Charges     Row Name 05/31/23 1151             Time Calculation    Start Time 0926  -NS      Stop Time 0955  -NS      Time Calculation (min) 29 min  -NS      PT Received On 05/31/23  -NS      PT - Next Appointment 06/01/23  -NS      PT Goal Re-Cert Due Date 06/14/23  -NS         Time Calculation- PT    Total Timed Code Minutes- PT 0 minute(s)  -NS            User Key  (r) = Recorded By, (t) = Taken By, (c) = Cosigned By    Initials Name Provider Type    Vandana Pang PT Physical Therapist              Therapy Charges for Today     Code Description Service Date Service Provider Modifiers Qty    [id number] HC PT EVAL MOD COMPLEXITY 4 5/31/2023 Vandana Villalta PT GP 1          PT G-Codes  Outcome Measure Options: AM-PAC 6 Clicks Basic Mobility (PT)  AM-PAC 6 Clicks Score (PT): 10  PT Discharge Summary  Anticipated Discharge Disposition (PT): skilled nursing facility    Vandana Villalta PT  5/31/2023      Electronically signed by  Vandana Villalta, PT at 05/31/23 1152       Occupational Therapy Notes (all)    No notes exist for this encounter.         Speech Language Pathology Notes (most recent note)    No notes exist for this encounter.

## 2023-06-01 ENCOUNTER — APPOINTMENT (OUTPATIENT)
Dept: GENERAL RADIOLOGY | Facility: HOSPITAL | Age: 73
DRG: 522 | End: 2023-06-01
Payer: MEDICARE

## 2023-06-01 LAB
ALBUMIN SERPL-MCNC: 2.6 G/DL (ref 3.5–5.2)
ALBUMIN/GLOB SERPL: 1 G/DL
ALP SERPL-CCNC: 55 U/L (ref 39–117)
ALT SERPL W P-5'-P-CCNC: 13 U/L (ref 1–33)
AMMONIA BLD-SCNC: 29 UMOL/L (ref 11–51)
ANION GAP SERPL CALCULATED.3IONS-SCNC: 7 MMOL/L (ref 5–15)
ANION GAP SERPL CALCULATED.3IONS-SCNC: 8 MMOL/L (ref 5–15)
AST SERPL-CCNC: 35 U/L (ref 1–32)
BACTERIA UR QL AUTO: ABNORMAL /HPF
BASOPHILS # BLD AUTO: 0 10*3/MM3 (ref 0–0.2)
BASOPHILS NFR BLD AUTO: 0.1 % (ref 0–1.5)
BILIRUB SERPL-MCNC: 0.5 MG/DL (ref 0–1.2)
BILIRUB UR QL STRIP: NEGATIVE
BUN SERPL-MCNC: 18 MG/DL (ref 8–23)
BUN SERPL-MCNC: 21 MG/DL (ref 8–23)
BUN/CREAT SERPL: 29 (ref 7–25)
BUN/CREAT SERPL: 32.8 (ref 7–25)
CALCIUM SPEC-SCNC: 8.3 MG/DL (ref 8.6–10.5)
CALCIUM SPEC-SCNC: 8.9 MG/DL (ref 8.6–10.5)
CHLORIDE SERPL-SCNC: 102 MMOL/L (ref 98–107)
CHLORIDE SERPL-SCNC: 102 MMOL/L (ref 98–107)
CLARITY UR: CLEAR
CO2 SERPL-SCNC: 27 MMOL/L (ref 22–29)
CO2 SERPL-SCNC: 29 MMOL/L (ref 22–29)
COD CRY URNS QL: ABNORMAL /HPF
COLOR UR: ABNORMAL
CREAT SERPL-MCNC: 0.62 MG/DL (ref 0.57–1)
CREAT SERPL-MCNC: 0.64 MG/DL (ref 0.57–1)
DEPRECATED RDW RBC AUTO: 42.9 FL (ref 37–54)
EGFRCR SERPLBLD CKD-EPI 2021: 92.9 ML/MIN/1.73
EGFRCR SERPLBLD CKD-EPI 2021: 93.6 ML/MIN/1.73
EOSINOPHIL # BLD AUTO: 0.3 10*3/MM3 (ref 0–0.4)
EOSINOPHIL NFR BLD AUTO: 3.2 % (ref 0.3–6.2)
ERYTHROCYTE [DISTWIDTH] IN BLOOD BY AUTOMATED COUNT: 12.3 % (ref 12.3–15.4)
GLOBULIN UR ELPH-MCNC: 2.5 GM/DL
GLUCOSE SERPL-MCNC: 141 MG/DL (ref 65–99)
GLUCOSE SERPL-MCNC: 154 MG/DL (ref 65–99)
GLUCOSE UR STRIP-MCNC: NEGATIVE MG/DL
HCT VFR BLD AUTO: 22 % (ref 34–46.6)
HCT VFR BLD AUTO: 23 % (ref 34–46.6)
HGB BLD-MCNC: 7.7 G/DL (ref 12–15.9)
HGB BLD-MCNC: 7.8 G/DL (ref 12–15.9)
HGB UR QL STRIP.AUTO: NEGATIVE
HYALINE CASTS UR QL AUTO: ABNORMAL /LPF
KETONES UR QL STRIP: ABNORMAL
LEUKOCYTE ESTERASE UR QL STRIP.AUTO: ABNORMAL
LYMPHOCYTES # BLD AUTO: 1.4 10*3/MM3 (ref 0.7–3.1)
LYMPHOCYTES NFR BLD AUTO: 13.1 % (ref 19.6–45.3)
MAGNESIUM SERPL-MCNC: 2 MG/DL (ref 1.6–2.4)
MCH RBC QN AUTO: 31.4 PG (ref 26.6–33)
MCHC RBC AUTO-ENTMCNC: 33.9 G/DL (ref 31.5–35.7)
MCV RBC AUTO: 92.9 FL (ref 79–97)
MONOCYTES # BLD AUTO: 1.1 10*3/MM3 (ref 0.1–0.9)
MONOCYTES NFR BLD AUTO: 10.5 % (ref 5–12)
NEUTROPHILS NFR BLD AUTO: 7.6 10*3/MM3 (ref 1.7–7)
NEUTROPHILS NFR BLD AUTO: 73.1 % (ref 42.7–76)
NITRITE UR QL STRIP: NEGATIVE
NRBC BLD AUTO-RTO: 0.1 /100 WBC (ref 0–0.2)
PH UR STRIP.AUTO: 6 [PH] (ref 5–8)
PHOSPHATE SERPL-MCNC: 2.7 MG/DL (ref 2.5–4.5)
PLATELET # BLD AUTO: 189 10*3/MM3 (ref 140–450)
PMV BLD AUTO: 8.1 FL (ref 6–12)
POTASSIUM SERPL-SCNC: 3.6 MMOL/L (ref 3.5–5.2)
POTASSIUM SERPL-SCNC: 3.9 MMOL/L (ref 3.5–5.2)
PROT SERPL-MCNC: 5.1 G/DL (ref 6–8.5)
PROT UR QL STRIP: ABNORMAL
RBC # BLD AUTO: 2.48 10*6/MM3 (ref 3.77–5.28)
RBC # UR STRIP: ABNORMAL /HPF
REF LAB TEST METHOD: ABNORMAL
SODIUM SERPL-SCNC: 136 MMOL/L (ref 136–145)
SODIUM SERPL-SCNC: 139 MMOL/L (ref 136–145)
SP GR UR STRIP: 1.02 (ref 1–1.03)
SQUAMOUS #/AREA URNS HPF: ABNORMAL /HPF
UROBILINOGEN UR QL STRIP: ABNORMAL
WBC # UR STRIP: ABNORMAL /HPF
WBC NRBC COR # BLD: 10.3 10*3/MM3 (ref 3.4–10.8)

## 2023-06-01 PROCEDURE — 85025 COMPLETE CBC W/AUTO DIFF WBC: CPT | Performed by: HOSPITALIST

## 2023-06-01 PROCEDURE — 97530 THERAPEUTIC ACTIVITIES: CPT

## 2023-06-01 PROCEDURE — 80053 COMPREHEN METABOLIC PANEL: CPT | Performed by: HOSPITALIST

## 2023-06-01 PROCEDURE — 83735 ASSAY OF MAGNESIUM: CPT | Performed by: HOSPITALIST

## 2023-06-01 PROCEDURE — 82140 ASSAY OF AMMONIA: CPT | Performed by: HOSPITALIST

## 2023-06-01 PROCEDURE — 85018 HEMOGLOBIN: CPT | Performed by: INTERNAL MEDICINE

## 2023-06-01 PROCEDURE — 85014 HEMATOCRIT: CPT | Performed by: INTERNAL MEDICINE

## 2023-06-01 PROCEDURE — 81001 URINALYSIS AUTO W/SCOPE: CPT | Performed by: HOSPITALIST

## 2023-06-01 PROCEDURE — 71045 X-RAY EXAM CHEST 1 VIEW: CPT

## 2023-06-01 PROCEDURE — 84100 ASSAY OF PHOSPHORUS: CPT | Performed by: HOSPITALIST

## 2023-06-01 RX ORDER — BUPROPION HYDROCHLORIDE 150 MG/1
450 TABLET ORAL DAILY
Status: DISCONTINUED | OUTPATIENT
Start: 2023-06-01 | End: 2023-06-03 | Stop reason: HOSPADM

## 2023-06-01 RX ORDER — ESCITALOPRAM OXALATE 10 MG/1
10 TABLET ORAL EVERY 12 HOURS SCHEDULED
Status: DISCONTINUED | OUTPATIENT
Start: 2023-06-01 | End: 2023-06-03 | Stop reason: HOSPADM

## 2023-06-01 RX ORDER — ATORVASTATIN CALCIUM 20 MG/1
20 TABLET, FILM COATED ORAL NIGHTLY
Status: DISCONTINUED | OUTPATIENT
Start: 2023-06-01 | End: 2023-06-03 | Stop reason: HOSPADM

## 2023-06-01 RX ORDER — OXYCODONE HYDROCHLORIDE 5 MG/1
5 TABLET ORAL EVERY 4 HOURS PRN
Status: DISCONTINUED | OUTPATIENT
Start: 2023-06-01 | End: 2023-06-03 | Stop reason: HOSPADM

## 2023-06-01 RX ADMIN — SENNOSIDES AND DOCUSATE SODIUM 2 TABLET: 50; 8.6 TABLET ORAL at 08:55

## 2023-06-01 RX ADMIN — SENNOSIDES AND DOCUSATE SODIUM 2 TABLET: 50; 8.6 TABLET ORAL at 20:52

## 2023-06-01 RX ADMIN — CARVEDILOL 3.12 MG: 3.12 TABLET, FILM COATED ORAL at 08:55

## 2023-06-01 RX ADMIN — Medication 10 ML: at 08:56

## 2023-06-01 RX ADMIN — CARVEDILOL 3.12 MG: 3.12 TABLET, FILM COATED ORAL at 20:53

## 2023-06-01 RX ADMIN — SODIUM CHLORIDE, POTASSIUM CHLORIDE, SODIUM LACTATE AND CALCIUM CHLORIDE 75 ML/HR: 600; 310; 30; 20 INJECTION, SOLUTION INTRAVENOUS at 17:23

## 2023-06-01 RX ADMIN — Medication 10 ML: at 20:53

## 2023-06-01 RX ADMIN — ESCITALOPRAM OXALATE 10 MG: 10 TABLET ORAL at 20:52

## 2023-06-01 RX ADMIN — APIXABAN 2.5 MG: 2.5 TABLET, FILM COATED ORAL at 20:53

## 2023-06-01 RX ADMIN — OXYCODONE 10 MG: 5 TABLET ORAL at 12:00

## 2023-06-01 RX ADMIN — CLOPIDOGREL BISULFATE 75 MG: 75 TABLET ORAL at 08:55

## 2023-06-01 RX ADMIN — OXYCODONE HYDROCHLORIDE 5 MG: 5 TABLET ORAL at 20:52

## 2023-06-01 RX ADMIN — APIXABAN 2.5 MG: 2.5 TABLET, FILM COATED ORAL at 08:55

## 2023-06-01 RX ADMIN — OXYCODONE 10 MG: 5 TABLET ORAL at 04:35

## 2023-06-01 RX ADMIN — ATORVASTATIN CALCIUM 20 MG: 20 TABLET, FILM COATED ORAL at 20:52

## 2023-06-01 RX ADMIN — BUPROPION HYDROCHLORIDE 450 MG: 150 TABLET, EXTENDED RELEASE ORAL at 14:27

## 2023-06-01 RX ADMIN — ESCITALOPRAM OXALATE 10 MG: 10 TABLET ORAL at 14:27

## 2023-06-01 RX ADMIN — SODIUM CHLORIDE, POTASSIUM CHLORIDE, SODIUM LACTATE AND CALCIUM CHLORIDE 75 ML/HR: 600; 310; 30; 20 INJECTION, SOLUTION INTRAVENOUS at 04:37

## 2023-06-01 NOTE — PLAN OF CARE
Goal Outcome Evaluation:                      Pt continuing to require 02 administration, went to low 80's on room air, mid 80's on 2 liters, below 92% on 3 liters, stable with 92 to 100 percent on 4 liters.

## 2023-06-01 NOTE — PLAN OF CARE
Goal Outcome Evaluation:         Patient is A & O X 1.  The patient's pain was treated per the MAR.  Patient has been sleeping most of this shift. The med video sitter was DC on this shift. The patient is not able to make her needs known.  Hourly rounding was done to ensure her safety and that her needs are met. The call light is with in reach.  Will continue with the patients care.

## 2023-06-01 NOTE — PLAN OF CARE
Goal Outcome Evaluation:  Plan of Care Reviewed With: patient           Assessment: Jennifer Blackmon presents with functional mobility impairments which indicate the need for skilled intervention. Fair tolerance to PT treatment this date. Pt with increased confusion and increased difficulty with command following this date. She requires frequent redirection as she tends to forget what she is doing mid-task. Pt does not remember falling and does not remember that she had surgery. She is requiring max A for supine to sit and for stand pivot transfer. She demonstrates deficits in strength, balance, activity tolerance and cognition. Pt will require SNF placement at d/c. Tolerating session today without incident. Will continue to follow and progress as tolerated.

## 2023-06-01 NOTE — SIGNIFICANT NOTE
Case Management/Social Work    Patient Name:  Jennifer Blackmon  YOB: 1950  MRN: [id number]  Admit Date:  5/26/2023 06/01/23 1046   Post Acute Pre-Cert Documentation   Verification from Payer Yes   Date Post Acute Pre-Cert Completed 06/01/23   Response Accepted   Post Acute Pre-Cert Initiated Comment KAREN verified SNF rpecert approval via [employer] portal. Plan auth ID [id number]. Valid 5/31-6/2. CM made aware.           Electronically signed by:  Sudhir Elder CMA  06/01/23 10:46 EDT    Sudhir Elder  Case Management Associate  [address] [address]  P: [phone]  F: [phone]

## 2023-06-01 NOTE — THERAPY TREATMENT NOTE
"Subjective: Pt agreeable to therapeutic plan of care.    Objective:     Disoriented to situation and time.     Bed mobility - Max-A  Transfers - Max-A stand pivot  Ambulation - 0 feet N/A or Not attempted.    Vitals: WNL    Pain: 5 VAS   Location: L hip  Intervention for pain: Repositioned, Increased Activity and Therapeutic Presence    Education: Provided education on the importance of mobility in the acute care setting, Verbal/Tactile Cues, Transfer Training and Post-Op Precautions    Assessment: Jennifer Blackmon presents with functional mobility impairments which indicate the need for skilled intervention. Fair tolerance to PT treatment this date. Pt with increased confusion and increased difficulty with command following this date. She requires frequent redirection as she tends to forget what she is doing mid-task. Pt does not remember falling and does not remember that she had surgery. She is requiring max A for supine to sit and for stand pivot transfer. She demonstrates deficits in strength, balance, activity tolerance and cognition. Pt will require SNF placement at d/c. Tolerating session today without incident. Will continue to follow and progress as tolerated.     Plan/Recommendations:   Moderate Intensity Therapy recommended post-acute care. This is recommended as therapy feels the patient would require 3-4 days per week and wouldn't tolerate \"3 hour daily\" rehab intensity. SNF would be the preferred choice. If the patient does not agree to SNF, arrange HH or OP depending on home bound status. If patient is medically complex, consider LTACH.. Pt requires no DME at discharge.     Pt desires Skilled Rehab placement at discharge. Pt cooperative; agreeable to therapeutic recommendations and plan of care.         Basic Mobility 6-click:  Rollin = Total, A lot = 2, A little = 3; 4 = None  Supine>Sit:   1 = Total, A lot = 2, A little = 3; 4 = None   Sit>Stand with arms:  1 = Total, A lot = 2, A little " = 3; 4 = None  Bed>Chair:   1 = Total, A lot = 2, A little = 3; 4 = None  Ambulate in room:  1 = Total, A lot = 2, A little = 3; 4 = None  3-5 Steps with railin = Total, A lot = 2, A little = 3; 4 = None  Score: 9    Modified [place]: N/A = No pre-op stroke/TIA    Post-Tx Position: Up in Chair and Call light and personal items within reach  PPE: gloves

## 2023-06-01 NOTE — CASE MANAGEMENT/SOCIAL WORK
Continued Stay Note  MARICEL William     Patient Name: Jennifer Blackmon  MRN: [id number]  Today's Date: 6/1/2023    Admit Date: 5/26/2023    Plan: Accepted at [facility] snf.  Precert approved  5/31/-6/2.   Discharge Plan     Row Name 06/01/23 1115       Plan    Plan Accepted at [facility] snf.  Precert approved  5/31/-6/2.    Plan Comments accepted at [facility].  Precert approved  good 5/31/23-06/02/23.  Dc barriers:  oxygen , pod# Lt TH                      Expected Discharge Date and Time     Expected Discharge Date Expected Discharge Time    Jun 2, 2023             Ida Sanchez, VANNESA   Phone communication or documentation only - no physical contact with patient or family.

## 2023-06-01 NOTE — PROGRESS NOTES
[facility] Medicine Services   Daily Progress Note    Patient Name: Jennifer Blackmon  : 1950  MRN: [id number]  Primary Care Physician:  Nathalia Maria APRN  Date of admission: 2023  Date and Time of Service:  at       Subjective      Chief Complaint:     Per nursing reported episodes of confusion  Denies any chest pain or SOB  No cough  No nausea or vomiting  Has pain that is controlled with meds.       Objective      Vitals:   Temp:  [98.1 °F (36.7 °C)-99.1 °F (37.3 °C)] 98.9 °F (37.2 °C)  Heart Rate:  [69-74] 73  Resp:  [13-15] 14  BP: (105-117)/(41-48) 109/48  Flow (L/min):  [4] 4    Physical Exam   General: No acute distress  HEENT: Neck supple, normal oral mucosa, no masses, no lymphadenopathy  Lungs: Clear bilaterally, no wheezing, no crackles, no rhonchi. Equal excursions.   CV - Normal S1/S2, no murmur, regular rate and rhythm   Abdomen - Soft, nontender, nondistended, normal bowel sounds  Extremities - no edema, no erythema  Neuro - No focal weakness, normal sensation  Psych - Alert and oriented to self, place and year. Slow to respond. Forgetful.   Skin - no wounds or lesions.            Result Review    Result Review:  I have personally reviewed the results from the time of this admission to 2023 12:12 EDT and agree with these findings:  [x]  Laboratory  [x]  Microbiology  [x]  Radiology  [x]  EKG/Telemetry   [x]  Cardiology/Vascular   []  Pathology  [x]  Old records  []  Other:  Most notable findings include:    Wounds (last 24 hours)     LDA Wound     Row Name 23 0720 23 1600       Wound 23 1128 Left anterior hip Incision    Wound - Properties Group Placement Date: 23  -TD Placement Time:   -TD Side: Left  -TD Orientation: anterior  -TD Location: hip  -TD Primary Wound Type: Incision  -TD    Dressing Appearance dry;intact;no drainage  -EH -- dry;intact;no drainage  -EH    Closure CHEPE  -EH CHEPE  -JM HCEPE  -EH    Base dressing in place,  unable to visualize  - dressing in place, unable to visualize  -JM dressing in place, unable to visualize  -EH    Drainage Amount -- none  -JM --    Retired Wound - Properties Group Placement Date: 05/30/23  -TD Placement Time: 1128  -TD Side: Left  -TD Orientation: anterior  -TD Location: hip  -TD Primary Wound Type: Incision  -TD    Retired Wound - Properties Group Date first assessed: 05/30/23  -TD Time first assessed: 1128  -TD Side: Left  -TD Location: hip  -TD Primary Wound Type: Incision  -TD          User Key  (r) = Recorded By, (t) = Taken By, (c) = Cosigned By    Initials Name Provider Type    TD Tracy Fulton RN Registered Nurse    Gretchen Pal LPN Licensed Nurse    Ruben Hampton RN Registered Nurse                  Assessment & Plan      Brief Patient Summary:  Jennifer Blackmon is a 72 y.o. female who       apixaban, 2.5 mg, Oral, Q12H  carvedilol, 3.125 mg, Oral, BID  clopidogrel, 75 mg, Oral, Daily  senna-docusate sodium, 2 tablet, Oral, BID  sodium chloride, 10 mL, Intravenous, Q12H  sodium chloride, 10 mL, Intravenous, Q12H       lactated ringers, 50 mL/hr, Last Rate: 50 mL/hr (05/29/23 2007)  lactated ringers, 75 mL/hr, Last Rate: 75 mL/hr (06/01/23 0437)         Active Hospital Problems:  Active Hospital Problems    Diagnosis    • **Left displaced femoral neck fracture      Plan:       Left hip femoral neck fracture  - s/p mechanical fall  - s/p left hip arthroplasty on 5/30 - Dr. Menon  - Low-dose Eliquis for DVT prophylaxis per Ortho  - Rehab placement    Hypoxia  - no h/o COPD. On 4L  - Encourage IS  - Check CXR. Wean off as tolerated     Intermittent delirium  - check Ammonia level. Will try to decrease Oxycodone dose   - monitor.      CAD  - s/p PCI in 2019  - Resume Plavix. Cont BB, Statin      ? CKD stage IIIa  -Creatinine stable     Hypertension - Coreg  Hyperlipidemia - Statin   Chronic depression/anxiety -Continue home meds, monitor     DVT prophylaxis  -Low-dose Eliquis  per Ortho      DVT prophylaxis:  Medical DVT prophylaxis orders are present.    CODE STATUS:    Code Status (Patient has no pulse and is not breathing): CPR (Attempt to Resuscitate)  Medical Interventions (Patient has pulse or is breathing): Full Support  Release to patient: Routine Release      Disposition:  I expect patient to be discharged .    This patient has been  and discussed with . 06/01/23      Electronically signed by Roshan Almodovar MD, 06/01/23, 12:12 EDT.  [facility] Hospitalist Team

## 2023-06-01 NOTE — ANESTHESIA POSTPROCEDURE EVALUATION
Patient: Jennifer Blackmon    Procedure Summary     Date: 05/30/23 Room / Location: [facility] OR 11 / [facility] MAIN OR    Anesthesia Start: 1002 Anesthesia Stop: 1208    Procedure: TOTAL HIP ARTHROPLASTY ANTERIOR (Left: Hip) Diagnosis:       Femur neck fracture      (femur neck fracture)    Surgeons: Josiah Menon MD Provider: Blanca Munoz CRNA    Anesthesia Type: general ASA Status: 3          Anesthesia Type: general    Vitals  Vitals Value Taken Time   BP 96/49 05/30/23 1531   Temp 97.2 °F (36.2 °C) 05/30/23 1521   Pulse 68 05/30/23 1530   Resp 15 05/30/23 1521   SpO2 97 % 05/30/23 1530   Vitals shown include unvalidated device data.        Post Anesthesia Care and Evaluation    Patient location during evaluation: PACU  Patient participation: complete - patient participated  Level of consciousness: awake  Pain scale: See nurse's notes for pain score.  Pain management: adequate    Airway patency: patent  Anesthetic complications: No anesthetic complications  PONV Status: none  Cardiovascular status: acceptable  Respiratory status: acceptable and spontaneous ventilation  Hydration status: acceptable    Comments: Patient seen and examined postoperatively; vital signs stable; SpO2 greater than or equal to 90%; cardiopulmonary status stable; nausea/vomiting adequately controlled; pain adequately controlled; no apparent anesthesia complications; patient discharged from anesthesia care when discharge criteria were met

## 2023-06-01 NOTE — CASE MANAGEMENT/SOCIAL WORK
Continued Stay Note   William     Patient Name: Jennifer Blackmon  MRN: [id number]  Today's Date: 6/1/2023    Admit Date: 5/26/2023    Plan: Accepted at [facility] pending precert.  CMA submitted SNF precert via [employer] portal. [employer] auth ID [id number]. PASRR  approved.   Discharge Plan     Row Name 06/01/23 1016       Plan    Plan Accepted at [facility] pending precert.  CMA submitted SNF precert via [employer] portal. [employer] auth ID [id number]. PASRR  approved.    Plan Comments precert pending to [facility] snf                Expected Discharge Date and Time     Expected Discharge Date Expected Discharge Time    Jun 2, 2023             Ida Sanchez RN   Phone communication or documentation only - no physical contact with patient or family.

## 2023-06-02 VITALS
RESPIRATION RATE: 13 BRPM | BODY MASS INDEX: 23.03 KG/M2 | SYSTOLIC BLOOD PRESSURE: 96 MMHG | HEART RATE: 66 BPM | TEMPERATURE: 98.2 F | OXYGEN SATURATION: 92 % | HEIGHT: 65 IN | WEIGHT: 138.23 LBS | DIASTOLIC BLOOD PRESSURE: 57 MMHG

## 2023-06-02 PROCEDURE — 97535 SELF CARE MNGMENT TRAINING: CPT

## 2023-06-02 PROCEDURE — 97110 THERAPEUTIC EXERCISES: CPT

## 2023-06-02 PROCEDURE — 97530 THERAPEUTIC ACTIVITIES: CPT

## 2023-06-02 RX ORDER — POLYETHYLENE GLYCOL 3350 17 G/17G
17 POWDER, FOR SOLUTION ORAL DAILY PRN
Start: 2023-06-02

## 2023-06-02 RX ORDER — AMOXICILLIN 250 MG
2 CAPSULE ORAL 2 TIMES DAILY
Start: 2023-06-02

## 2023-06-02 RX ORDER — OXYCODONE HYDROCHLORIDE 5 MG/1
5 TABLET ORAL EVERY 6 HOURS PRN
Qty: 20 TABLET | Refills: 0 | Status: SHIPPED | OUTPATIENT
Start: 2023-06-02

## 2023-06-02 RX ADMIN — Medication 10 ML: at 08:50

## 2023-06-02 RX ADMIN — ESCITALOPRAM OXALATE 10 MG: 10 TABLET ORAL at 08:50

## 2023-06-02 RX ADMIN — SENNOSIDES AND DOCUSATE SODIUM 2 TABLET: 50; 8.6 TABLET ORAL at 08:50

## 2023-06-02 RX ADMIN — APIXABAN 2.5 MG: 2.5 TABLET, FILM COATED ORAL at 08:50

## 2023-06-02 RX ADMIN — OXYCODONE HYDROCHLORIDE 5 MG: 5 TABLET ORAL at 08:56

## 2023-06-02 RX ADMIN — BUPROPION HYDROCHLORIDE 450 MG: 150 TABLET, EXTENDED RELEASE ORAL at 08:50

## 2023-06-02 RX ADMIN — CLOPIDOGREL BISULFATE 75 MG: 75 TABLET ORAL at 08:50

## 2023-06-02 RX ADMIN — CARVEDILOL 3.12 MG: 3.12 TABLET, FILM COATED ORAL at 08:50

## 2023-06-02 RX ADMIN — OXYCODONE HYDROCHLORIDE 5 MG: 5 TABLET ORAL at 01:54

## 2023-06-02 RX ADMIN — Medication 10 ML: at 08:51

## 2023-06-02 NOTE — CASE MANAGEMENT/SOCIAL WORK
"Physicians Statement of Medical Necessity for  Ambulance Transportation    GENERAL INFORMATION     Name: Jennifer Blackmon  YOB: 1950  Medicare #: [id number]  Transport Date: 06/02/2023 (Valid for round trips this date, or for scheduled repetitive trips for 60 days from the date signed below.)  Origin: [facility]   Destination [facility]   Is the Patient's stay covered under Medicare Part A (PPS/DRG?)No   Closest appropriate facility? No, reason transport to more distant facility is required: GOING TO ACCEPTING SNF   If this a hosp-hosp transfer? No  Is this a hospice patient? No    MEDICAL NECESSITY QUESTIONAIRE    Ambulance Transportation is medically necessary only if other means of transportation are contraindicated or would be potentially harmful to the patient.  To meet this requirement, the patient must be either \"bed confined\" or suffer from a condition such that transport by means other than an ambulance is contraindicated by the patient's condition.  The following questions must be answered by the healthcare professional signing below for this form to be valid:     1) Describe the MEDICAL CONDITION (physical and/or mental) of this patient AT THE TIME OF AMBULANCE TRANSPORT that requires the patient to be transported in an ambulance, and why transport by other means is contraindicated by the patient's condition: unable to ambulate, unable to transport in w/c without  , confused , unable to get up without assistance   Past Medical History:   Diagnosis Date   • Allergic rhinitis    • Basal cell carcinoma (BCC) of nostril    • Coronary artery disease involving native coronary artery of native heart with angina pectoris 06/18/2019    Status post successful PCI of the coronary stent deployment to high-grade RCA stenosis after presenting with ST segment elevated microinfarction in May 2019   • DDD (degenerative disc disease), lumbar    • Depression    • " "Depression    • Essential hypertension 2019   • Femoral neck fracture 2023   • Heart attack     2019   • Hyperlipidemia    • Hyperlipidemia, mixed 2019   • Hypertension    • Insomnia    • Myocardial infarction less than 4 weeks ago 2019    Presented initially with ST segment elevated microinfarction treated emergently in [place] May 2019   • Old MI (myocardial infarction) 2019    Presented initially with ST segment elevated microinfarction treated emergently in [place] May 2019   • Osteoarthritis    • Toenail fungus       Past Surgical History:   Procedure Laterality Date   • ADENOIDECTOMY      Adenoids removed   • APPENDECTOMY     • AUGMENTATION MAMMAPLASTY     • BREAST IMPLANT SURGERY     • CATARACT EXTRACTION Bilateral    • CERVICAL DISC SURGERY      ruptured and removed    •  SECTION     • CHEST SURGERY      attempted pectus repair    • EYE SURGERY  1997    AK   • HYSTERECTOMY     • KNEE ARTHROSCOPY Right    • LUMBAR DISC SURGERY      ruptured and removed    • NASAL SEPTAL RECONSTRUCTION     • PECTUS CARNATUM REPAIR      attempted   • RECTAL SURGERY      Repair   • REFRACTIVE SURGERY      RK/AK both eyes    • REFRACTIVE SURGERY     • SOMNOPLASTY RADIAL FREQUENCY ABLATION     • TONSILLECTOMY     • TOTAL HIP ARTHROPLASTY Left 2023    Procedure: TOTAL HIP ARTHROPLASTY ANTERIOR;  Surgeon: Josiah Menon MD;  [facility] MAIN OR;  Service: Orthopedics;  Laterality: Left;   • UVULOPALATOPHARYNGOPLASTY        2) Is this patient \"bed confined\" as defined below?No    To be \"bed confined\" the patient must satisfy all three of the following criteria:  (1) unable to get up from bed without assistance; AND (2) unable to ambulate;  AND (3) unable to sit in a chair or wheelchair.  3) Can this patient safely be transported by car or wheelchair van (I.e., may safely sit during transport, without an attendant or monitoring?)No   4. In addition " to completing questions 1-3 above, please check any of the following conditions that apply*:          *Note: supporting d unable to ambulate, unable to transport in w/c without  , confused , unable to get up without assistance ocumentation for any boxes checked must be maintained in the patient's medical records Patient is confused, Moderate/severe pain on movement and Medical attendant required      SIGNATURE OF PHYSICIAN OR OTHER AUTHORIZED HEALTHCARE PROFESSIONAL    I certify that the above information is true and correct based on my evaluation of this patient, and represent that the patient requires transport by ambulance and that other forms of transport are contraindicated.  I understand that this information will be used by the Centers for Medicare and Medicaid Services (CMS) to support the determiniation of medical necessity for ambulance services, and I represent that I have personal knowledge of the patient's condition at the time of transport.    x   If this box is checked, I also certify that the patient is physically or mentally incapable of signing the ambulance service's claim form and that the institution with which I am affiliated has furnished care, services or assistance to the patient.  My signature below is made on behalf of the patient pursuant to 42 .36(b)(4). In accordance with 42 .37, the specific reason(s) that the patient is physically or mentally incapable of signing the claim for is as follows: confused      Signature of Physician or Healthcare Professional   Dr. Dora Mack, Sybil Sanchez RN   06/02/2023     (For Scheduled repetitive transport, this form is not valid for transports performed more than 60 days after this date).                                                                                                                                             --------------------------------------------------------------------------------------------  Printed Name and Credentials of Physician or Authorized Healthcare Professional     *Form must be signed by patient's attending physician for scheduled, repetitive transports,.  For non-repetitive ambulance transports, if unable to obtain the signature of the attending physician, any of the following may sign (please select below):     Physician  Clinical Nurse Specialist  Registered Nurse     Physician Assistant  Discharge Planner  Licensed Practical Nurse     Nurse Practitioner  x

## 2023-06-02 NOTE — PLAN OF CARE
"Assessment: Jennifer Blackmon presents with ADL impairments affecting function including balance, cognition, endurance / activity tolerance, motor planning, pain, range of motion (ROM) and strength. Pt required max A STS trials to increase upright tolerance for ADLs. On 3rd trail pt transferred to the chair with max A and max verbal cues to move BLEs without success. Pt pivoted to chair with max A.Pt required max A for toileting and LB dressing.  Demonstrated functioning below baseline abilities indicate the need for continued skilled intervention while inpatient. Tolerating session today without incident. Will continue to follow and progress as tolerated.     Plan/Recommendations:   Moderate Intensity Therapy recommended post-acute care. This is recommended as therapy feels the patient would require 3-4 days per week and wouldn't tolerate \"3 hour daily\" rehab intensity. SNF would be the preferred choice. If the patient does not agree to SNF, arrange HH or OP depending on home bound status. If patient is medically complex, consider LTACH.. Pt requires no DME at discharge.    "

## 2023-06-02 NOTE — THERAPY TREATMENT NOTE
Subjective: Pt agreeable to therapeutic plan of care.  Cognition: oriented to Person, Place and Time and awareness of deficits: poor awareness of defits; ST memory deficits with poor recall of recent hospital events    Objective:     Bed Mobility: Mod-A and Max-A supine to sitting EOB  Functional Transfers: Max-A for sit to stand for 2 trials; on 3rd trial pt required max A for stand pivot sit to chair      Balance: supported and standing Mod-A  Functional Ambulation: N/A or Not attempted. Pt unable to complete full weight shifting to move BLEs     Toileting: Max-A  ADL Position: supported standing  ADL Comments: Pt max A for toileting with purewick    Lower Body Dressing: Max-A  ADL Position: supported sitting  ADL Comments: max A for donning socks     Therapeutic Exercise - 5 Reps Bilaterally and B LE AAROM supported sitting / chair for ankle pumps and knee flexion/extension     Vitals: WNL    Pain: 5 VAS  Location: RLE pain   Interventions for pain: Repositioned and Increased Activity and ice pack   Education: Provided education on the importance of mobility in the acute care setting, ADL training and Transfer Training      Assessment: Jennifer Blackmon presents with ADL impairments affecting function including balance, cognition, endurance / activity tolerance, motor planning, pain, range of motion (ROM) and strength. Pt required max A STS trials to increase upright tolerance for ADLs. On 3rd trail pt transferred to the chair with max A and max verbal cues to move BLEs without success. Pt pivoted to chair with max A.Pt required max A for toileting and LB dressing.  Demonstrated functioning below baseline abilities indicate the need for continued skilled intervention while inpatient. Tolerating session today without incident. Will continue to follow and progress as tolerated.     Plan/Recommendations:   Moderate Intensity Therapy recommended post-acute care. This is recommended as therapy feels the patient would  "require 3-4 days per week and wouldn't tolerate \"3 hour daily\" rehab intensity. SNF would be the preferred choice. If the patient does not agree to SNF, arrange HH or OP depending on home bound status. If patient is medically complex, consider LTACH.. Pt requires no DME at discharge.     Pt desires Skilled Rehab placement at discharge. Pt cooperative; agreeable to therapeutic recommendations and plan of care.     Modified [place]: N/A = No pre-op stroke/TIA    Post-Tx Position: Up in Chair, Alarms activated and Call light and personal items within reach  PPE: gloves  "

## 2023-06-02 NOTE — DISCHARGE SUMMARY
[facility] Medicine Services  Discharge Summary    Date of Service: 23  Patient condition: Stable    Patient Name: Jennifer Blackmon  : 1950  MRN: [id number]    Date of Admission: 2023  Discharge Diagnosis: left hip femoral fracture  Date of Discharge:  23    Primary Care Physician: Nathalia Maria APRN      Presenting Problem:   Left displaced femoral neck fracture [S72.002A]  Concussion with loss of consciousness of 30 minutes or less, initial encounter [S06.0X1A]  Contusion of left hip and thigh, initial encounter [S70.02XA, S70.12XA]  Contusion of left temporofrontal scalp, initial encounter [S00.03XA]    Active and Resolved Hospital Problems:  Active Hospital Problems    Diagnosis POA   • **Left displaced femoral neck fracture [S72.002A] Yes      Resolved Hospital Problems   No resolved problems to display.         Hospital Course     Hospital Course:  Jennifer Blackmon is a 72 y.o. female Patient is a 72-year-old female who presented with left hip femoral neck fracture status post mechanical fall underwent left hip arthroplasty and started on low-dose Eliquis per Ortho for DVT prophylaxis.  Patient was tolerating therapy.  Developed intermittent delirium that improved.  Oxycodone dose was decreased.  She had some hypoxia but likely due to atelectasis after taking deep breaths patient is saturation resolved to normal.  Patient had rehab arranged and will be discharged to complete therapy and follow-up with orthopedic as an outpatient        DISCHARGE Follow Up Recommendations for labs and diagnostics:       Reasons For Change In Medications and Indications for New Medications:      Day of Discharge     Vital Signs:  Temp:  [97.6 °F (36.4 °C)-98.7 °F (37.1 °C)] 98.2 °F (36.8 °C)  Heart Rate:  [66-78] 66  Resp:  [10-13] 13  BP: ()/(47-58) 96/57  Flow (L/min):  [4] 4    Physical Exam:  Physical Exam   General: No acute distress  HEENT: Neck supple, normal oral  mucosa, no masses, no lymphadenopathy  Lungs: Clear bilaterally, no wheezing, no crackles, no rhonchi. Equal excursions.   CV - Normal S1/S2, no murmur, regular rate and rhythm   Abdomen - Soft, nontender, nondistended, normal bowel sounds  Extremities - no edema, no erythema  Neuro - No focal weakness, normal sensation  Psych - Alert and oriented to self, place and year.   Skin - no wounds or lesions.       Pertinent  and/or Most Recent Results     LAB RESULTS:      Lab 06/01/23 2216 06/01/23  0300 05/31/23 2247 05/30/23 2243 05/30/23  1620 05/29/23 2324 05/28/23 2316 05/27/23  2320 05/26/23  2319 05/26/23  1649   WBC 10.30  --   --  10.00  --  8.10 8.00 7.60   < > 6.70   HEMOGLOBIN 7.8* 7.7* 7.7* 9.6* 10.3* 10.9* 11.3* 11.7*   < > 12.4   HEMATOCRIT 23.0* 22.0* 22.2* 28.6* 31.5* 32.8* 34.6 35.7   < > 37.4   PLATELETS 189  --   --  187  --  190 179 196   < > 226   NEUTROS ABS 7.60*  --   --   --   --   --   --   --   --  4.20   LYMPHS ABS 1.40  --   --   --   --   --   --   --   --  1.70   MONOS ABS 1.10*  --   --   --   --   --   --   --   --  0.60   EOS ABS 0.30  --   --   --   --   --   --   --   --  0.10   MCV 92.9  --   --  92.6  --  93.7 93.9 94.9   < > 96.0   PROTIME  --   --   --   --   --   --   --   --   --  10.4   APTT  --   --   --   --   --   --   --   --   --  26.1*    < > = values in this interval not displayed.         Lab 06/01/23 2216 06/01/23  1312 05/30/23 2243 05/28/23 2316 05/27/23  2320   SODIUM 139 136 137 135* 136   POTASSIUM 3.9 3.6 4.6 3.9 4.3   CHLORIDE 102 102 101 99 100   CO2 29.0 27.0 28.0 26.0 28.0   ANION GAP 8.0 7.0 8.0 10.0 8.0   BUN 21 18 18 14 19   CREATININE 0.64 0.62 0.76 0.77 0.93   EGFR 92.9 93.6 82.3 81.1 64.6   GLUCOSE 154* 141* 172* 114* 90   CALCIUM 8.9 8.3* 8.2* 8.6 8.8   MAGNESIUM 2.0  --   --   --   --    PHOSPHORUS 2.7  --   --   --   --          Lab 06/01/23  1312 05/26/23  1649   TOTAL PROTEIN 5.1* 6.1   ALBUMIN 2.6* 3.8   GLOBULIN 2.5 2.3   ALT (SGPT) 13 22    AST (SGOT) 35* 26   BILIRUBIN 0.5 0.4   ALK PHOS 55 79         Lab 05/26/23  1649   HSTROP T 11*   PROTIME 10.4   INR 0.97             Lab 05/30/23  0819   ABO TYPING O   RH TYPING Positive   ANTIBODY SCREEN Negative         Brief Urine Lab Results  (Last result in the past 365 days)      Color   Clarity   Blood   Leuk Est   Nitrite   Protein   CREAT   Urine HCG        06/01/23 1413 Dark Yellow   Clear   Negative   Trace   Negative   30 mg/dL (1+)               Microbiology Results (last 10 days)     ** No results found for the last 240 hours. **          CT Head Without Contrast    Result Date: 5/26/2023  Impression: Impression: No acute intracranial pathology. Electronically Signed: Poncho Capellan  5/26/2023 5:50 PM EDT  Workstation ID: [id number]    CT Pelvis Without Contrast    Result Date: 5/26/2023  Impression: Acute fracture of the left femoral neck. No additional fracture or dislocation identified. Electronically Signed: Poncho Capellan  5/26/2023 5:54 PM EDT  Workstation ID: [id number]    XR Chest 1 View    Result Date: 6/1/2023  Impression: Impression: 1. Cardiomegaly with probable small left effusion. 2. Right IJ large-bore central venous catheter with tip projecting over the high right atrium. 3. Limited visualization of the bases due to overlying breast implants. Electronically Signed: Ruslan Petersen  6/1/2023 1:21 PM EDT  Workstation ID: [id number]    XR Pelvis 1 or 2 View    Result Date: 5/30/2023  Impression: Impression: Postsurgical change from left total hip arthroplasty. Electronically Signed: Ruslan Petersen  5/30/2023 1:11 PM EDT  Workstation ID: [id number]    XR Hip With or Without Pelvis 1 View Left    Result Date: 5/30/2023  Impression: Impression: Postsurgical changes from left total hip arthroplasty. Electronically Signed: Ruslan Petersen  5/30/2023 12:46 PM EDT  Workstation ID: [id number]              Results for orders placed during the hospital encounter of 03/17/22    Adult Transthoracic Echo Complete W/  Cont if Necessary Per Protocol    Interpretation Summary  Normal LV size and contractility EF of 60 to 65%  Borderline RV  Mild biatrial enlargement seen.  Agitated saline bubble contrast negative for septal defect  Aortic valve, mitral valve, tricuspid valve appears structurally normal, mild to moderate mitral and mild tricuspid regurgitation seen.  Calculated RV systolic pressure of 32 mmHg  Reveal posterior pericardial effusion seen.  No signs of increased intrapericardial pressures  Proximal aorta appears normal in size.      Labs Pending at Discharge:      Procedures Performed  Procedure(s):  TOTAL HIP ARTHROPLASTY ANTERIOR         Consults:   Consults     Date and Time Order Name Status Description    5/30/2023  3:49 PM Inpatient Consult to Hospitalist      5/26/2023  8:38 PM Inpatient Orthopedic Surgery Consult Completed             Discharge Details        Discharge Medications      New Medications      Instructions Start Date   apixaban 2.5 MG tablet tablet  Commonly known as: ELIQUIS   2.5 mg, Oral, Every 12 Hours Scheduled      oxyCODONE 5 MG immediate release tablet  Commonly known as: ROXICODONE   5 mg, Oral, Every 6 Hours PRN      polyethylene glycol 17 g packet  Commonly known as: MIRALAX   17 g, Oral, Daily PRN      sennosides-docusate 8.6-50 MG per tablet  Commonly known as: PERICOLACE   2 tablets, Oral, 2 Times Daily         Continue These Medications      Instructions Start Date   atorvastatin 40 MG tablet  Commonly known as: LIPITOR   20 mg, Oral, Daily      buPROPion  MG 24 hr tablet  Commonly known as: WELLBUTRIN XL   450 mg, Oral, Every Morning      Calcium Citrate-Vitamin D 500-400 MG-UNIT chewable tablet   Oral, Daily      carvedilol 3.125 MG tablet  Commonly known as: COREG   3.125 mg, Oral, 2 Times Daily      clobetasol 0.05 % external solution  Commonly known as: TEMOVATE   Topical, 2 Times Daily      clopidogrel 75 MG tablet  Commonly known as: PLAVIX   75 mg, Oral, Daily       coenzyme Q10 100 MG capsule   100 mg, Oral, Daily      escitalopram 20 MG tablet  Commonly known as: LEXAPRO   TAKE ONE-HALF (1/2) TABLET TWICE A DAY      Melatonin 10 MG sublingual tablet   10 mg, Sublingual, Nightly PRN      Multi-Vitamin Gummies chewable tablet   1 tablet/day, Oral, Daily             No Known Allergies      Discharge Disposition: rehab with OP follow up  Rehab Facility or Unit (DC - External)    Diet:  Hospital:  Diet Order   Procedures   • Diet: Regular/House Diet; Texture: Regular Texture (IDDSI 7); Fluid Consistency: Thin (IDDSI 0)         Discharge Activity:         CODE STATUS:  Code Status and Medical Interventions:   Ordered at: 05/26/23 2038     Code Status (Patient has no pulse and is not breathing):    CPR (Attempt to Resuscitate)     Medical Interventions (Patient has pulse or is breathing):    Full Support     Release to patient:    Routine Release         Future Appointments   Date Time Provider Department Center   6/15/2023  2:00 PM Nathalia Maria APRN [facility]   12/20/2023  2:45 PM Seipel, Joseph F, MD [facility]   3/15/2024 11:00 AM Gaurav Saravia MD [facility]           Time spent on Discharge including face to face service:  31 minutes    This patient has been  and discussed with . 06/02/23      Signature: Electronically signed by Roshan Almodovar MD, 06/02/23, 15:49 EDT.  [facility] Hospitalist Team

## 2023-06-02 NOTE — PLAN OF CARE
Goal Outcome Evaluation:                   VSS on room air. Pt's pain treated per PRN PO orders. PT to dc to [facility] via [facility] today.

## 2023-06-02 NOTE — PLAN OF CARE
Goal Outcome Evaluation:   Pt is oriented to self and situation only. Pt given prn pain meds, see MAR. Pt remains on 4L of o2,  when lowered to 3L pt 's o2 dropped into the 80's. Pt is resting in bed with the call light within reach and the bed alarm is set. Ongoing care continues.

## 2023-06-02 NOTE — CASE MANAGEMENT/SOCIAL WORK
Continued Stay Note  MARICEL Thomas     Patient Name: Jennifer Blackmon  MRN: [id number]  Today's Date: 6/2/2023    Admit Date: 5/26/2023    Plan: Accepted at [facility] snf.  Precert approved 05/31/23-06/02/2023.Bed ready today 06/02/23   Discharge Plan     Row Name 06/02/23 1209       Plan    Plan Accepted at [facility] sn[facility].  Precert approved 05/31/23-06/02/2023.Bed ready today 06/02/23    Plan Comments Accepted at [facility]   pharmacy updated.           Expected Discharge Date and Time     Expected Discharge Date Expected Discharge Time    Jun 2, 2023             Ida Sanchez RN   Phone communication or documentation only - no physical contact with patient or family.

## 2023-06-05 ENCOUNTER — TELEPHONE (OUTPATIENT)
Dept: ORTHOPEDIC SURGERY | Facility: CLINIC | Age: 73
End: 2023-06-05
Payer: MEDICARE

## 2023-06-05 NOTE — CASE MANAGEMENT/SOCIAL WORK
Case Management Discharge Note      Final Note: [facility]    Provided Post Acute Provider List?: Yes  Post Acute Provider List: Nursing Home  Delivered To: Patient  Method of Delivery: In person    Selected Continued Care - Discharged on 6/2/2023 Admission date: 5/26/2023 - Discharge disposition: Rehab Facility or Unit (DC - External)      Destination Coordination complete.      Service Provider Selected Services Address Phone Fax Patient Preferred    [facility] [address] [id number] [phone] --                      Transportation Services  Ambulance: [facility]    Final Discharge Disposition Code: 03 - skilled nursing facility (SNF)

## 2023-06-05 NOTE — TELEPHONE ENCOUNTER
Caller: AAKASH     Relationship to patient: [facility]    Best call back number: [phone]    Additional Notes: AAKASH WITH [facility] WAS CALLING TO GET A STOP DATE ON PATIENT TAKING ELIQUIS? THANK YOU!

## 2023-06-05 NOTE — TELEPHONE ENCOUNTER
RETURNED AAKASH'S CALL, PER ALEK SHE IS NOT AVAILABLE AND CAN GIVE HER A MESSAGE. LEFT MESSAGE FOR AAKASH TO CALL US [phone] TO GET PATIENT SCHEDULED.

## 2023-06-05 NOTE — TELEPHONE ENCOUNTER
Caller: AAKASH     Relationship to patient: [facility]    Best call back number: [phone]    Chief complaint: TOTAL HIP ARTHROPLASTY ANTERIOR , LEFT    Type of visit: POST OP/ NEW PATIENT    Requested date: 3 WEEK POST OP     Additional notes: AAKASH WITH [facility] WAS CALLING TO SCHEDULE AN APPT WITH DR. SALTER FOR A THREE WEEK POST OP. I ATTEMPTED TO WARM TRANSFER CALL TO THE OFFICE DUE TO NO AVAILABILITY UNTIL AUGUST BUT RECEIVED NO ANSWER. THANK YOU!

## 2023-06-11 ENCOUNTER — APPOINTMENT (OUTPATIENT)
Dept: GENERAL RADIOLOGY | Facility: HOSPITAL | Age: 73
End: 2023-06-11
Payer: MEDICARE

## 2023-06-11 ENCOUNTER — HOSPITAL ENCOUNTER (EMERGENCY)
Facility: HOSPITAL | Age: 73
Discharge: NURSING, REHAB OR LONG-TERM CARE | End: 2023-06-11
Attending: EMERGENCY MEDICINE | Admitting: EMERGENCY MEDICINE
Payer: MEDICARE

## 2023-06-11 VITALS
HEART RATE: 72 BPM | TEMPERATURE: 98.7 F | WEIGHT: 130 LBS | RESPIRATION RATE: 16 BRPM | HEIGHT: 66 IN | SYSTOLIC BLOOD PRESSURE: 115 MMHG | OXYGEN SATURATION: 98 % | DIASTOLIC BLOOD PRESSURE: 53 MMHG | BODY MASS INDEX: 20.89 KG/M2

## 2023-06-11 DIAGNOSIS — S70.02XA CONTUSION OF LEFT HIP, INITIAL ENCOUNTER: Primary | ICD-10-CM

## 2023-06-11 PROCEDURE — 72170 X-RAY EXAM OF PELVIS: CPT

## 2023-06-11 PROCEDURE — 99283 EMERGENCY DEPT VISIT LOW MDM: CPT

## 2023-06-11 NOTE — ED PROVIDER NOTES
Subjective   History of Present Illness  72-year-old female presents after a fall at nursing home this morning.  She reports she was reaching for something and just went too far and rolled out of bed.  She does not complain of any head or neck pain.  She complains of some pain in her knees.  She does not complain of pain in her hips.  She has no complaints of chest pain abdominal pain or back pain.  She does have some mild dementia.  She does report history of previous fall.  Review of Systems    Past Medical History:   Diagnosis Date    Allergic rhinitis     Basal cell carcinoma (BCC) of nostril     Coronary artery disease involving native coronary artery of native heart with angina pectoris 2019    Status post successful PCI of the coronary stent deployment to high-grade RCA stenosis after presenting with ST segment elevated microinfarction in May 2019    DDD (degenerative disc disease), lumbar     Depression     Depression     Essential hypertension 2019    Femoral neck fracture 2023    Heart attack     2019    Hyperlipidemia     Hyperlipidemia, mixed 2019    Hypertension     Insomnia     Myocardial infarction less than 4 weeks ago 2019    Presented initially with ST segment elevated microinfarction treated emergently in [place] May 2019    Old MI (myocardial infarction) 2019    Presented initially with ST segment elevated microinfarction treated emergently in [place] May 2019    Osteoarthritis     Toenail fungus        No Known Allergies    Past Surgical History:   Procedure Laterality Date    ADENOIDECTOMY      Adenoids removed    APPENDECTOMY      AUGMENTATION MAMMAPLASTY      BREAST IMPLANT SURGERY  1984    CATARACT EXTRACTION Bilateral 2017    CERVICAL DISC SURGERY      ruptured and removed      SECTION      CHEST SURGERY      attempted pectus repair     EYE SURGERY  1997    AK    HYSTERECTOMY      KNEE ARTHROSCOPY Right     LUMBAR  DISC SURGERY      ruptured and removed     NASAL SEPTAL RECONSTRUCTION      PECTUS CARNATUM REPAIR      attempted    RECTAL SURGERY      Repair    REFRACTIVE SURGERY      RK/AK both eyes     REFRACTIVE SURGERY      SOMNOPLASTY RADIAL FREQUENCY ABLATION      TONSILLECTOMY      TOTAL HIP ARTHROPLASTY Left 2023    Procedure: TOTAL HIP ARTHROPLASTY ANTERIOR;  Surgeon: Josiah Menon MD;  Location: [facility] MAIN OR;  Service: Orthopedics;  Laterality: Left;    UVULOPALATOPHARYNGOPLASTY         Family History   Problem Relation Age of Onset    Alzheimer's disease Mother     Other Mother         CVA, acute myocardial infarction    Leukemia Father     Other Father         Parkinson's     Heart attack Sister     No Known Problems Brother     Bipolar disorder Daughter     Heart attack Brother     Alcohol abuse Brother     Anxiety disorder Brother     Depression Brother     ADD / ADHD Daughter     Other Daughter         alcohol and drug addiction    HIV Daughter        Social History     Socioeconomic History    Marital status:     Number of children: 2   Tobacco Use    Smoking status: Former     Packs/day: 0.50     Years: 4.00     Pack years: 2.00     Types: Cigarettes     Quit date:      Years since quittin.4    Smokeless tobacco: Never   Vaping Use    Vaping Use: Never used   Substance and Sexual Activity    Alcohol use: No    Drug use: No    Sexual activity: Not Currently     Prior to Admission medications    Medication Sig Start Date End Date Taking? Authorizing Provider   apixaban (ELIQUIS) 2.5 MG tablet tablet Take 1 tablet by mouth Every 12 (Twelve) Hours. Indications: Prevention of Unwanted Clot in Veins 23   Roshan Almodovar MD   atorvastatin (LIPITOR) 40 MG tablet Take 0.5 tablets by mouth Daily. 23   Nathalia Maria APRN   buPROPion XL (WELLBUTRIN XL) 150 MG 24 hr tablet Take 3 tablets by mouth Every Morning. 23   Nathalia Maria APRN   Calcium Citrate-Vitamin D 500-400 MG-UNIT  chewable tablet Chew Daily.    Nancy Painting MD   carvedilol (COREG) 3.125 MG tablet Take 1 tablet by mouth 2 (Two) Times a Day. 2/14/23   Nathalia Maria APRN   clobetasol (TEMOVATE) 0.05 % external solution Apply  topically to the appropriate area as directed 2 (Two) Times a Day. 2/14/23   Nathalia Maria APRN   clopidogrel (PLAVIX) 75 MG tablet Take 1 tablet by mouth Daily. 2/14/23   Nathalia Maria APRN   coenzyme Q10 100 MG capsule Take 1 capsule by mouth Daily.    Nancy Painting MD   escitalopram (LEXAPRO) 20 MG tablet TAKE ONE-HALF (1/2) TABLET TWICE A DAY 2/13/23   Nathalia Maria APRN   Melatonin 10 MG sublingual tablet Place 1 tablet under the tongue At Night As Needed.    Nancy Painting MD   Multiple Vitamins-Minerals (Multi-Vitamin Gummies) chewable tablet Chew 1 tablet/day Daily.    Nancy Painting MD   oxyCODONE (ROXICODONE) 5 MG immediate release tablet Take 1 tablet by mouth Every 6 (Six) Hours As Needed for Severe Pain. 6/2/23   Roshan Almodovar MD   polyethylene glycol (MIRALAX) 17 g packet Take 17 g by mouth Daily As Needed (Use if senna-docusate is ineffective). 6/2/23   Roshan Almodovar MD   sennosides-docusate (PERICOLACE) 8.6-50 MG per tablet Take 2 tablets by mouth 2 (Two) Times a Day. 6/2/23   Roshan Almodovar MD           Objective   Physical Exam  72-year-old female awake alert.  She has no complaints of head neck or back tenderness.  Chest clear equal breath sounds cardiovascular regular rate and rhythm abdomen soft nontender.  She has some old bruising to left anterior neck.  She has some old bruising to forearms.  She has dressing to left hip where she has had previous fracture.  She is able to flex both legs.  Examination of knees she appears to have some arthritis but no bruising or soft tissue swelling.  She is neurovascular grossly intact.  Neurologic exam without focal findings.  Procedures           ED Course        XR Pelvis 1 or 2 View    Result  "Date: 6/11/2023  Impression: 1.No definite new displaced fracture on this limited single view. 2.Status post left hip arthroplasty. Electronically Signed: Gaurav Carlson  6/11/2023 8:44 AM EDT  Workstation ID: [id number]   Medications - No data to display  /53   Pulse 72   Temp 98.7 °F (37.1 °C)   Resp 16   Ht 167.6 cm (66\")   Wt 59 kg (130 lb)   SpO2 98%   BMI 20.98 kg/m²                                        Medical Decision Making  Problems Addressed:  Contusion of left hip, initial encounter: complicated acute illness or injury    Amount and/or Complexity of Data Reviewed  Radiology: ordered.    Differential, contusion, fracture, dislocation  My x-ray interpretation pelvis reveals left hip arthroplasty.  No fracture appreciated.  Arthroplasty appears to be in good position.  Patient's findings were discussed with her.  She will be discharged back to [place].  Final diagnoses:   Contusion of left hip, initial encounter       ED Disposition  ED Disposition       ED Disposition   Discharge    Condition   Stable    Comment   --               Nathalia Maria, APRN  [address]  [address]  [place] [id number]  [phone]               Medication List      No changes were made to your prescriptions during this visit.            Favian Newell MD  06/11/23 1608    "

## 2023-07-05 PROBLEM — Z96.642 S/P TOTAL LEFT HIP ARTHROPLASTY: Status: ACTIVE | Noted: 2023-07-05

## 2023-07-26 ENCOUNTER — TELEPHONE (OUTPATIENT)
Dept: FAMILY MEDICINE CLINIC | Facility: CLINIC | Age: 73
End: 2023-07-26
Payer: MEDICARE

## 2023-07-26 NOTE — TELEPHONE ENCOUNTER
Caller: SANTY    Relationship to patient: Other    Best call back number: [phone]    Patient is needing:     [facility] CALLED AND SAID THEY ARE WAITING ON ONE ORDER TO BE SIGNED AND SENT BACK TO THEM    THE ORDER IS A VERBAL ORDER FOR AN OCCUPATIONAL THERAPY EVALUATION

## 2023-07-26 NOTE — TELEPHONE ENCOUNTER
Called [facility] and let them know that Nathalia is out until Monday and when she returns I will show her the papers to sign and fax them back right away

## 2023-08-02 ENCOUNTER — OFFICE VISIT (OUTPATIENT)
Dept: NEUROLOGY | Facility: CLINIC | Age: 73
End: 2023-08-02
Payer: MEDICARE

## 2023-08-02 VITALS
SYSTOLIC BLOOD PRESSURE: 112 MMHG | HEIGHT: 66 IN | WEIGHT: 130 LBS | HEART RATE: 79 BPM | BODY MASS INDEX: 20.89 KG/M2 | DIASTOLIC BLOOD PRESSURE: 64 MMHG

## 2023-08-02 DIAGNOSIS — R41.3 MEMORY LOSS: Primary | ICD-10-CM

## 2023-08-02 RX ORDER — MEMANTINE HYDROCHLORIDE 10 MG/1
TABLET ORAL
Qty: 30 TABLET | Refills: 2 | Status: SHIPPED | OUTPATIENT
Start: 2023-08-02

## 2023-08-02 RX ORDER — MEMANTINE HYDROCHLORIDE 5 MG/1
5 TABLET ORAL DAILY
Qty: 30 TABLET | Refills: 0 | Status: SHIPPED | OUTPATIENT
Start: 2023-08-02 | End: 2024-08-01

## 2023-08-04 ENCOUNTER — LAB (OUTPATIENT)
Dept: LAB | Facility: HOSPITAL | Age: 73
End: 2023-08-04
Payer: MEDICARE

## 2023-08-04 DIAGNOSIS — R41.3 MEMORY LOSS: ICD-10-CM

## 2023-08-04 LAB
FOLATE SERPL-MCNC: >20 NG/ML (ref 4.78–24.2)
VIT B12 BLD-MCNC: 816 PG/ML (ref 211–946)

## 2023-08-04 PROCEDURE — 36415 COLL VENOUS BLD VENIPUNCTURE: CPT

## 2023-08-04 PROCEDURE — 82607 VITAMIN B-12: CPT

## 2023-08-04 PROCEDURE — 82746 ASSAY OF FOLIC ACID SERUM: CPT

## 2023-08-04 PROCEDURE — 84207 ASSAY OF VITAMIN B-6: CPT

## 2023-08-04 PROCEDURE — 84446 ASSAY OF VITAMIN E: CPT

## 2023-08-09 ENCOUNTER — OFFICE VISIT (OUTPATIENT)
Dept: ORTHOPEDIC SURGERY | Facility: CLINIC | Age: 73
End: 2023-08-09
Payer: MEDICARE

## 2023-08-09 VITALS — HEIGHT: 66 IN | WEIGHT: 130 LBS | RESPIRATION RATE: 20 BRPM | BODY MASS INDEX: 20.89 KG/M2

## 2023-08-09 DIAGNOSIS — Z96.642 S/P TOTAL LEFT HIP ARTHROPLASTY: Primary | ICD-10-CM

## 2023-08-09 LAB
A-TOCOPHEROL VIT E SERPL-MCNC: 15.1 MG/L (ref 9–29)
GAMMA TOCOPHEROL SERPL-MCNC: 0.3 MG/L (ref 0.5–4.9)
PYRIDOXAL PHOS SERPL-MCNC: 40.8 UG/L (ref 3.4–65.2)

## 2023-08-09 PROCEDURE — 99024 POSTOP FOLLOW-UP VISIT: CPT | Performed by: NURSE PRACTITIONER

## 2023-08-10 NOTE — PROGRESS NOTES
[facility]  Post Operative Visit      Patient Name: Jennifer Blackmon  : 1950  Primary Care Physician: Nathalia Maria APRN        Chief Complaint:  S/P left total hip arthroplasty after a traumatic fall at home resulting in femoral neck fracture with Dr. Menon on May 30, 2023.    HPI:   Jennifer Blackmon is a 73 y.o. year old who presents today for post operative evaluation.  Her  is with her today and contributes to the HPI.  It is significant to note that the patient seems to have significant memory loss.  She is alert and oriented to person place and time today.  She presents in a wheelchair.  She states that she is able to ambulate independently with the assistance of a walker.  She did fall at home about a week ago.  She states that she hit her left hip but denies any pain after the fall.  She states that she fell onto a soft surface.  She denies any unusual bruising or bleeding.  She denies any fevers while at home.  She denies any new cough or shortness of breath.  She continues to have significant weight loss over the last several months.  She has poor appetite and is only consuming about 500 gordon a day.  She is followed by Dr. Seipel with neurology.  She was recently seen by him and prescribed Namenda.      Past Medical/Surgical, Social and Family History:  I have reviewed and/or updated pertinent history as noted in the medical record including:  Past Medical History:   Diagnosis Date    Allergic rhinitis     Basal cell carcinoma (BCC) of nostril     Coronary artery disease involving native coronary artery of native heart with angina pectoris 2019    Status post successful PCI of the coronary stent deployment to high-grade RCA stenosis after presenting with ST segment elevated microinfarction in May 2019    DDD (degenerative disc disease), lumbar     Depression     Depression     Essential hypertension 2019    Femoral neck fracture 2023    Heart attack     2019     Hyperlipidemia     Hyperlipidemia, mixed 2019    Hypertension     Insomnia     Myocardial infarction less than 4 weeks ago 2019    Presented initially with ST segment elevated microinfarction treated emergently in [place] May 2019    Old MI (myocardial infarction) 2019    Presented initially with ST segment elevated microinfarction treated emergently in [place] May 2019    Osteoarthritis     Toenail fungus      Past Surgical History:   Procedure Laterality Date    ADENOIDECTOMY      Adenoids removed    APPENDECTOMY      AUGMENTATION MAMMAPLASTY      BREAST IMPLANT SURGERY  1984    CATARACT EXTRACTION Bilateral 2017    CERVICAL DISC SURGERY      ruptured and removed      SECTION      CHEST SURGERY  196    attempted pectus repair     EYE SURGERY  1997    AK    HYSTERECTOMY      KNEE ARTHROSCOPY Right     LUMBAR DISC SURGERY      ruptured and removed     NASAL SEPTAL RECONSTRUCTION      PECTUS CARNATUM REPAIR      attempted    RECTAL SURGERY      Repair    REFRACTIVE SURGERY      RK/AK both eyes     REFRACTIVE SURGERY      SOMNOPLASTY RADIAL FREQUENCY ABLATION      TONSILLECTOMY      TOTAL HIP ARTHROPLASTY Left 2023    Procedure: TOTAL HIP ARTHROPLASTY ANTERIOR;  Surgeon: Josiah Menon MD;  Location: [facility];  Service: Orthopedics;  Laterality: Left;    UVULOPALATOPHARYNGOPLASTY       Social History     Occupational History    Occupation: Retired from [employer]   Tobacco Use    Smoking status: Former     Packs/day: 0.50     Years: 4.00     Pack years: 2.00     Types: Cigarettes     Quit date:      Years since quittin.6    Smokeless tobacco: Never   Vaping Use    Vaping Use: Never used   Substance and Sexual Activity    Alcohol use: No    Drug use: No    Sexual activity: Not Currently      Social History     Social History Narrative    Not on file     Family History   Problem Relation Age of Onset    Alzheimer's disease Mother      Other Mother         CVA, acute myocardial infarction    Leukemia Father     Other Father         Parkinson's     Heart attack Sister     No Known Problems Brother     Bipolar disorder Daughter     Heart attack Brother     Alcohol abuse Brother     Anxiety disorder Brother     Depression Brother     ADD / ADHD Daughter     Other Daughter         alcohol and drug addiction    HIV Daughter        Allergies: No Known Allergies    Medications:   Home Medications:  Current Outpatient Medications on File Prior to Visit   Medication Sig    atorvastatin (LIPITOR) 40 MG tablet Take 0.5 tablets by mouth Daily.    buPROPion XL (WELLBUTRIN XL) 150 MG 24 hr tablet Take 3 tablets by mouth Every Morning.    Calcium Citrate-Vitamin D 500-400 MG-UNIT chewable tablet Chew Daily.    carvedilol (COREG) 3.125 MG tablet Take 1 tablet by mouth 2 (Two) Times a Day.    clopidogrel (PLAVIX) 75 MG tablet Take 1 tablet by mouth Daily.    coenzyme Q10 100 MG capsule Take 1 capsule by mouth Daily.    escitalopram (LEXAPRO) 20 MG tablet TAKE ONE-HALF (1/2) TABLET TWICE A DAY    Melatonin 10 MG sublingual tablet Place 1 tablet under the tongue At Night As Needed.    memantine (NAMENDA) 10 MG tablet (Take the 5mg tab daily for one month) then take 10mg tab daily for one month then take one bid    memantine (NAMENDA) 5 MG tablet Take 1 tablet by mouth Daily. For one month    Multiple Vitamins-Minerals (Multi-Vitamin Gummies) chewable tablet Chew 1 tablet/day Daily.    oxyCODONE (ROXICODONE) 5 MG immediate release tablet Take 1 tablet by mouth Every 6 (Six) Hours As Needed for Severe Pain.    polyethylene glycol (MIRALAX) 17 g packet Take 17 g by mouth Daily As Needed (Use if senna-docusate is ineffective).    sennosides-docusate (PERICOLACE) 8.6-50 MG per tablet Take 2 tablets by mouth 2 (Two) Times a Day.     No current facility-administered medications on file prior to visit.         ROS:  14 point review of systems was negative except as  listed in the HPI     Physical Exam:   73 y.o. female  Body mass index is 20.98 kg/mý., 59 kg (130 lb)  Vitals:    08/09/23 1343   Resp: 20         General: Alert, cooperative, appears thin with muscle wasting  HEENT: Normocephalic, atraumatic on external visual inspection. No icterus.   CV: No significant peripheral edema.   Respiratory: Normal respiratory effort.   Skin: Warm & well perfused; dry skin turgor.  Psych: Appropriate mood & affect.  Neuro: Gross sensation and motor intact in affected extremity/extremities.  Vascular: Peripheral pulses palpable in affected extremity/extremities. Calves/compartments soft and non tender, no evidence of DVT or compartment syndrome.    Left Hip Exam     Tenderness   The patient is experiencing tenderness in the greater trochanter.    Range of Motion   Abduction:  normal   Adduction:  normal   Extension:  normal   Flexion:  normal     Muscle Strength   The patient has normal left hip strength.     Other   Erythema: absent  Sensation: normal  Pulse: present                  Assessment:  Left femoral neck fracture status post fall at home  Status post left total hip arthroplasty  Memory loss      Plan:  Continue physical therapy and ADLs as tolerated.  Educated on falls precautions.  Significant education provided today regarding consistent use of a walker and only ambulating with assistance.  Nutritional education provided today.  Recommend that she increase Ensure to 3 times daily.  Reviewed healing benefits of appropriate nutrition.  Educated on dental/GYN/ procedures and pre-procedural antibiotic requirement/recommendation  Follow-up in 3 months.  She was encouraged to call the office should she have any questions or concerns in the interim.      NIKHIL Leung

## 2023-10-02 ENCOUNTER — HOSPITAL ENCOUNTER (OUTPATIENT)
Facility: HOSPITAL | Age: 73
Setting detail: OBSERVATION
Discharge: HOME HEALTH | End: 2023-10-06
Attending: EMERGENCY MEDICINE | Admitting: INTERNAL MEDICINE
Payer: MEDICARE

## 2023-10-02 ENCOUNTER — APPOINTMENT (OUTPATIENT)
Dept: MRI IMAGING | Facility: HOSPITAL | Age: 73
End: 2023-10-02
Payer: MEDICARE

## 2023-10-02 DIAGNOSIS — N39.0 URINARY TRACT INFECTION WITHOUT HEMATURIA, SITE UNSPECIFIED: ICD-10-CM

## 2023-10-02 DIAGNOSIS — R53.1 GENERAL WEAKNESS: Primary | ICD-10-CM

## 2023-10-02 DIAGNOSIS — R53.1 GENERALIZED WEAKNESS: ICD-10-CM

## 2023-10-02 LAB
ALBUMIN SERPL-MCNC: 3.8 G/DL (ref 3.5–5.2)
ALBUMIN/GLOB SERPL: 1.4 G/DL
ALP SERPL-CCNC: 98 U/L (ref 39–117)
ALT SERPL W P-5'-P-CCNC: 25 U/L (ref 1–33)
ANION GAP SERPL CALCULATED.3IONS-SCNC: 8 MMOL/L (ref 5–15)
AST SERPL-CCNC: 22 U/L (ref 1–32)
BACTERIA UR QL AUTO: ABNORMAL /HPF
BASOPHILS # BLD AUTO: 0 10*3/MM3 (ref 0–0.2)
BASOPHILS # BLD AUTO: 0.1 10*3/MM3 (ref 0–0.2)
BASOPHILS NFR BLD AUTO: 0.4 % (ref 0–1.5)
BASOPHILS NFR BLD AUTO: 0.8 % (ref 0–1.5)
BILIRUB SERPL-MCNC: 0.4 MG/DL (ref 0–1.2)
BILIRUB UR QL STRIP: NEGATIVE
BUN SERPL-MCNC: 24 MG/DL (ref 8–23)
BUN/CREAT SERPL: 22.6 (ref 7–25)
CALCIUM SPEC-SCNC: 10.1 MG/DL (ref 8.6–10.5)
CHLORIDE SERPL-SCNC: 104 MMOL/L (ref 98–107)
CK SERPL-CCNC: 38 U/L (ref 20–180)
CLARITY UR: ABNORMAL
CO2 SERPL-SCNC: 29 MMOL/L (ref 22–29)
COLOR UR: YELLOW
CREAT SERPL-MCNC: 1.06 MG/DL (ref 0.57–1)
D-LACTATE SERPL-SCNC: 1.7 MMOL/L (ref 0.5–2)
DEPRECATED RDW RBC AUTO: 49.9 FL (ref 37–54)
DEPRECATED RDW RBC AUTO: 50.3 FL (ref 37–54)
EGFRCR SERPLBLD CKD-EPI 2021: 54.9 ML/MIN/1.73
EOSINOPHIL # BLD AUTO: 0.3 10*3/MM3 (ref 0–0.4)
EOSINOPHIL # BLD AUTO: 0.3 10*3/MM3 (ref 0–0.4)
EOSINOPHIL NFR BLD AUTO: 2.7 % (ref 0.3–6.2)
EOSINOPHIL NFR BLD AUTO: 4.1 % (ref 0.3–6.2)
ERYTHROCYTE [DISTWIDTH] IN BLOOD BY AUTOMATED COUNT: 15.2 % (ref 12.3–15.4)
ERYTHROCYTE [DISTWIDTH] IN BLOOD BY AUTOMATED COUNT: 15.3 % (ref 12.3–15.4)
GLOBULIN UR ELPH-MCNC: 2.7 GM/DL
GLUCOSE SERPL-MCNC: 103 MG/DL (ref 65–99)
GLUCOSE UR STRIP-MCNC: NEGATIVE MG/DL
HCT VFR BLD AUTO: 38.1 % (ref 34–46.6)
HCT VFR BLD AUTO: 41 % (ref 34–46.6)
HGB BLD-MCNC: 12.6 G/DL (ref 12–15.9)
HGB BLD-MCNC: 13.2 G/DL (ref 12–15.9)
HGB UR QL STRIP.AUTO: NEGATIVE
HYALINE CASTS UR QL AUTO: ABNORMAL /LPF
KETONES UR QL STRIP: NEGATIVE
LEUKOCYTE ESTERASE UR QL STRIP.AUTO: ABNORMAL
LYMPHOCYTES # BLD AUTO: 2 10*3/MM3 (ref 0.7–3.1)
LYMPHOCYTES # BLD AUTO: 3.1 10*3/MM3 (ref 0.7–3.1)
LYMPHOCYTES NFR BLD AUTO: 19.6 % (ref 19.6–45.3)
LYMPHOCYTES NFR BLD AUTO: 37.5 % (ref 19.6–45.3)
MAGNESIUM SERPL-MCNC: 2.2 MG/DL (ref 1.6–2.4)
MCH RBC QN AUTO: 29.5 PG (ref 26.6–33)
MCH RBC QN AUTO: 29.7 PG (ref 26.6–33)
MCHC RBC AUTO-ENTMCNC: 32.1 G/DL (ref 31.5–35.7)
MCHC RBC AUTO-ENTMCNC: 33.1 G/DL (ref 31.5–35.7)
MCV RBC AUTO: 89.9 FL (ref 79–97)
MCV RBC AUTO: 91.9 FL (ref 79–97)
MONOCYTES # BLD AUTO: 0.7 10*3/MM3 (ref 0.1–0.9)
MONOCYTES # BLD AUTO: 0.9 10*3/MM3 (ref 0.1–0.9)
MONOCYTES NFR BLD AUTO: 11 % (ref 5–12)
MONOCYTES NFR BLD AUTO: 7.1 % (ref 5–12)
NEUTROPHILS NFR BLD AUTO: 3.8 10*3/MM3 (ref 1.7–7)
NEUTROPHILS NFR BLD AUTO: 47 % (ref 42.7–76)
NEUTROPHILS NFR BLD AUTO: 69.8 % (ref 42.7–76)
NEUTROPHILS NFR BLD AUTO: 7.2 10*3/MM3 (ref 1.7–7)
NITRITE UR QL STRIP: NEGATIVE
NRBC BLD AUTO-RTO: 0 /100 WBC (ref 0–0.2)
NRBC BLD AUTO-RTO: 0.1 /100 WBC (ref 0–0.2)
PH UR STRIP.AUTO: 7.5 [PH] (ref 5–8)
PLATELET # BLD AUTO: 244 10*3/MM3 (ref 140–450)
PLATELET # BLD AUTO: 250 10*3/MM3 (ref 140–450)
PMV BLD AUTO: 7.4 FL (ref 6–12)
PMV BLD AUTO: 7.9 FL (ref 6–12)
POTASSIUM SERPL-SCNC: 4.4 MMOL/L (ref 3.5–5.2)
PROT SERPL-MCNC: 6.5 G/DL (ref 6–8.5)
PROT UR QL STRIP: NEGATIVE
RBC # BLD AUTO: 4.24 10*6/MM3 (ref 3.77–5.28)
RBC # BLD AUTO: 4.46 10*6/MM3 (ref 3.77–5.28)
RBC # UR STRIP: ABNORMAL /HPF
REF LAB TEST METHOD: ABNORMAL
SODIUM SERPL-SCNC: 141 MMOL/L (ref 136–145)
SP GR UR STRIP: 1.02 (ref 1–1.03)
SQUAMOUS #/AREA URNS HPF: ABNORMAL /HPF
TSH SERPL DL<=0.05 MIU/L-ACNC: 2.42 UIU/ML (ref 0.27–4.2)
UROBILINOGEN UR QL STRIP: ABNORMAL
WBC # UR STRIP: ABNORMAL /HPF
WBC NRBC COR # BLD: 10.3 10*3/MM3 (ref 3.4–10.8)
WBC NRBC COR # BLD: 8.2 10*3/MM3 (ref 3.4–10.8)
YEAST URNS QL MICRO: ABNORMAL /HPF

## 2023-10-02 PROCEDURE — 83605 ASSAY OF LACTIC ACID: CPT | Performed by: PHYSICIAN ASSISTANT

## 2023-10-02 PROCEDURE — G0378 HOSPITAL OBSERVATION PER HR: HCPCS

## 2023-10-02 PROCEDURE — 82550 ASSAY OF CK (CPK): CPT | Performed by: EMERGENCY MEDICINE

## 2023-10-02 PROCEDURE — 85025 COMPLETE CBC W/AUTO DIFF WBC: CPT | Performed by: PHYSICIAN ASSISTANT

## 2023-10-02 PROCEDURE — 97162 PT EVAL MOD COMPLEX 30 MIN: CPT

## 2023-10-02 PROCEDURE — 81001 URINALYSIS AUTO W/SCOPE: CPT | Performed by: EMERGENCY MEDICINE

## 2023-10-02 PROCEDURE — 85025 COMPLETE CBC W/AUTO DIFF WBC: CPT | Performed by: EMERGENCY MEDICINE

## 2023-10-02 PROCEDURE — 96365 THER/PROPH/DIAG IV INF INIT: CPT

## 2023-10-02 PROCEDURE — 25010000002 CEFTRIAXONE PER 250 MG: Performed by: EMERGENCY MEDICINE

## 2023-10-02 PROCEDURE — 36415 COLL VENOUS BLD VENIPUNCTURE: CPT | Performed by: PHYSICIAN ASSISTANT

## 2023-10-02 PROCEDURE — 84443 ASSAY THYROID STIM HORMONE: CPT | Performed by: EMERGENCY MEDICINE

## 2023-10-02 PROCEDURE — 80053 COMPREHEN METABOLIC PANEL: CPT | Performed by: EMERGENCY MEDICINE

## 2023-10-02 PROCEDURE — P9612 CATHETERIZE FOR URINE SPEC: HCPCS

## 2023-10-02 PROCEDURE — 87086 URINE CULTURE/COLONY COUNT: CPT | Performed by: EMERGENCY MEDICINE

## 2023-10-02 PROCEDURE — 83735 ASSAY OF MAGNESIUM: CPT | Performed by: EMERGENCY MEDICINE

## 2023-10-02 PROCEDURE — 87040 BLOOD CULTURE FOR BACTERIA: CPT | Performed by: PHYSICIAN ASSISTANT

## 2023-10-02 PROCEDURE — 70551 MRI BRAIN STEM W/O DYE: CPT

## 2023-10-02 PROCEDURE — 99284 EMERGENCY DEPT VISIT MOD MDM: CPT

## 2023-10-02 RX ORDER — CARVEDILOL 3.12 MG/1
3.12 TABLET ORAL 2 TIMES DAILY
Status: DISCONTINUED | OUTPATIENT
Start: 2023-10-02 | End: 2023-10-06 | Stop reason: HOSPADM

## 2023-10-02 RX ORDER — MEMANTINE HYDROCHLORIDE 10 MG/1
10 TABLET ORAL 2 TIMES DAILY
Status: DISCONTINUED | OUTPATIENT
Start: 2023-10-02 | End: 2023-10-06 | Stop reason: HOSPADM

## 2023-10-02 RX ORDER — ESCITALOPRAM OXALATE 10 MG/1
10 TABLET ORAL 2 TIMES DAILY
Status: DISCONTINUED | OUTPATIENT
Start: 2023-10-02 | End: 2023-10-06 | Stop reason: HOSPADM

## 2023-10-02 RX ORDER — ACETAMINOPHEN 325 MG/1
650 TABLET ORAL EVERY 4 HOURS PRN
Status: DISCONTINUED | OUTPATIENT
Start: 2023-10-02 | End: 2023-10-06 | Stop reason: HOSPADM

## 2023-10-02 RX ORDER — POLYETHYLENE GLYCOL 3350 17 G/17G
17 POWDER, FOR SOLUTION ORAL DAILY PRN
Status: DISCONTINUED | OUTPATIENT
Start: 2023-10-02 | End: 2023-10-06 | Stop reason: HOSPADM

## 2023-10-02 RX ORDER — SODIUM CHLORIDE 9 MG/ML
40 INJECTION, SOLUTION INTRAVENOUS AS NEEDED
Status: DISCONTINUED | OUTPATIENT
Start: 2023-10-02 | End: 2023-10-06 | Stop reason: HOSPADM

## 2023-10-02 RX ORDER — ATORVASTATIN CALCIUM 20 MG/1
20 TABLET, FILM COATED ORAL DAILY
Status: DISCONTINUED | OUTPATIENT
Start: 2023-10-02 | End: 2023-10-06 | Stop reason: HOSPADM

## 2023-10-02 RX ORDER — CHOLECALCIFEROL (VITAMIN D3) 125 MCG
5 CAPSULE ORAL NIGHTLY PRN
Status: DISCONTINUED | OUTPATIENT
Start: 2023-10-02 | End: 2023-10-06 | Stop reason: HOSPADM

## 2023-10-02 RX ORDER — ACETAMINOPHEN 160 MG/5ML
650 SOLUTION ORAL EVERY 4 HOURS PRN
Status: DISCONTINUED | OUTPATIENT
Start: 2023-10-02 | End: 2023-10-06 | Stop reason: HOSPADM

## 2023-10-02 RX ORDER — ACETAMINOPHEN 650 MG/1
650 SUPPOSITORY RECTAL EVERY 4 HOURS PRN
Status: DISCONTINUED | OUTPATIENT
Start: 2023-10-02 | End: 2023-10-06 | Stop reason: HOSPADM

## 2023-10-02 RX ORDER — ALUMINA, MAGNESIA, AND SIMETHICONE 2400; 2400; 240 MG/30ML; MG/30ML; MG/30ML
15 SUSPENSION ORAL EVERY 6 HOURS PRN
Status: DISCONTINUED | OUTPATIENT
Start: 2023-10-02 | End: 2023-10-06 | Stop reason: HOSPADM

## 2023-10-02 RX ORDER — ONDANSETRON 2 MG/ML
4 INJECTION INTRAMUSCULAR; INTRAVENOUS EVERY 6 HOURS PRN
Status: DISCONTINUED | OUTPATIENT
Start: 2023-10-02 | End: 2023-10-06 | Stop reason: HOSPADM

## 2023-10-02 RX ORDER — CLOPIDOGREL BISULFATE 75 MG/1
75 TABLET ORAL DAILY
Status: DISCONTINUED | OUTPATIENT
Start: 2023-10-02 | End: 2023-10-06 | Stop reason: HOSPADM

## 2023-10-02 RX ORDER — BISACODYL 5 MG/1
5 TABLET, DELAYED RELEASE ORAL DAILY PRN
Status: DISCONTINUED | OUTPATIENT
Start: 2023-10-02 | End: 2023-10-06 | Stop reason: HOSPADM

## 2023-10-02 RX ORDER — ESCITALOPRAM OXALATE 20 MG/1
10 TABLET ORAL 2 TIMES DAILY
COMMUNITY

## 2023-10-02 RX ORDER — SODIUM CHLORIDE 0.9 % (FLUSH) 0.9 %
10 SYRINGE (ML) INJECTION AS NEEDED
Status: DISCONTINUED | OUTPATIENT
Start: 2023-10-02 | End: 2023-10-06 | Stop reason: HOSPADM

## 2023-10-02 RX ORDER — SODIUM CHLORIDE 0.9 % (FLUSH) 0.9 %
10 SYRINGE (ML) INJECTION EVERY 12 HOURS SCHEDULED
Status: DISCONTINUED | OUTPATIENT
Start: 2023-10-02 | End: 2023-10-06 | Stop reason: HOSPADM

## 2023-10-02 RX ORDER — ONDANSETRON 4 MG/1
4 TABLET, FILM COATED ORAL EVERY 6 HOURS PRN
Status: DISCONTINUED | OUTPATIENT
Start: 2023-10-02 | End: 2023-10-06 | Stop reason: HOSPADM

## 2023-10-02 RX ORDER — MEMANTINE HYDROCHLORIDE 10 MG/1
10 TABLET ORAL 2 TIMES DAILY
COMMUNITY

## 2023-10-02 RX ORDER — BISACODYL 10 MG
10 SUPPOSITORY, RECTAL RECTAL DAILY PRN
Status: DISCONTINUED | OUTPATIENT
Start: 2023-10-02 | End: 2023-10-06 | Stop reason: HOSPADM

## 2023-10-02 RX ORDER — BUPROPION HYDROCHLORIDE 150 MG/1
450 TABLET ORAL EVERY MORNING
Status: DISCONTINUED | OUTPATIENT
Start: 2023-10-03 | End: 2023-10-06 | Stop reason: HOSPADM

## 2023-10-02 RX ADMIN — MEMANTINE 10 MG: 10 TABLET ORAL at 22:44

## 2023-10-02 RX ADMIN — Medication 10 ML: at 14:49

## 2023-10-02 RX ADMIN — ESCITALOPRAM OXALATE 10 MG: 10 TABLET ORAL at 22:44

## 2023-10-02 RX ADMIN — CEFTRIAXONE 1000 MG: 1 INJECTION, POWDER, FOR SOLUTION INTRAMUSCULAR; INTRAVENOUS at 09:08

## 2023-10-02 RX ADMIN — Medication 5 MG: at 22:44

## 2023-10-02 RX ADMIN — POLYETHYLENE GLYCOL 3350 17 G: 17 POWDER, FOR SOLUTION ORAL at 22:44

## 2023-10-02 RX ADMIN — BISACODYL 5 MG: 5 TABLET, COATED ORAL at 22:44

## 2023-10-02 RX ADMIN — Medication 10 ML: at 22:45

## 2023-10-02 RX ADMIN — CARVEDILOL 3.12 MG: 3.12 TABLET, FILM COATED ORAL at 22:44

## 2023-10-02 RX ADMIN — ATORVASTATIN CALCIUM 20 MG: 20 TABLET, FILM COATED ORAL at 16:25

## 2023-10-02 NOTE — PLAN OF CARE
Goal Outcome Evaluation:  Plan of Care Reviewed With: patient        Progress: no change   Pt is a 72 y/o F admitted to [facility] on 10/2/23 with complaints of leg stiffness and weakness, preventing her ability to stand up. Patient is not reliable source of information as she claims reason for admission is fracture of hip, which was reason for admission on 5/31/23. MRI Brain (+) for chronic small vessel ischemia, (-) for acute CVA. Diagnosed with UTI. PMH includes dementia, CAD, HTN, HLD, and depression. PLOF and Home environment difficult to obtain due to memory concerns and erratic speech, confirmed with spouse via phone call. She is currently living with spouse in [facility] with 2-step entry. At baseline, she req mod>max A for transfers, bed mobility, and ADLs, while spouse states she is mod A for short distance ambulation with RW. Today, pt is disoriented to situation and time. She completes bed mobility min>A and exhibits poor dynamic balance when assessing BLE strength. She is not suitable for transfers at this time due to poor BLE strength and limited balance in sitting. Pt and spouse both educated about SNF recommendation at d/c for continued care and rehab, but uncertain if pt will be agreeable. She is not safe to return back home at this time with poor strength and cognitive decline. Recommendation is SNF at d/c and PT will follow.    Anticipated Discharge Disposition (PT): skilled nursing facility

## 2023-10-02 NOTE — PLAN OF CARE
Goal Outcome Evaluation:      Pt resting in bed, will continue to monitor, has not complained of leg pain, pt worked with physical therapy today.

## 2023-10-02 NOTE — ED NOTES
Pt. Has newly diagnosis of dementia, complaining of leg stiffness. Has hip surgery in June. Pt. Is 2x assist at home.  at bedside.

## 2023-10-02 NOTE — PLAN OF CARE
Goal Outcome Evaluation:         Pt in bed, a/o x4, can be confused at times, blood cultures pending

## 2023-10-02 NOTE — Clinical Note
Level of Care: Med/Surg [1]   Admitting Physician: MARIANO LOWE [[id number]]   Attending Physician: MARIANO LOWE [[id number]]

## 2023-10-02 NOTE — CASE MANAGEMENT/SOCIAL WORK
Discharge Planning Assessment   William     Patient Name: Jennifer Blackmon  MRN: [id number]  Today's Date: 10/2/2023    Admit Date: 10/2/2023    Plan: From Home with , Pending SNF choice will need Precert and PASRR   Discharge Needs Assessment       Row Name 10/02/23 1724       Living Environment    People in Home alone;grandchild(carmelita)    Current Living Arrangements home    Potentially Unsafe Housing Conditions none    Primary Care Provided by self    Provides Primary Care For no one    Family Caregiver if Needed spouse    Family Caregiver Names Norma    Quality of Family Relationships supportive    Able to Return to Prior Arrangements yes       Resource/Environmental Concerns    Resource/Environmental Concerns none    Transportation Concerns none       Food Insecurity    Within the past 12 months, you worried that your food would run out before you got the money to buy more. Never true    Within the past 12 months, the food you bought just didn't last and you didn't have money to get more. Never true       Transition Planning    Patient/Family Anticipates Transition to inpatient rehabilitation facility    Patient/Family Anticipated Services at Transition skilled nursing    Transportation Anticipated family or friend will provide       Discharge Needs Assessment    Readmission Within the Last 30 Days no previous admission in last 30 days    Equipment Currently Used at Home walker, rolling;wheelchair, motorized    Concerns to be Addressed denies needs/concerns at this time    Anticipated Changes Related to Illness none    Equipment Needed After Discharge none    Provided Post Acute Provider List? Yes    Post Acute Provider List Nursing Home    Provided Post Acute Provider Quality & Resource List? Yes    Post Acute Provider Quality and Resource List Nursing Home    Delivered To Patient    Method of Delivery In person                   Discharge Plan       Row Name 10/02/23 5730       Plan    Plan From Home  with , Pending SNF choice will need Precert and PASRR    Plan Comments Met with Patient at bedside Lives at home with  and adult grandchildren. requires there assist at home she states can only walk very short distances with walker. PCP and Pharamcy verified, able to afford medications. Family assists with transportation. Spoke about DC recommendations from PT List given and she states she will call  and get choices. d/c barriers: PETER TB for UTI. Need OT eval                  Continued Care and Services - Admitted Since 10/2/2023    Coordination has not been started for this encounter.       Expected Discharge Date and Time       Expected Discharge Date Expected Discharge Time    Oct 3, 2023            Demographic Summary       Row Name 10/02/23 1724       General Information    Admission Type observation    Arrived From emergency department    Referral Source admission list    Reason for Consult discharge planning    Preferred Language English                   Functional Status       Row Name 10/02/23 1724       Functional Status    Usual Activity Tolerance moderate    Current Activity Tolerance poor       Functional Status, IADL    Medications assistive person    Meal Preparation assistive person    Housekeeping assistive person    Laundry assistive person    Shopping assistive person       Mental Status    General Appearance WDL WDL       Mental Status Summary    Recent Changes in Mental Status/Cognitive Functioning no changes                   Psychosocial    No documentation.                  Abuse/Neglect    No documentation.                  Legal    No documentation.                  Substance Abuse    No documentation.                  Patient Forms    No documentation.                     Anne Marie Deleon RN

## 2023-10-02 NOTE — H&P
[facility]  History & Physical    Patient Name: Jennifer Blackmon  : 1950  MRN: [id number]  Primary Care Physician:  Nathalia Maria APRN  Date of admission: 10/2/2023      Subjective      Chief Complaint: legs shaky and weak    History of Present Illness: Jennifer Blackmon is a 73 y.o. female with a past medical history to include CAD, hypertension, hyperlipidemia, depression and dementia who presented to [facility] on 10/2/2023 complaining of legs shaky and weak. Patient reports she went to bed as per usual last night and woke up in the middle of the night and felt too weak to stand.  Patient reports she broke her hip in May and has been convalescing at home with her  and 2 grandkids who often help her ambulate.  Patient does have dementia and is not a reliable source of information.  Reports that she does ambulate with a walker but not very well.  Patient was in her room alone during my visit.  She was alert and oriented x4 but some of her answers were questionable.  Patient states she has had no fever, cough or congestion.  She denies any chest pain or shortness of air.  She denies any abdominal pain, constipation or diarrhea.  She denies any urinary symptoms.  She denies having any unilateral weakness, visual disturbances or speech difficulty.  We have been asked to this patient for further evaluation and treatment.       ROS 12 point ROS reviewed and negative except as mentioned above     Personal History     Past Medical History:   Diagnosis Date    Allergic rhinitis     Basal cell carcinoma (BCC) of nostril     Coronary artery disease involving native coronary artery of native heart with angina pectoris 2019    Status post successful PCI of the coronary stent deployment to high-grade RCA stenosis after presenting with ST segment elevated microinfarction in May 2019    DDD (degenerative disc disease), lumbar     Depression     Depression     Essential  hypertension 2019    Femoral neck fracture 2023    Heart attack     2019    Hyperlipidemia     Hyperlipidemia, mixed 2019    Hypertension     Insomnia     Myocardial infarction less than 4 weeks ago 2019    Presented initially with ST segment elevated microinfarction treated emergently in [place] May 2019    Old MI (myocardial infarction) 2019    Presented initially with ST segment elevated microinfarction treated emergently in [place] May 2019    Osteoarthritis     Toenail fungus        Past Surgical History:   Procedure Laterality Date    ADENOIDECTOMY      Adenoids removed    APPENDECTOMY      AUGMENTATION MAMMAPLASTY      BREAST IMPLANT SURGERY  1984    CATARACT EXTRACTION Bilateral 2017    CERVICAL DISC SURGERY      ruptured and removed      SECTION      CHEST SURGERY      attempted pectus repair     EYE SURGERY  1997    AK    HYSTERECTOMY      KNEE ARTHROSCOPY Right     LUMBAR DISC SURGERY      ruptured and removed     NASAL SEPTAL RECONSTRUCTION      PECTUS CARNATUM REPAIR      attempted    RECTAL SURGERY      Repair    REFRACTIVE SURGERY      RK/AK both eyes     REFRACTIVE SURGERY      SOMNOPLASTY RADIAL FREQUENCY ABLATION      TONSILLECTOMY      TOTAL HIP ARTHROPLASTY Left 2023    Procedure: TOTAL HIP ARTHROPLASTY ANTERIOR;  Surgeon: Josiah Menon MD;  Location: [facility];  Service: Orthopedics;  Laterality: Left;    UVULOPALATOPHARYNGOPLASTY         Family History: family history includes ADD / ADHD in her daughter; Alcohol abuse in her brother; Alzheimer's disease in her mother; Anxiety disorder in her brother; Bipolar disorder in her daughter; Depression in her brother; HIV in her daughter; Heart attack in her brother and sister; Leukemia in her father; No Known Problems in her brother; Other in her daughter, father, and mother. Otherwise pertinent FHx was reviewed and not pertinent to current issue.    Social History:   reports that she quit smoking about 51 years ago. Her smoking use included cigarettes. She has a 2.00 pack-year smoking history. She has never used smokeless tobacco. She reports that she does not drink alcohol and does not use drugs.    Home Medications:  Prior to Admission Medications       Prescriptions Last Dose Informant Patient Reported? Taking?    atorvastatin (LIPITOR) 40 MG tablet   No No    Take 0.5 tablets by mouth Daily.    buPROPion XL (WELLBUTRIN XL) 150 MG 24 hr tablet   No No    Take 3 tablets by mouth Every Morning.    Calcium Citrate-Vitamin D 500-400 MG-UNIT chewable tablet   Yes No    Chew Daily.    carvedilol (COREG) 3.125 MG tablet   No No    Take 1 tablet by mouth 2 (Two) Times a Day.    clopidogrel (PLAVIX) 75 MG tablet   No No    Take 1 tablet by mouth Daily.    coenzyme Q10 100 MG capsule   Yes No    Take 1 capsule by mouth Daily.    escitalopram (LEXAPRO) 20 MG tablet   No No    TAKE ONE-HALF (1/2) TABLET TWICE A DAY    Melatonin 10 MG sublingual tablet   Yes No    Place 1 tablet under the tongue At Night As Needed.    memantine (NAMENDA) 10 MG tablet   No No    (Take the 5mg tab daily for one month) then take 10mg tab daily for one month then take one bid    memantine (NAMENDA) 5 MG tablet   No No    Take 1 tablet by mouth Daily. For one month    Multiple Vitamins-Minerals (Multi-Vitamin Gummies) chewable tablet   Yes No    Chew 1 tablet/day Daily.    oxyCODONE (ROXICODONE) 5 MG immediate release tablet   No No    Take 1 tablet by mouth Every 6 (Six) Hours As Needed for Severe Pain.    polyethylene glycol (MIRALAX) 17 g packet   No No    Take 17 g by mouth Daily As Needed (Use if senna-docusate is ineffective).    sennosides-docusate (PERICOLACE) 8.6-50 MG per tablet   No No    Take 2 tablets by mouth 2 (Two) Times a Day.              Allergies:  No Known Allergies    Objective      Vitals:   Temp:  [97.7 °F (36.5 °C)] 97.7 °F (36.5 °C)  Heart Rate:  [67-80] 76  Resp:  [16] 16  BP:  (112-158)/(57-94) 116/94    Physical Exam  Constitutional:       General: She is not in acute distress.     Appearance: She is not ill-appearing.   HENT:      Head: Normocephalic and atraumatic.   Cardiovascular:      Rate and Rhythm: Normal rate and regular rhythm.      Pulses: Normal pulses.      Heart sounds: Normal heart sounds. No murmur heard.  Pulmonary:      Effort: Pulmonary effort is normal. No respiratory distress.      Breath sounds: Normal breath sounds.   Abdominal:      General: Bowel sounds are normal.      Palpations: Abdomen is soft.      Tenderness: There is no abdominal tenderness.   Musculoskeletal:         General: Normal range of motion.      Cervical back: Normal range of motion and neck supple.      Right lower leg: No edema.      Left lower leg: No edema.   Skin:     General: Skin is warm and dry.   Neurological:      Mental Status: She is alert and oriented to person, place, and time. Mental status is at baseline.   Psychiatric:         Mood and Affect: Mood normal.         Result Review    Result Review:  I have personally reviewed the results from the time of this admission to 10/2/2023 15:23 EDT and agree with these findings:  [x]  Laboratory  []  Microbiology  [x]  Radiology  [x]  EKG/Telemetry   [x]  Cardiology/Vascular   []  Pathology  []  Old records  []  Other:    In the emergency department, VSS.  CK 38.  Sodium 141.  Potassium 4.4.  Creatinine 1.06.  Glucose 103.  TSH 2.420.  WBC 10.30.  Hemoglobin 13.2.  UA positive, culture pending.    MRI atrophy and evidence of chronic small vessel ischemic insult.  No acute CVA identified.      Assessment & Plan        Active Hospital Problems:  Active Hospital Problems    Diagnosis     **Generalized weakness      Plan:       Generalized weakness   UTI   -Fall precautions  -PT/OT  -MRI atrophy and evidence of chronic small vessel ischemic insult.  No acute CVA identified.  -UA positive, culture pending  -Patient initiated on Rocephin,  continue  -Continue to monitor  -Patient with recent history of left hip Fx 5/2023  -Case management for possible placement    HUGO on CKD 3a  -Creatinine 1.06  -Gentle hydration  -Avoid nephrotoxic agents    CAD   History of PCI 2019  -Continue home Plavix, statin    Hypertension  -Continue carvedilol    Dementia, recent diagnosis  Depression  -Continue Namenda    Hyperlipidemia  -Continue statin therapy      DVT prophylaxis:  Mechanical DVT prophylaxis orders are present.    CODE STATUS:    Code Status (Patient has no pulse and is not breathing): CPR (Attempt to Resuscitate)  Medical Interventions (Patient has pulse or is breathing): Full Support    Admission Status:  I believe this patient meets observation  status.    I discussed the patient's findings and my recommendations with patient.    Signature: Electronically signed by Beatris Rice PA-C, 10/02/23, 15:23 EDT.  [facility] Hospitalist Team

## 2023-10-02 NOTE — THERAPY EVALUATION
Patient Name: Jennifer Blackmon  : 1950    MRN: [id number]                              Today's Date: 10/2/2023       Admit Date: 10/2/2023    Visit Dx:     ICD-10-CM ICD-9-CM   1. General weakness  R53.1 780.79   2. Urinary tract infection without hematuria, site unspecified  N39.0 599.0     Patient Active Problem List   Diagnosis    Old MI (myocardial infarction)    Coronary artery disease involving native coronary artery of native heart with angina pectoris    Essential hypertension    Hyperlipidemia, mixed    Chest pain, atypical    Degeneration of lumbar intervertebral disc    Low back pain    Lumbar spondylosis    Arthritis    Cellulitis of face    Depressive disorder    Near syncope    Stage 3a chronic kidney disease    Left displaced femoral neck fracture    S/P total left hip arthroplasty    Memory loss    Generalized weakness     Past Medical History:   Diagnosis Date    Allergic rhinitis     Basal cell carcinoma (BCC) of nostril     Coronary artery disease involving native coronary artery of native heart with angina pectoris 2019    Status post successful PCI of the coronary stent deployment to high-grade RCA stenosis after presenting with ST segment elevated microinfarction in May 2019    DDD (degenerative disc disease), lumbar     Depression     Depression     Essential hypertension 2019    Femoral neck fracture 2023    Heart attack     2019    Hyperlipidemia     Hyperlipidemia, mixed 2019    Hypertension     Insomnia     Myocardial infarction less than 4 weeks ago 2019    Presented initially with ST segment elevated microinfarction treated emergently in [place] May 2019    Old MI (myocardial infarction) 2019    Presented initially with ST segment elevated microinfarction treated emergently in [place] May 2019    Osteoarthritis     Toenail fungus      Past Surgical History:   Procedure Laterality Date    ADENOIDECTOMY      Adenoids removed     APPENDECTOMY  195    AUGMENTATION MAMMAPLASTY      BREAST IMPLANT SURGERY  1984    CATARACT EXTRACTION Bilateral 2017    CERVICAL DISC SURGERY      ruptured and removed      SECTION      CHEST SURGERY  1961    attempted pectus repair     EYE SURGERY  1997    AK    HYSTERECTOMY      KNEE ARTHROSCOPY Right     LUMBAR DISC SURGERY      ruptured and removed     NASAL SEPTAL RECONSTRUCTION      PECTUS CARNATUM REPAIR      attempted    RECTAL SURGERY      Repair    REFRACTIVE SURGERY      RK/AK both eyes     REFRACTIVE SURGERY      SOMNOPLASTY RADIAL FREQUENCY ABLATION      TONSILLECTOMY      TOTAL HIP ARTHROPLASTY Left 2023    Procedure: TOTAL HIP ARTHROPLASTY ANTERIOR;  Surgeon: Josiah Menon MD;  Location[facility] MAIN OR;  Service: Orthopedics;  Laterality: Left;    UVULOPALATOPHARYNGOPLASTY        General Information       Row Name 10/02/23 1618          Physical Therapy Time and Intention    Document Type evaluation  -MB     Mode of Treatment physical therapy  -MB       Row Name 10/02/23 1618          General Information    Patient Profile Reviewed yes  -MB     Prior Level of Function mod assist:;all household mobility;gait;transfer;bed mobility;ADL's;grooming;dressing;bathing;home management  -MB     Existing Precautions/Restrictions fall  -MB     Barriers to Rehab medically complex;previous functional deficit  -MB       Row Name 10/02/23 1618          Living Environment    People in Home spouse  -MB       Row Name 10/02/23 1618          Home Main Entrance    Number of Stairs, Main Entrance two  -MB       Row Name 10/02/23 1618          Stairs Within Home, Primary    Number of Stairs, Within Home, Primary none  -MB       Row Name 10/02/23 1618          Cognition    Orientation Status (Cognition) disoriented to;situation;time  -MB       Row Name 10/02/23 1618          Safety Issues, Functional Mobility    Impairments Affecting Function (Mobility) balance;cognition;endurance/activity  tolerance;strength  -MB     Cognitive Impairments, Mobility Safety/Performance attention;awareness, need for assistance;impulsivity;insight into deficits/self-awareness  -MB               User Key  (r) = Recorded By, (t) = Taken By, (c) = Cosigned By      Initials Name Provider Type    Stanley Wadsworth, PT Physical Therapist                   Mobility       Row Name 10/02/23 1619          Bed Mobility    Bed Mobility bed mobility (all) activities  -MB     All Activities, [place] (Bed Mobility) minimum assist (75% patient effort)  -MB     Assistive Device (Bed Mobility) bed rails;head of bed elevated  -MB     Comment, (Bed Mobility) min A for supine<>Sit EOB  -MB       Row Name 10/02/23 1619          Transfers    Comment, (Transfers) pt not suitable for transfers at this date with very poor BLE strength and cognition preventing appropriate command following  -MB       Row Name 10/02/23 1619          Bed-Chair Transfer    Bed-Chair [place] (Transfers) not tested  -MB       Row Name 10/02/23 1619          Sit-Stand Transfer    Sit-Stand [place] (Transfers) not tested  -MB               User Key  (r) = Recorded By, (t) = Taken By, (c) = Cosigned By      Initials Name Provider Type    Stanley Wadsworth, PT Physical Therapist                   Obj/Interventions       Row Name 10/02/23 1620          Range of Motion Comprehensive    General Range of Motion no range of motion deficits identified  -MB       Row Name 10/02/23 1620          Strength Comprehensive (MMT)    Comment, General Manual Muscle Testing (MMT) Assessment BLE Strength 3/5 grossly  -MB       Row Name 10/02/23 1620          Balance    Balance Assessment sitting static balance;sitting dynamic balance  -MB     Static Sitting Balance contact guard  -MB     Dynamic Sitting Balance minimal assist;moderate assist  -MB     Position, Sitting Balance supported;sitting edge of bed  -MB       Row Name 10/02/23 1620          Sensory Assessment  (Somatosensory)    Sensory Assessment (Somatosensory) sensation intact  -MB               User Key  (r) = Recorded By, (t) = Taken By, (c) = Cosigned By      Initials Name Provider Type    Stanley Wadsworth, PT Physical Therapist                   Goals/Plan       Row Name 10/02/23 1621          Bed Mobility Goal 1 (PT)    Activity/Assistive Device (Bed Mobility Goal 1, PT) bed mobility activities, all  -MB     [place] Level/Cues Needed (Bed Mobility Goal 1, PT) standby assist  -MB     Time Frame (Bed Mobility Goal 1, PT) long term goal (LTG);2 weeks  -MB       Row Name 10/02/23 1621          Transfer Goal 1 (PT)    Activity/Assistive Device (Transfer Goal 1, PT) transfers, all;walker, rolling  -MB     [place] Level/Cues Needed (Transfer Goal 1, PT) moderate assist (50-74% patient effort)  -MB     Time Frame (Transfer Goal 1, PT) long term goal (LTG);2 weeks  -MB       Row Name 10/02/23 1621          Gait Training Goal 1 (PT)    Activity/Assistive Device (Gait Training Goal 1, PT) gait (walking locomotion);walker, rolling  -MB     [place] Level (Gait Training Goal 1, PT) minimum assist (75% or more patient effort)  -MB     Distance (Gait Training Goal 1, PT) 10  -MB     Time Frame (Gait Training Goal 1, PT) long term goal (LTG);2 weeks  -MB       Row Name 10/02/23 1621          Therapy Assessment/Plan (PT)    Planned Therapy Interventions (PT) balance training;bed mobility training;gait training;home exercise program;neuromuscular re-education;patient/family education;stair training;strengthening;transfer training  -MB               User Key  (r) = Recorded By, (t) = Taken By, (c) = Cosigned By      Initials Name Provider Type    Stanley Wadsworth, PT Physical Therapist                   Clinical Impression       Row Name 10/02/23 1621          Pain    Pretreatment Pain Rating 0/10 - no pain  -MB     Posttreatment Pain Rating 0/10 - no pain  -MB       Row Name 10/02/23 1626 10/02/23 1621       Plan of  Care Review    Plan of Care Reviewed With -- patient  -MB    Progress -- no change  -MB    Outcome Evaluation Pt is a 72 y/o F admitted to [facility] on 10/2/23 with complaints of leg stiffness and weakness, preventing her ability to stand up. Patient is not reliable source of information as she claims reason for admission is fracture of hip, which was reason for admission on 5/31/23. MRI Brain (+) for chronic small vessel ischemia, (-) for acute CVA. Diagnosed with UTI. PMH includes dementia, CAD, HTN, HLD, and depression. PLOF and Home environment difficult to obtain due to memory concerns and erratic speech, confirmed with spouse via phone call. She is currently living with spouse in [facility] with 2-step entry. At baseline, she req mod>max A for transfers, bed mobility, and ADLs, while spouse states she is mod A for short distance ambulation with RW. Today, pt is disoriented to situation and time. She completes bed mobility min>A and exhibits poor dynamic balance when assessing BLE strength. She is not suitable for transfers at this time due to poor BLE strength and limited balance in sitting. Pt and spouse both educated about SNF recommendation at d/c for continued care and rehab, but uncertain if pt will be agreeable. She is not safe to return back home at this time with poor strength and cognitive decline. Recommendation is SNF at d/c and PT will follow.  -MB --      Row Name 10/02/23 1621          Therapy Assessment/Plan (PT)    Rehab Potential (PT) fair, will monitor progress closely  -MB     Criteria for Skilled Interventions Met (PT) yes;meets criteria  -MB     Therapy Frequency (PT) 3 times/wk  -MB     Predicted Duration of Therapy Intervention (PT) until d/c  -MB       Row Name 10/02/23 1621          Vital Signs    Pre Systolic BP Rehab 105  -MB     Pre Treatment Diastolic BP 66  -MB     Pretreatment Heart Rate (beats/min) 77  -MB     Posttreatment Heart Rate (beats/min) 76  -MB     Pre SpO2 (%) 97  -MB     O2  Delivery Pre Treatment room air  -MB     O2 Delivery Intra Treatment room air  -MB     Post SpO2 (%) 96  -MB     O2 Delivery Post Treatment room air  -MB     Pre Patient Position Supine  -MB     Intra Patient Position Sitting  -MB     Post Patient Position Supine  -MB       Row Name 10/02/23 1621          Positioning and Restraints    Pre-Treatment Position in bed  -MB     Post Treatment Position bed  -MB     In Bed notified nsg;supine;call light within reach;encouraged to call for assist  -MB               User Key  (r) = Recorded By, (t) = Taken By, (c) = Cosigned By      Initials Name Provider Type    Stanley Wadsworth, PT Physical Therapist                   Outcome Measures       Row Name 10/02/23 1622          How much help from another person do you currently need...    Turning from your back to your side while in flat bed without using bedrails? 3  -MB     Moving from lying on back to sitting on the side of a flat bed without bedrails? 3  -MB     Moving to and from a bed to a chair (including a wheelchair)? 2  -MB     Standing up from a chair using your arms (e.g., wheelchair, bedside chair)? 2  -MB     Climbing 3-5 steps with a railing? 2  -MB     To walk in hospital room? 2  -MB     AM-PAC 6 Clicks Score (PT) 14  -MB     Highest level of mobility 4 --> Transferred to chair/commode  -MB       Row Name 10/02/23 1622          Functional Assessment    Outcome Measure Options AM-PAC 6 Clicks Basic Mobility (PT)  -MB               User Key  (r) = Recorded By, (t) = Taken By, (c) = Cosigned By      Initials Name Provider Type    Stanley Wadsworth, MANOJ Physical Therapist                                 Physical Therapy Education       Title: PT OT SLP Therapies (Done)       Topic: Physical Therapy (Done)       Point: Mobility training (Done)       Learning Progress Summary             Patient Acceptance, E,TB, VU by MB at 10/2/2023 1622                         Point: Body mechanics (Done)       Learning Progress  Summary             Patient Acceptance, E,TB, VU by MB at 10/2/2023 1622                         Point: Precautions (Done)       Learning Progress Summary             Patient Acceptance, E,TB, VU by MB at 10/2/2023 1622                                         User Key       Initials Effective Dates Name Provider Type Discipline    MB 06/06/23 -  Stanley Kinsey, PT Physical Therapist PT                  PT Recommendation and Plan  Planned Therapy Interventions (PT): balance training, bed mobility training, gait training, home exercise program, neuromuscular re-education, patient/family education, stair training, strengthening, transfer training  Plan of Care Reviewed With: patient  Progress: no change  Outcome Evaluation: Pt is a 72 y/o F admitted to [facility] on 10/2/23 with complaints of leg stiffness and weakness, preventing her ability to stand up. Patient is not reliable source of information as she claims reason for admission is fracture of hip, which was reason for admission on 5/31/23. MRI Brain (+) for chronic small vessel ischemia, (-) for acute CVA. Diagnosed with UTI. PMH includes dementia, CAD, HTN, HLD, and depression. PLOF and Home environment difficult to obtain due to memory concerns and erratic speech, confirmed with spouse via phone call. She is currently living with spouse in [facility] with 2-step entry. At baseline, she req mod>max A for transfers, bed mobility, and ADLs, while spouse states she is mod A for short distance ambulation with RW. Today, pt is disoriented to situation and time. She completes bed mobility min>A and exhibits poor dynamic balance when assessing BLE strength. She is not suitable for transfers at this time due to poor BLE strength and limited balance in sitting. Pt and spouse both educated about SNF recommendation at d/c for continued care and rehab, but uncertain if pt will be agreeable. She is not safe to return back home at this time with poor strength and cognitive decline.  Recommendation is SNF at d/c and PT will follow.     Time Calculation:   PT Evaluation Complexity  History, PT Evaluation Complexity: 1-2 personal factors and/or comorbidities  Examination of Body Systems (PT Eval Complexity): total of 3 or more elements  Clinical Presentation (PT Evaluation Complexity): evolving  Clinical Decision Making (PT Evaluation Complexity): moderate complexity  Overall Complexity (PT Evaluation Complexity): moderate complexity     PT Charges       Row Name 10/02/23 1622             Time Calculation    Start Time 1518  -MB      Stop Time 1544  -MB      Time Calculation (min) 26 min  -MB      PT Received On 10/02/23  -MB      PT - Next Appointment 10/04/23  -MB      PT Goal Re-Cert Due Date 10/16/23  -MB         Time Calculation- PT    Total Timed Code Minutes- PT 0 minute(s)  -MB                User Key  (r) = Recorded By, (t) = Taken By, (c) = Cosigned By      Initials Name Provider Type    Stanley Wadsworth, PT Physical Therapist                  Therapy Charges for Today       Code Description Service Date Service Provider Modifiers Qty    [id number] HC PT EVAL MOD COMPLEXITY 4 10/2/2023 Stanley Kinsey, PT GP 1            PT G-Codes  Outcome Measure Options: AM-PAC 6 Clicks Basic Mobility (PT)  AM-PAC 6 Clicks Score (PT): 14  PT Discharge Summary  Anticipated Discharge Disposition (PT): skilled nursing facility    Stanley Kinsey PT  10/2/2023

## 2023-10-02 NOTE — ED PROVIDER NOTES
Subjective   History of Present Illness  Chief complaint legs are shaking not working right think I had a stroke    History of present illness this is a 73-year-old female with several health problems who went to bed yesterday evening without difficulty but this morning she woke up about 4:30 AM and she felt her legs were jerking and they were so weak they would not work and she could not stand up.  She denies any fever chills she denies any trouble with her arms no visual changes or speech difficulty or paralysis there was no chest pain neck arm jaw pain or shortness of breath.  She denies any recent injury illness flus viruses or vaccinations.  No abdominal pain no one home with cerumen illness.    Review of Systems   Constitutional:  Negative for chills and fever.   Eyes:  Negative for photophobia and visual disturbance.   Respiratory:  Negative for chest tightness and shortness of breath.    Gastrointestinal:  Negative for abdominal pain and vomiting.   Genitourinary:  Negative for difficulty urinating and dysuria.   Musculoskeletal:  Negative for back pain and neck pain.   Skin:  Negative for rash.   Neurological:  Positive for weakness. Negative for facial asymmetry and speech difficulty.   Psychiatric/Behavioral:  Positive for confusion.      Past Medical History:   Diagnosis Date    Allergic rhinitis     Basal cell carcinoma (BCC) of nostril     Coronary artery disease involving native coronary artery of native heart with angina pectoris 06/18/2019    Status post successful PCI of the coronary stent deployment to high-grade RCA stenosis after presenting with ST segment elevated microinfarction in May 2019    DDD (degenerative disc disease), lumbar     Depression     Depression     Essential hypertension 06/18/2019    Femoral neck fracture 05/26/2023    Heart attack     6/2/2019    Hyperlipidemia     Hyperlipidemia, mixed 08/13/2019    Hypertension     Insomnia     Myocardial infarction less than 4 weeks ago  2019    Presented initially with ST segment elevated microinfarction treated emergently in [place] May 2019    Old MI (myocardial infarction) 2019    Presented initially with ST segment elevated microinfarction treated emergently in [place] May 2019    Osteoarthritis     Toenail fungus        No Known Allergies    Past Surgical History:   Procedure Laterality Date    ADENOIDECTOMY      Adenoids removed    APPENDECTOMY      AUGMENTATION MAMMAPLASTY      BREAST IMPLANT SURGERY  1984    CATARACT EXTRACTION Bilateral 2017    CERVICAL DISC SURGERY      ruptured and removed      SECTION      CHEST SURGERY      attempted pectus repair     EYE SURGERY  1997    AK    HYSTERECTOMY      KNEE ARTHROSCOPY Right     LUMBAR DISC SURGERY      ruptured and removed     NASAL SEPTAL RECONSTRUCTION      PECTUS CARNATUM REPAIR      attempted    RECTAL SURGERY      Repair    REFRACTIVE SURGERY      RK/AK both eyes     REFRACTIVE SURGERY      SOMNOPLASTY RADIAL FREQUENCY ABLATION      TONSILLECTOMY      TOTAL HIP ARTHROPLASTY Left 2023    Procedure: TOTAL HIP ARTHROPLASTY ANTERIOR;  Surgeon: Josiah Menon MD;  Location: [facility] MAIN OR;  Service: Orthopedics;  Laterality: Left;    UVULOPALATOPHARYNGOPLASTY         Family History   Problem Relation Age of Onset    Alzheimer's disease Mother     Other Mother         CVA, acute myocardial infarction    Leukemia Father     Other Father         Parkinson's     Heart attack Sister     No Known Problems Brother     Bipolar disorder Daughter     Heart attack Brother     Alcohol abuse Brother     Anxiety disorder Brother     Depression Brother     ADD / ADHD Daughter     Other Daughter         alcohol and drug addiction    HIV Daughter        Social History     Socioeconomic History    Marital status:     Number of children: 2   Tobacco Use    Smoking status: Former     Packs/day: 0.50     Years: 4.00     Pack years: 2.00     Types:  Cigarettes     Quit date: 1972     Years since quittin.7    Smokeless tobacco: Never   Vaping Use    Vaping Use: Never used   Substance and Sexual Activity    Alcohol use: No    Drug use: No    Sexual activity: Not Currently     Prior to Admission medications    Medication Sig Start Date End Date Taking? Authorizing Provider   atorvastatin (LIPITOR) 40 MG tablet Take 0.5 tablets by mouth Daily. 23   Nathalia Maria APRN   buPROPion XL (WELLBUTRIN XL) 150 MG 24 hr tablet Take 3 tablets by mouth Every Morning. 23   Nathalia Maria APRN   Calcium Citrate-Vitamin D 500-400 MG-UNIT chewable tablet Chew Daily.    ProviderNancy MD   carvedilol (COREG) 3.125 MG tablet Take 1 tablet by mouth 2 (Two) Times a Day. 23   Nathalia Maria APRN   clopidogrel (PLAVIX) 75 MG tablet Take 1 tablet by mouth Daily. 23   Nathalia Maria APRN   coenzyme Q10 100 MG capsule Take 1 capsule by mouth Daily.    Nancy Painting MD   escitalopram (LEXAPRO) 20 MG tablet TAKE ONE-HALF (1/2) TABLET TWICE A DAY 23   Nathalia Maria APRN   Melatonin 10 MG sublingual tablet Place 1 tablet under the tongue At Night As Needed.    Nancy Painting MD   memantine (NAMENDA) 10 MG tablet (Take the 5mg tab daily for one month) then take 10mg tab daily for one month then take one bid 23   Seipel, Joseph F, MD   memantine (NAMENDA) 5 MG tablet Take 1 tablet by mouth Daily. For one month 23  Seipel, Joseph F, MD   Multiple Vitamins-Minerals (Multi-Vitamin Gummies) chewable tablet Chew 1 tablet/day Daily.    ProviderNancy MD   oxyCODONE (ROXICODONE) 5 MG immediate release tablet Take 1 tablet by mouth Every 6 (Six) Hours As Needed for Severe Pain. 23   Roshan Almodovar MD   polyethylene glycol (MIRALAX) 17 g packet Take 17 g by mouth Daily As Needed (Use if senna-docusate is ineffective). 23   Roshan Almodovar MD   sennosides-docusate (PERICOLACE) 8.6-50 MG per tablet Take 2  tablets by mouth 2 (Two) Times a Day. 6/2/23   Roshan Almodovar MD          Objective   Physical Exam  Constitutional 73-year-old female awake alert no distress triage vital signs reviewed.  HEENT extraocular muscles are intact pupils equal round reactive sclera clear mouth clear neck supple no adenopathy no JV no bruits lungs clear no retraction no use of accessories.  Heart regular without murmur abdomen soft nontender good bowel sounds no peritoneal findings or pulsatile masses extremities pulses equal upper and lower extremities no edema cords or Homans' sign or evidence of DVT.  Skin warm dry without rashes or cellulitic changes neurologic he is awake alert no facial asymmetry speech normal no drift the arms or legs toes downgoing no clonus.  She sits up but needs assistant because she is very weak.  There is no truncal ataxia no focal weakness at this time.  Procedures           ED Course      Results for orders placed or performed during the hospital encounter of 10/02/23   Comprehensive Metabolic Panel    Specimen: Blood   Result Value Ref Range    Glucose 103 (H) 65 - 99 mg/dL    BUN 24 (H) 8 - 23 mg/dL    Creatinine 1.06 (H) 0.57 - 1.00 mg/dL    Sodium 141 136 - 145 mmol/L    Potassium 4.4 3.5 - 5.2 mmol/L    Chloride 104 98 - 107 mmol/L    CO2 29.0 22.0 - 29.0 mmol/L    Calcium 10.1 8.6 - 10.5 mg/dL    Total Protein 6.5 6.0 - 8.5 g/dL    Albumin 3.8 3.5 - 5.2 g/dL    ALT (SGPT) 25 1 - 33 U/L    AST (SGOT) 22 1 - 32 U/L    Alkaline Phosphatase 98 39 - 117 U/L    Total Bilirubin 0.4 0.0 - 1.2 mg/dL    Globulin 2.7 gm/dL    A/G Ratio 1.4 g/dL    BUN/Creatinine Ratio 22.6 7.0 - 25.0    Anion Gap 8.0 5.0 - 15.0 mmol/L    eGFR 54.9 (L) >60.0 mL/min/1.73   Urinalysis With Microscopic If Indicated (No Culture) - Straight Cath    Specimen: Straight Cath; Urine   Result Value Ref Range    Color, UA Yellow Yellow, Straw    Appearance, UA Slightly Cloudy (A) Clear    pH, UA 7.5 5.0 - 8.0    Specific Gravity, UA 1.017  1.005 - 1.030    Glucose, UA Negative Negative    Ketones, UA Negative Negative    Bilirubin, UA Negative Negative    Blood, UA Negative Negative    Protein, UA Negative Negative    Leuk Esterase, UA Large (3+) (A) Negative    Nitrite, UA Negative Negative    Urobilinogen, UA 1.0 E.U./dL 0.2 - 1.0 E.U./dL   CK    Specimen: Blood   Result Value Ref Range    Creatine Kinase 38 20 - 180 U/L   TSH    Specimen: Blood   Result Value Ref Range    TSH 2.420 0.270 - 4.200 uIU/mL   Magnesium    Specimen: Blood   Result Value Ref Range    Magnesium 2.2 1.6 - 2.4 mg/dL   CBC Auto Differential    Specimen: Blood   Result Value Ref Range    WBC 10.30 3.40 - 10.80 10*3/mm3    RBC 4.46 3.77 - 5.28 10*6/mm3    Hemoglobin 13.2 12.0 - 15.9 g/dL    Hematocrit 41.0 34.0 - 46.6 %    MCV 91.9 79.0 - 97.0 fL    MCH 29.5 26.6 - 33.0 pg    MCHC 32.1 31.5 - 35.7 g/dL    RDW 15.3 12.3 - 15.4 %    RDW-SD 49.9 37.0 - 54.0 fl    MPV 7.4 6.0 - 12.0 fL    Platelets 250 140 - 450 10*3/mm3    Neutrophil % 69.8 42.7 - 76.0 %    Lymphocyte % 19.6 19.6 - 45.3 %    Monocyte % 7.1 5.0 - 12.0 %    Eosinophil % 2.7 0.3 - 6.2 %    Basophil % 0.8 0.0 - 1.5 %    Neutrophils, Absolute 7.20 (H) 1.70 - 7.00 10*3/mm3    Lymphocytes, Absolute 2.00 0.70 - 3.10 10*3/mm3    Monocytes, Absolute 0.70 0.10 - 0.90 10*3/mm3    Eosinophils, Absolute 0.30 0.00 - 0.40 10*3/mm3    Basophils, Absolute 0.10 0.00 - 0.20 10*3/mm3    nRBC 0.0 0.0 - 0.2 /100 WBC   Urinalysis, Microscopic Only - Straight Cath    Specimen: Straight Cath; Urine   Result Value Ref Range    RBC, UA 3-5 (A) None Seen /HPF    WBC, UA 13-20 (A) None Seen /HPF    Bacteria, UA 2+ (A) None Seen /HPF    Squamous Epithelial Cells, UA 0-2 None Seen, 0-2 /HPF    Yeast, UA Small/1+ Yeast None Seen /HPF    Hyaline Casts, UA None Seen None Seen /LPF    Methodology Manual Light Microscopy    Lactic Acid, Plasma    Specimen: Blood   Result Value Ref Range    Lactate 1.7 0.5 - 2.0 mmol/L     MRI Brain Without  Contrast    Result Date: 10/2/2023  Impression: Atrophy and evidence of chronic small vessel ischemic insult. No acute CVA identified. Electronically Signed: Patrizia Goldman MD  10/2/2023 9:01 AM EDT  Workstation ID: [id number]     Medications   sodium chloride 0.9 % flush 10 mL (has no administration in time range)   sodium chloride 0.9 % flush 10 mL (10 mL Intravenous Given 10/2/23 1441)   sodium chloride 0.9 % flush 10 mL (has no administration in time range)   sodium chloride 0.9 % infusion 40 mL (has no administration in time range)   acetaminophen (TYLENOL) tablet 650 mg (has no administration in time range)     Or   acetaminophen (TYLENOL) 160 MG/5ML oral solution 650 mg (has no administration in time range)     Or   acetaminophen (TYLENOL) suppository 650 mg (has no administration in time range)   aluminum-magnesium hydroxide-simethicone (MAALOX MAX) 400-400-40 MG/5ML suspension 15 mL (has no administration in time range)   polyethylene glycol (MIRALAX) packet 17 g (has no administration in time range)     And   bisacodyl (DULCOLAX) EC tablet 5 mg (has no administration in time range)     And   bisacodyl (DULCOLAX) suppository 10 mg (has no administration in time range)   ondansetron (ZOFRAN) tablet 4 mg (has no administration in time range)     Or   ondansetron (ZOFRAN) injection 4 mg (has no administration in time range)   melatonin tablet 5 mg (has no administration in time range)   Potassium Replacement - Follow Nurse / BPA Driven Protocol (has no administration in time range)   Magnesium Standard Dose Replacement - Follow Nurse / BPA Driven Protocol (has no administration in time range)   Phosphorus Replacement - Follow Nurse / BPA Driven Protocol (has no administration in time range)   Calcium Replacement - Follow Nurse / BPA Driven Protocol (has no administration in time range)   cefTRIAXone (ROCEPHIN) 1,000 mg in sodium chloride 0.9 % 100 mL IVPB (has no administration in time range)   atorvastatin  (LIPITOR) tablet 20 mg (20 mg Oral Given 10/2/23 1625)   buPROPion XL (WELLBUTRIN XL) 24 hr tablet 450 mg (has no administration in time range)   carvedilol (COREG) tablet 3.125 mg (has no administration in time range)   clopidogrel (PLAVIX) tablet 75 mg (75 mg Oral Not Given 10/2/23 1629)   escitalopram (LEXAPRO) tablet 10 mg (has no administration in time range)   memantine (NAMENDA) tablet 10 mg (has no administration in time range)   cefTRIAXone (ROCEPHIN) 1,000 mg in sodium chloride 0.9 % 100 mL IVPB (0 mg Intravenous Stopped 10/2/23 1223)                                            Medical Decision Making  Medical decision making.  IV established monitor placed my review of sinus rhythm.  Labs obtained independent reviewed by me.  Comprehensive metabolic profile unremarkable and a BUN of 24 creatinine 1.06.  CBC unremarkable TSH magnesium CK all normal urinalysis showed large leukocyte 20 white cells 2+ bacteria I did culture that.  Patient was started on Rocephin 2 g IV for UTI.  MRI of the brain obtained radiology read report reviewed by me no acute stroke chronic small vessel disease was noted.  Lactic acid obtained and reviewed by me was normal at 1.7.  Patient repeat exam is resting comfortably.  I do not see evidence of an acute stroke there is no evidence on exam to suggest DVT or pulmonary embolism.  There is no palpable cords or Homans' sign.  There is no pain to the legs are not swollen.  They have just been jerking and weak.  I will see of arterial compromise pulses are equal both in upper and lower extremities and she can move them without difficulty.  Not a complete list of all possibilities no cellulitic changes compartments are soft no pain with passive stretching or movement.  Suggest compartment syndrome.  I talked to the patient and family discussed the case.  Patient will be admitted to the hospital.  Hospitalist nurse practitioner notified case discussed    Problems Addressed:  General  weakness: complicated acute illness or injury  Urinary tract infection without hematuria, site unspecified: complicated acute illness or injury    Amount and/or Complexity of Data Reviewed  Labs: ordered. Decision-making details documented in ED Course.  Radiology: ordered.    Risk  Decision regarding hospitalization.        Final diagnoses:   General weakness   Urinary tract infection without hematuria, site unspecified       ED Disposition  ED Disposition       ED Disposition   Decision to Admit    Condition   --    Comment   Level of Care: Telemetry [5]   Diagnosis: Generalized weakness [150341]   Admitting Physician: MARIANO LOWE [[id number]]   Attending Physician: MARIANO LOWE [[id number]]                 No follow-up provider specified.       Medication List        ASK your doctor about these medications      escitalopram 20 MG tablet  Commonly known as: LEXAPRO  Ask about: Which instructions should I use?     memantine 10 MG tablet  Commonly known as: NAMENDA  Ask about: Which instructions should I use?                 Dario Deleon MD  10/02/23 9914

## 2023-10-03 LAB
ANION GAP SERPL CALCULATED.3IONS-SCNC: 8 MMOL/L (ref 5–15)
BACTERIA SPEC AEROBE CULT: NO GROWTH
BUN SERPL-MCNC: 23 MG/DL (ref 8–23)
BUN/CREAT SERPL: 24.5 (ref 7–25)
CALCIUM SPEC-SCNC: 9.4 MG/DL (ref 8.6–10.5)
CHLORIDE SERPL-SCNC: 103 MMOL/L (ref 98–107)
CO2 SERPL-SCNC: 28 MMOL/L (ref 22–29)
CREAT SERPL-MCNC: 0.94 MG/DL (ref 0.57–1)
EGFRCR SERPLBLD CKD-EPI 2021: 63.4 ML/MIN/1.73
GLUCOSE SERPL-MCNC: 101 MG/DL (ref 65–99)
POTASSIUM SERPL-SCNC: 4.1 MMOL/L (ref 3.5–5.2)
SODIUM SERPL-SCNC: 139 MMOL/L (ref 136–145)

## 2023-10-03 PROCEDURE — 25010000002 CEFTRIAXONE PER 250 MG: Performed by: PHYSICIAN ASSISTANT

## 2023-10-03 PROCEDURE — G0378 HOSPITAL OBSERVATION PER HR: HCPCS

## 2023-10-03 PROCEDURE — 97166 OT EVAL MOD COMPLEX 45 MIN: CPT

## 2023-10-03 PROCEDURE — 97530 THERAPEUTIC ACTIVITIES: CPT

## 2023-10-03 PROCEDURE — 97116 GAIT TRAINING THERAPY: CPT

## 2023-10-03 RX ADMIN — CLOPIDOGREL BISULFATE 75 MG: 75 TABLET ORAL at 10:07

## 2023-10-03 RX ADMIN — CEFTRIAXONE 1000 MG: 1 INJECTION, POWDER, FOR SOLUTION INTRAMUSCULAR; INTRAVENOUS at 10:08

## 2023-10-03 RX ADMIN — Medication 10 ML: at 10:07

## 2023-10-03 RX ADMIN — CARVEDILOL 3.12 MG: 3.12 TABLET, FILM COATED ORAL at 20:17

## 2023-10-03 RX ADMIN — MEMANTINE 10 MG: 10 TABLET ORAL at 10:07

## 2023-10-03 RX ADMIN — BUPROPION HYDROCHLORIDE 450 MG: 150 TABLET, FILM COATED, EXTENDED RELEASE ORAL at 05:00

## 2023-10-03 RX ADMIN — ESCITALOPRAM OXALATE 10 MG: 10 TABLET ORAL at 20:17

## 2023-10-03 RX ADMIN — ATORVASTATIN CALCIUM 20 MG: 20 TABLET, FILM COATED ORAL at 10:07

## 2023-10-03 RX ADMIN — Medication 10 ML: at 20:28

## 2023-10-03 RX ADMIN — MEMANTINE 10 MG: 10 TABLET ORAL at 20:17

## 2023-10-03 RX ADMIN — CARVEDILOL 3.12 MG: 3.12 TABLET, FILM COATED ORAL at 10:07

## 2023-10-03 RX ADMIN — ESCITALOPRAM OXALATE 10 MG: 10 TABLET ORAL at 10:07

## 2023-10-03 NOTE — CASE MANAGEMENT/SOCIAL WORK
Continued Stay Note   William     Patient Name: Jennifer Blackmon  MRN: [id number]  Today's Date: 10/3/2023    Admit Date: 10/2/2023    Plan: Call to spouse x 2 today. Patient with confusion. Needs snf choice. will need precert and pasrr.   Discharge Plan       Row Name 10/03/23 5570       Plan    Plan Call to spouse x 2 today. Patient with confusion. Needs snf choice. will need precert and pasrr.    Plan Comments Barriers to dc: IV abx, cultures pending. OT eval pending for precert      Row Name 10/03/23 2346       Plan    Plan Call to psouse x2 with no answer. Pending snf choice. Will need precert.                             Expected Discharge Date and Time       Expected Discharge Date Expected Discharge Time    Oct 4, 2023               Ana Maria May RN

## 2023-10-03 NOTE — THERAPY TREATMENT NOTE
"Subjective: Pt agreeable to therapeutic plan of care.  Reports feeling better today.     Objective:   Pt supine in bed, confused but cooperative.  Needs repeated cues and direction for functional tasks.     Bed mobility - Min-A supine to sit, repeated cues to complete task, relies on bedrail and HOB elevated  Transfers - Min-A, Assist x 2, and with rolling walker, stand from EOB x 3 with cues for hand placement.   Ambulation - 25 feet min A x2 with RW, pt with narrow CANDY, frequent scissoring and short step length.  Frequent LOB requiring min A to correct.     Sitting balance:  initial posterior LOB but able to correct with verbal cues.    Standing balance:  cues to widen CANDY, relies heavily on support of RW and min A for balance.  Does not fully extend her knees in standing.       Pain: 0 VAS   Location:   Intervention for pain: N/A    Education: Gait Training    Assessment: Jennifer Blackmon presents with functional mobility impairments which indicate the need for skilled intervention. Pt with improved mobility since eval yesterday but still high risk for falls with significant gait and balance deficits.  Tolerating session today without incident. Will continue to follow and progress as tolerated.     Plan/Recommendations:   Moderate Intensity Therapy recommended post-acute care. This is recommended as therapy feels the patient would require 3-4 days per week and wouldn't tolerate \"3 hour daily\" rehab intensity. SNF would be the preferred choice. If the patient does not agree to SNF, arrange HH or OP depending on home bound status. If patient is medically complex, consider LTACH.. Pt requires no DME at discharge.     Pt desires Skilled Rehab placement at discharge. Pt cooperative; agreeable to therapeutic recommendations and plan of care.         Basic Mobility 6-click:  Rollin = Total, A lot = 2, A little = 3; 4 = None  Supine>Sit:   1 = Total, A lot = 2, A little = 3; 4 = None   Sit>Stand with arms:  1 = " Total, A lot = 2, A little = 3; 4 = None  Bed>Chair:   1 = Total, A lot = 2, A little = 3; 4 = None  Ambulate in room:  1 = Total, A lot = 2, A little = 3; 4 = None  3-5 Steps with railin = Total, A lot = 2, A little = 3; 4 = None  Score: 15      Post-Tx Position: Up in Chair, Alarms activated, and Call light and personal items within reach  PPE: gloves and surgical mask

## 2023-10-03 NOTE — PLAN OF CARE
Goal Outcome Evaluation:                      Jennifer Blackmon presents with functional mobility impairments which indicate the need for skilled intervention. Pt with improved mobility since eval yesterday but still high risk for falls with significant gait and balance deficits.  Tolerating session today without incident. Will continue to follow and progress as tolerated.

## 2023-10-03 NOTE — PLAN OF CARE
Problem: Adult Inpatient Plan of Care  Goal: Plan of Care Review  Outcome: Ongoing, Progressing  Goal: Patient-Specific Goal (Individualized)  Outcome: Ongoing, Progressing  Goal: Absence of Hospital-Acquired Illness or Injury  Outcome: Ongoing, Progressing  Intervention: Identify and Manage Fall Risk  Recent Flowsheet Documentation  Taken 10/3/2023 1652 by Perri Pruett RN  Safety Promotion/Fall Prevention: safety round/check completed  Taken 10/3/2023 1410 by Perri Pruett RN  Safety Promotion/Fall Prevention: safety round/check completed  Taken 10/3/2023 1259 by Perri Pruett RN  Safety Promotion/Fall Prevention: safety round/check completed  Taken 10/3/2023 1041 by Perri Pruett RN  Safety Promotion/Fall Prevention: safety round/check completed  Taken 10/3/2023 0859 by Perri Pruett RN  Safety Promotion/Fall Prevention:   safety round/check completed   room organization consistent   nonskid shoes/slippers when out of bed   assistive device/personal items within reach   clutter free environment maintained  Intervention: Prevent and Manage VTE (Venous Thromboembolism) Risk  Recent Flowsheet Documentation  Taken 10/3/2023 0859 by Perri Pruett RN  VTE Prevention/Management: sequential compression devices off  Intervention: Prevent Infection  Recent Flowsheet Documentation  Taken 10/3/2023 0859 by Perri Pruett RN  Infection Prevention:   single patient room provided   equipment surfaces disinfected   environmental surveillance performed  Goal: Optimal Comfort and Wellbeing  Outcome: Ongoing, Progressing  Intervention: Provide Person-Centered Care  Recent Flowsheet Documentation  Taken 10/3/2023 0859 by Perri Pruett RN  Trust Relationship/Rapport:   choices provided   emotional support provided   care explained   empathic listening provided   questions answered   questions encouraged  Goal: Readiness for Transition of Care  Outcome: Ongoing, Progressing      Problem: Fall Injury Risk  Goal: Absence of Fall and Fall-Related Injury  Outcome: Ongoing, Progressing  Intervention: Identify and Manage Contributors  Recent Flowsheet Documentation  Taken 10/3/2023 0859 by Perri Pruett RN  Medication Review/Management: medications reviewed  Intervention: Promote Injury-Free Environment  Recent Flowsheet Documentation  Taken 10/3/2023 1652 by Perri Pruett RN  Safety Promotion/Fall Prevention: safety round/check completed  Taken 10/3/2023 1410 by Perri Pruett RN  Safety Promotion/Fall Prevention: safety round/check completed  Taken 10/3/2023 1259 by Perri Pruett RN  Safety Promotion/Fall Prevention: safety round/check completed  Taken 10/3/2023 1041 by Perri Pruett RN  Safety Promotion/Fall Prevention: safety round/check completed  Taken 10/3/2023 0859 by Perri Pruett RN  Safety Promotion/Fall Prevention:   safety round/check completed   room organization consistent   nonskid shoes/slippers when out of bed   assistive device/personal items within reach   clutter free environment maintained     Problem: Hypertension Comorbidity  Goal: Blood Pressure in Desired Range  Outcome: Ongoing, Progressing  Intervention: Maintain Blood Pressure Management  Recent Flowsheet Documentation  Taken 10/3/2023 0859 by Perri Pruett RN  Medication Review/Management: medications reviewed     Problem: Osteoarthritis Comorbidity  Goal: Maintenance of Osteoarthritis Symptom Control  Outcome: Ongoing, Progressing  Intervention: Maintain Osteoarthritis Symptom Control  Recent Flowsheet Documentation  Taken 10/3/2023 0859 by Perri Pruett RN  Medication Review/Management: medications reviewed     Problem: Skin Injury Risk Increased  Goal: Skin Health and Integrity  Outcome: Ongoing, Progressing   Goal Outcome Evaluation:   PT worked with PT today. She had a very large bowel movement today. She was very constipated and sat on the bedside commode  "for a while. Her  was at bedside most of the afternoon. He also helped her eliminate the rest of the stool that was in her anus with his own hands. He pulled out 10 large hard stools. She didn't want any follow up medication for pain or stool softners after this occurred; \"I feel much better now so I don't need anything.\"  No complaints at this time, VSS, and call light within reach.                      "

## 2023-10-03 NOTE — THERAPY EVALUATION
Patient Name: Jennifer Blackmon  : 1950    MRN: [id number]                              Today's Date: 10/3/2023       Admit Date: 10/2/2023    Visit Dx:     ICD-10-CM ICD-9-CM   1. General weakness  R53.1 780.79   2. Urinary tract infection without hematuria, site unspecified  N39.0 599.0     Patient Active Problem List   Diagnosis    Old MI (myocardial infarction)    Coronary artery disease involving native coronary artery of native heart with angina pectoris    Essential hypertension    Hyperlipidemia, mixed    Chest pain, atypical    Degeneration of lumbar intervertebral disc    Low back pain    Lumbar spondylosis    Arthritis    Cellulitis of face    Depressive disorder    Near syncope    Stage 3a chronic kidney disease    Left displaced femoral neck fracture    S/P total left hip arthroplasty    Memory loss    Generalized weakness     Past Medical History:   Diagnosis Date    Allergic rhinitis     Basal cell carcinoma (BCC) of nostril     Coronary artery disease involving native coronary artery of native heart with angina pectoris 2019    Status post successful PCI of the coronary stent deployment to high-grade RCA stenosis after presenting with ST segment elevated microinfarction in May 2019    DDD (degenerative disc disease), lumbar     Depression     Depression     Essential hypertension 2019    Femoral neck fracture 2023    Heart attack     2019    Hyperlipidemia     Hyperlipidemia, mixed 2019    Hypertension     Insomnia     Myocardial infarction less than 4 weeks ago 2019    Presented initially with ST segment elevated microinfarction treated emergently in [place] May 2019    Old MI (myocardial infarction) 2019    Presented initially with ST segment elevated microinfarction treated emergently in [place] May 2019    Osteoarthritis     Toenail fungus      Past Surgical History:   Procedure Laterality Date    ADENOIDECTOMY      Adenoids removed     APPENDECTOMY      AUGMENTATION MAMMAPLASTY      BREAST IMPLANT SURGERY  1984    CATARACT EXTRACTION Bilateral 2017    CERVICAL DISC SURGERY      ruptured and removed      SECTION      CHEST SURGERY  1961    attempted pectus repair     EYE SURGERY  1997    AK    HYSTERECTOMY      KNEE ARTHROSCOPY Right     LUMBAR DISC SURGERY      ruptured and removed     NASAL SEPTAL RECONSTRUCTION      PECTUS CARNATUM REPAIR      attempted    RECTAL SURGERY      Repair    REFRACTIVE SURGERY      RK/AK both eyes     REFRACTIVE SURGERY      SOMNOPLASTY RADIAL FREQUENCY ABLATION      TONSILLECTOMY      TOTAL HIP ARTHROPLASTY Left 2023    Procedure: TOTAL HIP ARTHROPLASTY ANTERIOR;  Surgeon: Josiah Menon MD;  Location: [facility] MAIN OR;  Service: Orthopedics;  Laterality: Left;    UVULOPALATOPHARYNGOPLASTY        General Information       Row Name 10/03/23 1004          OT Time and Intention    Document Type evaluation  -SR (r) PS (t) SR (c)     Mode of Treatment occupational therapy  -SR (r) PS (t) SR (c)       Row Name 10/03/23 1004          General Information    Patient Profile Reviewed yes  -SR (r) PS (t) SR (c)     Prior Level of Function mod assist:;all household mobility;ADL's  -SR (r) PS (t) SR (c)     Existing Precautions/Restrictions fall  -SR (r) PS (t) SR (c)     Barriers to Rehab previous functional deficit;cognitive status  -SR (r) PS (t) SR (c)       Row Name 10/03/23 1004          Occupational Profile    Reason for Services/Referral (Occupational Profile) Pt is a 73 yo female brought to ED on 10/2/23 c/o legs shaky and weak. PMHx WHITNEY 23, dementia.   At baseline, pt lives with spouse and 2 grandkids in [facility] with 2 TEENA. She reports she is Mod I with BADLs and utilizes RW with help for mobility.  -ES (r) PS (t) ES (c)       Row Name 10/03/23 1004          Living Environment    People in Home grandchild(carmelita);spouse  -SR (r) PS (t) SR (c)       Row Name 10/03/23 1004          Home Main Entrance     Number of Stairs, Main Entrance two  -SR (r) PS (t) SR (c)       Row Name 10/03/23 1004          Cognition    Orientation Status (Cognition) oriented x 4  -SR (r) PS (t) SR (c)       Row Name 10/03/23 1004          Safety Issues, Functional Mobility    Safety Issues Affecting Function (Mobility) awareness of need for assistance;problem-solving;judgment  -SR (r) PS (t) SR (c)     Impairments Affecting Function (Mobility) balance;cognition;coordination;endurance/activity tolerance;motor control;strength  -SR (r) PS (t) SR (c)     Cognitive Impairments, Mobility Safety/Performance insight into deficits/self-awareness  -SR (r) PS (t) SR (c)               User Key  (r) = Recorded By, (t) = Taken By, (c) = Cosigned By      Initials Name Provider Type    Ida Sesay OT Occupational Therapist    SR Perri Pruett, RN Registered Nurse    Gracie Wu, DESHAWN Student OT Student                     Mobility/ADL's       Row Name 10/03/23 1007          Bed Mobility    Comment, (Bed Mobility) Pt up in chair upon OT arrival  -SR (r) PS (t) SR (c)       Row Name 10/03/23 1007          Sit-Stand Transfer    Sit-Stand [place] (Transfers) moderate assist (50% patient effort)  -SR (r) PS (t) SR (c)       Row Name 10/03/23 1007          Functional Mobility    Functional Mobility- Ind. Level moderate assist (50% patient effort)  -SR (r) PS (t) SR (c)     Functional Mobility- Device walker, front-wheeled  -SR (r) PS (t) SR (c)       Row Name 10/03/23 1007          Activities of Daily Living    BADL Assessment/Intervention toileting  -SR (r) PS (t) SR (c)       Row Name 10/03/23 1007          Toileting Assessment/Training    [place] Level (Toileting) dependent (less than 25% patient effort)  -SR (r) PS (t) SR (c)     Comment, (Toileting) Pt incontinent with bladder  -SR (r) PS (t) SR (c)               User Key  (r) = Recorded By, (t) = Taken By, (c) = Cosigned By      Initials Name Provider Type      Perri Pruett, RN Registered Nurse    Gracie Wu, OT Student OT Student                   Obj/Interventions       Row Name 10/03/23 1009          Sensory Assessment (Somatosensory)    Sensory Assessment (Somatosensory) sensation intact  -SR (r) PS (t) SR (c)       Row Name 10/03/23 1009          Vision Assessment/Intervention    Visual Impairment/Limitations corrective lenses full-time  -SR (r) PS (t) SR (c)       Row Name 10/03/23 1009          Range of Motion Comprehensive    General Range of Motion no range of motion deficits identified  -SR (r) PS (t) SR (c)       Row Name 10/03/23 1009          Strength Comprehensive (MMT)    General Manual Muscle Testing (MMT) Assessment upper extremity strength deficits identified  -SR (r) PS (t) SR (c)     Comment, General Manual Muscle Testing (MMT) Assessment BUE shoulder 3/5,  3/5 weaker on left hand  -SR (r) PS (t) SR (c)       Row Name 10/03/23 1009          Balance    Balance Assessment sitting static balance;standing static balance;standing dynamic balance;sit to stand dynamic balance  -SR (r) PS (t) SR (c)     Static Sitting Balance standby assist  -SR (r) PS (t) SR (c)     Position, Sitting Balance sitting in chair  -SR (r) PS (t) SR (c)     Sit to Stand Dynamic Balance moderate assist  -SR (r) PS (t) SR (c)     Static Standing Balance minimal assist  -SR (r) PS (t) SR (c)     Dynamic Standing Balance moderate assist  -SR (r) PS (t) SR (c)     Position/Device Used, Standing Balance walker, front-wheeled  -SR (r) PS (t) SR (c)               User Key  (r) = Recorded By, (t) = Taken By, (c) = Cosigned By      Initials Name Provider Type    SR Perri Pruett, RN Registered Nurse    Gracie Wu, OT Student OT Student                   Goals/Plan       Row Name 10/03/23 1316          Bathing Goal 1 (OT)    Activity/Device (Bathing Goal 1, OT) bathing skills, all  -ES (r) PS (t) ES (c)     Wallace Level/Cues Needed (Bathing Goal 1, OT)  minimum assist (75% or more patient effort);moderate assist (50-74% patient effort)  -ES (r) PS (t) ES (c)     Time Frame (Bathing Goal 1, OT) 2 weeks  -ES (r) PS (t) ES (c)       Row Name 10/03/23 1316          Toileting Goal 1 (OT)    Activity/Device (Toileting Goal 1, OT) toileting skills, all  -ES (r) PS (t) ES (c)     [place] Level/Cues Needed (Toileting Goal 1, OT) moderate assist (50-74% patient effort)  -ES (r) PS (t) ES (c)     Time Frame (Toileting Goal 1, OT) 2 weeks  -ES (r) PS (t) ES (c)       Row Name 10/03/23 1316          Grooming Goal 1 (OT)    Activity/Device (Grooming Goal 1, OT) grooming skills, all  -ES (r) PS (t) ES (c)     [place] (Grooming Goal 1, OT) minimum assist (75% or more patient effort)  -ES (r) PS (t) ES (c)     Time Frame (Grooming Goal 1, OT) 2 weeks  -ES (r) PS (t) ES (c)       Row Name 10/03/23 1316          Therapy Assessment/Plan (OT)    Planned Therapy Interventions (OT) activity tolerance training;BADL retraining;occupation/activity based interventions;IADL retraining;transfer/mobility retraining;strengthening exercise;patient/caregiver education/training  -ES (r) PS (t) ES (c)               User Key  (r) = Recorded By, (t) = Taken By, (c) = Cosigned By      Initials Name Provider Type    Ida Sesay OT Occupational Therapist    Gracie Wu OT Student OT Student                   Clinical Impression       Row Name 10/03/23 1020          Pain Assessment    Pretreatment Pain Rating 0/10 - no pain  -SR (r) PS (t) SR (c)     Posttreatment Pain Rating 0/10 - no pain  -SR (r) PS (t) SR (c)       Row Name 10/03/23 1020          Plan of Care Review    Plan of Care Reviewed With patient  -SR (r) PS (t) SR (c)     Outcome Evaluation Pt is a 73 yo female brought to ED on 10/2/23 c/o legs shaky and weak. PMHx WHITNEY 5/30/23, dementia.   At baseline, pt reports she lives with spouse and 2 grandkids in [facility] with 2 TEENA. She is typically Mod I with BADLs and utilizes  RW with help for mobility. Oriented x 4 on this date, however requires redirections and displayed difficulty with word finding. Pt up in chair upon OT arrival. Pt required some redirection when asked questions. Sit <>stand, functional mobility Mod A with RW. Pt demoed good siting balance but required Mod/Min for standing balance with RW. Pt became dizzy upon standing but unable to obtain standing BP due equipment not reading. Weakness in BUE noted, no UE ROM deficits. Pt is below baseline level function and unsafe to return home, OT recommends SNF following discharge.  -ES (r) PS (t) ES (c)       Row Name 10/03/23 1020          Therapy Assessment/Plan (OT)    Criteria for Skilled Therapeutic Interventions Met (OT) yes;skilled treatment is necessary  -SR (r) PS (t) SR (c)     Therapy Frequency (OT) 3 times/wk  -SR (r) PS (t) SR (c)       Row Name 10/03/23 1020          Therapy Plan Review/Discharge Plan (OT)    Anticipated Discharge Disposition (OT) skilled nursing facility  -SR (r) PS (t) SR (c)       Row Name 10/03/23 1020          Vital Signs    O2 Delivery Pre Treatment room air  -SR (r) PS (t) SR (c)     O2 Delivery Intra Treatment room air  -SR (r) PS (t) SR (c)     O2 Delivery Post Treatment room air  -SR (r) PS (t) SR (c)     Pre Patient Position Sitting  -SR (r) PS (t) SR (c)     Intra Patient Position Standing  -SR (r) PS (t) SR (c)     Post Patient Position Sitting  -SR (r) PS (t) SR (c)       Row Name 10/03/23 1020          Positioning and Restraints    Pre-Treatment Position sitting in chair/recliner  -SR (r) PS (t) SR (c)     Post Treatment Position chair  -SR (r) PS (t) SR (c)     In Chair reclined;sitting;call light within reach;encouraged to call for assist;exit alarm on  -SR (r) PS (t) SR (c)               User Key  (r) = Recorded By, (t) = Taken By, (c) = Cosigned By      Initials Name Provider Type    Ida Sesay, OT Occupational Therapist    Perri Pritchett, RN Registered Nurse     Gracie Wu, OT Student OT Student                   Outcome Measures       Row Name 10/03/23 1320          How much help from another is currently needed...    Putting on and taking off regular lower body clothing? 2  -ES (r) PS (t) ES (c)     Bathing (including washing, rinsing, and drying) 2  -ES (r) PS (t) ES (c)     Toileting (which includes using toilet bed pan or urinal) 2  -ES (r) PS (t) ES (c)     Putting on and taking off regular upper body clothing 3  -ES (r) PS (t) ES (c)     Taking care of personal grooming (such as brushing teeth) 3  -ES (r) PS (t) ES (c)     Eating meals 3  -ES (r) PS (t) ES (c)     AM-PAC 6 Clicks Score (OT) 15  -ES (r) PS (t)       Row Name 10/03/23 0859          How much help from another person do you currently need...    Turning from your back to your side while in flat bed without using bedrails? 3  -SR     Moving from lying on back to sitting on the side of a flat bed without bedrails? 3  -SR     Moving to and from a bed to a chair (including a wheelchair)? 2  -SR     Standing up from a chair using your arms (e.g., wheelchair, bedside chair)? 2  -SR     Climbing 3-5 steps with a railing? 2  -SR     To walk in hospital room? 2  -SR     AM-PAC 6 Clicks Score (PT) 14  -SR     Highest level of mobility 4 --> Transferred to chair/commode  -SR       Row Name 10/03/23 1320          Functional Assessment    Outcome Measure Options AM-PAC 6 Clicks Daily Activity (OT)  -ES (r) PS (t) ES (c)               User Key  (r) = Recorded By, (t) = Taken By, (c) = Cosigned By      Initials Name Provider Type    Ida Sesay OT Occupational Therapist    Perri Pritchett, RN Registered Nurse    Gracie Wu, OT Student OT Student                    Occupational Therapy Education       Title: PT OT SLP Therapies (Done)       Topic: Occupational Therapy (Done)       Point: ADL training (Done)       Description:   Instruct learner(s) on proper safety adaptation and  remediation techniques during self care or transfers.   Instruct in proper use of assistive devices.                  Learning Progress Summary             Patient Acceptance, E, VU,NR by PS at 10/3/2023 1322                         Point: Home exercise program (Done)       Description:   Instruct learner(s) on appropriate technique for monitoring, assisting and/or progressing therapeutic exercises/activities.                  Learning Progress Summary             Patient Acceptance, E, VU,NR by PS at 10/3/2023 1322                         Point: Precautions (Done)       Description:   Instruct learner(s) on prescribed precautions during self-care and functional transfers.                  Learning Progress Summary             Patient Acceptance, E, VU,NR by PS at 10/3/2023 1322                         Point: Body mechanics (Done)       Description:   Instruct learner(s) on proper positioning and spine alignment during self-care, functional mobility activities and/or exercises.                  Learning Progress Summary             Patient Acceptance, E, VU,NR by PS at 10/3/2023 1322                                         User Key       Initials Effective Dates Name Provider Type Discipline    PS 08/16/23 -  Gracie Smith OT Student OT Student OT                  OT Recommendation and Plan  Planned Therapy Interventions (OT): activity tolerance training, BADL retraining, occupation/activity based interventions, IADL retraining, transfer/mobility retraining, strengthening exercise, patient/caregiver education/training  Therapy Frequency (OT): 3 times/wk  Plan of Care Review  Plan of Care Reviewed With: patient  Outcome Evaluation: Pt is a 71 yo female brought to ED on 10/2/23 c/o legs shaky and weak. PMHx WHITNEY 5/30/23, dementia.   At baseline, pt reports she lives with spouse and 2 grandkids in [facility] with 2 TEENA. She is typically Mod I with BADLs and utilizes RW with help for mobility. Oriented x 4 on this date,  however requires redirections and displayed difficulty with word finding. Pt up in chair upon OT arrival. Pt required some redirection when asked questions. Sit <>stand, functional mobility Mod A with RW. Pt demoed good siting balance but required Mod/Min for standing balance with RW. Pt became dizzy upon standing but unable to obtain standing BP due equipment not reading. Weakness in BUE noted, no UE ROM deficits. Pt is below baseline level function and unsafe to return home, OT recommends SNF following discharge.     Time Calculation:         Time Calculation- OT       Row Name 10/03/23 1003             Time Calculation- OT    OT Start Time 0850  -SR (r) PS (t) SR (c)      OT Stop Time 0913  -SR (r) PS (t) SR (c)      OT Time Calculation (min) 23 min  -SR (r) PS (t)      Total Timed Code Minutes- OT 0 minute(s)  -SR (r) PS (t) SR (c)      OT Received On 10/03/23  -SR (r) PS (t) SR (c)      OT - Next Appointment 10/05/23  -SR (r) PS (t) SR (c)      OT Goal Re-Cert Due Date 10/17/23  -SR (r) PS (t) SR (c)                User Key  (r) = Recorded By, (t) = Taken By, (c) = Cosigned By      Initials Name Provider Type    SR Perri Pruett, RN Registered Nurse    Gracie Wu OT Student OT Student                  Therapy Charges for Today       Code Description Service Date Service Provider Modifiers Qty    24500221638 HC OT EVAL MOD COMPLEXITY 3 10/3/2023 Gracie Smith OT Student GO 1                 DESHAWN Mcqueen  10/3/2023

## 2023-10-03 NOTE — CASE MANAGEMENT/SOCIAL WORK
Social Work Assessment  [facility]     Patient Name: Jennifer Blackmon  MRN: [id number]  Today's Date: 10/3/2023    Admit Date: 10/2/2023     Discharge Plan       Row Name 10/03/23 1412       Plan    Plan Call to spouse x 2 today. Patient with confusion. Needs snf choice. will need precert. PASRR approved.    Plan Comments Charting update to reflect PASRR status.        JAYME Alonso, MSSW, [facility]    Phone: [phone]  Cell: [phone]  Fax: [phone]  [email or web address]

## 2023-10-03 NOTE — PLAN OF CARE
Goal Outcome Evaluation:  Plan of Care Reviewed With: patient           Outcome Evaluation: Pt is a 71 yo female brought to ED on 10/2/23 c/o legs shaky and weak. PMHx WHITNEY 5/30/23, dementia.   At baseline, pt reports she lives with spouse and 2 grandkids in [facility] with 2 TEENA. She is typically Mod I with BADLs and utilizes RW with help for mobility. Oriented x 4 on this date, however requires redirections and displayed difficulty with word finding. Pt up in chair upon OT arrival. Pt required some redirection when asked questions. Sit <>stand, functional mobility Mod A with RW. Pt demoed good siting balance but required Mod/Min for standing balance with RW. Pt became dizzy upon standing but unable to obtain standing BP due equipment not reading. Weakness in BUE noted, no UE ROM deficits. Pt is below baseline level function and unsafe to return home, OT recommends SNF following discharge.      Anticipated Discharge Disposition (OT): skilled nursing facility

## 2023-10-03 NOTE — PROGRESS NOTES
[facility] Medicine Services  INPATIENT PROGRESS NOTE    Length of Stay: 0  Date of Admission: 10/2/2023  Primary Care Physician: Nathalia Maria APRN    Subjective   Chief Complaint: Generalized weakness  HPI:    She has been admitted for generalized weakness.  Has been working with PT OT.  She is unsure about going to rehab facility and she will decide later with her .    Review of Systems   Respiratory:  Negative for shortness of breath.    Cardiovascular:  Negative for chest pain.      All pertinent negatives and positives are as above. All other systems have been reviewed and are negative unless otherwise stated.     Objective    Temp:  [97.5 °F (36.4 °C)-98.4 °F (36.9 °C)] 98.1 °F (36.7 °C)  Heart Rate:  [61-80] 77  Resp:  [11-18] 14  BP: ()/(59-94) 127/64  Physical Exam  Vitals and nursing note reviewed.   Constitutional:       General: She is not in acute distress.     Appearance: She is well-developed. She is not diaphoretic.   HENT:      Head: Normocephalic and atraumatic.   Cardiovascular:      Rate and Rhythm: Normal rate.   Pulmonary:      Effort: Pulmonary effort is normal. No respiratory distress.      Breath sounds: No wheezing.   Abdominal:      General: There is no distension.      Palpations: Abdomen is soft.   Musculoskeletal:         General: Normal range of motion.   Skin:     General: Skin is warm and dry.   Neurological:      Mental Status: She is alert.      Cranial Nerves: No cranial nerve deficit.   Psychiatric:         Behavior: Behavior normal.         Thought Content: Thought content normal.         Judgment: Judgment normal.           Results Review:  I have reviewed the labs, radiology results, and diagnostic studies.    Laboratory Data:   Lab Results (last 24 hours)       Procedure Component Value Units Date/Time    Urine Culture - Urine, Straight Cath [[id number]]  (Normal) Collected: 10/02/23 0802    Specimen: Urine from Straight Cath  Updated: 10/03/23 0824     Urine Culture No growth    Basic Metabolic Panel [[id number]]  (Abnormal) Collected: 10/02/23 2326    Specimen: Blood Updated: 10/03/23 0022     Glucose 101 mg/dL      BUN 23 mg/dL      Creatinine 0.94 mg/dL      Sodium 139 mmol/L      Potassium 4.1 mmol/L      Comment: Slight hemolysis detected by analyzer. Results may be affected.        Chloride 103 mmol/L      CO2 28.0 mmol/L      Calcium 9.4 mg/dL      BUN/Creatinine Ratio 24.5     Anion Gap 8.0 mmol/L      eGFR 63.4 mL/min/1.73     Narrative:      GFR Normal >60  Chronic Kidney Disease <60  Kidney Failure <15    The GFR formula is only valid for adults with stable renal function between ages 18 and 70.    CBC Auto Differential [[id number]]  (Normal) Collected: 10/02/23 2326    Specimen: Blood Updated: 10/02/23 2354     WBC 8.20 10*3/mm3      RBC 4.24 10*6/mm3      Hemoglobin 12.6 g/dL      Hematocrit 38.1 %      MCV 89.9 fL      MCH 29.7 pg      MCHC 33.1 g/dL      RDW 15.2 %      RDW-SD 50.3 fl      MPV 7.9 fL      Platelets 244 10*3/mm3      Neutrophil % 47.0 %      Lymphocyte % 37.5 %      Monocyte % 11.0 %      Eosinophil % 4.1 %      Basophil % 0.4 %      Neutrophils, Absolute 3.80 10*3/mm3      Lymphocytes, Absolute 3.10 10*3/mm3      Monocytes, Absolute 0.90 10*3/mm3      Eosinophils, Absolute 0.30 10*3/mm3      Basophils, Absolute 0.00 10*3/mm3      nRBC 0.1 /100 WBC     Lactic Acid, Plasma [[id number]]  (Normal) Collected: 10/02/23 1626    Specimen: Blood Updated: 10/02/23 1657     Lactate 1.7 mmol/L     Blood Culture - Blood, Arm, Right [[id number]] Collected: 10/02/23 1626    Specimen: Blood from Arm, Right Updated: 10/02/23 1633    Blood Culture - Blood, Arm, Left [[id number]] Collected: 10/02/23 1626    Specimen: Blood from Arm, Left Updated: 10/02/23 1633            Culture Data:   No results found for: BLOODCX  Urine Culture   Date Value Ref Range Status   10/02/2023 No growth  Final     No results found for: RESPCX  No  results found for: WOUNDCX  No results found for: STOOLCX  No components found for: BODYFLD    Radiology Data:   Imaging Results (Last 24 Hours)       ** No results found for the last 24 hours. **            I have reviewed the patient's current medications.     Assessment/Plan     Active Hospital Problems    Diagnosis     **Generalized weakness      Generalized weakness   UTI   -Fall precautions  -PT/OT  -MRI atrophy and evidence of chronic small vessel ischemic insult.  No acute CVA identified.  -UA positive, culture pending  -Patient initiated on Rocephin, continue  -Continue to monitor  -Patient with recent history of left hip Fx 5/2023  -Case management for possible placement  Patient will decide with her  about rehab placement.     HUGO on CKD 3a  -Creatinine 1.06  -Gentle hydration  -Avoid nephrotoxic agents     CAD   History of PCI 2019  -Continue home Plavix, statin     Hypertension  -Continue carvedilol     Dementia, recent diagnosis  Depression  -Continue Namenda     Hyperlipidemia  -Continue statin therapy        DVT prophylaxis:  Mechanical DVT prophylaxis orders are present.     CODE STATUS:    Code Status (Patient has no pulse and is not breathing): CPR (Attempt to Resuscitate)  Medical Interventions (Patient has pulse or is breathing): Full Support    Trey Bridges MD   10/03/23   10:45 EDT

## 2023-10-04 PROCEDURE — G0378 HOSPITAL OBSERVATION PER HR: HCPCS

## 2023-10-04 PROCEDURE — 25010000002 HEPARIN (PORCINE) PER 1000 UNITS: Performed by: HOSPITALIST

## 2023-10-04 PROCEDURE — 97116 GAIT TRAINING THERAPY: CPT

## 2023-10-04 PROCEDURE — 25010000002 CEFTRIAXONE PER 250 MG: Performed by: PHYSICIAN ASSISTANT

## 2023-10-04 PROCEDURE — 96372 THER/PROPH/DIAG INJ SC/IM: CPT

## 2023-10-04 PROCEDURE — 96366 THER/PROPH/DIAG IV INF ADDON: CPT

## 2023-10-04 RX ORDER — HEPARIN SODIUM 5000 [USP'U]/ML
5000 INJECTION, SOLUTION INTRAVENOUS; SUBCUTANEOUS EVERY 12 HOURS SCHEDULED
Status: DISCONTINUED | OUTPATIENT
Start: 2023-10-04 | End: 2023-10-06 | Stop reason: HOSPADM

## 2023-10-04 RX ADMIN — ESCITALOPRAM OXALATE 10 MG: 10 TABLET ORAL at 09:18

## 2023-10-04 RX ADMIN — Medication 5 MG: at 21:10

## 2023-10-04 RX ADMIN — CARVEDILOL 3.12 MG: 3.12 TABLET, FILM COATED ORAL at 09:18

## 2023-10-04 RX ADMIN — ATORVASTATIN CALCIUM 20 MG: 20 TABLET, FILM COATED ORAL at 09:18

## 2023-10-04 RX ADMIN — ESCITALOPRAM OXALATE 10 MG: 10 TABLET ORAL at 21:09

## 2023-10-04 RX ADMIN — BUPROPION HYDROCHLORIDE 450 MG: 150 TABLET, FILM COATED, EXTENDED RELEASE ORAL at 06:26

## 2023-10-04 RX ADMIN — CLOPIDOGREL BISULFATE 75 MG: 75 TABLET ORAL at 09:18

## 2023-10-04 RX ADMIN — MEMANTINE 10 MG: 10 TABLET ORAL at 21:10

## 2023-10-04 RX ADMIN — HEPARIN SODIUM 5000 UNITS: 5000 INJECTION INTRAVENOUS; SUBCUTANEOUS at 17:51

## 2023-10-04 RX ADMIN — MEMANTINE 10 MG: 10 TABLET ORAL at 09:18

## 2023-10-04 RX ADMIN — Medication 10 ML: at 21:10

## 2023-10-04 RX ADMIN — CEFTRIAXONE 1000 MG: 1 INJECTION, POWDER, FOR SOLUTION INTRAMUSCULAR; INTRAVENOUS at 09:17

## 2023-10-04 RX ADMIN — Medication 10 ML: at 09:18

## 2023-10-04 NOTE — CASE MANAGEMENT/SOCIAL WORK
Continued Stay Note  MARICEL Thomas     Patient Name: Jennifer Blackmon  MRN: [id number]  Today's Date: 10/4/2023    Admit Date: 10/2/2023    Plan: Accepted at [employer] with precert to be statrted 10/4. Pasrr approved   Discharge Plan       Row Name 10/04/23 1618       Plan    Plan Accepted at [employer] with precert to be statrted 10/4. Pasrr approved    Plan Comments Spoke with spouse. Gave [facility] for first referrals. Both facilities are full at this time. Next choice as above. Patient accepted and asked for precert to be started.                Barrier to dc: precert pending       Expected Discharge Date and Time       Expected Discharge Date Expected Discharge Time    Oct 5, 2023               Ana Maria May RN

## 2023-10-04 NOTE — PLAN OF CARE
Assessment: Jennifer Blackmon presents with functional mobility impairments which indicate the need for skilled intervention. Pt improved in functional mobility by tolerating ambulating 15' with rw Min-A x 2, then 15' with HHA Min-A x 2. Pt demonstrated significant LOB when ambulating with posterior leaning and knee instability while using rw. Pt required max cueing for hand placement and proper gait mechanics with rw. Pt demonstrated improvement with HHA , less posterior leaning but continues to present with bilateral knee instability.  Will continue to follow and progress as tolerated. Remains unsafe to return home at this time.

## 2023-10-04 NOTE — PROGRESS NOTES
[facility]   Daily Progress Note    Patient Name: Jennifer Blackmon  : 1950  MRN: [id number]  Primary Care Physician:  Nathalia Maria APRN  Date of admission: 10/2/2023  Date and Time of Service:  at       Subjective      Chief Complaint:     Patient Reports feeling better.  No chest pain shortness of breath lightheadedness.  Thinks possibly she could be dehydrated when presented.  Currently no lightheadedness or chest pain.  Feels back to normal.      Objective      Vitals:   Temp:  [97.5 °F (36.4 °C)-98 °F (36.7 °C)] 97.6 °F (36.4 °C)  Heart Rate:  [78-81] 80  Resp:  [14-16] 16  BP: (101-125)/(62-72) 101/62    Physical Exam  General: No acute distress  HEENT: Neck supple, normal oral mucosa, no masses, no lymphadenopathy  Lungs: Clear bilaterally, no wheezing, no crackles, no rhonchi. Equal excursions.   CV - Normal S1/S2, no murmur, regular rate and rhythm   Abdomen - Soft, nontender, nondistended, normal bowel sounds  Extremities - no edema, no erythema  Neuro - No focal weakness, normal sensation  Psych - Alert and oriented x3  Skin - no wounds or lesions.          Result Review    Result Review:  I have personally reviewed the results from the time of this admission to 10/4/2023 18:37 EDT and agree with these findings:  [x]  Laboratory  [x]  Microbiology  [x]  Radiology  [x]  EKG/Telemetry   [x]  Cardiology/Vascular   []  Pathology  [x]  Old records  []  Other:  Most notable findings include:     Wounds (last 24 hours)       LDA Wound       Row Name 10/03/23 2343             Wound 23 1128 Left anterior hip Incision    Wound - Properties Group Placement Date: 23  -TD Placement Time: 1128  -TD Side: Left  -TD Orientation: anterior  -TD Location: hip  -TD Primary Wound Type: Incision  -TD    Pressure Injury Stage O  -SD      Retired Wound - Properties Group Placement Date: 23  -TD Placement Time: 1128  -TD Side: Left  -TD Orientation: anterior  -TD Location:  hip  -TD Primary Wound Type: Incision  -TD    Retired Wound - Properties Group Date first assessed: 05/30/23  -TD Time first assessed: 1128  -TD Side: Left  -TD Location: hip  -TD Primary Wound Type: Incision  -TD              User Key  (r) = Recorded By, (t) = Taken By, (c) = Cosigned By      Initials Name Provider Type    TD Tracy Fulton RN Registered Nurse    Irene Smith RN Registered Nurse                      Assessment & Plan      Brief Patient Summary:  Jennifer Blackmon is a 73 y.o. female who       atorvastatin, 20 mg, Oral, Daily  buPROPion XL, 450 mg, Oral, QAM  carvedilol, 3.125 mg, Oral, BID  clopidogrel, 75 mg, Oral, Daily  escitalopram, 10 mg, Oral, BID  heparin (porcine), 5,000 Units, Subcutaneous, Q12H  memantine, 10 mg, Oral, BID  sodium chloride, 10 mL, Intravenous, Q12H             Active Hospital Problems:  Active Hospital Problems    Diagnosis     **Generalized weakness      Plan:     Generalized weakness  - dehydration  - questionable cystitis  - improved with fluids.   - s/p 3 days Abx. Culture negative. D/c Abx.   - PT/OT    H/o left hip Fx 5/2023  - PT/OT    Mild HUGO  - s/p fluids.      CAD   History of PCI 2019  -Continue home Plavix, statin     Hypertension  -Continue carvedilol     Dementia, recent diagnosis  Depression  -Continue Namenda     Hyperlipidemia  -Continue statin therapy     Rehab/SNF once arranged     DVT prophylaxis:  Medical and mechanical DVT prophylaxis orders are present.    CODE STATUS:    Code Status (Patient has no pulse and is not breathing): CPR (Attempt to Resuscitate)  Medical Interventions (Patient has pulse or is breathing): Full Support      Disposition:  I expect patient to be discharged .    This patient has been  and discussed with . 10/04/23      Electronically signed by Roshan Almodovar MD, 10/04/23, 18:37 EDT.  [facility] Hospitalist Team

## 2023-10-04 NOTE — THERAPY TREATMENT NOTE
"Subjective: Pt agreeable to therapeutic plan of care.    Objective:     Bed mobility - SBA to go from sitting EOB to supine, Mod-A x 2 to scoot her up to HOB.   Transfers - Mod-A and with rolling walker, pt required cueing for hand placement and to stand erect, knees straight.   Ambulation - 15' x 1 with rw Min-A x 2, 15' x 1 Min-A x 2 with HHA.  Pt demonstrated unsafe ambulation while using rw, pt keeping rw outside CANDY and demonstrated multiple posterior leans that needed assistance to correct and continued to demonstrate knee instability with inability to keep knees straight. Improved ambulation with HHA x 2.     Vitals: WNL    Pain: 0 VAS    Education: Provided education on the importance of mobility in the acute care setting, Verbal/Tactile Cues, Transfer Training, and Gait Training    Assessment: Jennifer Blackmon presents with functional mobility impairments which indicate the need for skilled intervention. Pt improved in functional mobility by tolerating ambulating 15' with rw Min-A x 2, then 15' with HHA Min-A x 2. Pt demonstrated significant LOB when ambulating with posterior leaning and knee instability while using rw. Pt required max cueing for hand placement and proper gait mechanics with rw. Pt demonstrated improvement with HHA , less posterior leaning but continues to present with bilateral knee instability.  Will continue to follow and progress as tolerated. Remains unsafe to return home at this time.    Plan/Recommendations:   Moderate Intensity Therapy recommended post-acute care. This is recommended as therapy feels the patient would require 3-4 days per week and wouldn't tolerate \"3 hour daily\" rehab intensity. SNF would be the preferred choice. If the patient does not agree to SNF, arrange HH or OP depending on home bound status. If patient is medically complex, consider LTACH.. Pt requires no DME at discharge.     Pt desires Skilled Rehab placement at discharge. Pt cooperative; agreeable to " therapeutic recommendations and plan of care.         Basic Mobility 6-click:  Rollin = Total, A lot = 2, A little = 3; 4 = None  Supine>Sit:   1 = Total, A lot = 2, A little = 3; 4 = None   Sit>Stand with arms:  1 = Total, A lot = 2, A little = 3; 4 = None  Bed>Chair:   1 = Total, A lot = 2, A little = 3; 4 = None  Ambulate in room:  1 = Total, A lot = 2, A little = 3; 4 = None  3-5 Steps with railin = Total, A lot = 2, A little = 3; 4 = None  Score: 15    Post-Tx Position: Supine with HOB Elevated, Alarms activated, and Call light and personal items within reach  PPE: gloves

## 2023-10-04 NOTE — DISCHARGE PLACEMENT REQUEST
"Re Castro (73 y.o. Female)       Date of Birth   1950    Social Security Number       Address   [address] [place] [address] [place] [facility]    Home Phone   [phone]    MRN   [id number]       Denominational   None    Marital Status                               Admission Date   10/2/23    Admission Type   Emergency    Admitting Provider   Katy Mcmullen MD    Attending Provider   Roshan Almodovar MD    Department, Room/Bed   [facility] SURGICAL INPATIENT, 4125/1       Discharge Date       Discharge Disposition       Discharge Destination                                 Attending Provider: oRshan Almodovar MD    Allergies: No Known Allergies    Isolation: None   Infection: None   Code Status: CPR    Ht: 167.6 cm (66\")   Wt: 55.6 kg (122 lb 9.2 oz)    Admission Cmt: None   Principal Problem: Generalized weakness [R53.1]                   Active Insurance as of 10/2/2023       Primary Coverage       Payor Plan Insurance Group Employer/Plan Group    HUMANA MEDICARE REPLACEMENT HUMANA MEDICARE REPLACEMENT [id number]       Payor Plan Address Payor Plan Phone Number Payor Plan Fax Number Effective Dates    [address] [address] [phone]  1/1/2018 - None Entered    [facility] [id number]         Subscriber Name Subscriber Birth Date Member ID       RE CASTRO 1950 [id number]                     Emergency Contacts        (Rel.) Home Phone Work Phone Mobile Phone    ROMEO CASTRO (Spouse) -- -- [phone]                "

## 2023-10-05 PROCEDURE — 97535 SELF CARE MNGMENT TRAINING: CPT

## 2023-10-05 PROCEDURE — G0378 HOSPITAL OBSERVATION PER HR: HCPCS

## 2023-10-05 PROCEDURE — 97110 THERAPEUTIC EXERCISES: CPT

## 2023-10-05 PROCEDURE — 25010000002 HEPARIN (PORCINE) PER 1000 UNITS: Performed by: HOSPITALIST

## 2023-10-05 PROCEDURE — 97116 GAIT TRAINING THERAPY: CPT

## 2023-10-05 PROCEDURE — 96372 THER/PROPH/DIAG INJ SC/IM: CPT

## 2023-10-05 PROCEDURE — 97530 THERAPEUTIC ACTIVITIES: CPT

## 2023-10-05 RX ADMIN — MEMANTINE 10 MG: 10 TABLET ORAL at 22:23

## 2023-10-05 RX ADMIN — ESCITALOPRAM OXALATE 10 MG: 10 TABLET ORAL at 22:23

## 2023-10-05 RX ADMIN — HEPARIN SODIUM 5000 UNITS: 5000 INJECTION INTRAVENOUS; SUBCUTANEOUS at 09:04

## 2023-10-05 RX ADMIN — CLOPIDOGREL BISULFATE 75 MG: 75 TABLET ORAL at 09:04

## 2023-10-05 RX ADMIN — BUPROPION HYDROCHLORIDE 450 MG: 150 TABLET, FILM COATED, EXTENDED RELEASE ORAL at 06:11

## 2023-10-05 RX ADMIN — Medication 10 ML: at 09:06

## 2023-10-05 RX ADMIN — Medication 10 ML: at 22:24

## 2023-10-05 RX ADMIN — ATORVASTATIN CALCIUM 20 MG: 20 TABLET, FILM COATED ORAL at 09:04

## 2023-10-05 RX ADMIN — HEPARIN SODIUM 5000 UNITS: 5000 INJECTION INTRAVENOUS; SUBCUTANEOUS at 22:22

## 2023-10-05 RX ADMIN — Medication 5 MG: at 22:23

## 2023-10-05 RX ADMIN — MEMANTINE 10 MG: 10 TABLET ORAL at 09:04

## 2023-10-05 RX ADMIN — ESCITALOPRAM OXALATE 10 MG: 10 TABLET ORAL at 09:04

## 2023-10-05 RX ADMIN — CARVEDILOL 3.12 MG: 3.12 TABLET, FILM COATED ORAL at 09:04

## 2023-10-05 NOTE — PLAN OF CARE
Goal Outcome Evaluation:  Plan of Care Reviewed With: patient           Outcome Evaluation: Pt is a 73 yo female brought to ED on 10/2/23 c/o legs shaky and weak. PMHx WHITNEY 5/30/23, dementia.   At baseline, pt reports she lives with spouse and 2 grandkids in [place] with 2 TEENA. She is typically Mod I with BADLs and utilizes RW with help for mobility. Oriented x 4 on this date, however requires redirections and displayed difficulty with word finding. Pt up in chair upon OT arrival. Pt required some redirection when asked questions. Sit <>stand, functional mobility Mod A with RW. Pt demoed good siting balance but required Mod/Min for standing balance with RW. Pt became dizzy upon standing but unable to obtain standing BP due equipment not reading. Weakness in BUE noted, no UE ROM deficits. Pt is below baseline level function and unsafe to return home, OT recommends SNF following discharge.      Anticipated Discharge Disposition (OT): skilled nursing facility

## 2023-10-05 NOTE — PLAN OF CARE
"Goal Outcome Evaluation:     Bed mobility - Supervision supine to sit.  Pt very slow at task but if given time pt was able to complete   Transfers - Min-A, Assist x 2, and with rolling walker  Ambulation - 20 + 16 feet Min-A, Mod-A, and with rolling walker    Therapeutic Exercise - 10 Reps B LE AROM supported sitting / chair    Moderate Intensity Therapy recommended post-acute care. This is recommended as therapy feels the patient would require 3-4 days per week and wouldn't tolerate \"3 hour daily\" rehab intensity. SNF would be the preferred choice.  Pt requires no DME at discharge.     Pt desires Skilled Rehab placement at discharge. Pt cooperative; agreeable to therapeutic recommendations and plan of care.                      "

## 2023-10-05 NOTE — PLAN OF CARE
Problem: Adult Inpatient Plan of Care  Goal: Plan of Care Review  Outcome: Ongoing, Progressing  Flowsheets (Taken 10/5/2023 1521)  Progress: no change  Plan of Care Reviewed With:   patient   spouse  Outcome Evaluation: Patient is stable and no complaints of pain. Awaiting pre cert, patient has been accepted to [facility]. Spouse aware. Safety measures in place and call light within reach. Will continue to monitor.  Goal: Patient-Specific Goal (Individualized)  Outcome: Ongoing, Progressing  Goal: Absence of Hospital-Acquired Illness or Injury  Outcome: Ongoing, Progressing  Intervention: Identify and Manage Fall Risk  Recent Flowsheet Documentation  Taken 10/5/2023 1242 by Nati Diane LPN  Safety Promotion/Fall Prevention:   activity supervised   assistive device/personal items within reach   clutter free environment maintained   fall prevention program maintained   gait belt   nonskid shoes/slippers when out of bed   room organization consistent   safety round/check completed  Taken 10/5/2023 1015 by Nati Diane LPN  Safety Promotion/Fall Prevention:   activity supervised   assistive device/personal items within reach   clutter free environment maintained   nonskid shoes/slippers when out of bed   room organization consistent   safety round/check completed   fall prevention program maintained   gait belt  Taken 10/5/2023 0859 by Nati Diane LPN  Safety Promotion/Fall Prevention:   activity supervised   assistive device/personal items within reach   clutter free environment maintained   fall prevention program maintained   gait belt   nonskid shoes/slippers when out of bed   room organization consistent   safety round/check completed  Intervention: Prevent and Manage VTE (Venous Thromboembolism) Risk  Recent Flowsheet Documentation  Taken 10/5/2023 1242 by Nati Diane LPN  Activity Management: activity encouraged  Taken 10/5/2023 0859 by Nati Diane LPN  Activity Management: activity  encouraged  VTE Prevention/Management:   bilateral   sequential compression devices off  Intervention: Prevent Infection  Recent Flowsheet Documentation  Taken 10/5/2023 1415 by Nati Diane LPN  Infection Prevention:   hand hygiene promoted   personal protective equipment utilized   single patient room provided  Taken 10/5/2023 1242 by Nati Diane LPN  Infection Prevention:   hand hygiene promoted   personal protective equipment utilized   single patient room provided  Taken 10/5/2023 0859 by Nati Diane LPN  Infection Prevention:   hand hygiene promoted   personal protective equipment utilized   single patient room provided  Goal: Optimal Comfort and Wellbeing  Outcome: Ongoing, Progressing  Intervention: Provide Person-Centered Care  Recent Flowsheet Documentation  Taken 10/5/2023 0859 by Nati Diane LPN  Trust Relationship/Rapport:   care explained   questions answered   questions encouraged   reassurance provided   thoughts/feelings acknowledged  Goal: Readiness for Transition of Care  Outcome: Ongoing, Progressing     Problem: Fall Injury Risk  Goal: Absence of Fall and Fall-Related Injury  Outcome: Ongoing, Progressing  Intervention: Identify and Manage Contributors  Recent Flowsheet Documentation  Taken 10/5/2023 1242 by Nati Diane LPN  Medication Review/Management: medications reviewed  Taken 10/5/2023 1015 by Nati Diane LPN  Medication Review/Management: medications reviewed  Taken 10/5/2023 0859 by Nati Diane LPN  Medication Review/Management: medications reviewed  Intervention: Promote Injury-Free Environment  Recent Flowsheet Documentation  Taken 10/5/2023 1242 by Nati Diane LPN  Safety Promotion/Fall Prevention:   activity supervised   assistive device/personal items within reach   clutter free environment maintained   fall prevention program maintained   gait belt   nonskid shoes/slippers when out of bed   room organization consistent   safety round/check  completed  Taken 10/5/2023 1015 by Nati Diane LPN  Safety Promotion/Fall Prevention:   activity supervised   assistive device/personal items within reach   clutter free environment maintained   nonskid shoes/slippers when out of bed   room organization consistent   safety round/check completed   fall prevention program maintained   gait belt  Taken 10/5/2023 0859 by Nati Diane LPN  Safety Promotion/Fall Prevention:   activity supervised   assistive device/personal items within reach   clutter free environment maintained   fall prevention program maintained   gait belt   nonskid shoes/slippers when out of bed   room organization consistent   safety round/check completed     Problem: Hypertension Comorbidity  Goal: Blood Pressure in Desired Range  Outcome: Ongoing, Progressing  Intervention: Maintain Blood Pressure Management  Recent Flowsheet Documentation  Taken 10/5/2023 1242 by Nati Diane LPN  Medication Review/Management: medications reviewed  Taken 10/5/2023 1015 by Nati Diane LPN  Medication Review/Management: medications reviewed  Taken 10/5/2023 0859 by Nati Diane LPN  Medication Review/Management: medications reviewed     Problem: Osteoarthritis Comorbidity  Goal: Maintenance of Osteoarthritis Symptom Control  Outcome: Ongoing, Progressing  Intervention: Maintain Osteoarthritis Symptom Control  Recent Flowsheet Documentation  Taken 10/5/2023 1242 by Nati Diane LPN  Activity Management: activity encouraged  Medication Review/Management: medications reviewed  Taken 10/5/2023 1015 by Nati Diane LPN  Medication Review/Management: medications reviewed  Taken 10/5/2023 0859 by Nati Diane LPN  Activity Management: activity encouraged  Medication Review/Management: medications reviewed     Problem: Skin Injury Risk Increased  Goal: Skin Health and Integrity  Outcome: Ongoing, Progressing   Goal Outcome Evaluation:  Plan of Care Reviewed With: patient, spouse        Progress:  no change  Outcome Evaluation: Patient is stable and no complaints of pain. Awaiting pre cert, patient has been accepted to [facility]. Spouse aware. Safety measures in place and call light within reach. Will continue to monitor.

## 2023-10-05 NOTE — DISCHARGE PLACEMENT REQUEST
"Re Blackmon (73 y.o. Female)       Date of Birth   1950    Social Security Number       Address   [address] [place] [address] [place] [facility]    Home Phone   [phone]    MRN   [id number]       Zoroastrianism   None    Marital Status                               Admission Date   10/2/23    Admission Type   Emergency    Admitting Provider   Katy Mcmullen MD    Attending Provider   Roshan Amlodovar MD    Department, Room/Bed   [facility] SURGICAL INPATIENT,        Discharge Date       Discharge Disposition       Discharge Destination                                 Attending Provider: Roshan Almodovar MD    Allergies: No Known Allergies    Isolation: None   Infection: None   Code Status: CPR    Ht: 167.6 cm (66\")   Wt: 55.6 kg (122 lb 9.2 oz)    Admission Cmt: None   Principal Problem: Generalized weakness [R53.1]                   Active Insurance as of 10/2/2023       Primary Coverage       Payor Plan Insurance Group Employer/Plan Group    HUMANA MEDICARE REPLACEMENT HUMANA MEDICARE REPLACEMENT [id number]       Payor Plan Address Payor Plan Phone Number Payor Plan Fax Number Effective Dates    [address] [address] [phone]  2018 - None Entered    [facility] [id number]         Subscriber Name Subscriber Birth Date Member ID       RE BLACKMON 1950 [id number]                     Emergency Contacts        (Rel.) Home Phone Work Phone Mobile Phone    ROMEO BLACKMON (Spouse) -- -- [phone]                 History & Physical        Beatris Rice PA-C at 10/02/23 1032       Attestation signed by Luiz Hoffmann MD at 10/02/23 3248    I have reviewed this documentation and agree.                      [facility] Medicine Services  History & Physical    Patient Name: Re Blackmon  : 1950  MRN: [id number]  Primary Care Physician:  Nathalia Maria APRN  Date of admission: 10/2/2023      Subjective      Chief Complaint: legs shaky and " weak    History of Present Illness: Jennifer Blackmon is a 73 y.o. female with a past medical history to include CAD, hypertension, hyperlipidemia, depression and dementia who presented to [facility] on 10/2/2023 complaining of legs shaky and weak. Patient reports she went to bed as per usual last night and woke up in the middle of the night and felt too weak to stand.  Patient reports she broke her hip in May and has been convalescing at home with her  and 2 grandkids who often help her ambulate.  Patient does have dementia and is not a reliable source of information.  Reports that she does ambulate with a walker but not very well.  Patient was in her room alone during my visit.  She was alert and oriented x4 but some of her answers were questionable.  Patient states she has had no fever, cough or congestion.  She denies any chest pain or shortness of air.  She denies any abdominal pain, constipation or diarrhea.  She denies any urinary symptoms.  She denies having any unilateral weakness, visual disturbances or speech difficulty.  We have been asked to this patient for further evaluation and treatment.       ROS 12 point ROS reviewed and negative except as mentioned above     Personal History     Past Medical History:   Diagnosis Date    Allergic rhinitis     Basal cell carcinoma (BCC) of nostril     Coronary artery disease involving native coronary artery of native heart with angina pectoris 06/18/2019    Status post successful PCI of the coronary stent deployment to high-grade RCA stenosis after presenting with ST segment elevated microinfarction in May 2019    DDD (degenerative disc disease), lumbar     Depression     Depression     Essential hypertension 06/18/2019    Femoral neck fracture 05/26/2023    Heart attack     6/2/2019    Hyperlipidemia     Hyperlipidemia, mixed 08/13/2019    Hypertension     Insomnia     Myocardial infarction less than 4 weeks ago 06/18/2019    Presented initially with  ST segment elevated microinfarction treated emergently in [place] May 2019    Old MI (myocardial infarction) 2019    Presented initially with ST segment elevated microinfarction treated emergently in [place] May 2019    Osteoarthritis     Toenail fungus        Past Surgical History:   Procedure Laterality Date    ADENOIDECTOMY      Adenoids removed    APPENDECTOMY      AUGMENTATION MAMMAPLASTY      BREAST IMPLANT SURGERY  1984    CATARACT EXTRACTION Bilateral 2017    CERVICAL DISC SURGERY      ruptured and removed      SECTION      CHEST SURGERY      attempted pectus repair     EYE SURGERY  1997    AK    HYSTERECTOMY      KNEE ARTHROSCOPY Right     LUMBAR DISC SURGERY      ruptured and removed     NASAL SEPTAL RECONSTRUCTION      PECTUS CARNATUM REPAIR      attempted    RECTAL SURGERY      Repair    REFRACTIVE SURGERY      RK/AK both eyes     REFRACTIVE SURGERY      SOMNOPLASTY RADIAL FREQUENCY ABLATION      TONSILLECTOMY      TOTAL HIP ARTHROPLASTY Left 2023    Procedure: TOTAL HIP ARTHROPLASTY ANTERIOR;  Surgeon: Josiah Menon MD;  Location: [facility] MAIN OR;  Service: Orthopedics;  Laterality: Left;    UVULOPALATOPHARYNGOPLASTY         Family History: family history includes ADD / ADHD in her daughter; Alcohol abuse in her brother; Alzheimer's disease in her mother; Anxiety disorder in her brother; Bipolar disorder in her daughter; Depression in her brother; HIV in her daughter; Heart attack in her brother and sister; Leukemia in her father; No Known Problems in her brother; Other in her daughter, father, and mother. Otherwise pertinent FHx was reviewed and not pertinent to current issue.    Social History:  reports that she quit smoking about 51 years ago. Her smoking use included cigarettes. She has a 2.00 pack-year smoking history. She has never used smokeless tobacco. She reports that she does not drink alcohol and does not use drugs.    Home Medications:  Prior to  Admission Medications       Prescriptions Last Dose Informant Patient Reported? Taking?    atorvastatin (LIPITOR) 40 MG tablet   No No    Take 0.5 tablets by mouth Daily.    buPROPion XL (WELLBUTRIN XL) 150 MG 24 hr tablet   No No    Take 3 tablets by mouth Every Morning.    Calcium Citrate-Vitamin D 500-400 MG-UNIT chewable tablet   Yes No    Chew Daily.    carvedilol (COREG) 3.125 MG tablet   No No    Take 1 tablet by mouth 2 (Two) Times a Day.    clopidogrel (PLAVIX) 75 MG tablet   No No    Take 1 tablet by mouth Daily.    coenzyme Q10 100 MG capsule   Yes No    Take 1 capsule by mouth Daily.    escitalopram (LEXAPRO) 20 MG tablet   No No    TAKE ONE-HALF (1/2) TABLET TWICE A DAY    Melatonin 10 MG sublingual tablet   Yes No    Place 1 tablet under the tongue At Night As Needed.    memantine (NAMENDA) 10 MG tablet   No No    (Take the 5mg tab daily for one month) then take 10mg tab daily for one month then take one bid    memantine (NAMENDA) 5 MG tablet   No No    Take 1 tablet by mouth Daily. For one month    Multiple Vitamins-Minerals (Multi-Vitamin Gummies) chewable tablet   Yes No    Chew 1 tablet/day Daily.    oxyCODONE (ROXICODONE) 5 MG immediate release tablet   No No    Take 1 tablet by mouth Every 6 (Six) Hours As Needed for Severe Pain.    polyethylene glycol (MIRALAX) 17 g packet   No No    Take 17 g by mouth Daily As Needed (Use if senna-docusate is ineffective).    sennosides-docusate (PERICOLACE) 8.6-50 MG per tablet   No No    Take 2 tablets by mouth 2 (Two) Times a Day.              Allergies:  No Known Allergies    Objective      Vitals:   Temp:  [97.7 °F (36.5 °C)] 97.7 °F (36.5 °C)  Heart Rate:  [67-80] 76  Resp:  [16] 16  BP: (112-158)/(57-94) 116/94    Physical Exam  Constitutional:       General: She is not in acute distress.     Appearance: She is not ill-appearing.   HENT:      Head: Normocephalic and atraumatic.   Cardiovascular:      Rate and Rhythm: Normal rate and regular rhythm.       Pulses: Normal pulses.      Heart sounds: Normal heart sounds. No murmur heard.  Pulmonary:      Effort: Pulmonary effort is normal. No respiratory distress.      Breath sounds: Normal breath sounds.   Abdominal:      General: Bowel sounds are normal.      Palpations: Abdomen is soft.      Tenderness: There is no abdominal tenderness.   Musculoskeletal:         General: Normal range of motion.      Cervical back: Normal range of motion and neck supple.      Right lower leg: No edema.      Left lower leg: No edema.   Skin:     General: Skin is warm and dry.   Neurological:      Mental Status: She is alert and oriented to person, place, and time. Mental status is at baseline.   Psychiatric:         Mood and Affect: Mood normal.         Result Review    Result Review:  I have personally reviewed the results from the time of this admission to 10/2/2023 15:23 EDT and agree with these findings:  [x]  Laboratory  []  Microbiology  [x]  Radiology  [x]  EKG/Telemetry   [x]  Cardiology/Vascular   []  Pathology  []  Old records  []  Other:    In the emergency department, VSS.  CK 38.  Sodium 141.  Potassium 4.4.  Creatinine 1.06.  Glucose 103.  TSH 2.420.  WBC 10.30.  Hemoglobin 13.2.  UA positive, culture pending.    MRI atrophy and evidence of chronic small vessel ischemic insult.  No acute CVA identified.      Assessment & Plan        Active Hospital Problems:  Active Hospital Problems    Diagnosis     **Generalized weakness      Plan:       Generalized weakness   UTI   -Fall precautions  -PT/OT  -MRI atrophy and evidence of chronic small vessel ischemic insult.  No acute CVA identified.  -UA positive, culture pending  -Patient initiated on Rocephin, continue  -Continue to monitor  -Patient with recent history of left hip Fx 5/2023  -Case management for possible placement    HUGO on CKD 3a  -Creatinine 1.06  -Gentle hydration  -Avoid nephrotoxic agents    CAD   History of PCI 2019  -Continue home Plavix,  statin    Hypertension  -Continue carvedilol    Dementia, recent diagnosis  Depression  -Continue Namenda    Hyperlipidemia  -Continue statin therapy      DVT prophylaxis:  Mechanical DVT prophylaxis orders are present.    CODE STATUS:    Code Status (Patient has no pulse and is not breathing): CPR (Attempt to Resuscitate)  Medical Interventions (Patient has pulse or is breathing): Full Support    Admission Status:  I believe this patient meets observation  status.    I discussed the patient's findings and my recommendations with patient.    Signature: Electronically signed by Beatris Rice PA-C, 10/02/23, 15:23 EDT.  [facility] Hospitalist Team    Electronically signed by Luiz Hoffmann MD at 10/02/23 1548       Operative/Procedure Notes (all)    No notes of this type exist for this encounter.          Physician Progress Notes (last 48 hours)        Roshan Almodovar MD at 10/04/23 1837              [facility] Medicine Services   Daily Progress Note    Patient Name: Jennifer Blackmon  : 1950  MRN: [id number]  Primary Care Physician:  Nathalia Maria APRN  Date of admission: 10/2/2023  Date and Time of Service:  at       Subjective      Chief Complaint:     Patient Reports feeling better.  No chest pain shortness of breath lightheadedness.  Thinks possibly she could be dehydrated when presented.  Currently no lightheadedness or chest pain.  Feels back to normal.      Objective      Vitals:   Temp:  [97.5 °F (36.4 °C)-98 °F (36.7 °C)] 97.6 °F (36.4 °C)  Heart Rate:  [78-81] 80  Resp:  [14-16] 16  BP: (101-125)/(62-72) 101/62    Physical Exam  General: No acute distress  HEENT: Neck supple, normal oral mucosa, no masses, no lymphadenopathy  Lungs: Clear bilaterally, no wheezing, no crackles, no rhonchi. Equal excursions.   CV - Normal S1/S2, no murmur, regular rate and rhythm   Abdomen - Soft, nontender, nondistended, normal bowel sounds  Extremities - no edema, no erythema  Neuro - No focal  weakness, normal sensation  Psych - Alert and oriented x3  Skin - no wounds or lesions.          Result Review    Result Review:  I have personally reviewed the results from the time of this admission to 10/4/2023 18:37 EDT and agree with these findings:  [x]  Laboratory  [x]  Microbiology  [x]  Radiology  [x]  EKG/Telemetry   [x]  Cardiology/Vascular   []  Pathology  [x]  Old records  []  Other:  Most notable findings include:     Wounds (last 24 hours)       LDA Wound       Row Name 10/03/23 2343             Wound 05/30/23 1128 Left anterior hip Incision    Wound - Properties Group Placement Date: 05/30/23  -TD Placement Time: 1128  -TD Side: Left  -TD Orientation: anterior  -TD Location: hip  -TD Primary Wound Type: Incision  -TD    Pressure Injury Stage O  -SD      Retired Wound - Properties Group Placement Date: 05/30/23  -TD Placement Time: 1128  -TD Side: Left  -TD Orientation: anterior  -TD Location: hip  -TD Primary Wound Type: Incision  -TD    Retired Wound - Properties Group Date first assessed: 05/30/23  -TD Time first assessed: 1128  -TD Side: Left  -TD Location: hip  -TD Primary Wound Type: Incision  -TD              User Key  (r) = Recorded By, (t) = Taken By, (c) = Cosigned By      Initials Name Provider Type    TD Tracy Fulton RN Registered Nurse    Irene Smith RN Registered Nurse                      Assessment & Plan      Brief Patient Summary:  Jennifer Blackmon is a 73 y.o. female who       atorvastatin, 20 mg, Oral, Daily  buPROPion XL, 450 mg, Oral, QAM  carvedilol, 3.125 mg, Oral, BID  clopidogrel, 75 mg, Oral, Daily  escitalopram, 10 mg, Oral, BID  heparin (porcine), 5,000 Units, Subcutaneous, Q12H  memantine, 10 mg, Oral, BID  sodium chloride, 10 mL, Intravenous, Q12H             Active Hospital Problems:  Active Hospital Problems    Diagnosis     **Generalized weakness      Plan:     Generalized weakness  - dehydration  - questionable cystitis  - improved with fluids.   - s/p  3 days Abx. Culture negative. D/c Abx.   - PT/OT    H/o left hip Fx 5/2023  - PT/OT    Mild HUGO  - s/p fluids.      CAD   History of PCI 2019  -Continue home Plavix, statin     Hypertension  -Continue carvedilol     Dementia, recent diagnosis  Depression  -Continue Namenda     Hyperlipidemia  -Continue statin therapy     Rehab/SNF once arranged     DVT prophylaxis:  Medical and mechanical DVT prophylaxis orders are present.    CODE STATUS:    Code Status (Patient has no pulse and is not breathing): CPR (Attempt to Resuscitate)  Medical Interventions (Patient has pulse or is breathing): Full Support      Disposition:  I expect patient to be discharged .    This patient has been  and discussed with . 10/04/23      Electronically signed by Roshan Almodovar MD, 10/04/23, 18:37 EDT.  [facility] Hospitalist Team               Electronically signed by Roshan Almodovar MD at 10/04/23 1841       Trey Bridges MD at 10/03/23 1045              [facility] Medicine Services  INPATIENT PROGRESS NOTE    Length of Stay: 0  Date of Admission: 10/2/2023  Primary Care Physician: Nathalia Maria APRN    Subjective   Chief Complaint: Generalized weakness  HPI:    She has been admitted for generalized weakness.  Has been working with PT OT.  She is unsure about going to rehab facility and she will decide later with her .    Review of Systems   Respiratory:  Negative for shortness of breath.    Cardiovascular:  Negative for chest pain.      All pertinent negatives and positives are as above. All other systems have been reviewed and are negative unless otherwise stated.     Objective    Temp:  [97.5 °F (36.4 °C)-98.4 °F (36.9 °C)] 98.1 °F (36.7 °C)  Heart Rate:  [61-80] 77  Resp:  [11-18] 14  BP: ()/(59-94) 127/64  Physical Exam  Vitals and nursing note reviewed.   Constitutional:       General: She is not in acute distress.     Appearance: She is well-developed. She is not diaphoretic.   HENT:       Head: Normocephalic and atraumatic.   Cardiovascular:      Rate and Rhythm: Normal rate.   Pulmonary:      Effort: Pulmonary effort is normal. No respiratory distress.      Breath sounds: No wheezing.   Abdominal:      General: There is no distension.      Palpations: Abdomen is soft.   Musculoskeletal:         General: Normal range of motion.   Skin:     General: Skin is warm and dry.   Neurological:      Mental Status: She is alert.      Cranial Nerves: No cranial nerve deficit.   Psychiatric:         Behavior: Behavior normal.         Thought Content: Thought content normal.         Judgment: Judgment normal.           Results Review:  I have reviewed the labs, radiology results, and diagnostic studies.    Laboratory Data:   Lab Results (last 24 hours)       Procedure Component Value Units Date/Time    Urine Culture - Urine, Straight Cath [[id number]]  (Normal) Collected: 10/02/23 0802    Specimen: Urine from Straight Cath Updated: 10/03/23 0824     Urine Culture No growth    Basic Metabolic Panel [[id number]]  (Abnormal) Collected: 10/02/23 2326    Specimen: Blood Updated: 10/03/23 0022     Glucose 101 mg/dL      BUN 23 mg/dL      Creatinine 0.94 mg/dL      Sodium 139 mmol/L      Potassium 4.1 mmol/L      Comment: Slight hemolysis detected by analyzer. Results may be affected.        Chloride 103 mmol/L      CO2 28.0 mmol/L      Calcium 9.4 mg/dL      BUN/Creatinine Ratio 24.5     Anion Gap 8.0 mmol/L      eGFR 63.4 mL/min/1.73     Narrative:      GFR Normal >60  Chronic Kidney Disease <60  Kidney Failure <15    The GFR formula is only valid for adults with stable renal function between ages 18 and 70.    CBC Auto Differential [[id number]]  (Normal) Collected: 10/02/23 2326    Specimen: Blood Updated: 10/02/23 2354     WBC 8.20 10*3/mm3      RBC 4.24 10*6/mm3      Hemoglobin 12.6 g/dL      Hematocrit 38.1 %      MCV 89.9 fL      MCH 29.7 pg      MCHC 33.1 g/dL      RDW 15.2 %      RDW-SD 50.3 fl      MPV 7.9  fL      Platelets 244 10*3/mm3      Neutrophil % 47.0 %      Lymphocyte % 37.5 %      Monocyte % 11.0 %      Eosinophil % 4.1 %      Basophil % 0.4 %      Neutrophils, Absolute 3.80 10*3/mm3      Lymphocytes, Absolute 3.10 10*3/mm3      Monocytes, Absolute 0.90 10*3/mm3      Eosinophils, Absolute 0.30 10*3/mm3      Basophils, Absolute 0.00 10*3/mm3      nRBC 0.1 /100 WBC     Lactic Acid, Plasma [[id number]]  (Normal) Collected: 10/02/23 1626    Specimen: Blood Updated: 10/02/23 1657     Lactate 1.7 mmol/L     Blood Culture - Blood, Arm, Right [[id number]] Collected: 10/02/23 1626    Specimen: Blood from Arm, Right Updated: 10/02/23 1633    Blood Culture - Blood, Arm, Left [[id number]] Collected: 10/02/23 1626    Specimen: Blood from Arm, Left Updated: 10/02/23 1633            Culture Data:   No results found for: BLOODCX  Urine Culture   Date Value Ref Range Status   10/02/2023 No growth  Final     No results found for: RESPCX  No results found for: WOUNDCX  No results found for: STOOLCX  No components found for: BODYFLD    Radiology Data:   Imaging Results (Last 24 Hours)       ** No results found for the last 24 hours. **            I have reviewed the patient's current medications.     Assessment/Plan     Active Hospital Problems    Diagnosis     **Generalized weakness      Generalized weakness   UTI   -Fall precautions  -PT/OT  -MRI atrophy and evidence of chronic small vessel ischemic insult.  No acute CVA identified.  -UA positive, culture pending  -Patient initiated on Rocephin, continue  -Continue to monitor  -Patient with recent history of left hip Fx 5/2023  -Case management for possible placement  Patient will decide with her  about rehab placement.     HUGO on CKD 3a  -Creatinine 1.06  -Gentle hydration  -Avoid nephrotoxic agents     CAD   History of PCI 2019  -Continue home Plavix, statin     Hypertension  -Continue carvedilol     Dementia, recent diagnosis  Depression  -Continue Namenda      Hyperlipidemia  -Continue statin therapy        DVT prophylaxis:  Mechanical DVT prophylaxis orders are present.     CODE STATUS:    Code Status (Patient has no pulse and is not breathing): CPR (Attempt to Resuscitate)  Medical Interventions (Patient has pulse or is breathing): Full Support    Trey Bridges MD   10/03/23   10:45 EDT       Electronically signed by Trey Bridges MD at 10/03/23 1355       Consult Notes (last 48 hours)  Notes from 10/03/23 0920 through 10/05/23 0920   No notes of this type exist for this encounter.          Physical Therapy Notes (last 24 hours)        Shaggy Baptiste, PT Student at 10/04/23 1525  Version 1 of 1      Attestation signed by Reyes, Carmela, PT at 10/04/23 1544    PT present, directing the service, making the skilled judgment, and is responsible for the assessment and treatment.                  Subjective: Pt agreeable to therapeutic plan of care.    Objective:     Bed mobility - SBA to go from sitting EOB to supine, Mod-A x 2 to scoot her up to HOB.   Transfers - Mod-A and with rolling walker, pt required cueing for hand placement and to stand erect, knees straight.   Ambulation - 15' x 1 with rw Min-A x 2, 15' x 1 Min-A x 2 with HHA.  Pt demonstrated unsafe ambulation while using rw, pt keeping rw outside CANDY and demonstrated multiple posterior leans that needed assistance to correct and continued to demonstrate knee instability with inability to keep knees straight. Improved ambulation with HHA x 2.     Vitals: WNL    Pain: 0 VAS    Education: Provided education on the importance of mobility in the acute care setting, Verbal/Tactile Cues, Transfer Training, and Gait Training    Assessment: Jennifer Blackmon presents with functional mobility impairments which indicate the need for skilled intervention. Pt improved in functional mobility by tolerating ambulating 15' with rw Min-A x 2, then 15' with HHA Min-A x 2. Pt demonstrated significant LOB when ambulating with  "posterior leaning and knee instability while using rw. Pt required max cueing for hand placement and proper gait mechanics with rw. Pt demonstrated improvement with HHA , less posterior leaning but continues to present with bilateral knee instability.  Will continue to follow and progress as tolerated. Remains unsafe to return home at this time.    Plan/Recommendations:   Moderate Intensity Therapy recommended post-acute care. This is recommended as therapy feels the patient would require 3-4 days per week and wouldn't tolerate \"3 hour daily\" rehab intensity. SNF would be the preferred choice. If the patient does not agree to SNF, arrange HH or OP depending on home bound status. If patient is medically complex, consider LTACH.. Pt requires no DME at discharge.     Pt desires Skilled Rehab placement at discharge. Pt cooperative; agreeable to therapeutic recommendations and plan of care.         Basic Mobility 6-click:  Rollin = Total, A lot = 2, A little = 3; 4 = None  Supine>Sit:   1 = Total, A lot = 2, A little = 3; 4 = None   Sit>Stand with arms:  1 = Total, A lot = 2, A little = 3; 4 = None  Bed>Chair:   1 = Total, A lot = 2, A little = 3; 4 = None  Ambulate in room:  1 = Total, A lot = 2, A little = 3; 4 = None  3-5 Steps with railin = Total, A lot = 2, A little = 3; 4 = None  Score: 15    Post-Tx Position: Supine with HOB Elevated, Alarms activated, and Call light and personal items within reach  PPE: gloves     Electronically signed by Reyes, Carmela, PT at 10/04/23 1544       Shaggy Baptiste, PT Student at 10/04/23 1537  Version 1 of 1      Attestation signed by Reyes, Carmela, PT at 10/04/23 4152    Agree with plan of care.                Assessment: Jennifer Blackmon presents with functional mobility impairments which indicate the need for skilled intervention. Pt improved in functional mobility by tolerating ambulating 15' with rw Min-A x 2, then 15' with HHA Min-A x 2. Pt demonstrated " significant LOB when ambulating with posterior leaning and knee instability while using rw. Pt required max cueing for hand placement and proper gait mechanics with rw. Pt demonstrated improvement with HHA , less posterior leaning but continues to present with bilateral knee instability.  Will continue to follow and progress as tolerated. Remains unsafe to return home at this time.                          Electronically signed by Reyes, Carmela, PT at 10/04/23 1544       Occupational Therapy Notes (most recent note)    No notes exist for this encounter.       Speech Language Pathology Notes (most recent note)    No notes exist for this encounter.       Stanley Kinsey, PT   Physical Therapist  Specialty:  Physical Therapy  Therapy Evaluation     Signed  Date of Service:  10/02/23 1627  Creation Time:  10/02/23 1627     Signed        Expand All Collapse All                                                                                                                                                                                                                                                                Patient Name: Jennifer Blackmon                    : 1950                          MRN: [id number]                              Today's Date: 10/2/2023                                   Admit Date: 10/2/2023                        Visit Dx:   Visit Diagnosis       ICD-10-CM ICD-9-CM   1. General weakness  R53.1 780.79   2. Urinary tract infection without hematuria, site unspecified  N39.0 599.0         Problem List       Patient Active Problem List   Diagnosis    Old MI (myocardial infarction)    Coronary artery disease involving native coronary artery of native heart with angina pectoris    Essential hypertension    Hyperlipidemia, mixed    Chest pain, atypical    Degeneration of lumbar intervertebral disc    Low back pain    Lumbar spondylosis    Arthritis    Cellulitis of face    Depressive disorder    Near  syncope    Stage 3a chronic kidney disease    Left displaced femoral neck fracture    S/P total left hip arthroplasty    Memory loss    Generalized weakness         Medical History        Past Medical History:   Diagnosis Date    Allergic rhinitis      Basal cell carcinoma (BCC) of nostril      Coronary artery disease involving native coronary artery of native heart with angina pectoris 2019     Status post successful PCI of the coronary stent deployment to high-grade RCA stenosis after presenting with ST segment elevated microinfarction in May 2019    DDD (degenerative disc disease), lumbar      Depression      Depression      Essential hypertension 2019    Femoral neck fracture 2023    Heart attack       2019    Hyperlipidemia      Hyperlipidemia, mixed 2019    Hypertension      Insomnia      Myocardial infarction less than 4 weeks ago 2019     Presented initially with ST segment elevated microinfarction treated emergently in [place] May 2019    Old MI (myocardial infarction) 2019     Presented initially with ST segment elevated microinfarction treated emergently in [place] May 2019    Osteoarthritis      Toenail fungus           Surgical History         Past Surgical History:   Procedure Laterality Date    ADENOIDECTOMY         Adenoids removed    APPENDECTOMY       AUGMENTATION MAMMAPLASTY        BREAST IMPLANT SURGERY   1984    CATARACT EXTRACTION Bilateral 2017    CERVICAL DISC SURGERY         ruptured and removed      SECTION        CHEST SURGERY        attempted pectus repair     EYE SURGERY   1997     AK    HYSTERECTOMY        KNEE ARTHROSCOPY Right      LUMBAR DISC SURGERY         ruptured and removed     NASAL SEPTAL RECONSTRUCTION        PECTUS CARNATUM REPAIR         attempted    RECTAL SURGERY         Repair    REFRACTIVE SURGERY         RK/AK both eyes     REFRACTIVE SURGERY        SOMNOPLASTY RADIAL FREQUENCY ABLATION         TONSILLECTOMY        TOTAL HIP ARTHROPLASTY Left 5/30/2023     Procedure: TOTAL HIP ARTHROPLASTY ANTERIOR;  Surgeon: Josiah Menon MD;  Location: [facility] MAIN OR;  Service: Orthopedics;  Laterality: Left;    UVULOPALATOPHARYNGOPLASTY               General Information         Row Name 10/02/23 1618                 Physical Therapy Time and Intention     Document Type evaluation  -MB       Mode of Treatment physical therapy  -MB          Row Name 10/02/23 1618                 General Information     Patient Profile Reviewed yes  -MB       Prior Level of Function mod assist:;all household mobility;gait;transfer;bed mobility;ADL's;grooming;dressing;bathing;home management  -MB       Existing Precautions/Restrictions fall  -MB       Barriers to Rehab medically complex;previous functional deficit  -MB          Row Name 10/02/23 1618                 Living Environment     People in Home spouse  -MB          Row Name 10/02/23 1618                 Home Main Entrance     Number of Stairs, Main Entrance two  -MB          Row Name 10/02/23 1618                 Stairs Within Home, Primary     Number of Stairs, Within Home, Primary none  -MB          Row Name 10/02/23 1618                 Cognition     Orientation Status (Cognition) disoriented to;situation;time  -MB          Row Name 10/02/23 1618                 Safety Issues, Functional Mobility     Impairments Affecting Function (Mobility) balance;cognition;endurance/activity tolerance;strength  -MB       Cognitive Impairments, Mobility Safety/Performance attention;awareness, need for assistance;impulsivity;insight into deficits/self-awareness  -MB                       User Key  (r) = Recorded By, (t) = Taken By, (c) = Cosigned By        Initials Name Provider Type     MB Stanley Kinsey, PT Physical Therapist                            Mobility         Row Name 10/02/23 1619                 Bed Mobility     Bed Mobility bed mobility (all) activities  -MB       All Activities,  [place] (Bed Mobility) minimum assist (75% patient effort)  -MB       Assistive Device (Bed Mobility) bed rails;head of bed elevated  -MB       Comment, (Bed Mobility) min A for supine<>Sit EOB  -MB          Row Name 10/02/23 1619                 Transfers     Comment, (Transfers) pt not suitable for transfers at this date with very poor BLE strength and cognition preventing appropriate command following  -MB          Row Name 10/02/23 1619                 Bed-Chair Transfer     Bed-Chair [place] (Transfers) not tested  -MB          Row Name 10/02/23 1619                 Sit-Stand Transfer     Sit-Stand [place] (Transfers) not tested  -MB                       User Key  (r) = Recorded By, (t) = Taken By, (c) = Cosigned By        Initials Name Provider Type     Stanley Wadsworth PT Physical Therapist                            Obj/Interventions         Row Name 10/02/23 1620                 Range of Motion Comprehensive     General Range of Motion no range of motion deficits identified  -MB          Row Name 10/02/23 1620                 Strength Comprehensive (MMT)     Comment, General Manual Muscle Testing (MMT) Assessment BLE Strength 3/5 grossly  -MB          Row Name 10/02/23 1620                 Balance     Balance Assessment sitting static balance;sitting dynamic balance  -MB       Static Sitting Balance contact guard  -MB       Dynamic Sitting Balance minimal assist;moderate assist  -MB       Position, Sitting Balance supported;sitting edge of bed  -MB          Row Name 10/02/23 1620                 Sensory Assessment (Somatosensory)     Sensory Assessment (Somatosensory) sensation intact  -MB                       User Key  (r) = Recorded By, (t) = Taken By, (c) = Cosigned By        Initials Name Provider Type     Stanley Wadsworth PT Physical Therapist                            Goals/Plan         Row Name 10/02/23 1621                 Bed Mobility Goal 1 (PT)     Activity/Assistive Device (Bed  Mobility Goal 1, PT) bed mobility activities, all  -MB       [place] Level/Cues Needed (Bed Mobility Goal 1, PT) standby assist  -MB       Time Frame (Bed Mobility Goal 1, PT) long term goal (LTG);2 weeks  -MB          Row Name 10/02/23 1621                 Transfer Goal 1 (PT)     Activity/Assistive Device (Transfer Goal 1, PT) transfers, all;walker, rolling  -MB       [place] Level/Cues Needed (Transfer Goal 1, PT) moderate assist (50-74% patient effort)  -MB       Time Frame (Transfer Goal 1, PT) long term goal (LTG);2 weeks  -MB          Row Name 10/02/23 1621                 Gait Training Goal 1 (PT)     Activity/Assistive Device (Gait Training Goal 1, PT) gait (walking locomotion);walker, rolling  -MB       [place] Level (Gait Training Goal 1, PT) minimum assist (75% or more patient effort)  -MB       Distance (Gait Training Goal 1, PT) 10  -MB       Time Frame (Gait Training Goal 1, PT) long term goal (LTG);2 weeks  -MB          Row Name 10/02/23 1621                 Therapy Assessment/Plan (PT)     Planned Therapy Interventions (PT) balance training;bed mobility training;gait training;home exercise program;neuromuscular re-education;patient/family education;stair training;strengthening;transfer training  -MB                    User Key  (r) = Recorded By, (t) = Taken By, (c) = Cosigned By        Initials Name Provider Type     Stanley Wadsworth, PT Physical Therapist                         Clinical Impression         Row Name 10/02/23 1621                 Pain     Pretreatment Pain Rating 0/10 - no pain  -MB       Posttreatment Pain Rating 0/10 - no pain  -MB          Row Name 10/02/23 1626 10/02/23 1621            Plan of Care Review     Plan of Care Reviewed With -- patient  -MB     Progress -- no change  -MB     Outcome Evaluation Pt is a 74 y/o F admitted to [facility] on 10/2/23 with complaints of leg stiffness and weakness, preventing her ability to stand up. Patient is not reliable source of  information as she claims reason for admission is fracture of hip, which was reason for admission on 5/31/23. MRI Brain (+) for chronic small vessel ischemia, (-) for acute CVA. Diagnosed with UTI. PMH includes dementia, CAD, HTN, HLD, and depression. PLOF and Home environment difficult to obtain due to memory concerns and erratic speech, confirmed with spouse via phone call. She is currently living with spouse in [facility] with 2-step entry. At baseline, she req mod>max A for transfers, bed mobility, and ADLs, while spouse states she is mod A for short distance ambulation with RW. Today, pt is disoriented to situation and time. She completes bed mobility min>A and exhibits poor dynamic balance when assessing BLE strength. She is not suitable for transfers at this time due to poor BLE strength and limited balance in sitting. Pt and spouse both educated about SNF recommendation at d/c for continued care and rehab, but uncertain if pt will be agreeable. She is not safe to return back home at this time with poor strength and cognitive decline. Recommendation is SNF at d/c and PT will follow.  -MB --        Row Name 10/02/23 1621                 Therapy Assessment/Plan (PT)     Rehab Potential (PT) fair, will monitor progress closely  -MB       Criteria for Skilled Interventions Met (PT) yes;meets criteria  -MB       Therapy Frequency (PT) 3 times/wk  -MB       Predicted Duration of Therapy Intervention (PT) until d/c  -MB          Row Name 10/02/23 1621                 Vital Signs     Pre Systolic BP Rehab 105  -MB       Pre Treatment Diastolic BP 66  -MB       Pretreatment Heart Rate (beats/min) 77  -MB       Posttreatment Heart Rate (beats/min) 76  -MB       Pre SpO2 (%) 97  -MB       O2 Delivery Pre Treatment room air  -MB       O2 Delivery Intra Treatment room air  -MB       Post SpO2 (%) 96  -MB       O2 Delivery Post Treatment room air  -MB       Pre Patient Position Supine  -MB       Intra Patient Position Sitting   -MB       Post Patient Position Supine  -MB          Row Name 10/02/23 1621                 Positioning and Restraints     Pre-Treatment Position in bed  -MB       Post Treatment Position bed  -MB       In Bed notified nsg;supine;call light within reach;encouraged to call for assist  -MB                       User Key  (r) = Recorded By, (t) = Taken By, (c) = Cosigned By        Initials Name Provider Type     Stanley Wadsworth, MANOJ Physical Therapist                            Outcome Measures         Row Name 10/02/23 1622                 How much help from another person do you currently need...     Turning from your back to your side while in flat bed without using bedrails? 3  -MB       Moving from lying on back to sitting on the side of a flat bed without bedrails? 3  -MB       Moving to and from a bed to a chair (including a wheelchair)? 2  -MB       Standing up from a chair using your arms (e.g., wheelchair, bedside chair)? 2  -MB       Climbing 3-5 steps with a railing? 2  -MB       To walk in hospital room? 2  -MB       AM-PAC 6 Clicks Score (PT) 14  -MB       Highest level of mobility 4 --> Transferred to chair/commode  -MB          Row Name 10/02/23 1622                 Functional Assessment     Outcome Measure Options AM-PAC 6 Clicks Basic Mobility (PT)  -MB                       User Key  (r) = Recorded By, (t) = Taken By, (c) = Cosigned By        Initials Name Provider Type     Stanley Wadsworth, MANOJ Physical Therapist                          Physical Therapy Education            Title: PT OT SLP Therapies (Done)         Topic: Physical Therapy (Done)         Point: Mobility training (Done)         Learning Progress Summary               Patient Acceptance, E,TB, VU by MB at 10/2/2023 1622                               Point: Body mechanics (Done)         Learning Progress Summary               Patient Acceptance, E,TB, VU by MB at 10/2/2023 1622                               Point: Precautions (Done)          Learning Progress Summary               Patient Acceptance, E,TB, VU by MB at 10/2/2023 8800                                                   User Key         Initials Effective Dates Name Provider Type Discipline     MB 06/06/23 -  Stanley Kinsey, PT Physical Therapist PT                          PT Recommendation and Plan  Planned Therapy Interventions (PT): balance training, bed mobility training, gait training, home exercise program, neuromuscular re-education, patient/family education, stair training, strengthening, transfer training  Plan of Care Reviewed With: patient  Progress: no change  Outcome Evaluation: Pt is a 72 y/o F admitted to [facility] on 10/2/23 with complaints of leg stiffness and weakness, preventing her ability to stand up. Patient is not reliable source of information as she claims reason for admission is fracture of hip, which was reason for admission on 5/31/23. MRI Brain (+) for chronic small vessel ischemia, (-) for acute CVA. Diagnosed with UTI. PMH includes dementia, CAD, HTN, HLD, and depression. PLOF and Home environment difficult to obtain due to memory concerns and erratic speech, confirmed with spouse via phone call. She is currently living with spouse in [facility] with 2-step entry. At baseline, she req mod>max A for transfers, bed mobility, and ADLs, while spouse states she is mod A for short distance ambulation with RW. Today, pt is disoriented to situation and time. She completes bed mobility min>A and exhibits poor dynamic balance when assessing BLE strength. She is not suitable for transfers at this time due to poor BLE strength and limited balance in sitting. Pt and spouse both educated about SNF recommendation at d/c for continued care and rehab, but uncertain if pt will be agreeable. She is not safe to return back home at this time with poor strength and cognitive decline. Recommendation is SNF at d/c and PT will follow.      Time Calculation:   PT Evaluation Complexity  History,  PT Evaluation Complexity: 1-2 personal factors and/or comorbidities  Examination of Body Systems (PT Eval Complexity): total of 3 or more elements  Clinical Presentation (PT Evaluation Complexity): evolving  Clinical Decision Making (PT Evaluation Complexity): moderate complexity  Overall Complexity (PT Evaluation Complexity): moderate complexity       PT Charges         Row Name 10/02/23 1622                       Time Calculation     Start Time 1518  -MB         Stop Time 1544  -MB         Time Calculation (min) 26 min  -MB         PT Received On 10/02/23  -MB         PT - Next Appointment 10/04/23  -MB         PT Goal Re-Cert Due Date 10/16/23  -MB                 Time Calculation- PT     Total Timed Code Minutes- PT 0 minute(s)  -MB                      User Key  (r) = Recorded By, (t) = Taken By, (c) = Cosigned By        Initials Name Provider Type     Stanley Wadsworth, PT Physical Therapist                       Therapy Charges for Today         Code Description Service Date Service Provider Modifiers Qty     36302490884 HC PT EVAL MOD COMPLEXITY 4 10/2/2023 Stanley Kinsey, PT GP 1                PT G-Codes  Outcome Measure Options: AM-PAC 6 Clicks Basic Mobility (PT)  AM-PAC 6 Clicks Score (PT): 14  PT Discharge Summary  Anticipated Discharge Disposition (PT): skilled nursing facility     Stanley Kinsey PT                    10/2/2023

## 2023-10-05 NOTE — PROGRESS NOTES
[facility] Medicine Services   Daily Progress Note    Patient Name: Jennifer Blackmon  : 1950  MRN: [id number]  Primary Care Physician:  Nathalia Maria APRN  Date of admission: 10/2/2023  Date and Time of Service:  at       Subjective      Chief Complaint:     Patient Reports feeling better.  No chest pain shortness of breath lightheadedness.  Thinks possibly she could be dehydrated when presented.  Currently no lightheadedness or chest pain.  Feels back to normal.      Objective      Vitals:   Temp:  [97.4 °F (36.3 °C)-98.4 °F (36.9 °C)] 98.4 °F (36.9 °C)  Heart Rate:  [71-75] 74  Resp:  [14-16] 16  BP: (103-129)/(61-77) 108/70    Physical Exam  General: No acute distress  HEENT: Neck supple, normal oral mucosa, no masses, no lymphadenopathy  Lungs: Clear bilaterally, no wheezing, no crackles, no rhonchi. Equal excursions.   CV - Normal S1/S2, no murmur, regular rate and rhythm   Abdomen - Soft, nontender, nondistended, normal bowel sounds  Extremities - no edema, no erythema  Neuro - No focal weakness, normal sensation  Psych - Alert and oriented x3  Skin - no wounds or lesions.          Result Review    Result Review:  I have personally reviewed the results from the time of this admission to 10/5/2023 16:51 EDT and agree with these findings:  [x]  Laboratory  [x]  Microbiology  [x]  Radiology  [x]  EKG/Telemetry   [x]  Cardiology/Vascular   []  Pathology  [x]  Old records  []  Other:  Most notable findings include:     Wounds (last 24 hours)       LDA Wound       Row Name 10/05/23 0859             Wound 23 1128 Left anterior hip Incision    Wound - Properties Group Placement Date: 23  -TD Placement Time: 8  -TD Side: Left  -TD Orientation: anterior  -TD Location: hip  -TD Primary Wound Type: Incision  -TD    Dressing Appearance open to air  -AH      Closure Open to air  -AH      Base pink  healed/scar  -AH      Retired Wound - Properties Group Placement Date: 23  -TD  Placement Time: 1128  -TD Side: Left  -TD Orientation: anterior  -TD Location: hip  -TD Primary Wound Type: Incision  -TD    Retired Wound - Properties Group Date first assessed: 05/30/23  -TD Time first assessed: 1128  -TD Side: Left  -TD Location: hip  -TD Primary Wound Type: Incision  -TD              User Key  (r) = Recorded By, (t) = Taken By, (c) = Cosigned By      Initials Name Provider Type    Nati Prado LPN Licensed Nurse    Tracy Hale RN Registered Nurse                      Assessment & Plan      Brief Patient Summary:  Jennifer Blackmon is a 73 y.o. female who       atorvastatin, 20 mg, Oral, Daily  buPROPion XL, 450 mg, Oral, QAM  carvedilol, 3.125 mg, Oral, BID  clopidogrel, 75 mg, Oral, Daily  escitalopram, 10 mg, Oral, BID  heparin (porcine), 5,000 Units, Subcutaneous, Q12H  memantine, 10 mg, Oral, BID  sodium chloride, 10 mL, Intravenous, Q12H             Active Hospital Problems:  Active Hospital Problems    Diagnosis     **Generalized weakness      Plan:     Generalized weakness  - dehydration  - questionable cystitis  - improved with fluids.   - s/p 3 days Abx. Culture negative. D/c Abx.   - PT/OT    H/o left hip Fx 5/2023  - PT/OT    Mild HUGO  - s/p fluids.      CAD   History of PCI 2019  -Continue home Plavix, statin     Hypertension  -Continue carvedilol     Dementia, recent diagnosis  Depression  -Continue Namenda     Hyperlipidemia  -Continue statin therapy     Patient medically ready for discharge once placement arranged. Awaiting precert     DVT prophylaxis:  Medical and mechanical DVT prophylaxis orders are present.    CODE STATUS:    Code Status (Patient has no pulse and is not breathing): CPR (Attempt to Resuscitate)  Medical Interventions (Patient has pulse or is breathing): Full Support      Disposition:  I expect patient to be discharged .    This patient has been  and discussed with . 10/05/23      Electronically signed by Roshan Almodovar MD, 10/05/23, 16:51  EDT.  [facility] Hospitalist Team

## 2023-10-05 NOTE — THERAPY TREATMENT NOTE
"Subjective: Pt agreeable to therapeutic plan of care.  Cognition: arousal/alertness: Alert and Attentive    Objective:     Bed Mobility: Modified-Independent   Functional Transfers: Min-A, Assist x 2, and with rolling walker     Balance: sitting EOB, static, dynamic, with UE support, and standing Min-A  Functional Ambulation: Min-A and with rolling walker    Grooming: SBA  ADL Position: supported sitting  ADL Comments: Pt brushed hair while in the chair       Vitals: WNL    Pain: 0 VAS  Location: N/A  Interventions for pain: N/A  Education: Provided education on the importance of mobility in the acute care setting      Assessment: Jennifer Blackmon presents with ADL impairments affecting function including balance, cognition, coordination, endurance / activity tolerance, and strength. Pt cooperative and pleasant during session. Bed mobility improving. Demonstrated functioning below baseline abilities indicate the need for continued skilled intervention while inpatient. Tolerating session today without incident. Will continue to follow and progress as tolerated.     Plan/Recommendations:   Moderate Intensity Therapy recommended post-acute care. This is recommended as therapy feels the patient would require 3-4 days per week and wouldn't tolerate \"3 hour daily\" rehab intensity. SNF would be the preferred choice. If the patient does not agree to SNF, arrange HH or OP depending on home bound status. If patient is medically complex, consider LTACH.. Pt requires no DME at discharge.     Pt desires Skilled Rehab placement at discharge. Pt cooperative; agreeable to therapeutic recommendations and plan of care.     Modified [place]: 4 = Moderately severe disability (Unable to attend to own bodily needs without assistance, and unable to walk unassisted)     Post-Tx Position: Up in Chair  PPE: gloves   "

## 2023-10-05 NOTE — THERAPY TREATMENT NOTE
"Subjective: Pt agreeable to therapeutic plan of care.    Objective:     Bed mobility - Supervision supine to sit.  Pt very slow at task but if given time pt was able to complete   Transfers - Min-A, Assist x 2, and with rolling walker  Ambulation - 20 + 16 feet Min-A, Mod-A, and with rolling walker    Therapeutic Exercise - 10 Reps B LE AROM supported sitting / chair    Vitals: WNL    Pain: 0 VAS       Education: Provided education on the importance of mobility in the acute care setting, Verbal/Tactile Cues, Transfer Training, and Gait Training    Assessment: Jennifer Blackmon presents with functional mobility impairments which indicate the need for skilled intervention. Tolerating session today without incident. Pt was cooperative today but slow with her overall mobility. Pt exhibits fear of falling during gait training especially when approaching sitting surface.  Pt responds well to stopping and recovering and then proceed to the chair.  Will continue to follow and progress as tolerated.     Plan/Recommendations:   Moderate Intensity Therapy recommended post-acute care. This is recommended as therapy feels the patient would require 3-4 days per week and wouldn't tolerate \"3 hour daily\" rehab intensity. SNF would be the preferred choice.  Pt requires no DME at discharge.     Pt desires Skilled Rehab placement at discharge. Pt cooperative; agreeable to therapeutic recommendations and plan of care.     Basic Mobility 6-click:  Rollin = Total, A lot = 2, A little = 3; 4 = None  Supine>Sit:   1 = Total, A lot = 2, A little = 3; 4 = None   Sit>Stand with arms:  1 = Total, A lot = 2, A little = 3; 4 = None  Bed>Chair:   1 = Total, A lot = 2, A little = 3; 4 = None  Ambulate in room:  1 = Total, A lot = 2, A little = 3; 4 = None  3-5 Steps with railin = Total, A lot = 2, A little = 3; 4 = None  Score: 14    Modified [place]: 4 = Moderately severe disability (Unable to attend to own bodily needs without " assistance, and unable to walk unassisted)     Post-Tx Position: Up in Chair, Alarms activated, and Call light and personal items within reach  pt's  present  PPE: gloves

## 2023-10-05 NOTE — PLAN OF CARE
Goal Outcome Evaluation:              Outcome Evaluation: Pt has had no c/o pain this shift, vss, call light in reach, plan of care ongoing

## 2023-10-06 ENCOUNTER — READMISSION MANAGEMENT (OUTPATIENT)
Dept: CALL CENTER | Facility: HOSPITAL | Age: 73
End: 2023-10-06
Payer: MEDICARE

## 2023-10-06 ENCOUNTER — HOME HEALTH ADMISSION (OUTPATIENT)
Dept: HOME HEALTH SERVICES | Facility: HOME HEALTHCARE | Age: 73
End: 2023-10-06
Payer: MEDICARE

## 2023-10-06 VITALS
TEMPERATURE: 98 F | OXYGEN SATURATION: 96 % | SYSTOLIC BLOOD PRESSURE: 123 MMHG | WEIGHT: 122.58 LBS | BODY MASS INDEX: 19.7 KG/M2 | RESPIRATION RATE: 14 BRPM | HEART RATE: 75 BPM | HEIGHT: 66 IN | DIASTOLIC BLOOD PRESSURE: 81 MMHG

## 2023-10-06 PROCEDURE — 25010000002 HEPARIN (PORCINE) PER 1000 UNITS: Performed by: HOSPITALIST

## 2023-10-06 PROCEDURE — 96372 THER/PROPH/DIAG INJ SC/IM: CPT

## 2023-10-06 PROCEDURE — 97530 THERAPEUTIC ACTIVITIES: CPT

## 2023-10-06 PROCEDURE — 97116 GAIT TRAINING THERAPY: CPT

## 2023-10-06 PROCEDURE — 97535 SELF CARE MNGMENT TRAINING: CPT

## 2023-10-06 PROCEDURE — 97112 NEUROMUSCULAR REEDUCATION: CPT

## 2023-10-06 PROCEDURE — G0378 HOSPITAL OBSERVATION PER HR: HCPCS

## 2023-10-06 RX ADMIN — BISACODYL 5 MG: 5 TABLET, COATED ORAL at 13:01

## 2023-10-06 RX ADMIN — HEPARIN SODIUM 5000 UNITS: 5000 INJECTION INTRAVENOUS; SUBCUTANEOUS at 08:43

## 2023-10-06 RX ADMIN — ESCITALOPRAM OXALATE 10 MG: 10 TABLET ORAL at 08:43

## 2023-10-06 RX ADMIN — BUPROPION HYDROCHLORIDE 450 MG: 150 TABLET, FILM COATED, EXTENDED RELEASE ORAL at 06:09

## 2023-10-06 RX ADMIN — Medication 10 ML: at 08:43

## 2023-10-06 RX ADMIN — ATORVASTATIN CALCIUM 20 MG: 20 TABLET, FILM COATED ORAL at 08:43

## 2023-10-06 RX ADMIN — CARVEDILOL 3.12 MG: 3.12 TABLET, FILM COATED ORAL at 08:43

## 2023-10-06 RX ADMIN — MEMANTINE 10 MG: 10 TABLET ORAL at 08:43

## 2023-10-06 RX ADMIN — CLOPIDOGREL BISULFATE 75 MG: 75 TABLET ORAL at 08:43

## 2023-10-06 NOTE — DISCHARGE SUMMARY
[facility] Medicine Services  Discharge Summary    Date of Service: 10/6/23  Patient Name: Jennifer Blackmon  : 1950  MRN: [id number]    Date of Admission: 10/2/2023  Discharge Diagnosis:   Date of Discharge: Physical deconditioning  Primary Care Physician: Nathalia Maria APRN      Presenting Problem:   General weakness [R53.1]  Generalized weakness [R53.1]  Urinary tract infection without hematuria, site unspecified [N39.0]    Active and Resolved Hospital Problems:  Active Hospital Problems    Diagnosis POA    **Generalized weakness [R53.1] Yes      Resolved Hospital Problems   No resolved problems to display.         Hospital Course     Hospital Course:  Jennifer Blackmon is a 73 y.o. female with a past medical history to include CAD, hypertension, hyperlipidemia, depression and dementia who presented to [facility] on 10/2/2023 complaining of legs shaky and weak. Patient reports she went to bed as per usual last night and woke up in the middle of the night and felt too weak to stand.  Patient reports she broke her hip in May and has been convalescing at home with her  and 2 grandkids who often help her ambulate. MRI atrophy and evidence of chronic small vessel ischemic insult. No acute CVA identified. HUGO on CKD present and improved. UTI present and been treated.  She has been seen by physical therapist occupational therapist and recommended for rehab.  Insurance declined.  Family is comfortable taking back her home with home health.  Home health is arranged.    DISCHARGE Follow Up Recommendations for labs and diagnostics:  Follow-up with primary care physician   Follow-up with home health        Reasons For Change In Medications and Indications for New Medications:      Day of Discharge     Vital Signs:  Temp:  [97.9 °F (36.6 °C)-98.6 °F (37 °C)] 98 °F (36.7 °C)  Heart Rate:  [68-75] 75  Resp:  [14-15] 14  BP: (111-133)/(61-81) 123/81    Physical Exam:  Physical Exam    Physical Exam  HENT:      Head: Normocephalic and atraumatic.      Nose: Nose normal.   Eyes:      Extraocular Movements: Extraocular movements intact.      Conjunctiva/sclera: Conjunctivae normal.      Pupils: Pupils are equal, round, and reactive to light.   Cardiovascular:      Rate and Rhythm: normal       Pulses: Normal pulses.      Heart sounds: Normal heart sounds.   Pulmonary:      Effort: normal      Breath sounds: normal   Abdominal:      General: Abdomen is flat. Bowel sounds are normal.      Palpations: Abdomen is soft.   Musculoskeletal:        Weak            Pertinent  and/or Most Recent Results     LAB RESULTS:      Lab 10/02/23  2326 10/02/23  1626 10/02/23  0749   WBC 8.20  --  10.30   HEMOGLOBIN 12.6  --  13.2   HEMATOCRIT 38.1  --  41.0   PLATELETS 244  --  250   NEUTROS ABS 3.80  --  7.20*   LYMPHS ABS 3.10  --  2.00   MONOS ABS 0.90  --  0.70   EOS ABS 0.30  --  0.30   MCV 89.9  --  91.9   LACTATE  --  1.7  --          Lab 10/02/23  2326 10/02/23  0749   SODIUM 139 141   POTASSIUM 4.1 4.4   CHLORIDE 103 104   CO2 28.0 29.0   ANION GAP 8.0 8.0   BUN 23 24*   CREATININE 0.94 1.06*   EGFR 63.4 54.9*   GLUCOSE 101* 103*   CALCIUM 9.4 10.1   MAGNESIUM  --  2.2   TSH  --  2.420         Lab 10/02/23  0749   TOTAL PROTEIN 6.5   ALBUMIN 3.8   GLOBULIN 2.7   ALT (SGPT) 25   AST (SGOT) 22   BILIRUBIN 0.4   ALK PHOS 98                     Brief Urine Lab Results  (Last result in the past 365 days)        Color   Clarity   Blood   Leuk Est   Nitrite   Protein   CREAT   Urine HCG        10/02/23 0802 Yellow   Slightly Cloudy   Negative   Large (3+)   Negative   Negative                 Microbiology Results (last 10 days)       Procedure Component Value - Date/Time    Blood Culture - Blood, Arm, Right [[id number]]  (Normal) Collected: 10/02/23 1626    Lab Status: Preliminary result Specimen: Blood from Arm, Right Updated: 10/06/23 1645     Blood Culture No growth at 4 days    Blood Culture - Blood, Arm, Left  [[id number]]  (Normal) Collected: 10/02/23 1626    Lab Status: Preliminary result Specimen: Blood from Arm, Left Updated: 10/06/23 1645     Blood Culture No growth at 4 days    Urine Culture - Urine, Straight Cath [[id number]]  (Normal) Collected: 10/02/23 0802    Lab Status: Final result Specimen: Urine from Straight Cath Updated: 10/03/23 0824     Urine Culture No growth            MRI Brain Without Contrast    Result Date: 10/2/2023  Impression: Impression: Atrophy and evidence of chronic small vessel ischemic insult. No acute CVA identified. Electronically Signed: Patrizia Goldman MD  10/2/2023 9:01 AM EDT  Workstation ID: [id number]             Results for orders placed during the hospital encounter of 03/17/22    Adult Transthoracic Echo Complete W/ Cont if Necessary Per Protocol    Interpretation Summary  Normal LV size and contractility EF of 60 to 65%  Borderline RV  Mild biatrial enlargement seen.  Agitated saline bubble contrast negative for septal defect  Aortic valve, mitral valve, tricuspid valve appears structurally normal, mild to moderate mitral and mild tricuspid regurgitation seen.  Calculated RV systolic pressure of 32 mmHg  Reveal posterior pericardial effusion seen.  No signs of increased intrapericardial pressures  Proximal aorta appears normal in size.      Labs Pending at Discharge:  Pending Labs       Order Current Status    Blood Culture - Blood, Arm, Left Preliminary result    Blood Culture - Blood, Arm, Right Preliminary result            Procedures Performed           Consults:   Consults       Date and Time Order Name Status Description    10/2/2023  9:09 AM Hospitalist (on-call MD unless specified)                Discharge Details        Discharge Medications        Continue These Medications        Instructions Start Date   atorvastatin 40 MG tablet  Commonly known as: LIPITOR   20 mg, Oral, Daily      buPROPion  MG 24 hr tablet  Commonly known as: WELLBUTRIN XL   450 mg, Oral,  Every Morning      Calcium Citrate-Vitamin D 500-400 MG-UNIT chewable tablet   Oral, Daily      carvedilol 3.125 MG tablet  Commonly known as: COREG   3.125 mg, Oral, 2 Times Daily      clopidogrel 75 MG tablet  Commonly known as: PLAVIX   75 mg, Oral, Daily      coenzyme Q10 100 MG capsule   100 mg, Oral, Daily      escitalopram 20 MG tablet  Commonly known as: LEXAPRO   10 mg, Oral, 2 Times Daily      memantine 10 MG tablet  Commonly known as: NAMENDA   10 mg, Oral, 2 Times Daily      Multi-Vitamin Gummies chewable tablet   1 tablet/day, Oral, Daily               No Known Allergies      Discharge Disposition:   Home-Health Care [facility]    Diet:  Hospital:  Diet Order   Procedures    Diet: Cardiac Diets; Healthy Heart (2-3 Na+); Texture: Regular Texture (IDDSI 7); Fluid Consistency: Thin (IDDSI 0)         Discharge Activity:         CODE STATUS:  Code Status and Medical Interventions:   Ordered at: 10/02/23 1320     Code Status (Patient has no pulse and is not breathing):    CPR (Attempt to Resuscitate)     Medical Interventions (Patient has pulse or is breathing):    Full Support         Future Appointments   Date Time Provider Department Center   11/21/2023  2:45 PM Flor Macias APRN [facility] ORTHO [facility]   1/18/2024  2:30 PM Nathalia Irving APRN [facility] PC FLK[facility]   3/15/2024 11:00 AM Gaurav Saravia MD [facility] CAR NA [facility]   8/6/2024  1:45 PM Seipel, Joseph F, MD [facility] NEURO [facility]       Additional Instructions for the Follow-ups that You Need to Schedule       Ambulatory Referral to Home Health (Hospital)   As directed      Face to Face Visit Date: 10/6/2023   Follow-up provider for Plan of Care?: I treated the patient in an acute care facility and will not continue treatment after discharge.   Follow-up provider: BELGICA IRVING [[id number]]   Reason/Clinical Findings: Physical deconditioning   Describe mobility limitations that make leaving home difficult: Physical deconditioning   Nursing/Therapeutic Services  Requested: Physical Therapy Occupational Therapy   PT orders: Therapeutic exercise Gait Training Transfer training Strengthening Home safety assessment   Weight Bearing Status: As Tolerated   Frequency: 1 Week 1                Time spent on Discharge including face to face service:  35 minutes      Signature: Electronically signed by Eryn Julian MD, 10/06/23, 17:10 EDT.  [facility] Hospitalist Team

## 2023-10-06 NOTE — CASE MANAGEMENT/SOCIAL WORK
Continued Stay Note  MARICEL William     Patient Name: Jennifer Blackmon  MRN: [id number]  Today's Date: 10/6/2023    Admit Date: 10/2/2023    Plan: Accepted at [facility] started 10/4.  PASRR approved.  P2P requested.   Discharge Plan       Row Name 10/06/23 1026       Plan    Plan Accepted at [facility] started 10/4.  PASRR approved.  P2P requested.    Plan Comments CM received notfication that [facility] was requested a P2P prior to determination of precert.  ph. [phone] option #5.  Deadline 12:30EST.  pending auth # [id number].  Notified [facility] in  who arranged P2P with Dr. Loco.  Pending response.                      Expected Discharge Date and Time       Expected Discharge Date Expected Discharge Time    Oct 6, 2023               Tanya Cedeno RN

## 2023-10-06 NOTE — CASE MANAGEMENT/SOCIAL WORK
Continued Stay Note  [facility]     Patient Name: Jennifer Blackmon  MRN: [id number]  Today's Date: 10/6/2023    Admit Date: 10/2/2023    Plan: home with spouse and [facility]- accepted to start next week.  order in.  insurance denied snf   Discharge Plan       Row Name 10/06/23 1622       Plan    Plan home with spouse and [facility]- accepted to start next week.  order in.  insurance denied snf      Row Name 10/06/23 1536       Plan    Plan Comments [facility] is sending insurance in for verification.  pending final response.  no updated from [employer] at this time.      Row Name 10/06/23 1508       Plan    Plan home with spouse: HH referral pending to [facility]- orders in.  Insurance denied SNF.      Row Name 10/06/23 1406       Plan    Plan home with spouse.  [facility] pending acceptance.  order in.  Ins denied SNF    Post Acute Provider Quality and Resource List Home Health    Delivered To Patient    Method of Delivery In person    Plan Comments CM met with pt and spouse; informed them of the completed P2P and the insurance denial of SNF.  pt and spouse agreed that she has improved and are agreeable to go home.  Discussed HH: they are agreeable pending insurance coverage.  stated that they had hh- uncertain of company when reviewed list.  no preference.  referral sent to [facility]. pending response.  updated nursing and md.  obtained HH order.                   Discharge Codes    No documentation.                 Expected Discharge Date and Time       Expected Discharge Date Expected Discharge Time    Oct 6, 2023               Tanya Cedeno, RN

## 2023-10-06 NOTE — CASE MANAGEMENT/SOCIAL WORK
Continued Stay Note  [facility]     Patient Name: Jennifer Blackmon  MRN: [id number]  Today's Date: 10/6/2023    Admit Date: 10/2/2023    Plan: home with spouse: HH referral pending to [facility]- orders in.  Insurance denied SNF.   Discharge Plan       Row Name 10/06/23 1536       Plan    Plan Comments [facility] is sending insurance in for verification.  pending final response.  no updated from [employer] at this time.      Row Name 10/06/23 1508       Plan    Plan home with spouse: HH referral pending to [facility]- orders in.  Insurance denied SNF.      Row Name 10/06/23 1406       Plan    Plan home with spouse.  [facility] pending acceptance.  order in.  Ins denied SNF    Post Acute Provider Quality and Resource List Home Health    Delivered To Patient    Method of Delivery In person    Plan Comments CM met with pt and spouse; informed them of the completed P2P and the insurance denial of SNF.  pt and spouse agreed that she has improved and are agreeable to go home.  Discussed HH: they are agreeable pending insurance coverage.  stated that they had hh- uncertain of company when reviewed list.  no preference.  referral sent to [facility]. pending response.  updated nursing and md.  obtained HH order.                   Discharge Codes    No documentation.                 Expected Discharge Date and Time       Expected Discharge Date Expected Discharge Time    Oct 6, 2023               Tanya Cedeno RN

## 2023-10-06 NOTE — PLAN OF CARE
Goal Outcome Evaluation:         Jennifer Blackmon presents with functional mobility impairments which indicate the need for skilled intervention. Tolerating session today without incident. Pt is progressing with mobility but still requires min A due to decreased balance. She is unsafe to return home at this time and would benefit from SNF for PT at discharge. Will continue to follow and progress as tolerated.

## 2023-10-06 NOTE — THERAPY TREATMENT NOTE
"Subjective: Pt agreeable to therapeutic plan of care. Pt is confused as to current situation but willing to participate.    Objective:     Bed mobility - CGA  Transfers - Min-A  Ambulation - 30 feet x 2 with RW and Min A; Pt with extremely narrow CANDY, occasionally crossing midline. Pt Pushes with LUE in extension causing RW to veer L and requires assist to correct.    Therapeutic Exercise - Seated hip ABD with tactile cues x 10 BLEs, seated isometric cerv extension and lateral flex for muscle soreness.    NMR: performed standing lateral step outs with tactile cues to knee to maintain upright and knee extension; performed reaching forward outside of base with min A and tactile cues to maintain upright and wt shift    Vitals: WNL    Pain: 0 VAS   Location: n/a  Intervention for pain: N/A    Education: Transfer Training and Gait Training; exercises; standing postural alignment    Assessment: Jennifer Blackmon presents with functional mobility impairments which indicate the need for skilled intervention. Tolerating session today without incident. Pt is progressing with mobility but still requires min A due to decreased balance. She is unsafe to return home at this time and would benefit from SNF for PT at discharge. Will continue to follow and progress as tolerated.     Plan/Recommendations:   Moderate Intensity Therapy recommended post-acute care. This is recommended as therapy feels the patient would require 3-4 days per week and wouldn't tolerate \"3 hour daily\" rehab intensity. SNF would be the preferred choice. If the patient does not agree to SNF, arrange HH or OP depending on home bound status. If patient is medically complex, consider LTACH.. Pt requires no DME at discharge.     Pt desires Skilled Rehab placement at discharge. Pt cooperative; agreeable to therapeutic recommendations and plan of care.         Basic Mobility 6-click:  Rollin = Total, A lot = 2, A little = 3; 4 = None  Supine>Sit:   1 = " Total, A lot = 2, A little = 3; 4 = None   Sit>Stand with arms:  1 = Total, A lot = 2, A little = 3; 4 = None  Bed>Chair:   1 = Total, A lot = 2, A little = 3; 4 = None  Ambulate in room:  1 = Total, A lot = 2, A little = 3; 4 = None  3-5 Steps with railin = Total, A lot = 2, A little = 3; 4 = None  Score: 19    Modified [place]: 4 = Moderately severe disability (Unable to attend to own bodily needs without assistance, and unable to walk unassisted)     Post-Tx Position: Up in Chair, Alarms activated, and Call light and personal items within reach  PPE: gloves

## 2023-10-06 NOTE — THERAPY TREATMENT NOTE
"Subjective: Pt agreeable to therapeutic plan of care.  Cognition: arousal/alertness: Alert and Distracted    Objective:     Bed Mobility: SBA   Functional Transfers: CGA     Balance: dynamic, with UE support, and standing CGA  Functional Ambulation: N/A or Not attempted.    Grooming: Supervision  ADL Position: supported sitting  ADL Comments: Pt brushed teeth and hair with set up assist. Pt needed help opening mouthwash and toothpaste.     Vitals: WNL    Pain: 0 VAS  Location: NA  Interventions for pain: N/A  Education: Provided education on the importance of mobility in the acute care setting      Assessment: Jennifer Blackmon presents with ADL impairments affecting function including balance, cognition, coordination, endurance / activity tolerance, and strength. Pt pleasant and cooperative. Pt needed cuing for attention to task and initiation. Demonstrated functioning below baseline abilities indicate the need for continued skilled intervention while inpatient. Tolerating session today without incident. Will continue to follow and progress as tolerated.     Plan/Recommendations:   Moderate Intensity Therapy recommended post-acute care. This is recommended as therapy feels the patient would require 3-4 days per week and wouldn't tolerate \"3 hour daily\" rehab intensity. SNF would be the preferred choice. If the patient does not agree to SNF, arrange HH or OP depending on home bound status. If patient is medically complex, consider LTACH.. Pt requires no DME at discharge.     Pt desires Skilled Rehab placement at discharge. Pt cooperative; agreeable to therapeutic recommendations and plan of care.     Modified [place]: N/A = No pre-op stroke/TIA    Post-Tx Position: Up in Chair, Alarms activated, and Call light and personal items within reach  PPE: gloves   "

## 2023-10-06 NOTE — DISCHARGE PLACEMENT REQUEST
"Re Blackmon (73 y.o. Female)       Date of Birth   1950    Social Security Number       Address   [address] [place] [address] [place] [facility]    Home Phone   [phone]    MRN   [id number]       Sabianist   None    Marital Status                               Admission Date   10/2/23    Admission Type   Emergency    Admitting Provider   Katy Mcmullen MD    Attending Provider   Eryn Julian MD    Department, Room/Bed   [facility],        Discharge Date       Discharge Disposition       Discharge Destination                                 Attending Provider: Eryn Julian MD    Allergies: No Known Allergies    Isolation: None   Infection: None   Code Status: CPR    Ht: 167.6 cm (66\")   Wt: 55.6 kg (122 lb 9.2 oz)    Admission Cmt: None   Principal Problem: Generalized weakness [R53.1]                   Active Insurance as of 10/2/2023       Primary Coverage       Payor Plan Insurance Group Employer/Plan Group    HUMANA MEDICARE REPLACEMENT HUMANA MEDICARE REPLACEMENT [id number]       Payor Plan Address Payor Plan Phone Number Payor Plan Fax Number Effective Dates    [address] [address] [phone]  2018 - None Entered    [facility] [address]         Subscriber Name Subscriber Birth Date Member ID       RE BLACKMON 1950 [id number]                     Emergency Contacts        (Rel.) Home Phone Work Phone Mobile Phone    ROMEO BLACKMON (Spouse) -- -- [phone]                 History & Physical        Beatris Rice PA-C at 10/02/23 1032       Attestation signed by Luiz Hoffmann MD at 10/02/23 2378    I have reviewed this documentation and agree.                      [facility] Medicine Services  History & Physical    Patient Name: Re Blackmon  : 1950  MRN: [id number]  Primary Care Physician:  Nathalia Maria APRN  Date of admission: 10/2/2023      Subjective      Chief Complaint: legs shaky and " weak    History of Present Illness: Jennifer Blackmon is a 73 y.o. female with a past medical history to include CAD, hypertension, hyperlipidemia, depression and dementia who presented to [facility] on 10/2/2023 complaining of legs shaky and weak. Patient reports she went to bed as per usual last night and woke up in the middle of the night and felt too weak to stand.  Patient reports she broke her hip in May and has been convalescing at home with her  and 2 grandkids who often help her ambulate.  Patient does have dementia and is not a reliable source of information.  Reports that she does ambulate with a walker but not very well.  Patient was in her room alone during my visit.  She was alert and oriented x4 but some of her answers were questionable.  Patient states she has had no fever, cough or congestion.  She denies any chest pain or shortness of air.  She denies any abdominal pain, constipation or diarrhea.  She denies any urinary symptoms.  She denies having any unilateral weakness, visual disturbances or speech difficulty.  We have been asked to this patient for further evaluation and treatment.       ROS 12 point ROS reviewed and negative except as mentioned above     Personal History     Past Medical History:   Diagnosis Date    Allergic rhinitis     Basal cell carcinoma (BCC) of nostril     Coronary artery disease involving native coronary artery of native heart with angina pectoris 06/18/2019    Status post successful PCI of the coronary stent deployment to high-grade RCA stenosis after presenting with ST segment elevated microinfarction in May 2019    DDD (degenerative disc disease), lumbar     Depression     Depression     Essential hypertension 06/18/2019    Femoral neck fracture 05/26/2023    Heart attack     6/2/2019    Hyperlipidemia     Hyperlipidemia, mixed 08/13/2019    Hypertension     Insomnia     Myocardial infarction less than 4 weeks ago 06/18/2019    Presented initially with  ST segment elevated microinfarction treated emergently in [place] May 2019    Old MI (myocardial infarction) 2019    Presented initially with ST segment elevated microinfarction treated emergently in [place] May 2019    Osteoarthritis     Toenail fungus        Past Surgical History:   Procedure Laterality Date    ADENOIDECTOMY      Adenoids removed    APPENDECTOMY      AUGMENTATION MAMMAPLASTY      BREAST IMPLANT SURGERY  1984    CATARACT EXTRACTION Bilateral 2017    CERVICAL DISC SURGERY      ruptured and removed      SECTION      CHEST SURGERY      attempted pectus repair     EYE SURGERY  1997    AK    HYSTERECTOMY      KNEE ARTHROSCOPY Right     LUMBAR DISC SURGERY      ruptured and removed     NASAL SEPTAL RECONSTRUCTION      PECTUS CARNATUM REPAIR      attempted    RECTAL SURGERY      Repair    REFRACTIVE SURGERY      RK/AK both eyes     REFRACTIVE SURGERY      SOMNOPLASTY RADIAL FREQUENCY ABLATION      TONSILLECTOMY      TOTAL HIP ARTHROPLASTY Left 2023    Procedure: TOTAL HIP ARTHROPLASTY ANTERIOR;  Surgeon: Josiah Menon MD;  Location: [facility] MAIN OR;  Service: Orthopedics;  Laterality: Left;    UVULOPALATOPHARYNGOPLASTY         Family History: family history includes ADD / ADHD in her daughter; Alcohol abuse in her brother; Alzheimer's disease in her mother; Anxiety disorder in her brother; Bipolar disorder in her daughter; Depression in her brother; HIV in her daughter; Heart attack in her brother and sister; Leukemia in her father; No Known Problems in her brother; Other in her daughter, father, and mother. Otherwise pertinent FHx was reviewed and not pertinent to current issue.    Social History:  reports that she quit smoking about 51 years ago. Her smoking use included cigarettes. She has a 2.00 pack-year smoking history. She has never used smokeless tobacco. She reports that she does not drink alcohol and does not use drugs.    Home Medications:  Prior to  Admission Medications       Prescriptions Last Dose Informant Patient Reported? Taking?    atorvastatin (LIPITOR) 40 MG tablet   No No    Take 0.5 tablets by mouth Daily.    buPROPion XL (WELLBUTRIN XL) 150 MG 24 hr tablet   No No    Take 3 tablets by mouth Every Morning.    Calcium Citrate-Vitamin D 500-400 MG-UNIT chewable tablet   Yes No    Chew Daily.    carvedilol (COREG) 3.125 MG tablet   No No    Take 1 tablet by mouth 2 (Two) Times a Day.    clopidogrel (PLAVIX) 75 MG tablet   No No    Take 1 tablet by mouth Daily.    coenzyme Q10 100 MG capsule   Yes No    Take 1 capsule by mouth Daily.    escitalopram (LEXAPRO) 20 MG tablet   No No    TAKE ONE-HALF (1/2) TABLET TWICE A DAY    Melatonin 10 MG sublingual tablet   Yes No    Place 1 tablet under the tongue At Night As Needed.    memantine (NAMENDA) 10 MG tablet   No No    (Take the 5mg tab daily for one month) then take 10mg tab daily for one month then take one bid    memantine (NAMENDA) 5 MG tablet   No No    Take 1 tablet by mouth Daily. For one month    Multiple Vitamins-Minerals (Multi-Vitamin Gummies) chewable tablet   Yes No    Chew 1 tablet/day Daily.    oxyCODONE (ROXICODONE) 5 MG immediate release tablet   No No    Take 1 tablet by mouth Every 6 (Six) Hours As Needed for Severe Pain.    polyethylene glycol (MIRALAX) 17 g packet   No No    Take 17 g by mouth Daily As Needed (Use if senna-docusate is ineffective).    sennosides-docusate (PERICOLACE) 8.6-50 MG per tablet   No No    Take 2 tablets by mouth 2 (Two) Times a Day.              Allergies:  No Known Allergies    Objective      Vitals:   Temp:  [97.7 °F (36.5 °C)] 97.7 °F (36.5 °C)  Heart Rate:  [67-80] 76  Resp:  [16] 16  BP: (112-158)/(57-94) 116/94    Physical Exam  Constitutional:       General: She is not in acute distress.     Appearance: She is not ill-appearing.   HENT:      Head: Normocephalic and atraumatic.   Cardiovascular:      Rate and Rhythm: Normal rate and regular rhythm.       Pulses: Normal pulses.      Heart sounds: Normal heart sounds. No murmur heard.  Pulmonary:      Effort: Pulmonary effort is normal. No respiratory distress.      Breath sounds: Normal breath sounds.   Abdominal:      General: Bowel sounds are normal.      Palpations: Abdomen is soft.      Tenderness: There is no abdominal tenderness.   Musculoskeletal:         General: Normal range of motion.      Cervical back: Normal range of motion and neck supple.      Right lower leg: No edema.      Left lower leg: No edema.   Skin:     General: Skin is warm and dry.   Neurological:      Mental Status: She is alert and oriented to person, place, and time. Mental status is at baseline.   Psychiatric:         Mood and Affect: Mood normal.         Result Review    Result Review:  I have personally reviewed the results from the time of this admission to 10/2/2023 15:23 EDT and agree with these findings:  [x]  Laboratory  []  Microbiology  [x]  Radiology  [x]  EKG/Telemetry   [x]  Cardiology/Vascular   []  Pathology  []  Old records  []  Other:    In the emergency department, VSS.  CK 38.  Sodium 141.  Potassium 4.4.  Creatinine 1.06.  Glucose 103.  TSH 2.420.  WBC 10.30.  Hemoglobin 13.2.  UA positive, culture pending.    MRI atrophy and evidence of chronic small vessel ischemic insult.  No acute CVA identified.      Assessment & Plan        Active Hospital Problems:  Active Hospital Problems    Diagnosis     **Generalized weakness      Plan:       Generalized weakness   UTI   -Fall precautions  -PT/OT  -MRI atrophy and evidence of chronic small vessel ischemic insult.  No acute CVA identified.  -UA positive, culture pending  -Patient initiated on Rocephin, continue  -Continue to monitor  -Patient with recent history of left hip Fx 5/2023  -Case management for possible placement    HUGO on CKD 3a  -Creatinine 1.06  -Gentle hydration  -Avoid nephrotoxic agents    CAD   History of PCI 2019  -Continue home Plavix,  statin    Hypertension  -Continue carvedilol    Dementia, recent diagnosis  Depression  -Continue Namenda    Hyperlipidemia  -Continue statin therapy      DVT prophylaxis:  Mechanical DVT prophylaxis orders are present.    CODE STATUS:    Code Status (Patient has no pulse and is not breathing): CPR (Attempt to Resuscitate)  Medical Interventions (Patient has pulse or is breathing): Full Support    Admission Status:  I believe this patient meets observation  status.    I discussed the patient's findings and my recommendations with patient.    Signature: Electronically signed by Beatris Rice PA-C, 10/02/23, 15:23 EDT.  [facility] Hospitalist Team    Electronically signed by Luiz Hoffmann MD at 10/02/23 1548          Physician Progress Notes (last 24 hours)        Roshan Almodovar MD at 10/05/23 1651              [facility] Medicine Services   Daily Progress Note    Patient Name: Jennifer Blackmon  : 1950  MRN: [id number]  Primary Care Physician:  Nathalia Maria APRN  Date of admission: 10/2/2023  Date and Time of Service:  at       Subjective      Chief Complaint:     Patient Reports feeling better.  No chest pain shortness of breath lightheadedness.  Thinks possibly she could be dehydrated when presented.  Currently no lightheadedness or chest pain.  Feels back to normal.      Objective      Vitals:   Temp:  [97.4 °F (36.3 °C)-98.4 °F (36.9 °C)] 98.4 °F (36.9 °C)  Heart Rate:  [71-75] 74  Resp:  [14-16] 16  BP: (103-129)/(61-77) 108/70    Physical Exam  General: No acute distress  HEENT: Neck supple, normal oral mucosa, no masses, no lymphadenopathy  Lungs: Clear bilaterally, no wheezing, no crackles, no rhonchi. Equal excursions.   CV - Normal S1/S2, no murmur, regular rate and rhythm   Abdomen - Soft, nontender, nondistended, normal bowel sounds  Extremities - no edema, no erythema  Neuro - No focal weakness, normal sensation  Psych - Alert and oriented x3  Skin - no wounds or lesions.           Result Review    Result Review:  I have personally reviewed the results from the time of this admission to 10/5/2023 16:51 EDT and agree with these findings:  [x]  Laboratory  [x]  Microbiology  [x]  Radiology  [x]  EKG/Telemetry   [x]  Cardiology/Vascular   []  Pathology  [x]  Old records  []  Other:  Most notable findings include:     Wounds (last 24 hours)       LDA Wound       Row Name 10/05/23 0859             Wound 05/30/23 1128 Left anterior hip Incision    Wound - Properties Group Placement Date: 05/30/23  -TD Placement Time: 1128  -TD Side: Left  -TD Orientation: anterior  -TD Location: hip  -TD Primary Wound Type: Incision  -TD    Dressing Appearance open to air  -AH      Closure Open to air  -AH      Base pink  healed/scar  -AH      Retired Wound - Properties Group Placement Date: 05/30/23  -TD Placement Time: 1128  -TD Side: Left  -TD Orientation: anterior  -TD Location: hip  -TD Primary Wound Type: Incision  -TD    Retired Wound - Properties Group Date first assessed: 05/30/23  -TD Time first assessed: 1128  -TD Side: Left  -TD Location: hip  -TD Primary Wound Type: Incision  -TD              User Key  (r) = Recorded By, (t) = Taken By, (c) = Cosigned By      Initials Name Provider Type    Nati Prado LPN Licensed Nurse    Tracy Hale RN Registered Nurse                      Assessment & Plan      Brief Patient Summary:  Jennifer Blackmon is a 73 y.o. female who       atorvastatin, 20 mg, Oral, Daily  buPROPion XL, 450 mg, Oral, QAM  carvedilol, 3.125 mg, Oral, BID  clopidogrel, 75 mg, Oral, Daily  escitalopram, 10 mg, Oral, BID  heparin (porcine), 5,000 Units, Subcutaneous, Q12H  memantine, 10 mg, Oral, BID  sodium chloride, 10 mL, Intravenous, Q12H             Active Hospital Problems:  Active Hospital Problems    Diagnosis     **Generalized weakness      Plan:     Generalized weakness  - dehydration  - questionable cystitis  - improved with fluids.   - s/p 3 days Abx.  Culture negative. D/c Abx.   - PT/OT    H/o left hip Fx 5/2023  - PT/OT    Mild HUGO  - s/p fluids.      CAD   History of PCI 2019  -Continue home Plavix, statin     Hypertension  -Continue carvedilol     Dementia, recent diagnosis  Depression  -Continue Namenda     Hyperlipidemia  -Continue statin therapy     Patient medically ready for discharge once placement arranged. Awaiting precert     DVT prophylaxis:  Medical and mechanical DVT prophylaxis orders are present.    CODE STATUS:    Code Status (Patient has no pulse and is not breathing): CPR (Attempt to Resuscitate)  Medical Interventions (Patient has pulse or is breathing): Full Support      Disposition:  I expect patient to be discharged .    This patient has been  and discussed with . 10/05/23      Electronically signed by Roshan Almodovar MD, 10/05/23, 16:51 EDT.  [facility] Hospitalist Team               Electronically signed by Roshan Almodovar MD at 10/05/23 1651          Physical Therapy Notes (last 24 hours)        Alee Arizmendi, PTA at 10/05/23 1510  Version 1 of 1         Subjective: Pt agreeable to therapeutic plan of care.    Objective:     Bed mobility - Supervision supine to sit.  Pt very slow at task but if given time pt was able to complete   Transfers - Min-A, Assist x 2, and with rolling walker  Ambulation - 20 + 16 feet Min-A, Mod-A, and with rolling walker    Therapeutic Exercise - 10 Reps B LE AROM supported sitting / chair    Vitals: WNL    Pain: 0 VAS       Education: Provided education on the importance of mobility in the acute care setting, Verbal/Tactile Cues, Transfer Training, and Gait Training    Assessment: Jennifer Blackmon presents with functional mobility impairments which indicate the need for skilled intervention. Tolerating session today without incident. Pt was cooperative today but slow with her overall mobility. Pt exhibits fear of falling during gait training especially when approaching sitting surface.  Pt responds well  "to stopping and recovering and then proceed to the chair.  Will continue to follow and progress as tolerated.     Plan/Recommendations:   Moderate Intensity Therapy recommended post-acute care. This is recommended as therapy feels the patient would require 3-4 days per week and wouldn't tolerate \"3 hour daily\" rehab intensity. SNF would be the preferred choice.  Pt requires no DME at discharge.     Pt desires Skilled Rehab placement at discharge. Pt cooperative; agreeable to therapeutic recommendations and plan of care.     Basic Mobility 6-click:  Rollin = Total, A lot = 2, A little = 3; 4 = None  Supine>Sit:   1 = Total, A lot = 2, A little = 3; 4 = None   Sit>Stand with arms:  1 = Total, A lot = 2, A little = 3; 4 = None  Bed>Chair:   1 = Total, A lot = 2, A little = 3; 4 = None  Ambulate in room:  1 = Total, A lot = 2, A little = 3; 4 = None  3-5 Steps with railin = Total, A lot = 2, A little = 3; 4 = None  Score: 14    Modified [place]: 4 = Moderately severe disability (Unable to attend to own bodily needs without assistance, and unable to walk unassisted)     Post-Tx Position: Up in Chair, Alarms activated, and Call light and personal items within reach  pt's  present  PPE: gloves        Electronically signed by Alee Arizmendi, PTA at 10/05/23 1709       Alee Arizmendi, PTA at 10/05/23 1510  Version 1 of 1         Goal Outcome Evaluation:     Bed mobility - Supervision supine to sit.  Pt very slow at task but if given time pt was able to complete   Transfers - Min-A, Assist x 2, and with rolling walker  Ambulation - 20 + 16 feet Min-A, Mod-A, and with rolling walker    Therapeutic Exercise - 10 Reps B LE AROM supported sitting / chair    Moderate Intensity Therapy recommended post-acute care. This is recommended as therapy feels the patient would require 3-4 days per week and wouldn't tolerate \"3 hour daily\" rehab intensity. SNF would be the preferred choice.  Pt requires no DME at " "discharge.     Pt desires Skilled Rehab placement at discharge. Pt cooperative; agreeable to therapeutic recommendations and plan of care.                        Electronically signed by Alee Arizmendi, PTA at 10/05/23 1709       Kailey Patel, PT at 10/06/23 1001  Version 1 of 1         Subjective: Pt agreeable to therapeutic plan of care. Pt is confused as to current situation but willing to participate.    Objective:     Bed mobility - CGA  Transfers - Min-A  Ambulation - 30 feet x 2 with RW and Min A; Pt with extremely narrow CANDY, occasionally crossing midline. Pt Pushes with LUE in extension causing RW to veer L and requires assist to correct.    Therapeutic Exercise - Seated hip ABD with tactile cues x 10 BLEs, seated isometric cerv extension and lateral flex for muscle soreness.    NMR: performed standing lateral step outs with tactile cues to knee to maintain upright and knee extension; performed reaching forward outside of base with min A and tactile cues to maintain upright and wt shift    Vitals: WNL    Pain: 0 VAS   Location: n/a  Intervention for pain: N/A    Education: Transfer Training and Gait Training; exercises; standing postural alignment    Assessment: Jennifer Blackmon presents with functional mobility impairments which indicate the need for skilled intervention. Tolerating session today without incident. Pt is progressing with mobility but still requires min A due to decreased balance. She is unsafe to return home at this time and would benefit from SNF for PT at discharge. Will continue to follow and progress as tolerated.     Plan/Recommendations:   Moderate Intensity Therapy recommended post-acute care. This is recommended as therapy feels the patient would require 3-4 days per week and wouldn't tolerate \"3 hour daily\" rehab intensity. SNF would be the preferred choice. If the patient does not agree to SNF, arrange HH or OP depending on home bound status. If patient is medically " complex, consider LTACH.. Pt requires no DME at discharge.     Pt desires Skilled Rehab placement at discharge. Pt cooperative; agreeable to therapeutic recommendations and plan of care.         Basic Mobility 6-click:  Rollin = Total, A lot = 2, A little = 3; 4 = None  Supine>Sit:   1 = Total, A lot = 2, A little = 3; 4 = None   Sit>Stand with arms:  1 = Total, A lot = 2, A little = 3; 4 = None  Bed>Chair:   1 = Total, A lot = 2, A little = 3; 4 = None  Ambulate in room:  1 = Total, A lot = 2, A little = 3; 4 = None  3-5 Steps with railin = Total, A lot = 2, A little = 3; 4 = None  Score: 19    Modified [place]: 4 = Moderately severe disability (Unable to attend to own bodily needs without assistance, and unable to walk unassisted)     Post-Tx Position: Up in Chair, Alarms activated, and Call light and personal items within reach  PPE: gloves       Electronically signed by Kailey Patel PT at 10/06/23 1025       Kailey Patel PT at 10/06/23 1025  Version 1 of 1         Goal Outcome Evaluation:         Jennifer Blackmon presents with functional mobility impairments which indicate the need for skilled intervention. Tolerating session today without incident. Pt is progressing with mobility but still requires min A due to decreased balance. She is unsafe to return home at this time and would benefit from SNF for PT at discharge. Will continue to follow and progress as tolerated.                  Electronically signed by Kailey Patel PT at 10/06/23 1025       Occupational Therapy Notes (last 24 hours)  Notes from 10/05/23 1326 through 10/06/23 1326   No notes exist for this encounter.

## 2023-10-06 NOTE — PLAN OF CARE
Goal Outcome Evaluation:  Plan of Care Reviewed With: patient, spouse        Progress: no change  Patient will discharge home with  due to insurance denying her to go to IP rehab. CM trying to get [facility].  will transport. Education given to patient and . Patient told about making follow up appt with PCP

## 2023-10-06 NOTE — OUTREACH NOTE
Prep Survey      Flowsheet Row Responses   [facility] facility patient discharged from? [facility]   Is LACE score < 7 ? No   Eligibility [facility]   Date of Admission 10/02/23   Date of Discharge 10/06/23   Discharge Disposition Home-Health Care Svc   Discharge diagnosis Generalized weakness-Urinary tract infection   Does the patient have one of the following disease processes/diagnoses(primary or secondary)? Other   Does the patient have Home health ordered? Yes   What is the Home health agency?  [facility]   Is there a DME ordered? No   Prep survey completed? Yes            KATRIN BUCKNER - Registered Nurse

## 2023-10-06 NOTE — PLAN OF CARE
"Goal Outcome Evaluation:  Plan of Care Reviewed With: patient           Assessment: Jennifer Blackmon presents with ADL impairments affecting function including balance, cognition, coordination, endurance / activity tolerance, and strength. Pt pleasant and cooperative. Pt needed cuing for attention to task and initiation. Demonstrated functioning below baseline abilities indicate the need for continued skilled intervention while inpatient. Tolerating session today without incident. Will continue to follow and progress as tolerated.      Plan/Recommendations:   Moderate Intensity Therapy recommended post-acute care. This is recommended as therapy feels the patient would require 3-4 days per week and wouldn't tolerate \"3 hour daily\" rehab intensity. SNF would be the preferred choice. If the patient does not agree to SNF, arrange HH or OP depending on home bound status. If patient is medically complex, consider LTACH.. Pt requires no DME at discharge.   "

## 2023-10-06 NOTE — PLAN OF CARE
Goal Outcome Evaluation:              Outcome Evaluation: Pt has had no c/o pain this shift, awaiting pre cert to [place], vss, call light in reach, plan of care ongoing

## 2023-10-06 NOTE — CASE MANAGEMENT/SOCIAL WORK
Continued Stay Note   [facility]     Patient Name: Jennifer Blackmon  MRN: [id number]  Today's Date: 10/6/2023    Admit Date: 10/2/2023    Plan: home with spouse.  [facility] pending acceptance.  order in.  Ins denied SNF   Discharge Plan       Row Name 10/06/23 1406       Plan    Plan home with spouse.  [facility] pending acceptance.  order in.  Ins denied SNF    Post Acute Provider Quality and Resource List Home Health    Delivered To Patient    Method of Delivery In person    Plan Comments CM met with pt and spouse; informed them of the completed P2P and the insurance denial of SNF.  pt and spouse agreed that she has improved and are agreeable to go home.  Discussed HH: they are agreeable pending insurance coverage.  stated that they had hh- uncertain of company when reviewed list.  no preference.  referral sent to [facility]. pending response.  updated nursing and md.  obtained HH order.                      Expected Discharge Date and Time       Expected Discharge Date Expected Discharge Time    Oct 6, 2023               Tanya Cedeno, RN

## 2023-10-06 NOTE — CASE MANAGEMENT/SOCIAL WORK
Continued Stay Note  [facility]     Patient Name: Jennifer Blackmon  MRN: [id number]  Today's Date: 10/6/2023    Admit Date: 10/2/2023    Plan: home with spouse: HH referral pending to [facility]- orders in.  Insurance denied SNF.   Discharge Plan       Row Name 10/06/23 1508       Plan    Plan home with spouse: HH referral pending to [facility]- orders in.  Insurance denied SNF.      Row Name 10/06/23 1406       Plan    Plan home with spouse.  [facility] pending acceptance.  order in.  Ins denied SNF    Post Acute Provider Quality and Resource List Home Health    Delivered To Patient    Method of Delivery In person    Plan Comments CM met with pt and spouse; informed them of the completed P2P and the insurance denial of SNF.  pt and spouse agreed that she has improved and are agreeable to go home.  Discussed HH: they are agreeable pending insurance coverage.  stated that they had hh- uncertain of company when reviewed list.  no preference.  referral sent to [facility]. pending response.  updated nursing and md.  obtained HH order.                      Expected Discharge Date and Time       Expected Discharge Date Expected Discharge Time    Oct 6, 2023               Tanya Cedeno, RN

## 2023-10-07 LAB
BACTERIA SPEC AEROBE CULT: NORMAL
BACTERIA SPEC AEROBE CULT: NORMAL

## 2023-10-07 NOTE — CASE MANAGEMENT/SOCIAL WORK
Case Management Discharge Note      Final Note: home with [facility]    Provided Post Acute Provider List?: Yes  Post Acute Provider List: Nursing Home  Provided Post Acute Provider Quality & Resource List?: Yes  Post Acute Provider Quality and Resource List: Home Health  Delivered To: Patient  Method of Delivery: In person    Selected Continued Care - Discharged on 10/6/2023 Admission date: 10/2/2023 - Discharge disposition: Home-Health Care [facility] [place] [facility]          Home Medical Care       Service Provider Selected Services Address Phone Fax Patient Preferred    [facility] [address] [place], [place] [id number] [phone] --       Internal Comment last updated by Tanya Cedeno, RN 10/6/2023 1622    To start next week                              Transportation Services  Private: Car    Final Discharge Disposition Code: 06 - home with home health care

## 2023-10-08 PROBLEM — E78.2 HYPERLIPIDEMIA, MIXED: Status: RESOLVED | Noted: 2019-08-13 | Resolved: 2023-10-08

## 2023-10-09 ENCOUNTER — TRANSITIONAL CARE MANAGEMENT TELEPHONE ENCOUNTER (OUTPATIENT)
Dept: CALL CENTER | Facility: HOSPITAL | Age: 73
End: 2023-10-09
Payer: MEDICARE

## 2023-10-09 NOTE — OUTREACH NOTE
"Call Center TCM Note      Flowsheet Row Responses   [facility] patient discharged from? [facility]   Does the patient have one of the following disease processes/diagnoses(primary or secondary)? Other   TCM attempt successful? Yes   Call start time 1642   Call end time 1645   Discharge diagnosis Generalized weakness-Urinary tract infection   Meds reviewed with patient/caregiver? Yes   Does the patient have all medications ordered at discharge? N/A   Is the patient taking all medications as directed (includes completed medication regime)? Yes   Does the patient have an appointment with their PCP within 7-14 days of discharge? No   Nursing Interventions Assisted patient with making appointment per protocol, Routed TCM call to PCP office   Has home health visited the patient within 72 hours of discharge? No   Home health interventions Other   Home health comments  states he wants to \"put home health on hold\" for now.   Psychosocial issues? No   Did the patient receive a copy of their discharge instructions? Yes   Nursing interventions Reviewed instructions with patient   What is the patient's perception of their health status since discharge? Improving   Is the patient/caregiver able to teach back signs and symptoms related to disease process for when to call PCP? Yes   Is the patient/caregiver able to teach back signs and symptoms related to disease process for when to call 911? Yes   Is the patient/caregiver able to teach back the hierarchy of who to call/visit for symptoms/problems? PCP, Specialist, Home health nurse, Urgent Care, ED, 911 Yes   If the patient is a current smoker, are they able to teach back resources for cessation? Not a smoker   TCM call completed? Yes   Call end time 1645   Would this patient benefit from a Referral to Amb Social Work? No   Is the patient interested in additional calls from an ambulatory ? No            Jacquelin Franco LPN    10/9/2023, 16:46 EDT        "

## 2023-10-09 NOTE — OUTREACH NOTE
Call Center TCM Note      Flowsheet Row Responses   [facility] facility patient discharged from? [facility]   Does the patient have one of the following disease processes/diagnoses(primary or secondary)? Other   TCM attempt successful? No   Unsuccessful attempts Attempt 1            Jacquelin Franco LPN    10/9/2023, 13:19 EDT

## 2023-10-11 ENCOUNTER — TRANSCRIBE ORDERS (OUTPATIENT)
Dept: ADMINISTRATIVE | Facility: HOSPITAL | Age: 73
End: 2023-10-11
Payer: MEDICARE

## 2023-10-11 ENCOUNTER — LAB (OUTPATIENT)
Dept: LAB | Facility: HOSPITAL | Age: 73
End: 2023-10-11
Payer: MEDICARE

## 2023-10-11 DIAGNOSIS — N18.30 STAGE 3 CHRONIC KIDNEY DISEASE, UNSPECIFIED WHETHER STAGE 3A OR 3B CKD: Primary | ICD-10-CM

## 2023-10-11 DIAGNOSIS — N18.30 STAGE 3 CHRONIC KIDNEY DISEASE, UNSPECIFIED WHETHER STAGE 3A OR 3B CKD: ICD-10-CM

## 2023-10-11 DIAGNOSIS — E55.9 AVITAMINOSIS D: ICD-10-CM

## 2023-10-11 LAB
25(OH)D3 SERPL-MCNC: 48.8 NG/ML (ref 30–100)
ANION GAP SERPL CALCULATED.3IONS-SCNC: 6 MMOL/L (ref 5–15)
BASOPHILS # BLD AUTO: 0.04 10*3/MM3 (ref 0–0.2)
BASOPHILS NFR BLD AUTO: 0.5 % (ref 0–1.5)
BUN SERPL-MCNC: 22 MG/DL (ref 8–23)
BUN/CREAT SERPL: 21 (ref 7–25)
CALCIUM SPEC-SCNC: 9.9 MG/DL (ref 8.6–10.5)
CHLORIDE SERPL-SCNC: 101 MMOL/L (ref 98–107)
CO2 SERPL-SCNC: 32 MMOL/L (ref 22–29)
CREAT SERPL-MCNC: 1.05 MG/DL (ref 0.57–1)
DEPRECATED RDW RBC AUTO: 43.7 FL (ref 37–54)
EGFRCR SERPLBLD CKD-EPI 2021: 55.5 ML/MIN/1.73
EOSINOPHIL # BLD AUTO: 0.2 10*3/MM3 (ref 0–0.4)
EOSINOPHIL NFR BLD AUTO: 2.6 % (ref 0.3–6.2)
ERYTHROCYTE [DISTWIDTH] IN BLOOD BY AUTOMATED COUNT: 13.1 % (ref 12.3–15.4)
GLUCOSE SERPL-MCNC: 168 MG/DL (ref 65–99)
HCT VFR BLD AUTO: 39.2 % (ref 34–46.6)
HGB BLD-MCNC: 12.8 G/DL (ref 12–15.9)
IMM GRANULOCYTES # BLD AUTO: 0.03 10*3/MM3 (ref 0–0.05)
IMM GRANULOCYTES NFR BLD AUTO: 0.4 % (ref 0–0.5)
LYMPHOCYTES # BLD AUTO: 1.55 10*3/MM3 (ref 0.7–3.1)
LYMPHOCYTES NFR BLD AUTO: 20.1 % (ref 19.6–45.3)
MAGNESIUM SERPL-MCNC: 2.4 MG/DL (ref 1.6–2.4)
MCH RBC QN AUTO: 29.6 PG (ref 26.6–33)
MCHC RBC AUTO-ENTMCNC: 32.7 G/DL (ref 31.5–35.7)
MCV RBC AUTO: 90.7 FL (ref 79–97)
MONOCYTES # BLD AUTO: 0.67 10*3/MM3 (ref 0.1–0.9)
MONOCYTES NFR BLD AUTO: 8.7 % (ref 5–12)
NEUTROPHILS NFR BLD AUTO: 5.23 10*3/MM3 (ref 1.7–7)
NEUTROPHILS NFR BLD AUTO: 67.7 % (ref 42.7–76)
NRBC BLD AUTO-RTO: 0 /100 WBC (ref 0–0.2)
PLATELET # BLD AUTO: 242 10*3/MM3 (ref 140–450)
PMV BLD AUTO: 9.9 FL (ref 6–12)
POTASSIUM SERPL-SCNC: 4.3 MMOL/L (ref 3.5–5.2)
RBC # BLD AUTO: 4.32 10*6/MM3 (ref 3.77–5.28)
SODIUM SERPL-SCNC: 139 MMOL/L (ref 136–145)
TSH SERPL DL<=0.05 MIU/L-ACNC: 1.39 UIU/ML (ref 0.27–4.2)
URATE SERPL-MCNC: 3.9 MG/DL (ref 2.4–5.7)
WBC NRBC COR # BLD: 7.72 10*3/MM3 (ref 3.4–10.8)

## 2023-10-11 PROCEDURE — 36415 COLL VENOUS BLD VENIPUNCTURE: CPT

## 2023-10-11 PROCEDURE — 83735 ASSAY OF MAGNESIUM: CPT

## 2023-10-11 PROCEDURE — 80048 BASIC METABOLIC PNL TOTAL CA: CPT

## 2023-10-11 PROCEDURE — 84443 ASSAY THYROID STIM HORMONE: CPT

## 2023-10-11 PROCEDURE — 85025 COMPLETE CBC W/AUTO DIFF WBC: CPT

## 2023-10-11 PROCEDURE — 82306 VITAMIN D 25 HYDROXY: CPT

## 2023-10-11 PROCEDURE — 84550 ASSAY OF BLOOD/URIC ACID: CPT

## 2023-10-23 ENCOUNTER — TELEPHONE (OUTPATIENT)
Dept: ORTHOPEDIC SURGERY | Facility: CLINIC | Age: 73
End: 2023-10-23

## 2023-10-23 RX ORDER — MEMANTINE HYDROCHLORIDE 10 MG/1
10 TABLET ORAL 2 TIMES DAILY
Qty: 60 TABLET | Refills: 2 | Status: SHIPPED | OUTPATIENT
Start: 2023-10-23

## 2023-10-23 NOTE — TELEPHONE ENCOUNTER
Caller: ROMEO CASTRO    Relationship: Emergency Contact    Best call back number: [phone]    Requested Prescriptions:   Requested Prescriptions     Pending Prescriptions Disp Refills    memantine (NAMENDA) 10 MG tablet       Sig: Take 1 tablet by mouth 2 (Two) Times a Day.        Pharmacy where request should be sent: [id number] - [facility] [place] [address] SEC OF [facility] [address] & [facility] - [phone] [place] [phone] FX     Last office visit with prescribing clinician: 8/2/2023   Last telemedicine visit with prescribing clinician: Visit date not found   Next office visit with prescribing clinician: 8/6/2024     Additional details provided by patient: HAS BEEN OUT OF THE MEDICATION FOR A COUPLE DAYS. PHARMACY HAS ONLY BEEN GIVING ENOUGH FOR PATIENT TO TAKE ONCE DAILY INSTEAD OF TWICE DAILY AS PRESCRIBED.    Does the patient have less than a 3 day supply:  [x] Yes  [] No    Would you like a call back once the refill request has been completed: [] Yes [x] No    If the office needs to give you a call back, can they leave a voicemail: [] Yes [x] No    Venus Joe Rep   10/23/23 13:57 EDT

## 2023-10-23 NOTE — TELEPHONE ENCOUNTER
Hub staff attempted to follow warm transfer process and was unsuccessful    Caller: HEIDI CASTRO    Relationship to patient: SPOUSE (ON  VERBAL)    Best call back number: [phone]    Patient is needing: PATIENT HAVING INCREASED PAIN IN BOTH HIPS (TROUBLE WALKING), WANTS TO HAVE APPT MOVED UP. APPT IS STATED AS INJECTION BUT NO BODY PART LISTED, NO INJECTION MENTIONED IN PREVIOUS OFFICE NOTES. [facility] ATTEMPTED TO WARM TRANSFER FOR CLARIFICATION, BUT WAS UNSUCCESSFUL. PLEASE CALL PATIENT'S  BACK TO SCHEDULE.

## 2023-10-30 NOTE — TELEPHONE ENCOUNTER
ASSUMED CARE ON PATIENT, IN BED RESTING, A AND O X 4, IN NO APPARENT DISTRESS, ON RA, DENIED PAIN. V/S CHECKED, BP LOW, MAP 66 MMHG, TELE ON AND HEART RHYTHM AND RATE CHECKED ON MONITOR.    SISTER AT BEDSIDE, PLAN OF CARE DISCUSSED, MONITOR BP, LAB VALUES, IV ABX AND MAINTAIN SAFETY. SAFETY PRECAUTIONS ARE NON SKID SOCKS ON, SIDE RAILS UP X 3, BED TO LOWEST LEVEL AND BED ALARM IS ON, INSTRUCTED TO ALWAYS USE THE CALL LIGHT FOR ASSISTANCE. THEY BOTH VERBALIZED UNDERSTANDING, WILL ROUND HOURLY AND AS NEEDED.   Caller: Jennifer Blackmon    Relationship: Self    Best call back number: [phone]     Requested Prescriptions:   Requested Prescriptions     Pending Prescriptions Disp Refills   • buPROPion XL (WELLBUTRIN XL) 150 MG 24 hr tablet 270 tablet 3   • carvedilol (COREG) 3.125 MG tablet 180 tablet 3     Sig: Take 1 tablet by mouth 2 (Two) Times a Day.        Pharmacy where request should be sent: [facility] - [address] [phone] [facility] [phone] FX     Additional details provided by patient: 1 1/2 WEEKS OF MEDICATION REMAINING     Does the patient have less than a 3 day supply:  [] Yes  [x] No    Michel Briscoe   01/11/22 14:38 EST

## 2023-11-07 ENCOUNTER — OFFICE VISIT (OUTPATIENT)
Dept: FAMILY MEDICINE CLINIC | Facility: CLINIC | Age: 73
End: 2023-11-07
Payer: MEDICARE

## 2023-11-07 VITALS
HEART RATE: 74 BPM | RESPIRATION RATE: 16 BRPM | HEIGHT: 66 IN | OXYGEN SATURATION: 98 % | SYSTOLIC BLOOD PRESSURE: 110 MMHG | BODY MASS INDEX: 19.13 KG/M2 | WEIGHT: 119 LBS | DIASTOLIC BLOOD PRESSURE: 64 MMHG

## 2023-11-07 DIAGNOSIS — R26.81 UNSTEADY GAIT: ICD-10-CM

## 2023-11-07 DIAGNOSIS — Z23 FLU VACCINE NEED: ICD-10-CM

## 2023-11-07 DIAGNOSIS — R53.1 WEAKNESS: Primary | ICD-10-CM

## 2023-11-07 DIAGNOSIS — N39.0 RECURRENT UTI: ICD-10-CM

## 2023-11-07 DIAGNOSIS — R29.6 FREQUENT FALLS: ICD-10-CM

## 2023-11-07 DIAGNOSIS — R39.11 URINARY HESITANCY: ICD-10-CM

## 2023-11-07 PROCEDURE — 1160F RVW MEDS BY RX/DR IN RCRD: CPT | Performed by: NURSE PRACTITIONER

## 2023-11-07 PROCEDURE — 1159F MED LIST DOCD IN RCRD: CPT | Performed by: NURSE PRACTITIONER

## 2023-11-07 PROCEDURE — 81001 URINALYSIS AUTO W/SCOPE: CPT | Performed by: NURSE PRACTITIONER

## 2023-11-07 PROCEDURE — 3074F SYST BP LT 130 MM HG: CPT | Performed by: NURSE PRACTITIONER

## 2023-11-07 PROCEDURE — 99214 OFFICE O/P EST MOD 30 MIN: CPT | Performed by: NURSE PRACTITIONER

## 2023-11-07 PROCEDURE — 90662 IIV NO PRSV INCREASED AG IM: CPT | Performed by: NURSE PRACTITIONER

## 2023-11-07 PROCEDURE — G0008 ADMIN INFLUENZA VIRUS VAC: HCPCS | Performed by: NURSE PRACTITIONER

## 2023-11-07 PROCEDURE — 3078F DIAST BP <80 MM HG: CPT | Performed by: NURSE PRACTITIONER

## 2023-11-07 RX ORDER — ATORVASTATIN CALCIUM 10 MG/1
1 TABLET, FILM COATED ORAL DAILY
COMMUNITY
Start: 2023-10-18

## 2023-11-07 NOTE — PROGRESS NOTES
"Chief Complaint  Hip Pain (S/p L hip replacement in May 2023.  C/o pain in that hip and states she feels like something is moving in the joint that shouldn't be.)  Subjective        Jennifer Blackmon presents to [facility]  History of Present Illness  Pt comes in today with c/o ongoing left hip pain. Had hip replacement in May.  She is with c/o weakness and decreased strength in nolan legs. Seems to have worsened over the past 1 month. Was in the hospital prior to this with UTI. Went to ER on 10/2 with c/o leg weakness and difficulty standing. Was diagnosed with UTI and admitted for about 5days. Would like to have UA checked again today  When she was in the hospital they were trying  to get her into a rehab facility, but insurance denied it.   is concerned about the inability to ambulate. In wheelchair now constantly over the past couple of days. Had been using walker with assistance. Has a bed side commode.   is primary caregiver.   Pt is unable to drive.  Has had a couple of falls in the past 1 month.   She is not having any current dysuria, but does c/o urgency and hesitancy. This has been ongoing.  states she will sit on toilet for 30 min or more at a time trying to go.       Hip Pain          Objective     Vital Signs:   /64   Pulse 74   Resp 16   Ht 167.6 cm (66\")   Wt 54 kg (119 lb)   SpO2 98%   BMI 19.21 kg/m²       BP Readings from Last 3 Encounters:   11/07/23 110/64   10/06/23 123/81   08/02/23 112/64       Wt Readings from Last 3 Encounters:   11/07/23 54 kg (119 lb)   10/02/23 55.6 kg (122 lb 9.2 oz)   08/09/23 59 kg (130 lb)     Physical Exam  Constitutional:       Appearance: She is well-developed.   Eyes:      Pupils: Pupils are equal, round, and reactive to light.   Cardiovascular:      Rate and Rhythm: Normal rate and regular rhythm.   Pulmonary:      Effort: Pulmonary effort is normal.      Breath sounds: Normal breath sounds. "   Neurological:      Mental Status: She is alert.   Psychiatric:         Behavior: Behavior is agitated.        Result Review :                 Assessment and Plan    Diagnoses and all orders for this visit:    1. Weakness (Primary)  -     Ambulatory Referral to Home Health    2. Flu vaccine need  -     Fluzone High-Dose 65+yrs ([id number])    3. Recurrent UTI  -     Urinalysis With Culture If Indicated - Urine, Clean Catch; Future    4. Unsteady gait  -     Ambulatory Referral to Home Health    5. Frequent falls  -     Ambulatory Referral to Home Health    6. Urinary hesitancy  -     Ambulatory Referral to Urology    Condition is worsening. Pt has been very sedentary and having more frequent falls.   Recent hospitalization caused worsening in condition.  Will refer to home health  Recheck UA/Cx   Referral to urology as well  Discussion with  and pt. She is more reluctant, but he wants to move forward with plan  During this office visit, we discussed the pertinent aspects of the visit and treatment recommendations. Pt verbalizes understanding. Follow up was discussed. Patient was given the opportunity to ask questions and discuss other concerns.     I spent 35 minutes caring for Jennifer on this date of service. This time includes time spent by me in the following activities:preparing for the visit, reviewing tests, obtaining and/or reviewing a separately obtained history, performing a medically appropriate examination and/or evaluation , counseling and educating the patient/family/caregiver, ordering medications, tests, or procedures, referring and communicating with other health care professionals , and documenting information in the medical record    Follow Up   Return in about 3 months (around 2/7/2024) for Medicare Wellness.  Patient was given instructions and counseling regarding her condition or for health maintenance advice. Please see specific information pulled into the AVS if appropriate.

## 2023-11-08 ENCOUNTER — LAB (OUTPATIENT)
Dept: FAMILY MEDICINE CLINIC | Facility: CLINIC | Age: 73
End: 2023-11-08
Payer: MEDICARE

## 2023-11-08 ENCOUNTER — HOME HEALTH ADMISSION (OUTPATIENT)
Dept: HOME HEALTH SERVICES | Facility: HOME HEALTHCARE | Age: 73
End: 2023-11-08
Payer: MEDICARE

## 2023-11-08 DIAGNOSIS — N39.0 RECURRENT UTI: ICD-10-CM

## 2023-11-08 LAB
BACTERIA UR QL AUTO: ABNORMAL /HPF
BILIRUB UR QL STRIP: NEGATIVE
CLARITY UR: CLEAR
COD CRY URNS QL: ABNORMAL /HPF
COLOR UR: YELLOW
GLUCOSE UR STRIP-MCNC: NEGATIVE MG/DL
HGB UR QL STRIP.AUTO: NEGATIVE
HOLD SPECIMEN: NORMAL
HYALINE CASTS UR QL AUTO: ABNORMAL /LPF
KETONES UR QL STRIP: NEGATIVE
LEUKOCYTE ESTERASE UR QL STRIP.AUTO: ABNORMAL
NITRITE UR QL STRIP: NEGATIVE
PH UR STRIP.AUTO: 6 [PH] (ref 5–8)
PROT UR QL STRIP: ABNORMAL
RBC # UR STRIP: ABNORMAL /HPF
REF LAB TEST METHOD: ABNORMAL
SP GR UR STRIP: 1.03 (ref 1–1.03)
SQUAMOUS #/AREA URNS HPF: ABNORMAL /HPF
UROBILINOGEN UR QL STRIP: ABNORMAL
WBC # UR STRIP: ABNORMAL /HPF

## 2023-11-15 ENCOUNTER — TELEPHONE (OUTPATIENT)
Dept: FAMILY MEDICINE CLINIC | Facility: CLINIC | Age: 73
End: 2023-11-15

## 2023-11-15 NOTE — TELEPHONE ENCOUNTER
Caller: [facility]    Best call back number: [phone]    Patient is needing:     WAS IN HOME FOR PT TODAY, AND IS CALLING TO REPORT TWO FALLS FROM YESTERDAY 11.14.23.    THERE SEEMS TO BE NO INJURIES, JUST SOME MINOR BRUISING.    PLEASE CALL FOR ANY QUESTIONS OR TO FOLLOW UP.

## 2023-11-17 ENCOUNTER — HOSPITAL ENCOUNTER (EMERGENCY)
Facility: HOSPITAL | Age: 73
Discharge: HOME | End: 2023-11-18
Attending: EMERGENCY MEDICINE
Payer: MEDICARE

## 2023-11-17 DIAGNOSIS — K59.00 CONSTIPATION, UNSPECIFIED CONSTIPATION TYPE: Primary | ICD-10-CM

## 2023-11-17 PROCEDURE — 99284 EMERGENCY DEPT VISIT MOD MDM: CPT

## 2023-11-18 VITALS
OXYGEN SATURATION: 95 % | HEART RATE: 72 BPM | DIASTOLIC BLOOD PRESSURE: 66 MMHG | HEIGHT: 66 IN | RESPIRATION RATE: 20 BRPM | SYSTOLIC BLOOD PRESSURE: 102 MMHG | TEMPERATURE: 98.2 F | WEIGHT: 118 LBS | BODY MASS INDEX: 18.96 KG/M2

## 2023-11-18 RX ORDER — POLYETHYLENE GLYCOL 3350 17 G/17G
17 POWDER, FOR SOLUTION ORAL DAILY
Qty: 30 PACKET | Refills: 0 | Status: SHIPPED | OUTPATIENT
Start: 2023-11-18 | End: 2023-12-18

## 2023-11-18 NOTE — ED PROVIDER NOTES
Subjective     Chief Complaint   Patient presents with    Constipation     Nathalia Maria APRN      History provided by:  Patient and spouse    Patient is a 73-year-old female presents the ED with complaint of constipation.  She does have a longstanding history of constipation, she reports that she maybe had a bowel movement yesterday, but was not normal.  She had an episode of vomiting yesterday, 1 today.  Vomiting after drinking milk of magnesia.  No fevers.  No Urinary symptoms.  Review of Systems   Constitutional:  Negative for chills and fever.   Respiratory:  Negative for shortness of breath.    Cardiovascular:  Negative for chest pain.   Gastrointestinal:  Positive for constipation and vomiting. Negative for abdominal pain and nausea.        Abdominal cramping   Genitourinary:  Negative for dysuria.   Musculoskeletal:  Negative for back pain and neck pain.       Past Medical History:   Diagnosis Date    Allergic rhinitis     Basal cell carcinoma (BCC) of nostril     Coronary artery disease involving native coronary artery of native heart with angina pectoris 06/18/2019    Status post successful PCI of the coronary stent deployment to high-grade RCA stenosis after presenting with ST segment elevated microinfarction in May 2019    DDD (degenerative disc disease), lumbar     Depression     Depression     Essential hypertension 06/18/2019    Femoral neck fracture 05/26/2023    Heart attack     6/2/2019    Hyperlipidemia     Hyperlipidemia, mixed 08/13/2019    Hypertension     Insomnia     Myocardial infarction less than 4 weeks ago 06/18/2019    Presented initially with ST segment elevated microinfarction treated emergently in [place] May 2019    Old MI (myocardial infarction) 06/18/2019    Presented initially with ST segment elevated microinfarction treated emergently in [place] May 2019    Osteoarthritis     Toenail fungus        No Known Allergies    Past Surgical History:   Procedure Laterality  Date    ADENOIDECTOMY      Adenoids removed    APPENDECTOMY      AUGMENTATION MAMMAPLASTY      BREAST IMPLANT SURGERY  1984    CATARACT EXTRACTION Bilateral 2017    CERVICAL DISC SURGERY      ruptured and removed      SECTION      CHEST SURGERY  1961    attempted pectus repair     EYE SURGERY  1997    AK    HYSTERECTOMY      KNEE ARTHROSCOPY Right     LUMBAR DISC SURGERY      ruptured and removed     NASAL SEPTAL RECONSTRUCTION      PECTUS CARNATUM REPAIR      attempted    RECTAL SURGERY      Repair    REFRACTIVE SURGERY      RK/AK both eyes     REFRACTIVE SURGERY      SOMNOPLASTY RADIAL FREQUENCY ABLATION      TONSILLECTOMY      TOTAL HIP ARTHROPLASTY Left 2023    Procedure: TOTAL HIP ARTHROPLASTY ANTERIOR;  Surgeon: Josiah Menon MD;  Location[facility] MAIN OR;  Service: Orthopedics;  Laterality: Left;    UVULOPALATOPHARYNGOPLASTY         Family History   Problem Relation Age of Onset    Alzheimer's disease Mother     Other Mother         CVA, acute myocardial infarction    Leukemia Father     Other Father         Parkinson's     Heart attack Sister     No Known Problems Brother     Bipolar disorder Daughter     Heart attack Brother     Alcohol abuse Brother     Anxiety disorder Brother     Depression Brother     ADD / ADHD Daughter     Other Daughter         alcohol and drug addiction    HIV Daughter        Social History     Socioeconomic History    Marital status:     Number of children: 2   Tobacco Use    Smoking status: Former     Packs/day: 0.50     Years: 4.00     Additional pack years: 0.00     Total pack years: 2.00     Types: Cigarettes     Quit date:      Years since quittin.9    Smokeless tobacco: Never   Vaping Use    Vaping Use: Never used   Substance and Sexual Activity    Alcohol use: No    Drug use: No    Sexual activity: Not Currently           Objective   Physical Exam  Vitals and nursing note reviewed.   Constitutional:       Appearance: Normal appearance.  "She is not ill-appearing or toxic-appearing.   HENT:      Head: Normocephalic and atraumatic.      Nose: Nose normal.      Mouth/Throat:      Mouth: Mucous membranes are moist.      Pharynx: Oropharynx is clear.   Eyes:      Extraocular Movements: Extraocular movements intact.      Conjunctiva/sclera: Conjunctivae normal.      Pupils: Pupils are equal, round, and reactive to light.   Cardiovascular:      Rate and Rhythm: Normal rate and regular rhythm.      Heart sounds: Normal heart sounds. No murmur heard.     No friction rub. No gallop.   Pulmonary:      Effort: Pulmonary effort is normal.      Breath sounds: Normal breath sounds.   Abdominal:      General: Bowel sounds are normal. There is no distension.      Palpations: Abdomen is soft.      Tenderness: There is no abdominal tenderness. There is no guarding or rebound.      Comments: No vomiting on exam   Musculoskeletal:         General: Normal range of motion.      Cervical back: Normal range of motion and neck supple.   Skin:     General: Skin is warm and dry.      Capillary Refill: Capillary refill takes less than 2 seconds.   Neurological:      General: No focal deficit present.      Mental Status: She is alert and oriented to person, place, and time.         Procedures           ED Course  ED Course as of 11/18/23 0048   Sat Nov 18, 2023   0041 patient had bowel movement.  She reports her pain is much improved and she feels much better than when she initially arrived in the ED.  She would like to go home   [LB]      ED Course User Index  [LB] Kate Rodriguez, APRN                BP 97/64   Pulse 72   Temp 98.2 °F (36.8 °C)   Resp 20   Ht 167.6 cm (66\")   Wt 53.5 kg (118 lb)   SpO2 95%   BMI 19.05 kg/m²   Medications - No data to display  No radiology results for the last day  Lab Results (last 24 hours)       ** No results found for the last 24 hours. **                                     Medical Decision Making  Problems " Addressed:  Constipation, unspecified constipation type: acute illness or injury      73-year-old female with a history of constipation, presents the ED with report of being constipated associated abdominal cramping over the past couple of days.  Last movement 2 days ago.  Patient had 2 episodes of vomiting, after milk of magnesia.  No further episodes.  Differential diagnoses considered for patient presentation, this list is not all inclusive of diagnoses considered: She has benign nontender abdominal exam here in the ED.  Good bowel sounds.  No further episodes of emesis, and she is tolerating Ensure.  She was given an enema and had a bowel movement.  She reports that she feels significantly improved than when she arrived.  The patient is safe to discharge from the ED, and I will start her on MiraLAX for home    I spoke with the patient at the bedside regarding their plan of care, discharge instruction,  prescriptions, as well as reasons to return to the emergency department.  We discussed test results at the bedside, including incidental abnormal labs, radiological findings, understands need and importance  for follow-up with primary care or specialist if indicated.  Patient verbalizes understanding and agrees to the treatment plan at this time.         Note Disclaimer: At [facility], we believe that sharing information builds trust and better relationships. You are receiving this note because you recently visited [facility]. It is possible you will see health information before a provider has talked with you about it. This kind of information can be easy to misunderstand. To help you fully understand what it means for your health, we urge you to discuss this note with your provider.Note dictated utilizing Dragon Dictation. Appropriate PPE worn during patient interactions.        Final diagnoses:   Constipation, unspecified constipation type       ED Disposition  ED Disposition       ED Disposition    Discharge    Condition   Stable    Comment   --               Nathalia Maria, APRN  [address] [facility] [address]  [address]  [facility] [place] [id number] [phone]          [facility] EMERGENCY DEPARTMENT  [address] [facility] [address] [phone]             Medication List        New Prescriptions      polyethylene glycol 17 g packet  Commonly known as: MIRALAX  Take 17 g by mouth Daily for 30 days.               Where to Get Your Medications        These medications were sent to [facility] #[id number] - [facility] [place] - [address] [place] [address] [address] AT SEC OF [facility] [address] & [facility] [address] - [phone]  - [phone] [address], [place] [address]      Phone: [phone]   polyethylene glycol 17 g packet            Kate Rodriguez, APRN  11/18/23 0048

## 2023-11-18 NOTE — DISCHARGE INSTRUCTIONS
Follow up with PCP, call for an appointment in 1-2 days, if you do not have a primary care provider please use patient connection [phone] to help establish care  Follow up with any specialist as indicated and discussed  Return to the ED for new or worsening symptoms  Take any medications as prescribed

## 2023-11-20 ENCOUNTER — PATIENT OUTREACH (OUTPATIENT)
Dept: CASE MANAGEMENT | Facility: OTHER | Age: 73
End: 2023-11-20
Payer: MEDICARE

## 2023-11-20 NOTE — OUTREACH NOTE
AMBULATORY CASE MANAGEMENT NOTE    Name and Relationship of Patient/Support Person: GLORIANORMA - Emergency Contact  Emergency Contact    Patient Outreach    Pt discharged from [facility] ED on 11/17/23, seen for constipation. RN-ACM outreach call made to pt, spoke with pt's spouse Norma. Explained role of RN-ACM and reason for call. Norma states pt had some cramps yesterday, none today. He reports pt's last BM was last night. Reviewed ED AVS with Norma. Education provided. Norma states he is on his way to  pt's miralax rx. He reports they will follow up with pt's PCP. Reviewed SDOH. Norma denies any pt needs. No questions per Norma. Advised him to call with any needs. Follow up outreach prn.     Adult Patient Profile  Questions/Answers      Flowsheet Row Most Recent Value   Primary Source of Support/Comfort spouse   People in Home spouse   Current Living Arrangements home          Send Education  Questions/Answers      Flowsheet Row Most Recent Value   Annual Wellness Visit:  Patient Has Completed   Other Patient Education/Resources  24/7 [facility] Nurse Call Line   24/7 Nurse Call Line Education Method Verbal   Advanced Directives: --  [resources provided]          SDOH updated and reviewed with the patient during this program:  Financial Resource Strain: Low Risk  (11/20/2023)    Overall Financial Resource Strain (CARDIA)     Difficulty of Paying Living Expenses: Not very hard      Food Insecurity: No Food Insecurity (11/20/2023)    Hunger Vital Sign     Worried About Running Out of Food in the Last Year: Never true     Ran Out of Food in the Last Year: Never true      Transportation Needs: No Transportation Needs (11/20/2023)    PRAPARE - Transportation     Lack of Transportation (Medical): No     Lack of Transportation (Non-Medical): No         Elisabeth ROSS  Ambulatory Case Management    11/20/2023, 11:38 EST

## 2023-11-21 ENCOUNTER — OFFICE VISIT (OUTPATIENT)
Dept: ORTHOPEDIC SURGERY | Facility: CLINIC | Age: 73
End: 2023-11-21
Payer: MEDICARE

## 2023-11-21 VITALS — OXYGEN SATURATION: 98 % | HEIGHT: 66 IN | WEIGHT: 118 LBS | RESPIRATION RATE: 20 BRPM | BODY MASS INDEX: 18.96 KG/M2

## 2023-11-21 DIAGNOSIS — Z96.642 S/P TOTAL LEFT HIP ARTHROPLASTY: Primary | ICD-10-CM

## 2023-11-21 DIAGNOSIS — Z74.09 DECREASED FUNCTIONAL MOBILITY AND ENDURANCE: ICD-10-CM

## 2023-11-21 DIAGNOSIS — R63.4 WEIGHT LOSS, UNINTENTIONAL: ICD-10-CM

## 2023-11-21 NOTE — PROGRESS NOTES
FOLLOW UP VISIT        Patient Name: Jennifer Blackmon  : 1950  Primary Care Physician: Nathalia Maria APRN        Chief Complaint:  s/p left total hip arthroplasty after a traumatic fall at home resulting in femoral neck fracture  Surgery date 23    HPI:   Jennifer Blackmon is a 73 y.o. year old who presents today for follow up of left WHITNYE.     At her last visit ~ 3 months ago she had noted weight loss, generalized weakness and overall had not recovered from her hospitalization.     Today, she presents in a wheelchair. She has lost 6 lbs since her visit with me in August. Her  is with her today. He states she continues to have profound weakness and falls at home. She cannot dress herself, bathe herself or prepare food herself. She denies any significant pain. No dizziness or syncope symptoms. Denies shortness of breath symptoms.       Past Medical/Surgical, Social and Family History:  I have reviewed and/or updated pertinent history as noted in the medical record including:  Past Medical History:   Diagnosis Date    Allergic rhinitis     Basal cell carcinoma (BCC) of nostril     Coronary artery disease involving native coronary artery of native heart with angina pectoris 2019    Status post successful PCI of the coronary stent deployment to high-grade RCA stenosis after presenting with ST segment elevated microinfarction in May 2019    DDD (degenerative disc disease), lumbar     Depression     Depression     Essential hypertension 2019    Femoral neck fracture 2023    Heart attack     2019    Hyperlipidemia     Hyperlipidemia, mixed 2019    Hypertension     Insomnia     Myocardial infarction less than 4 weeks ago 2019    Presented initially with ST segment elevated microinfarction treated emergently in [place] May 2019    Old MI (myocardial infarction) 2019    Presented initially with ST segment elevated microinfarction treated emergently in [place] May 2019    Osteoarthritis     Toenail fungus      Past Surgical History:   Procedure Laterality Date    ADENOIDECTOMY      Adenoids removed    APPENDECTOMY      AUGMENTATION MAMMAPLASTY      BREAST IMPLANT SURGERY  1984    CATARACT EXTRACTION Bilateral 2017    CERVICAL DISC SURGERY      ruptured and removed      SECTION      CHEST SURGERY  196    attempted pectus repair     EYE SURGERY  1997    AK    HYSTERECTOMY      KNEE ARTHROSCOPY Right     LUMBAR DISC SURGERY      ruptured and removed     NASAL SEPTAL RECONSTRUCTION      PECTUS CARNATUM REPAIR      attempted    RECTAL SURGERY      Repair    REFRACTIVE SURGERY      RK/AK both eyes     REFRACTIVE SURGERY      SOMNOPLASTY RADIAL FREQUENCY ABLATION      TONSILLECTOMY      TOTAL HIP ARTHROPLASTY Left 2023    Procedure: TOTAL HIP ARTHROPLASTY ANTERIOR;  Surgeon: Josiah Menon MD;  Location: [facility] MAIN OR;  Service: Orthopedics;  Laterality: Left;    UVULOPALATOPHARYNGOPLASTY       Social History     Occupational History    Occupation: Retired from [employer]   Tobacco Use    Smoking status: Former     Packs/day: 0.50     Years: 4.00     Additional pack years: 0.00     Total pack years: 2.00     Types: Cigarettes     Quit date:      Years since quittin.9    Smokeless tobacco: Never   Vaping Use    Vaping Use: Never used   Substance and Sexual Activity    Alcohol use: No    Drug use: No    Sexual activity: Not Currently      Social History     Social History Narrative    Not on file     Family History   Problem Relation Age of Onset    Alzheimer's disease Mother     Other Mother         CVA, acute myocardial infarction    Leukemia Father     Other Father         Parkinson's     Heart attack Sister     No Known Problems Brother     Bipolar disorder Daughter     Heart attack Brother     Alcohol abuse Brother     Anxiety disorder Brother     Depression Brother     ADD / ADHD Daughter     Other Daughter          alcohol and drug addiction    HIV Daughter        Allergies: No Known Allergies    Medications:   Home Medications:  Current Outpatient Medications on File Prior to Visit   Medication Sig    atorvastatin (LIPITOR) 10 MG tablet Take 1 tablet by mouth Daily.    buPROPion XL (WELLBUTRIN XL) 150 MG 24 hr tablet Take 3 tablets by mouth Every Morning.    Calcium Citrate-Vitamin D 500-400 MG-UNIT chewable tablet Chew Daily.    carvedilol (COREG) 3.125 MG tablet Take 1 tablet by mouth 2 (Two) Times a Day.    clopidogrel (PLAVIX) 75 MG tablet Take 1 tablet by mouth Daily.    coenzyme Q10 100 MG capsule Take 1 capsule by mouth Daily.    escitalopram (LEXAPRO) 20 MG tablet Take 0.5 tablets by mouth 2 (Two) Times a Day.    memantine (NAMENDA) 10 MG tablet Take 1 tablet by mouth 2 (Two) Times a Day.    Multiple Vitamins-Minerals (Multi-Vitamin Gummies) chewable tablet Chew 1 tablet/day Daily.    polyethylene glycol (MIRALAX) 17 g packet Take 17 g by mouth Daily for 30 days.     No current facility-administered medications on file prior to visit.         ROS:  14 point review of systems was negative except as listed in the HPI     Physical Exam:   73 y.o. female  Body mass index is 19.05 kg/m²., 53.5 kg (118 lb)  Vitals:    11/21/23 1424   Resp: 20   SpO2: 98%         General: Alert, cooperative, appears well and in no observable distress.   HEENT: Normocephalic, atraumatic on external visual inspection. No icterus.   CV: No significant peripheral edema.   Respiratory: Normal respiratory effort.   Skin: Warm & well perfused; appropriate skin turgor.  Psych: Appropriate mood & affect.  Neuro: Gross sensation and motor intact in affected extremity/extremities.  Vascular: Peripheral pulses palpable in affected extremity/extremities. Calves/compartments soft and non tender, no evidence of DVT or compartment syndrome.    Ortho Exam      Hip Musculoskeletal Exam  Gait    Assistive device: wheelchair    Palpation    Left        Left  hip palpation is normal.    Range of Motion    Left      Active ROM: no pain.        Passive ROM: no pain.      Strength    Left      Extension: 2/5. Extension is not affected by pain.       Flexion: 2/5. Flexion is not affected by pain.            Investigations:  No new x-rays were needed today.              Assessment:  S/p left total hip arthroplasty  Chronic weight loss  Diminished functional capacity  Body mass index is 19.05 kg/m².  BMI consistent with Normal: 18.5-24.9kg/m2        Plan:  From a surgical POV, she no longer requires follow up  Agree with  PT set up by her PCP  Recommend 3 Ensure per day along with routine meals  Fall precautions  Memory loss and cognitive function is likely playing a bigger role in this patient. She is currently taking Namenda.   Rec. Follow up with neurology. If no significant improvement in the next 6-12 months, family may need to discuss placement  BMI reviewed  Follow up with ortho as needed   Patient encouraged to call with questions or concerns in the interim      NIKHIL Leung

## 2023-11-25 ENCOUNTER — HOSPITAL ENCOUNTER (OUTPATIENT)
Facility: HOSPITAL | Age: 73
Setting detail: OBSERVATION
Discharge: HOME | End: 2023-11-26
Attending: EMERGENCY MEDICINE | Admitting: EMERGENCY MEDICINE
Payer: MEDICARE

## 2023-11-25 ENCOUNTER — APPOINTMENT (OUTPATIENT)
Dept: CT IMAGING | Facility: HOSPITAL | Age: 73
End: 2023-11-25
Payer: MEDICARE

## 2023-11-25 ENCOUNTER — APPOINTMENT (OUTPATIENT)
Dept: GENERAL RADIOLOGY | Facility: HOSPITAL | Age: 73
End: 2023-11-25
Payer: MEDICARE

## 2023-11-25 DIAGNOSIS — R41.82 ALTERED MENTAL STATUS, UNSPECIFIED ALTERED MENTAL STATUS TYPE: Primary | ICD-10-CM

## 2023-11-25 DIAGNOSIS — G20.B2 PARKINSON'S DISEASE WITH DYSKINESIA AND FLUCTUATING MANIFESTATIONS: ICD-10-CM

## 2023-11-25 DIAGNOSIS — J20.9 ACUTE BRONCHITIS, UNSPECIFIED ORGANISM: ICD-10-CM

## 2023-11-25 LAB
ALBUMIN SERPL-MCNC: 3.8 G/DL (ref 3.5–5.2)
ALBUMIN/GLOB SERPL: 1.3 G/DL
ALP SERPL-CCNC: 108 U/L (ref 39–117)
ALT SERPL W P-5'-P-CCNC: 27 U/L (ref 1–33)
AMMONIA BLD-SCNC: 15 UMOL/L (ref 11–51)
ANION GAP SERPL CALCULATED.3IONS-SCNC: 8 MMOL/L (ref 5–15)
AST SERPL-CCNC: 21 U/L (ref 1–32)
BACTERIA UR QL AUTO: NORMAL /HPF
BASOPHILS # BLD AUTO: 0 10*3/MM3 (ref 0–0.2)
BASOPHILS NFR BLD AUTO: 0.6 % (ref 0–1.5)
BILIRUB SERPL-MCNC: 0.2 MG/DL (ref 0–1.2)
BILIRUB UR QL STRIP: NEGATIVE
BUN SERPL-MCNC: 23 MG/DL (ref 8–23)
BUN/CREAT SERPL: 19 (ref 7–25)
CALCIUM SPEC-SCNC: 9.7 MG/DL (ref 8.6–10.5)
CHLORIDE SERPL-SCNC: 102 MMOL/L (ref 98–107)
CK SERPL-CCNC: 42 U/L (ref 20–180)
CLARITY UR: ABNORMAL
CO2 SERPL-SCNC: 32 MMOL/L (ref 22–29)
COLOR UR: YELLOW
CREAT SERPL-MCNC: 1.21 MG/DL (ref 0.57–1)
DEPRECATED RDW RBC AUTO: 46.4 FL (ref 37–54)
EGFRCR SERPLBLD CKD-EPI 2021: 46.8 ML/MIN/1.73
EOSINOPHIL # BLD AUTO: 0.3 10*3/MM3 (ref 0–0.4)
EOSINOPHIL NFR BLD AUTO: 4.4 % (ref 0.3–6.2)
ERYTHROCYTE [DISTWIDTH] IN BLOOD BY AUTOMATED COUNT: 14.3 % (ref 12.3–15.4)
ERYTHROCYTE [SEDIMENTATION RATE] IN BLOOD: 37 MM/HR (ref 0–30)
FLUAV SUBTYP SPEC NAA+PROBE: NOT DETECTED
FLUBV RNA ISLT QL NAA+PROBE: NOT DETECTED
GLOBULIN UR ELPH-MCNC: 2.9 GM/DL
GLUCOSE SERPL-MCNC: 133 MG/DL (ref 65–99)
GLUCOSE UR STRIP-MCNC: NEGATIVE MG/DL
HCT VFR BLD AUTO: 39.8 % (ref 34–46.6)
HGB BLD-MCNC: 13.1 G/DL (ref 12–15.9)
HGB UR QL STRIP.AUTO: NEGATIVE
HYALINE CASTS UR QL AUTO: NORMAL /LPF
KETONES UR QL STRIP: NEGATIVE
LEUKOCYTE ESTERASE UR QL STRIP.AUTO: ABNORMAL
LYMPHOCYTES # BLD AUTO: 1.9 10*3/MM3 (ref 0.7–3.1)
LYMPHOCYTES NFR BLD AUTO: 31.3 % (ref 19.6–45.3)
MAGNESIUM SERPL-MCNC: 2.3 MG/DL (ref 1.6–2.4)
MCH RBC QN AUTO: 30.8 PG (ref 26.6–33)
MCHC RBC AUTO-ENTMCNC: 33 G/DL (ref 31.5–35.7)
MCV RBC AUTO: 93.6 FL (ref 79–97)
MONOCYTES # BLD AUTO: 0.7 10*3/MM3 (ref 0.1–0.9)
MONOCYTES NFR BLD AUTO: 10.7 % (ref 5–12)
NEUTROPHILS NFR BLD AUTO: 3.3 10*3/MM3 (ref 1.7–7)
NEUTROPHILS NFR BLD AUTO: 53 % (ref 42.7–76)
NITRITE UR QL STRIP: NEGATIVE
NRBC BLD AUTO-RTO: 0 /100 WBC (ref 0–0.2)
NT-PROBNP SERPL-MCNC: 383.9 PG/ML (ref 0–1800)
PH UR STRIP.AUTO: 7.5 [PH] (ref 5–8)
PLATELET # BLD AUTO: 316 10*3/MM3 (ref 140–450)
PMV BLD AUTO: 7.4 FL (ref 6–12)
POTASSIUM SERPL-SCNC: 4.2 MMOL/L (ref 3.5–5.2)
PROT SERPL-MCNC: 6.7 G/DL (ref 6–8.5)
PROT UR QL STRIP: NEGATIVE
RBC # BLD AUTO: 4.25 10*6/MM3 (ref 3.77–5.28)
RBC # UR STRIP: NORMAL /HPF
REF LAB TEST METHOD: NORMAL
SARS-COV-2 RNA RESP QL NAA+PROBE: NOT DETECTED
SODIUM SERPL-SCNC: 142 MMOL/L (ref 136–145)
SP GR UR STRIP: 1.02 (ref 1–1.03)
SQUAMOUS #/AREA URNS HPF: NORMAL /HPF
TROPONIN T SERPL HS-MCNC: 15 NG/L
TSH SERPL DL<=0.05 MIU/L-ACNC: 1.4 UIU/ML (ref 0.27–4.2)
UROBILINOGEN UR QL STRIP: ABNORMAL
WBC # UR STRIP: NORMAL /HPF
WBC NRBC COR # BLD AUTO: 6.2 10*3/MM3 (ref 3.4–10.8)

## 2023-11-25 PROCEDURE — 83735 ASSAY OF MAGNESIUM: CPT | Performed by: EMERGENCY MEDICINE

## 2023-11-25 PROCEDURE — 87040 BLOOD CULTURE FOR BACTERIA: CPT | Performed by: EMERGENCY MEDICINE

## 2023-11-25 PROCEDURE — 80053 COMPREHEN METABOLIC PANEL: CPT | Performed by: EMERGENCY MEDICINE

## 2023-11-25 PROCEDURE — 82550 ASSAY OF CK (CPK): CPT | Performed by: EMERGENCY MEDICINE

## 2023-11-25 PROCEDURE — G0378 HOSPITAL OBSERVATION PER HR: HCPCS

## 2023-11-25 PROCEDURE — 85652 RBC SED RATE AUTOMATED: CPT | Performed by: EMERGENCY MEDICINE

## 2023-11-25 PROCEDURE — 84443 ASSAY THYROID STIM HORMONE: CPT | Performed by: EMERGENCY MEDICINE

## 2023-11-25 PROCEDURE — 93005 ELECTROCARDIOGRAM TRACING: CPT | Performed by: EMERGENCY MEDICINE

## 2023-11-25 PROCEDURE — 81001 URINALYSIS AUTO W/SCOPE: CPT | Performed by: EMERGENCY MEDICINE

## 2023-11-25 PROCEDURE — 84439 ASSAY OF FREE THYROXINE: CPT | Performed by: NURSE PRACTITIONER

## 2023-11-25 PROCEDURE — P9612 CATHETERIZE FOR URINE SPEC: HCPCS

## 2023-11-25 PROCEDURE — 99284 EMERGENCY DEPT VISIT MOD MDM: CPT

## 2023-11-25 PROCEDURE — 82140 ASSAY OF AMMONIA: CPT | Performed by: EMERGENCY MEDICINE

## 2023-11-25 PROCEDURE — 83880 ASSAY OF NATRIURETIC PEPTIDE: CPT | Performed by: EMERGENCY MEDICINE

## 2023-11-25 PROCEDURE — 93005 ELECTROCARDIOGRAM TRACING: CPT

## 2023-11-25 PROCEDURE — 84484 ASSAY OF TROPONIN QUANT: CPT | Performed by: EMERGENCY MEDICINE

## 2023-11-25 PROCEDURE — 84481 FREE ASSAY (FT-3): CPT | Performed by: NURSE PRACTITIONER

## 2023-11-25 PROCEDURE — 70450 CT HEAD/BRAIN W/O DYE: CPT

## 2023-11-25 PROCEDURE — 87636 SARSCOV2 & INF A&B AMP PRB: CPT | Performed by: EMERGENCY MEDICINE

## 2023-11-25 PROCEDURE — 36415 COLL VENOUS BLD VENIPUNCTURE: CPT

## 2023-11-25 PROCEDURE — 85025 COMPLETE CBC W/AUTO DIFF WBC: CPT | Performed by: EMERGENCY MEDICINE

## 2023-11-25 PROCEDURE — 71045 X-RAY EXAM CHEST 1 VIEW: CPT

## 2023-11-25 RX ORDER — SODIUM CHLORIDE 0.9 % (FLUSH) 0.9 %
10 SYRINGE (ML) INJECTION AS NEEDED
Status: DISCONTINUED | OUTPATIENT
Start: 2023-11-25 | End: 2023-11-26 | Stop reason: HOSPADM

## 2023-11-25 RX ORDER — ACETAMINOPHEN 325 MG/1
650 TABLET ORAL EVERY 4 HOURS PRN
Status: DISCONTINUED | OUTPATIENT
Start: 2023-11-25 | End: 2023-11-26 | Stop reason: HOSPADM

## 2023-11-25 RX ORDER — AMOXICILLIN 250 MG
2 CAPSULE ORAL 2 TIMES DAILY
Status: DISCONTINUED | OUTPATIENT
Start: 2023-11-25 | End: 2023-11-26 | Stop reason: HOSPADM

## 2023-11-25 RX ORDER — BISACODYL 10 MG
10 SUPPOSITORY, RECTAL RECTAL DAILY PRN
Status: DISCONTINUED | OUTPATIENT
Start: 2023-11-25 | End: 2023-11-26 | Stop reason: HOSPADM

## 2023-11-25 RX ORDER — BISACODYL 5 MG/1
5 TABLET, DELAYED RELEASE ORAL DAILY PRN
Status: DISCONTINUED | OUTPATIENT
Start: 2023-11-25 | End: 2023-11-26 | Stop reason: HOSPADM

## 2023-11-25 RX ORDER — DOXYCYCLINE 100 MG/1
100 CAPSULE ORAL EVERY 12 HOURS SCHEDULED
Status: DISCONTINUED | OUTPATIENT
Start: 2023-11-25 | End: 2023-11-26 | Stop reason: HOSPADM

## 2023-11-25 RX ORDER — SODIUM CHLORIDE 9 MG/ML
40 INJECTION, SOLUTION INTRAVENOUS AS NEEDED
Status: DISCONTINUED | OUTPATIENT
Start: 2023-11-25 | End: 2023-11-26 | Stop reason: HOSPADM

## 2023-11-25 RX ORDER — SODIUM CHLORIDE 0.9 % (FLUSH) 0.9 %
10 SYRINGE (ML) INJECTION EVERY 12 HOURS SCHEDULED
Status: DISCONTINUED | OUTPATIENT
Start: 2023-11-25 | End: 2023-11-26 | Stop reason: HOSPADM

## 2023-11-25 RX ORDER — POLYETHYLENE GLYCOL 3350 17 G/17G
17 POWDER, FOR SOLUTION ORAL DAILY PRN
Status: DISCONTINUED | OUTPATIENT
Start: 2023-11-25 | End: 2023-11-26 | Stop reason: HOSPADM

## 2023-11-25 RX ORDER — CHOLECALCIFEROL (VITAMIN D3) 125 MCG
5 CAPSULE ORAL NIGHTLY PRN
Status: DISCONTINUED | OUTPATIENT
Start: 2023-11-25 | End: 2023-11-26 | Stop reason: HOSPADM

## 2023-11-25 RX ORDER — ONDANSETRON 2 MG/ML
4 INJECTION INTRAMUSCULAR; INTRAVENOUS EVERY 6 HOURS PRN
Status: DISCONTINUED | OUTPATIENT
Start: 2023-11-25 | End: 2023-11-26 | Stop reason: HOSPADM

## 2023-11-25 NOTE — ED PROVIDER NOTES
Subjective   Provider in Triage Note  73-year-old female presents to triage with spouse with multiple complaints.  States she has had some nonfocal confusion that she occasionally gets when she has a UTI although she denies any new urinary symptoms.  She did recently come off Plavix for her hip surgery on November 21.  They are unable to definitely tell me at time of when her symptoms started therefore TNK was not felt to be warranted.  Discussed with Dr. Diane and patient will be transferred back to the main ER for evaluation.    Due to significant overcrowding in the emergency department patient was initially seen and evaluated in triage.  Provider in triage recommended patient placement in the treatment area to initiate therapy and movement to an ER bed as soon as possible.   Orders placed; medications will be deferred to main provider per protocol.        History of Present Illness  Provider in triage note reviewed    73-year-old female presents with multiple complaints the patient has had nonfocal confusion that has increased in the last 2 to 3 days.  The patient had a cough that has been nonproductive.  The patient has had altered mental status in the past with urinary tract infection however no current dysuria has been reported.  The patient felt hot earlier in the day but has had no chills.  The patient's family reports that she has had trouble walking recently and has had more of a halting gait.  The patient states that she feels stiff..  She states she occasionally has a headache but the recent headaches have not been the worst she has ever had.  The patient apparently has difficulty sleeping and occasionally feels anxious.  She denies cognitive and vegetative symptoms of depression but states that she is very worried about her health and feels as if she is having trouble taking care of herself.  She denies homicidal or suicidal ideation  She was reportedly changing position today and her legs gave out  bilaterally according to the .  He states that her gait has become progressively more unsteady in the last several weeks  She has been noted to have recently increasing tremor  Review of Systems   Neurological:  Positive for tremors, light-headedness and headaches. Negative for syncope, speech difficulty and weakness.   Psychiatric/Behavioral:  Positive for confusion.        Past Medical History:   Diagnosis Date    Allergic rhinitis     Basal cell carcinoma (BCC) of nostril     Coronary artery disease involving native coronary artery of native heart with angina pectoris 2019    Status post successful PCI of the coronary stent deployment to high-grade RCA stenosis after presenting with ST segment elevated microinfarction in May 2019    DDD (degenerative disc disease), lumbar     Depression     Depression     Essential hypertension 2019    Femoral neck fracture 2023    Heart attack     2019    Hyperlipidemia     Hyperlipidemia, mixed 2019    Hypertension     Insomnia     Myocardial infarction less than 4 weeks ago 2019    Presented initially with ST segment elevated microinfarction treated emergently in [place] May 2019    Old MI (myocardial infarction) 2019    Presented initially with ST segment elevated microinfarction treated emergently in [place] May 2019    Osteoarthritis     Toenail fungus        No Known Allergies    Past Surgical History:   Procedure Laterality Date    ADENOIDECTOMY      Adenoids removed    APPENDECTOMY      AUGMENTATION MAMMAPLASTY      BREAST IMPLANT SURGERY  1984    CATARACT EXTRACTION Bilateral 2017    CERVICAL DISC SURGERY      ruptured and removed      SECTION      CHEST SURGERY      attempted pectus repair     EYE SURGERY  1997    AK    HYSTERECTOMY      KNEE ARTHROSCOPY Right     LUMBAR DISC SURGERY      ruptured and removed     NASAL SEPTAL RECONSTRUCTION      PECTUS CARNATUM REPAIR      attempted     RECTAL SURGERY      Repair    REFRACTIVE SURGERY      RK/AK both eyes     REFRACTIVE SURGERY      SOMNOPLASTY RADIAL FREQUENCY ABLATION      TONSILLECTOMY      TOTAL HIP ARTHROPLASTY Left 2023    Procedure: TOTAL HIP ARTHROPLASTY ANTERIOR;  Surgeon: Josiah Menon MD;  Location: [facility] MAIN OR;  Service: Orthopedics;  Laterality: Left;    UVULOPALATOPHARYNGOPLASTY         Family History   Problem Relation Age of Onset    Alzheimer's disease Mother     Other Mother         CVA, acute myocardial infarction    Leukemia Father     Other Father         Parkinson's     Heart attack Sister     No Known Problems Brother     Bipolar disorder Daughter     Heart attack Brother     Alcohol abuse Brother     Anxiety disorder Brother     Depression Brother     ADD / ADHD Daughter     Other Daughter         alcohol and drug addiction    HIV Daughter    Parkinson syndrome    Social History     Socioeconomic History    Marital status:     Number of children: 2   Tobacco Use    Smoking status: Former     Packs/day: 0.50     Years: 4.00     Additional pack years: 0.00     Total pack years: 2.00     Types: Cigarettes     Quit date:      Years since quittin.9    Smokeless tobacco: Never   Vaping Use    Vaping Use: Never used   Substance and Sexual Activity    Alcohol use: No    Drug use: No    Sexual activity: Not Currently       No reported safety issues    Objective   Physical Exam  Alert [place] Coma Scale 15   HEENT: Pupils equal and reactive to light. Conjunctivae are not injected. Normal tympanic membranes. Oropharynx and nares are normal.   Neck: Supple. Midline trachea. No JVD. No goiter.   Chest: Clear and equal breath sounds bilaterally, regular rate and rhythm without murmur or rub.   Abdomen: Positive bowel sounds, nontender, nondistended. No rebound or peritoneal signs. No CVA tenderness.   Extremities/neurologic right-hand-dominant cranial nerves are intact no clubbing. cyanosis or edema. Motor sensory  exam is normal. The full range of motion is intact Remmer is noted with the nondominant arm there is bilateral upper extremity cogwheeling noted.  Parkinsonian facies   Skin: Warm and dry, no rashes or petechia.   Lymphatic: No regional lymphadenopathy. No calf pain, swelling or Homans sign    Procedures           ED Course  ED Course as of 11/25/23 2226   Sat Nov 25, 2023   2141 Overflow/overcrowding ER conditions [TH]      ED Course User Index  [TH] Koko Diane MD           Labs Reviewed   COMPREHENSIVE METABOLIC PANEL - Abnormal; Notable for the following components:       Result Value    Glucose 133 (*)     Creatinine 1.21 (*)     CO2 32.0 (*)     eGFR 46.8 (*)     All other components within normal limits    Narrative:     GFR Normal >60  Chronic Kidney Disease <60  Kidney Failure <15    The GFR formula is only valid for adults with stable renal function between ages 18 and 70.   URINALYSIS W/ CULTURE IF INDICATED - Abnormal; Notable for the following components:    Appearance, UA Hazy (*)     Leuk Esterase, UA Trace (*)     All other components within normal limits    Narrative:     In absence of clinical symptoms, the presence of pyuria, bacteria, and/or nitrites on the urinalysis result does not correlate with infection.   SINGLE HSTROPONIN T - Abnormal; Notable for the following components:    HS Troponin T 15 (*)     All other components within normal limits    Narrative:     High Sensitive Troponin T Reference Range:  <14.0 ng/L- Negative Female for AMI  <22.0 ng/L- Negative Male for AMI  >=14 - Abnormal Female indicating possible myocardial injury.  >=22 - Abnormal Male indicating possible myocardial injury.   Clinicians would have to utilize clinical acumen, EKG, Troponin, and serial changes to determine if it is an Acute Myocardial Infarction or myocardial injury due to an underlying chronic condition.        SEDIMENTATION RATE - Abnormal; Notable for the following components:    Sed Rate 37 (*)      All other components within normal limits   COVID-19 AND FLU A/B, NP SWAB IN TRANSPORT MEDIA 1 HR TAT - Normal    Narrative:     Fact sheet for providers: https://www.fda.gov/media/616761/download    Fact sheet for patients: https://www.fda.gov/media/258348/download    Test performed by PCR.   BNP (IN-HOUSE) - Normal    Narrative:     This assay is used as an aid in the diagnosis of individuals suspected of having heart failure. It can be used as an aid in the diagnosis of acute decompensated heart failure (ADHF) in patients presenting with signs and symptoms of ADHF to the emergency department (ED). In addition, NT-proBNP of <300 pg/mL indicates ADHF is not likely.    Age Range Result Interpretation  NT-proBNP Concentration (pg/mL:      <50             Positive            >450                   Gray                 300-450                    Negative             <300    50-75           Positive            >900                  Gray                300-900                  Negative            <300      >75             Positive            >1800                  Gray                300-1800                  Negative            <300   MAGNESIUM - Normal   CK - Normal   AMMONIA - Normal   TSH - Normal   CBC WITH AUTO DIFFERENTIAL - Normal   BLOOD CULTURE   BLOOD CULTURE   URINALYSIS, MICROSCOPIC ONLY   CBC AND DIFFERENTIAL    Narrative:     The following orders were created for panel order CBC & Differential.  Procedure                               Abnormality         Status                     ---------                               -----------         ------                     CBC Auto Differential[[id number]]        Normal              Final result                 Please view results for these tests on the individual orders.     Medications   sodium chloride 0.9 % flush 10 mL (has no administration in time range)     CT Head Without Contrast    Result Date: 11/25/2023  Impression: 1. No CT evidence of acute or  evolving infarct. 2. Features of moderately advanced chronic microvascular disease and moderate generalized atrophy. Electronically Signed: Kyara Pandya MD  11/25/2023 7:56 PM EST  Workstation ID: [id number]    XR Chest 1 View    Result Date: 11/25/2023  Impression: No evidence of pneumonia. There is possible small left pleural effusion Electronically Signed: Servando Carol  11/25/2023 7:01 PM EST  Workstation ID: [id number]                                   Medical Decision Making  Stable ER course.  The patient's bronchitis will be treated with ceftriaxone injection followed by doxycycline.  The patient will have observation tonight we will obtain neurologic consultation evaluate for potential parkinsonian medication and evaluation of the patient's Namenda dosing.  The patient was agreeable with this plan of treatment as was the family.  The patient's  reported he was concerned about her recent cognitive decline, although reportedly the patient's daughter thinks that it is more of a metabolic encephalopathy issue rather than her chronic illness decline    Amount and/or Complexity of Data Reviewed  Independent Historian: spouse     Details: Daughter phoned in concerns and information to the   Labs: ordered. Decision-making details documented in ED Course.  Radiology: ordered and independent interpretation performed.  ECG/medicine tests: ordered.     Details: Sinus rhythm with extensive artifact.  Comparison of 5/20/2023 showed sinus rhythm also with artifact and no significant change was noted    Risk  Prescription drug management.  Decision regarding hospitalization.    Cording to the  dementia has been considered but reportedly not confirmed    Final diagnoses:   Altered mental status, unspecified altered mental status type   Acute bronchitis, unspecified organism   Parkinson's disease with dyskinesia and fluctuating manifestations       ED Disposition  ED Disposition       ED Disposition    Decision to Admit    Condition   --    Comment   --               No follow-up provider specified.       Medication List      No changes were made to your prescriptions during this visit.            Koko Diane MD  11/25/23 9063

## 2023-11-26 ENCOUNTER — READMISSION MANAGEMENT (OUTPATIENT)
Dept: CALL CENTER | Facility: HOSPITAL | Age: 73
End: 2023-11-26
Payer: MEDICARE

## 2023-11-26 VITALS
OXYGEN SATURATION: 96 % | TEMPERATURE: 97.3 F | HEART RATE: 70 BPM | RESPIRATION RATE: 10 BRPM | SYSTOLIC BLOOD PRESSURE: 122 MMHG | DIASTOLIC BLOOD PRESSURE: 67 MMHG | HEIGHT: 66 IN | BODY MASS INDEX: 18.96 KG/M2 | WEIGHT: 118 LBS

## 2023-11-26 LAB
ANION GAP SERPL CALCULATED.3IONS-SCNC: 10 MMOL/L (ref 5–15)
B PARAPERT DNA SPEC QL NAA+PROBE: NOT DETECTED
B PERT DNA SPEC QL NAA+PROBE: NOT DETECTED
BASOPHILS # BLD AUTO: 0.1 10*3/MM3 (ref 0–0.2)
BASOPHILS NFR BLD AUTO: 1 % (ref 0–1.5)
BUN SERPL-MCNC: 23 MG/DL (ref 8–23)
BUN/CREAT SERPL: 24 (ref 7–25)
C PNEUM DNA NPH QL NAA+NON-PROBE: NOT DETECTED
CALCIUM SPEC-SCNC: 9.3 MG/DL (ref 8.6–10.5)
CHLORIDE SERPL-SCNC: 104 MMOL/L (ref 98–107)
CO2 SERPL-SCNC: 24 MMOL/L (ref 22–29)
CREAT SERPL-MCNC: 0.96 MG/DL (ref 0.57–1)
DEPRECATED RDW RBC AUTO: 51.2 FL (ref 37–54)
EGFRCR SERPLBLD CKD-EPI 2021: 61.8 ML/MIN/1.73
EOSINOPHIL # BLD AUTO: 0.4 10*3/MM3 (ref 0–0.4)
EOSINOPHIL NFR BLD AUTO: 5.4 % (ref 0.3–6.2)
ERYTHROCYTE [DISTWIDTH] IN BLOOD BY AUTOMATED COUNT: 14.7 % (ref 12.3–15.4)
FLUAV SUBTYP SPEC NAA+PROBE: NOT DETECTED
FLUBV RNA ISLT QL NAA+PROBE: NOT DETECTED
FOLATE SERPL-MCNC: >20 NG/ML (ref 4.78–24.2)
GLUCOSE SERPL-MCNC: 92 MG/DL (ref 65–99)
HADV DNA SPEC NAA+PROBE: NOT DETECTED
HCOV 229E RNA SPEC QL NAA+PROBE: NOT DETECTED
HCOV HKU1 RNA SPEC QL NAA+PROBE: NOT DETECTED
HCOV NL63 RNA SPEC QL NAA+PROBE: NOT DETECTED
HCOV OC43 RNA SPEC QL NAA+PROBE: NOT DETECTED
HCT VFR BLD AUTO: 36.6 % (ref 34–46.6)
HGB BLD-MCNC: 12 G/DL (ref 12–15.9)
HMPV RNA NPH QL NAA+NON-PROBE: NOT DETECTED
HPIV1 RNA ISLT QL NAA+PROBE: DETECTED
HPIV2 RNA SPEC QL NAA+PROBE: NOT DETECTED
HPIV3 RNA NPH QL NAA+PROBE: NOT DETECTED
HPIV4 P GENE NPH QL NAA+PROBE: NOT DETECTED
LYMPHOCYTES # BLD AUTO: 3.1 10*3/MM3 (ref 0.7–3.1)
LYMPHOCYTES NFR BLD AUTO: 46.4 % (ref 19.6–45.3)
M PNEUMO IGG SER IA-ACNC: NOT DETECTED
MCH RBC QN AUTO: 30.4 PG (ref 26.6–33)
MCHC RBC AUTO-ENTMCNC: 32.6 G/DL (ref 31.5–35.7)
MCV RBC AUTO: 93 FL (ref 79–97)
MONOCYTES # BLD AUTO: 0.7 10*3/MM3 (ref 0.1–0.9)
MONOCYTES NFR BLD AUTO: 10.4 % (ref 5–12)
NEUTROPHILS NFR BLD AUTO: 2.5 10*3/MM3 (ref 1.7–7)
NEUTROPHILS NFR BLD AUTO: 36.8 % (ref 42.7–76)
NRBC BLD AUTO-RTO: 0.1 /100 WBC (ref 0–0.2)
PLATELET # BLD AUTO: 308 10*3/MM3 (ref 140–450)
PMV BLD AUTO: 8 FL (ref 6–12)
POTASSIUM SERPL-SCNC: 4.2 MMOL/L (ref 3.5–5.2)
QT INTERVAL: 379 MS
QTC INTERVAL: 429 MS
RBC # BLD AUTO: 3.94 10*6/MM3 (ref 3.77–5.28)
RHINOVIRUS RNA SPEC NAA+PROBE: NOT DETECTED
RSV RNA NPH QL NAA+NON-PROBE: NOT DETECTED
SARS-COV-2 RNA NPH QL NAA+NON-PROBE: NOT DETECTED
SODIUM SERPL-SCNC: 138 MMOL/L (ref 136–145)
T3FREE SERPL-MCNC: 2.85 PG/ML (ref 2–4.4)
T4 FREE SERPL-MCNC: 1.09 NG/DL (ref 0.93–1.7)
TROPONIN T SERPL HS-MCNC: 16 NG/L
VIT B12 BLD-MCNC: 904 PG/ML (ref 211–946)
WBC NRBC COR # BLD AUTO: 6.7 10*3/MM3 (ref 3.4–10.8)

## 2023-11-26 PROCEDURE — 82746 ASSAY OF FOLIC ACID SERUM: CPT | Performed by: NURSE PRACTITIONER

## 2023-11-26 PROCEDURE — 96365 THER/PROPH/DIAG IV INF INIT: CPT

## 2023-11-26 PROCEDURE — 80048 BASIC METABOLIC PNL TOTAL CA: CPT | Performed by: EMERGENCY MEDICINE

## 2023-11-26 PROCEDURE — 84484 ASSAY OF TROPONIN QUANT: CPT | Performed by: EMERGENCY MEDICINE

## 2023-11-26 PROCEDURE — 25010000002 CEFTRIAXONE PER 250 MG: Performed by: EMERGENCY MEDICINE

## 2023-11-26 PROCEDURE — G0378 HOSPITAL OBSERVATION PER HR: HCPCS

## 2023-11-26 PROCEDURE — 82607 VITAMIN B-12: CPT | Performed by: NURSE PRACTITIONER

## 2023-11-26 PROCEDURE — 0202U NFCT DS 22 TRGT SARS-COV-2: CPT | Performed by: NURSE PRACTITIONER

## 2023-11-26 PROCEDURE — 85025 COMPLETE CBC W/AUTO DIFF WBC: CPT | Performed by: EMERGENCY MEDICINE

## 2023-11-26 PROCEDURE — 99214 OFFICE O/P EST MOD 30 MIN: CPT | Performed by: PSYCHIATRY & NEUROLOGY

## 2023-11-26 RX ORDER — ESCITALOPRAM OXALATE 10 MG/1
20 TABLET ORAL DAILY
Status: DISCONTINUED | OUTPATIENT
Start: 2023-11-26 | End: 2023-11-26 | Stop reason: HOSPADM

## 2023-11-26 RX ORDER — ATORVASTATIN CALCIUM 10 MG/1
10 TABLET, FILM COATED ORAL DAILY
Status: DISCONTINUED | OUTPATIENT
Start: 2023-11-26 | End: 2023-11-26 | Stop reason: HOSPADM

## 2023-11-26 RX ORDER — DOXYCYCLINE HYCLATE 100 MG/1
100 CAPSULE ORAL 2 TIMES DAILY
Qty: 10 CAPSULE | Refills: 0 | Status: SHIPPED | OUTPATIENT
Start: 2023-11-26 | End: 2023-12-01

## 2023-11-26 RX ORDER — MEMANTINE HYDROCHLORIDE 10 MG/1
10 TABLET ORAL 2 TIMES DAILY
Status: DISCONTINUED | OUTPATIENT
Start: 2023-11-26 | End: 2023-11-26 | Stop reason: HOSPADM

## 2023-11-26 RX ORDER — POLYETHYLENE GLYCOL 3350 17 G/17G
17 POWDER, FOR SOLUTION ORAL DAILY
Status: DISCONTINUED | OUTPATIENT
Start: 2023-11-26 | End: 2023-11-26 | Stop reason: HOSPADM

## 2023-11-26 RX ORDER — CARVEDILOL 3.12 MG/1
3.12 TABLET ORAL 2 TIMES DAILY
Status: DISCONTINUED | OUTPATIENT
Start: 2023-11-26 | End: 2023-11-26 | Stop reason: HOSPADM

## 2023-11-26 RX ORDER — CLOPIDOGREL BISULFATE 75 MG/1
75 TABLET ORAL DAILY
Status: DISCONTINUED | OUTPATIENT
Start: 2023-11-26 | End: 2023-11-26 | Stop reason: HOSPADM

## 2023-11-26 RX ORDER — BUPROPION HYDROCHLORIDE 150 MG/1
450 TABLET ORAL EVERY MORNING
Status: DISCONTINUED | OUTPATIENT
Start: 2023-11-26 | End: 2023-11-26 | Stop reason: HOSPADM

## 2023-11-26 RX ADMIN — POLYETHYLENE GLYCOL 3350 17 G: 17 POWDER, FOR SOLUTION ORAL at 09:53

## 2023-11-26 RX ADMIN — ATORVASTATIN CALCIUM 10 MG: 10 TABLET, FILM COATED ORAL at 09:54

## 2023-11-26 RX ADMIN — ESCITALOPRAM OXALATE 20 MG: 10 TABLET ORAL at 09:54

## 2023-11-26 RX ADMIN — BUPROPION HYDROCHLORIDE 450 MG: 150 TABLET, EXTENDED RELEASE ORAL at 09:54

## 2023-11-26 RX ADMIN — DOCUSATE SODIUM 50MG AND SENNOSIDES 8.6MG 2 TABLET: 8.6; 5 TABLET, FILM COATED ORAL at 00:16

## 2023-11-26 RX ADMIN — Medication 10 ML: at 08:32

## 2023-11-26 RX ADMIN — DOXYCYCLINE 100 MG: 100 CAPSULE ORAL at 00:16

## 2023-11-26 RX ADMIN — CEFTRIAXONE 1000 MG: 1 INJECTION, POWDER, FOR SOLUTION INTRAMUSCULAR; INTRAVENOUS at 00:16

## 2023-11-26 RX ADMIN — CARVEDILOL 3.12 MG: 3.12 TABLET, FILM COATED ORAL at 09:54

## 2023-11-26 RX ADMIN — CLOPIDOGREL BISULFATE 75 MG: 75 TABLET ORAL at 09:54

## 2023-11-26 RX ADMIN — MEMANTINE 10 MG: 10 TABLET ORAL at 09:54

## 2023-11-26 RX ADMIN — Medication 10 ML: at 00:16

## 2023-11-26 RX ADMIN — DOXYCYCLINE 100 MG: 100 CAPSULE ORAL at 08:31

## 2023-11-26 RX ADMIN — DOCUSATE SODIUM 50MG AND SENNOSIDES 8.6MG 2 TABLET: 8.6; 5 TABLET, FILM COATED ORAL at 08:31

## 2023-11-26 NOTE — DISCHARGE SUMMARY
[facility]    Candace NathaliaNIKHIL Macias    CHIEF COMPLAINT:     Altered mental status     HISTORY OF PRESENT ILLNESS:    Altered Mental Status  Associated symptoms include congestion, headaches and weakness. Pertinent negatives include no coughing.     ED 11/25/2023  Provider in Triage Note  73-year-old female presents to triage with spouse with multiple complaints.  States she has had some nonfocal confusion that she occasionally gets when she has a UTI although she denies any new urinary symptoms.  She did recently come off Plavix for her hip surgery on November 21.  They are unable to definitely tell me at time of when her symptoms started therefore TNK was not felt to be warranted.  Discussed with Dr. Diane and patient will be transferred back to the main ER for evaluation.     Due to significant overcrowding in the emergency department patient was initially seen and evaluated in triage.  Provider in triage recommended patient placement in the treatment area to initiate therapy and movement to an ER bed as soon as possible.   Orders placed; medications will be deferred to main provider per protocol.           History of Present Illness  Provider in triage note reviewed     73-year-old female presents with multiple complaints the patient has had nonfocal confusion that has increased in the last 2 to 3 days.  The patient had a cough that has been nonproductive.  The patient has had altered mental status in the past with urinary tract infection however no current dysuria has been reported.  The patient felt hot earlier in the day but has had no chills.  The patient's family reports that she has had trouble walking recently and has had more of a halting gait.  The patient states that she feels stiff..  She states she occasionally has a headache but the recent headaches have not been the worst she has ever had.  The patient apparently has difficulty sleeping and occasionally feels anxious.  She  "denies cognitive and vegetative symptoms of depression but states that she is very worried about her health and feels as if she is having trouble taking care of herself.  She denies homicidal or suicidal ideation  She was reportedly changing position today and her legs gave out bilaterally according to the .  He states that her gait has become progressively more unsteady in the last several weeks  She has been noted to have recently increasing tremor      Observation 11/26/2023  Patient is alert and oriented to person.   at bedside and confirms ongoing complaints regarding patient's mental status.  He is unsure if patient has neurologist and states that \" she sees too many doctors\".   declines physical therapy inpatient and states that last month she was admitted but insurance declined rehab.  States that she has home health PT set up at home.   also reports congestion for the past few weeks.  Patient is states that nasal secretion is green at times.    Past Medical History:   Diagnosis Date    Allergic rhinitis     Basal cell carcinoma (BCC) of nostril     Coronary artery disease involving native coronary artery of native heart with angina pectoris 06/18/2019    Status post successful PCI of the coronary stent deployment to high-grade RCA stenosis after presenting with ST segment elevated microinfarction in May 2019    DDD (degenerative disc disease), lumbar     Depression     Depression     Essential hypertension 06/18/2019    Femoral neck fracture 05/26/2023    Heart attack     6/2/2019    Hyperlipidemia     Hyperlipidemia, mixed 08/13/2019    Hypertension     Insomnia     Myocardial infarction less than 4 weeks ago 06/18/2019    Presented initially with ST segment elevated microinfarction treated emergently in [place] May 2019    Old MI (myocardial infarction) 06/18/2019    Presented initially with ST segment elevated microinfarction treated emergently in [place] May 2019 "    Osteoarthritis     Toenail fungus      Past Surgical History:   Procedure Laterality Date    ADENOIDECTOMY      Adenoids removed    APPENDECTOMY      AUGMENTATION MAMMAPLASTY      BREAST IMPLANT SURGERY  1984    CATARACT EXTRACTION Bilateral 2017    CERVICAL DISC SURGERY      ruptured and removed      SECTION      CHEST SURGERY  196    attempted pectus repair     EYE SURGERY  1997    AK    HYSTERECTOMY      KNEE ARTHROSCOPY Right     LUMBAR DISC SURGERY      ruptured and removed     NASAL SEPTAL RECONSTRUCTION      PECTUS CARNATUM REPAIR      attempted    RECTAL SURGERY      Repair    REFRACTIVE SURGERY      RK/AK both eyes     REFRACTIVE SURGERY      SOMNOPLASTY RADIAL FREQUENCY ABLATION      TONSILLECTOMY      TOTAL HIP ARTHROPLASTY Left 2023    Procedure: TOTAL HIP ARTHROPLASTY ANTERIOR;  Surgeon: Josiah Menon MD;  Location: [facility] MAIN OR;  Service: Orthopedics;  Laterality: Left;    UVULOPALATOPHARYNGOPLASTY       Family History   Problem Relation Age of Onset    Alzheimer's disease Mother     Other Mother         CVA, acute myocardial infarction    Leukemia Father     Other Father         Parkinson's     Heart attack Sister     No Known Problems Brother     Bipolar disorder Daughter     Heart attack Brother     Alcohol abuse Brother     Anxiety disorder Brother     Depression Brother     ADD / ADHD Daughter     Other Daughter         alcohol and drug addiction    HIV Daughter      Social History     Tobacco Use    Smoking status: Former     Packs/day: 0.50     Years: 4.00     Additional pack years: 0.00     Total pack years: 2.00     Types: Cigarettes     Quit date:      Years since quittin.9    Smokeless tobacco: Never   Vaping Use    Vaping Use: Never used   Substance Use Topics    Alcohol use: No    Drug use: No     Medications Prior to Admission   Medication Sig Dispense Refill Last Dose    atorvastatin (LIPITOR) 10 MG tablet Take 1 tablet by mouth Daily.   2023     buPROPion XL (WELLBUTRIN XL) 150 MG 24 hr tablet Take 3 tablets by mouth Every Morning. 270 tablet 3 11/25/2023    Calcium Citrate-Vitamin D 500-400 MG-UNIT chewable tablet Chew Daily.   11/25/2023    carvedilol (COREG) 3.125 MG tablet Take 1 tablet by mouth 2 (Two) Times a Day. 180 tablet 3 11/25/2023    clopidogrel (PLAVIX) 75 MG tablet Take 1 tablet by mouth Daily. 90 tablet 3 11/25/2023    coenzyme Q10 100 MG capsule Take 1 capsule by mouth Daily.   Past Month    escitalopram (LEXAPRO) 20 MG tablet Take 0.5 tablets by mouth 2 (Two) Times a Day.   11/25/2023    memantine (NAMENDA) 10 MG tablet Take 1 tablet by mouth 2 (Two) Times a Day. 60 tablet 2 11/25/2023    Multiple Vitamins-Minerals (Multi-Vitamin Gummies) chewable tablet Chew 1 tablet/day Daily.   11/25/2023    polyethylene glycol (MIRALAX) 17 g packet Take 17 g by mouth Daily for 30 days. 30 packet 0 11/25/2023     Allergies:  Patient has no known allergies.    Immunization History   Administered Date(s) Administered    COVID-19 (MODERNA) 1st,2nd,3rd Dose Monovalent 02/04/2021, 03/08/2021    FLUAD TRI 65YR+ 10/19/2017    Flu Vaccine Quad PF 6-35MO 10/27/2016    Flu Vaccine Quad PF >36MO 10/27/2016    Fluad Quad 65+ 10/09/2019, 10/20/2020    Fluzone High Dose =>65 Years ([facility]) 11/13/2015, 10/19/2017, 10/15/2018    Fluzone High-Dose 65+yrs 10/20/2021, 10/26/2022, 11/07/2023    Influenza, Unspecified 10/09/2019    Pneumococcal Conjugate 13-Valent (PCV13) 04/16/2018    Pneumococcal Polysaccharide (PPSV23) 12/07/2021           REVIEW OF SYSTEMS:    Review of Systems   Constitutional: Negative for malaise/fatigue.   HENT:  Positive for congestion.    Respiratory:  Negative for cough and shortness of breath.    Neurological:  Positive for headaches, light-headedness and weakness.   Psychiatric/Behavioral:  Positive for altered mental status.          Vital Signs  Temp:  [97.3 °F (36.3 °C)-98.6 °F (37 °C)] 97.3 °F (36.3 °C)  Heart Rate:  [59-77]  70  Resp:  [10-16] 10  BP: (102-137)/(46-67) 122/67          Physical Exam:  Physical Exam  Vitals and nursing note reviewed.   Constitutional:       Appearance: Normal appearance.   HENT:      Head: Normocephalic and atraumatic.      Right Ear: External ear normal.      Left Ear: External ear normal.      Nose: Nose normal.      Mouth/Throat:      Mouth: Mucous membranes are moist.      Pharynx: Oropharynx is clear.   Eyes:      Extraocular Movements: Extraocular movements intact.   Cardiovascular:      Rate and Rhythm: Normal rate and regular rhythm.      Pulses: Normal pulses.      Heart sounds: Normal heart sounds.   Pulmonary:      Effort: Pulmonary effort is normal.      Breath sounds: Normal breath sounds.   Abdominal:      General: Abdomen is flat. Bowel sounds are normal.      Palpations: Abdomen is soft.   Musculoskeletal:         General: Normal range of motion.      Cervical back: Normal range of motion.   Skin:     General: Skin is warm.   Neurological:      General: No focal deficit present.      Mental Status: She is alert.      Comments: Alert to person only.  Pleasant   Psychiatric:         Mood and Affect: Mood normal.         Behavior: Behavior normal.           Emotional Behavior:   Normal   Debilities:  Altered mental status  Results Review:    I reviewed the patient's new clinical results.  Lab Results (most recent)       Procedure Component Value Units Date/Time    Respiratory Panel PCR w/COVID-19(SARS-CoV-2) FAIZA/GLENN/YENI/PAD/COR/KORIN In-House, NP Swab in UTM/VTM, 2 HR TAT - Swab, Nasopharynx [[id number]]  (Abnormal) Collected: 11/26/23 0834    Specimen: Swab from Nasopharynx Updated: 11/26/23 1000     ADENOVIRUS, PCR Not Detected     Coronavirus 229E Not Detected     Coronavirus HKU1 Not Detected     Coronavirus NL63 Not Detected     Coronavirus OC43 Not Detected     COVID19 Not Detected     Human Metapneumovirus Not Detected     Human Rhinovirus/Enterovirus Not Detected     Influenza A PCR Not  Detected     Influenza B PCR Not Detected     Parainfluenza Virus 1 Detected     Parainfluenza Virus 2 Not Detected     Parainfluenza Virus 3 Not Detected     Parainfluenza Virus 4 Not Detected     RSV, PCR Not Detected     Bordetella pertussis pcr Not Detected     Bordetella parapertussis PCR Not Detected     Chlamydophila pneumoniae PCR Not Detected     Mycoplasma pneumo by PCR Not Detected    Narrative:      In the setting of a positive respiratory panel with a viral infection PLUS a negative procalcitonin without other underlying concern for bacterial infection, consider observing off antibiotics or discontinuation of antibiotics and continue supportive care. If the respiratory panel is positive for atypical bacterial infection (Bordetella pertussis, Chlamydophila pneumoniae, or Mycoplasma pneumoniae), consider antibiotic de-escalation to target atypical bacterial infection.    Basic Metabolic Panel [[id number]]  (Normal) Collected: 11/26/23 0342    Specimen: Blood Updated: 11/26/23 0510     Glucose 92 mg/dL      BUN 23 mg/dL      Creatinine 0.96 mg/dL      Sodium 138 mmol/L      Potassium 4.2 mmol/L      Chloride 104 mmol/L      CO2 24.0 mmol/L      Calcium 9.3 mg/dL      BUN/Creatinine Ratio 24.0     Anion Gap 10.0 mmol/L      eGFR 61.8 mL/min/1.73     Narrative:      GFR Normal >60  Chronic Kidney Disease <60  Kidney Failure <15    The GFR formula is only valid for adults with stable renal function between ages 18 and 70.    High Sensitivity Troponin T [[id number]]  (Abnormal) Collected: 11/26/23 0342    Specimen: Blood Updated: 11/26/23 0510     HS Troponin T 16 ng/L     Narrative:      High Sensitive Troponin T Reference Range:  <14.0 ng/L- Negative Female for AMI  <22.0 ng/L- Negative Male for AMI  >=14 - Abnormal Female indicating possible myocardial injury.  >=22 - Abnormal Male indicating possible myocardial injury.   Clinicians would have to utilize clinical acumen, EKG, Troponin, and serial changes to  determine if it is an Acute Myocardial Infarction or myocardial injury due to an underlying chronic condition.         CBC Auto Differential [[id number]]  (Abnormal) Collected: 11/26/23 0342    Specimen: Blood Updated: 11/26/23 0448     WBC 6.70 10*3/mm3      RBC 3.94 10*6/mm3      Hemoglobin 12.0 g/dL      Hematocrit 36.6 %      MCV 93.0 fL      MCH 30.4 pg      MCHC 32.6 g/dL      RDW 14.7 %      RDW-SD 51.2 fl      MPV 8.0 fL      Platelets 308 10*3/mm3      Neutrophil % 36.8 %      Lymphocyte % 46.4 %      Monocyte % 10.4 %      Eosinophil % 5.4 %      Basophil % 1.0 %      Neutrophils, Absolute 2.50 10*3/mm3      Lymphocytes, Absolute 3.10 10*3/mm3      Monocytes, Absolute 0.70 10*3/mm3      Eosinophils, Absolute 0.40 10*3/mm3      Basophils, Absolute 0.10 10*3/mm3      nRBC 0.1 /100 WBC     Urinalysis, Microscopic Only - Urine, Catheter [[id number]] Collected: 11/25/23 1928    Specimen: Urine, Catheter Updated: 11/1948     RBC, UA 0-2 /HPF      WBC, UA 0-2 /HPF      Comment: Urine culture not indicated.        Bacteria, UA None Seen /HPF      Squamous Epithelial Cells, UA 0-2 /HPF      Hyaline Casts, UA 0-2 /LPF      Methodology Automated Microscopy    Urinalysis With Culture If Indicated - Urine, Catheter [[id number]]  (Abnormal) Collected: 11/25/23 1928    Specimen: Urine, Catheter Updated: 11/25/23 1946     Color, UA Yellow     Appearance, UA Hazy     pH, UA 7.5     Specific Gravity, UA 1.019     Glucose, UA Negative     Ketones, UA Negative     Bilirubin, UA Negative     Blood, UA Negative     Protein, UA Negative     Leuk Esterase, UA Trace     Nitrite, UA Negative     Urobilinogen, UA 1.0 E.U./dL    Narrative:      In absence of clinical symptoms, the presence of pyuria, bacteria, and/or nitrites on the urinalysis result does not correlate with infection.    Blood Culture - Blood, Arm, Right [[id number]] Collected: 11/25/23 1928    Specimen: Blood from Arm, Right Updated: 11/25/23 1932    [facility] High  Sensitivity Troponin T [[id number]]  (Abnormal) Collected: 11/25/23 1849    Specimen: Blood from Arm, Right Updated: 11/25/23 1924     HS Troponin T 15 ng/L     Narrative:      High Sensitive Troponin T Reference Range:  <14.0 ng/L- Negative Female for AMI  <22.0 ng/L- Negative Male for AMI  >=14 - Abnormal Female indicating possible myocardial injury.  >=22 - Abnormal Male indicating possible myocardial injury.   Clinicians would have to utilize clinical acumen, EKG, Troponin, and serial changes to determine if it is an Acute Myocardial Infarction or myocardial injury due to an underlying chronic condition.         BNP [[id number]]  (Normal) Collected: 11/25/23 1849    Specimen: Blood from Arm, Right Updated: 11/25/23 1924     proBNP 383.9 pg/mL     Narrative:      This assay is used as an aid in the diagnosis of individuals suspected of having heart failure. It can be used as an aid in the diagnosis of acute decompensated heart failure (ADHF) in patients presenting with signs and symptoms of ADHF to the emergency department (ED). In addition, NT-proBNP of <300 pg/mL indicates ADHF is not likely.    Age Range Result Interpretation  NT-proBNP Concentration (pg/mL:      <50             Positive            >450                   Gray                 300-450                    Negative             <300    50-75           Positive            >900                  Gray                300-900                  Negative            <300      >75             Positive            >1800                  Gray                300-1800                  Negative            <300    TSH [[id number]]  (Normal) Collected: 11/25/23 1849    Specimen: Blood from Arm, Right Updated: 11/25/23 1924     TSH 1.400 uIU/mL     Comprehensive Metabolic Panel [[id number]]  (Abnormal) Collected: 11/25/23 1849    Specimen: Blood from Arm, Right Updated: 11/25/23 1920     Glucose 133 mg/dL      BUN 23 mg/dL      Creatinine 1.21 mg/dL      Sodium 142 mmol/L       Potassium 4.2 mmol/L      Comment: Slight hemolysis detected by analyzer. Result may be falsely elevated.        Chloride 102 mmol/L      CO2 32.0 mmol/L      Calcium 9.7 mg/dL      Total Protein 6.7 g/dL      Albumin 3.8 g/dL      ALT (SGPT) 27 U/L      AST (SGOT) 21 U/L      Alkaline Phosphatase 108 U/L      Total Bilirubin 0.2 mg/dL      Globulin 2.9 gm/dL      A/G Ratio 1.3 g/dL      BUN/Creatinine Ratio 19.0     Anion Gap 8.0 mmol/L      eGFR 46.8 mL/min/1.73     Narrative:      GFR Normal >60  Chronic Kidney Disease <60  Kidney Failure <15    The GFR formula is only valid for adults with stable renal function between ages 18 and 70.    Magnesium [[id number]]  (Normal) Collected: 11/25/23 1849    Specimen: Blood from Arm, Right Updated: 11/25/23 1920     Magnesium 2.3 mg/dL     CK [[id number]]  (Normal) Collected: 11/25/23 1849    Specimen: Blood from Arm, Right Updated: 11/25/23 1920     Creatine Kinase 42 U/L     Ammonia [[id number]]  (Normal) Collected: 11/25/23 1849    Specimen: Blood from Arm, Right Updated: 11/25/23 1919     Ammonia 15 umol/L     COVID-19 and FLU A/B PCR, 1 HR TAT - Swab, Nasopharynx [[id number]]  (Normal) Collected: 11/25/23 1849    Specimen: Swab from Nasopharynx Updated: 11/25/23 1918     COVID19 Not Detected     Influenza A PCR Not Detected     Influenza B PCR Not Detected    Narrative:      Fact sheet for providers: https://www.fda.gov/media/886767/download    Fact sheet for patients: https://www.fda.gov/media/646587/download    Test performed by PCR.    Sedimentation Rate [[id number]]  (Abnormal) Collected: 11/25/23 1849    Specimen: Blood from Arm, Right Updated: 11/25/23 1906     Sed Rate 37 mm/hr     CBC & Differential [[id number]]  (Normal) Collected: 11/25/23 1849    Specimen: Blood from Arm, Right Updated: 11/25/23 1858    Narrative:      The following orders were created for panel order CBC & Differential.  Procedure                               Abnormality         Status                      ---------                               -----------         ------                     CBC Auto Differential[[id number]]        Normal              Final result                 Please view results for these tests on the individual orders.    CBC Auto Differential [[id number]]  (Normal) Collected: 11/25/23 1849    Specimen: Blood from Arm, Right Updated: 11/25/23 1858     WBC 6.20 10*3/mm3      RBC 4.25 10*6/mm3      Hemoglobin 13.1 g/dL      Hematocrit 39.8 %      MCV 93.6 fL      MCH 30.8 pg      MCHC 33.0 g/dL      RDW 14.3 %      RDW-SD 46.4 fl      MPV 7.4 fL      Platelets 316 10*3/mm3      Neutrophil % 53.0 %      Lymphocyte % 31.3 %      Monocyte % 10.7 %      Eosinophil % 4.4 %      Basophil % 0.6 %      Neutrophils, Absolute 3.30 10*3/mm3      Lymphocytes, Absolute 1.90 10*3/mm3      Monocytes, Absolute 0.70 10*3/mm3      Eosinophils, Absolute 0.30 10*3/mm3      Basophils, Absolute 0.00 10*3/mm3      nRBC 0.0 /100 WBC     Blood Culture - Blood, Arm, Right [[id number]] Collected: 11/25/23 1849    Specimen: Blood from Arm, Right Updated: 11/25/23 1853            Imaging Results (Most Recent)       Procedure Component Value Units Date/Time    CT Head Without Contrast [[id number]] Collected: 11/25/23 1953     Updated: 11/25/23 1958    Narrative:      CT HEAD WO CONTRAST    Date of Exam: 11/25/2023 7:46 PM EST    Indication: altered.    Comparison: MRI brain without contrast 10/2/2023    Technique: Axial CT images were obtained of the head without contrast administration.  Coronal reconstructions were performed.  Automated exposure control and iterative reconstruction methods were used.      Findings:  Extensive deep white matter hypodensities are nonspecific but favored to represent changes of moderately advanced chronic microvascular disease. These findings correspond to the MRI brain from 10/2/2023. Preservation of gray matter-white matter junction   distinction, without convincing evidence of  acute or evolving infarct. There is moderate generalized atrophy. Stable mild ventricular prominence. No gross hydrocephalus. No mass lesion or mass effect or midline shift is seen. Paranasal sinuses and   mastoid air cells are clear. No acute calvarial abnormality is identified.      Impression:      Impression:    1. No CT evidence of acute or evolving infarct.  2. Features of moderately advanced chronic microvascular disease and moderate generalized atrophy.      Electronically Signed: Kyara Pandya MD    11/25/2023 7:56 PM EST    Workstation ID: [id number]    XR Chest 1 View [[id number]] Collected: 11/25/23 1858     Updated: 11/25/23 1903    Narrative:      XR CHEST 1 VW    Date of Exam: 11/25/2023 6:50 PM EST    Indication: fever cough    Comparison: 6/1/2023    Findings:  Heart size and pulmonary vasculature are within normal limits. No infiltrate is demonstrated. There is scarring in the right upper lobe. Density overlying the medial lower right lung is caused by a breast implant. There is questionable blunting of the   left costophrenic angle      Impression:      Impression:  No evidence of pneumonia. There is possible small left pleural effusion      Electronically Signed: Servando Medina    11/25/2023 7:01 PM EST    Workstation ID: [id number]          reviewed    ECG/EMG Results (most recent)       Procedure Component Value Units Date/Time    ECG 12 Lead Altered Mental Status [[id number]] Collected: 11/25/23 1800     Updated: 11/26/23 1359     QT Interval 379 ms      QTC Interval 429 ms     Narrative:      HEART RATE= 77  bpm  RR Interval= 781  ms  SD Interval=   ms  P Horizontal Axis=   deg  P Front Axis=   deg  QRSD Interval= 97  ms  QT Interval= 379  ms  QTcB= 429  ms  QRS Axis= 65  deg  T Wave Axis= 65  deg  - ABNORMAL ECG -  Sinus Rhythm  Anteroseptal infarct, age indeterminate  When compared with ECG of 26-May-2023 16:47:45,  Significant change in rhythm: previously sinus  New or worsened ischemia or  infarction  Significant axis, voltage or hypertrophy change  Electronically Signed By: Koko Diane (Yeni) 26-Nov-2023 13:59:24  Date and Time of Study: 2023-11-25 18:00:27          reviewed        Results for orders placed during the hospital encounter of 03/17/22    Adult Transthoracic Echo Complete W/ Cont if Necessary Per Protocol    Interpretation Summary  Normal LV size and contractility EF of 60 to 65%  Borderline RV  Mild biatrial enlargement seen.  Agitated saline bubble contrast negative for septal defect  Aortic valve, mitral valve, tricuspid valve appears structurally normal, mild to moderate mitral and mild tricuspid regurgitation seen.  Calculated RV systolic pressure of 32 mmHg  Reveal posterior pericardial effusion seen.  No signs of increased intrapericardial pressures  Proximal aorta appears normal in size.      Microbiology Results (last 10 days)       Procedure Component Value - Date/Time    Respiratory Panel PCR w/COVID-19(SARS-CoV-2) FAIZA/GLENN/YENI/PAD/COR/KORIN In-House, NP Swab in UTM/VTM, 2 HR TAT - Swab, Nasopharynx [[id number]]  (Abnormal) Collected: 11/26/23 0834    Lab Status: Final result Specimen: Swab from Nasopharynx Updated: 11/26/23 1000     ADENOVIRUS, PCR Not Detected     Coronavirus 229E Not Detected     Coronavirus HKU1 Not Detected     Coronavirus NL63 Not Detected     Coronavirus OC43 Not Detected     COVID19 Not Detected     Human Metapneumovirus Not Detected     Human Rhinovirus/Enterovirus Not Detected     Influenza A PCR Not Detected     Influenza B PCR Not Detected     Parainfluenza Virus 1 Detected     Parainfluenza Virus 2 Not Detected     Parainfluenza Virus 3 Not Detected     Parainfluenza Virus 4 Not Detected     RSV, PCR Not Detected     Bordetella pertussis pcr Not Detected     Bordetella parapertussis PCR Not Detected     Chlamydophila pneumoniae PCR Not Detected     Mycoplasma pneumo by PCR Not Detected    Narrative:      In the setting of a positive respiratory  panel with a viral infection PLUS a negative procalcitonin without other underlying concern for bacterial infection, consider observing off antibiotics or discontinuation of antibiotics and continue supportive care. If the respiratory panel is positive for atypical bacterial infection (Bordetella pertussis, Chlamydophila pneumoniae, or Mycoplasma pneumoniae), consider antibiotic de-escalation to target atypical bacterial infection.    COVID-19 and FLU A/B PCR, 1 HR TAT - Swab, Nasopharynx [[id number]]  (Normal) Collected: 11/25/23 1849    Lab Status: Final result Specimen: Swab from Nasopharynx Updated: 11/25/23 1918     COVID19 Not Detected     Influenza A PCR Not Detected     Influenza B PCR Not Detected    Narrative:      Fact sheet for providers: https://www.fda.gov/media/784203/download    Fact sheet for patients: https://www.fda.gov/media/140683/download    Test performed by PCR.            Assessment & Plan     Altered mental status       Altered mental status  -Troponin 15, 16  -CK 42  -CMP and CBC generally unremarkable  -Ammonia 15  -Respiratory panel positive for parainfluenza  -Chest x-ray showed no evidence of pneumonia  -CT head showed no acute or evolving infarct.  Moderately advanced chronic microvascular disease and atrophy.  -In the ED patient received IV ceftriaxone and neurologist was consulted  -Neurology cleared patient for discharge as patient's issues appear to be chronic and recommended outpatient follow-up.  -TSH within normal range.  T3, T4, B12, folate added per neurologist recommendations  -Follow-up with primary neurologist    Acute sinusitis,  -Patient received IV ceftriaxone in the ED  -Doxycycline done empirically  -Noted that patient is positive for parainfluenza but treating with doxycycline due to onset of symptoms greater than 10 days ago.    Depression/anxiety  -Continue Wellbutrin and Lexapro      Memory loss  -Continue Namenda    I discussed the patients findings and my  recommendations with patient and family and nursing staff.     Discharge Diagnosis:      Altered mental status      Hospital Course  Patient is a 73 y.o. female presented with altered mental status as noted in HPI above.  All labs unremarkable including CMP, CBC, CK, ammonia, troponin.  Chest x-ray showed no evidence of pneumonia and CT head showed no acute issues. Neurologist was consulted and cleared patient for discharge as her symptoms appear to be chronic.  Patient also tested positive for parainfluenza and reported symptoms ongoing for the past few weeks.  Sent doxycycline for acute sinusitis at discharge and encouraged family to follow-up with primary neurologist.  At this time, patient felt to be in good condition for discharge with close follow up with PCP. Instructed to take all medications as prescribed and to return to ED if any concerning signs/symptoms. All test/lab results were discussed with patient. All questions were answered and patient verbalizes understanding.       Past Medical History:     Past Medical History:   Diagnosis Date    Allergic rhinitis     Basal cell carcinoma (BCC) of nostril     Coronary artery disease involving native coronary artery of native heart with angina pectoris 06/18/2019    Status post successful PCI of the coronary stent deployment to high-grade RCA stenosis after presenting with ST segment elevated microinfarction in May 2019    DDD (degenerative disc disease), lumbar     Depression     Depression     Essential hypertension 06/18/2019    Femoral neck fracture 05/26/2023    Heart attack     6/2/2019    Hyperlipidemia     Hyperlipidemia, mixed 08/13/2019    Hypertension     Insomnia     Myocardial infarction less than 4 weeks ago 06/18/2019    Presented initially with ST segment elevated microinfarction treated emergently in [place] May 2019    Old MI (myocardial infarction) 06/18/2019    Presented initially with ST segment elevated microinfarction treated  emergently in [place] May 2019    Osteoarthritis     Toenail fungus        Past Surgical History:     Past Surgical History:   Procedure Laterality Date    ADENOIDECTOMY      Adenoids removed    APPENDECTOMY      AUGMENTATION MAMMAPLASTY      BREAST IMPLANT SURGERY  1984    CATARACT EXTRACTION Bilateral 2017    CERVICAL DISC SURGERY      ruptured and removed      SECTION      CHEST SURGERY  196    attempted pectus repair     EYE SURGERY  1997    AK    HYSTERECTOMY      KNEE ARTHROSCOPY Right     LUMBAR DISC SURGERY      ruptured and removed     NASAL SEPTAL RECONSTRUCTION      PECTUS CARNATUM REPAIR      attempted    RECTAL SURGERY      Repair    REFRACTIVE SURGERY      RK/AK both eyes     REFRACTIVE SURGERY      SOMNOPLASTY RADIAL FREQUENCY ABLATION      TONSILLECTOMY      TOTAL HIP ARTHROPLASTY Left 2023    Procedure: TOTAL HIP ARTHROPLASTY ANTERIOR;  Surgeon: Josiah Menon MD;  Location: [facility] MAIN OR;  Service: Orthopedics;  Laterality: Left;    UVULOPALATOPHARYNGOPLASTY         Social History:   Social History     Socioeconomic History    Marital status:     Number of children: 2   Tobacco Use    Smoking status: Former     Packs/day: 0.50     Years: 4.00     Additional pack years: 0.00     Total pack years: 2.00     Types: Cigarettes     Quit date:      Years since quittin.9    Smokeless tobacco: Never   Vaping Use    Vaping Use: Never used   Substance and Sexual Activity    Alcohol use: No    Drug use: No    Sexual activity: Defer       Procedures Performed         Consults:   Consults       Date and Time Order Name Status Description    2023 11:10 PM Inpatient Neurology Consult Stroke Completed             Condition on Discharge:     Stable    Discharge Disposition  Home or Self Care    Discharge Medications     Discharge Medications        New Medications        Instructions Start Date   doxycycline 100 MG capsule  Commonly known as: VIBRAMYCIN   100 mg,  Oral, 2 Times Daily             Continue These Medications        Instructions Start Date   atorvastatin 10 MG tablet  Commonly known as: LIPITOR   1 tablet, Oral, Daily      buPROPion  MG 24 hr tablet  Commonly known as: WELLBUTRIN XL   450 mg, Oral, Every Morning      Calcium Citrate-Vitamin D 500-400 MG-UNIT chewable tablet   Oral, Daily      carvedilol 3.125 MG tablet  Commonly known as: COREG   3.125 mg, Oral, 2 Times Daily      clopidogrel 75 MG tablet  Commonly known as: PLAVIX   75 mg, Oral, Daily      coenzyme Q10 100 MG capsule   100 mg, Oral, Daily      escitalopram 20 MG tablet  Commonly known as: LEXAPRO   10 mg, Oral, 2 Times Daily      memantine 10 MG tablet  Commonly known as: NAMENDA   10 mg, Oral, 2 Times Daily      Multi-Vitamin Gummies chewable tablet   1 tablet/day, Oral, Daily      polyethylene glycol 17 g packet  Commonly known as: MIRALAX   17 g, Oral, Daily               Discharge Diet:   Diet Instructions       Diet: Cardiac Diets; Healthy Heart (2-3 Na+); Regular Texture (IDDSI 7); Thin (IDDSI 0)      Discharge Diet: Cardiac Diets    Cardiac Diet: Healthy Heart (2-3 Na+)    Texture: Regular Texture (IDDSI 7)    Fluid Consistency: Thin (IDDSI 0)            Activity at Discharge:     Follow-up Appointments  Future Appointments   Date Time Provider Department Center   1/18/2024  2:30 PM Nathalia Maria APRN [facility]   3/15/2024 11:00 AM Gaurav Saravia MD [facility]   8/6/2024  1:45 PM Seipel, Joseph F, MD [facility]     Additional Instructions for the Follow-ups that You Need to Schedule       Discharge Follow-up with PCP   As directed       Currently Documented PCP:    Nathalia Maria APRN    PCP Phone Number:    [phone]     Follow Up Details: 5-7 days        Discharge Follow-up with Specified Provider: Dr Seipel; 2 Weeks   As directed      To: Dr Seipel   Follow Up: 2 Weeks                Test Results Pending at Discharge  Pending Labs        Order Current Status    Folate Collected (11/26/23 1412)    Vitamin B12 Collected (11/26/23 1412)    Blood Culture - Blood, Arm, Right In process    Blood Culture - Blood, Arm, Right In process             Risk for Readmission (LACE) Score: 8 (11/26/2023  6:00 AM)      Greater than 30 minutes spent in discharge activities for this patient    NIKHIL Montelongo  11/26/23  14:20 EST

## 2023-11-26 NOTE — CONSULTS
Primary Care Provider: Nathalia Maria APRN     Consult requested by: Admitting team    Reason for Consultation: Neurological evaluation /mental status changes    History taken from: chart family    Chief complaint: Worsening mentation       SUBJECTIVE:    History of present illness: Background per H&P: Jennifer Blackmon is a 73 y.o. year old female who was evaluated in room 228 at [facility]    Source of information is mostly the medical records my discussion with the patient's  who is at bedside    From what I could gather, this patient has chronic memory problems  She has immobilization  They report that she has spasms  This is to the extent that she has good days and bad days  Sometimes she has to use bedside commode because she cannot go to the bathroom because of weakness    Anytime she gets sick, her symptoms worsen    It looks like she has had detailed evaluation done by multiple specialists in the past and was not found to have any treatable condition as per her     She was diagnosed with parainfluenza and questionable bronchitis and she is improving    Nothing suggesting acute stroke  Not this is a TIA or seizures    Nothing suggesting CNS infection    Vital signs are stable, she is afebrile  No white count  CT head showed atrophy but nothing major    I explained it to her  that dementia evaluation is an elective outpatient process    I am not impressed with Parkinson's disease and again I would not start any medication because there is no way we can titrate or whenever she leaves the hospital, there is no way we can adjust medication or address any side effects    No falls or injuries or exposure to toxins and no alcohol or drug related issues withdrawal      As per admitting,  73-year-old female presents to triage with spouse with multiple complaints.  States she has had some nonfocal confusion that she occasionally gets when she has a UTI although she denies any new urinary  symptoms.  She did recently come off Plavix for her hip surgery on November 21.  They are unable to definitely tell me at time of when her symptoms started therefore TNK was not felt to be warranted.  Discussed with Dr. Diane and patient will be transferred back to the main ER for evaluation.     Due to significant overcrowding in the emergency department patient was initially seen and evaluated in triage.  Provider in triage recommended patient placement in the treatment area to initiate therapy and movement to an ER bed as soon as possible.   Orders placed; medications will be deferred to main provider per protocol.           History of Present Illness  Provider in triage note reviewed     73-year-old female presents with multiple complaints the patient has had nonfocal confusion that has increased in the last 2 to 3 days.  The patient had a cough that has been nonproductive.  The patient has had altered mental status in the past with urinary tract infection however no current dysuria has been reported.  The patient felt hot earlier in the day but has had no chills.  The patient's family reports that she has had trouble walking recently and has had more of a halting gait.  The patient states that she feels stiff..  She states she occasionally has a headache but the recent headaches have not been the worst she has ever had.  The patient apparently has difficulty sleeping and occasionally feels anxious.  She denies cognitive and vegetative symptoms of depression but states that she is very worried about her health and feels as if she is having trouble taking care of herself.  She denies homicidal or suicidal ideation  She was reportedly changing position today and her legs gave out bilaterally according to the .  He states that her gait has become progressively more unsteady in the last several weeks  She has been noted to have recently increasing tremor  Review of Systems   Neurological:  Positive for  tremors, light-headedness and headaches. Negative for syncope, speech difficulty and weakness.   Psychiatric/Behavioral:  Positive for confusion.    Medical Decision Making  Stable ER course.  The patient's bronchitis will be treated with ceftriaxone injection followed by doxycycline.  The patient will have observation tonight we will obtain neurologic consultation evaluate for potential parkinsonian medication and evaluation of the patient's Namenda dosing.  The patient was agreeable with this plan of treatment as was the family.  The patient's  reported he was concerned about her recent cognitive decline, although reportedly the patient's daughter thinks that it is more of a metabolic encephalopathy issue rather than her chronic illness decline      - Portions of the above HPI were copied from previous encounters and edited as appropriate. PMH as detailed below.     Review of Systems   Very difficult to obtain because of her present condition    PATIENT HISTORY:  Past Medical History:   Diagnosis Date    Allergic rhinitis     Basal cell carcinoma (BCC) of nostril     Coronary artery disease involving native coronary artery of native heart with angina pectoris 06/18/2019    Status post successful PCI of the coronary stent deployment to high-grade RCA stenosis after presenting with ST segment elevated microinfarction in May 2019    DDD (degenerative disc disease), lumbar     Depression     Depression     Essential hypertension 06/18/2019    Femoral neck fracture 05/26/2023    Heart attack     6/2/2019    Hyperlipidemia     Hyperlipidemia, mixed 08/13/2019    Hypertension     Insomnia     Myocardial infarction less than 4 weeks ago 06/18/2019    Presented initially with ST segment elevated microinfarction treated emergently in [place] May 2019    Old MI (myocardial infarction) 06/18/2019    Presented initially with ST segment elevated microinfarction treated emergently in [place] May 2019     Osteoarthritis     Toenail fungus    ,   Past Surgical History:   Procedure Laterality Date    ADENOIDECTOMY      Adenoids removed    APPENDECTOMY      AUGMENTATION MAMMAPLASTY      BREAST IMPLANT SURGERY  1984    CATARACT EXTRACTION Bilateral 2017    CERVICAL DISC SURGERY      ruptured and removed      SECTION      CHEST SURGERY  196    attempted pectus repair     EYE SURGERY  1997    AK    HYSTERECTOMY      KNEE ARTHROSCOPY Right     LUMBAR DISC SURGERY      ruptured and removed     NASAL SEPTAL RECONSTRUCTION      PECTUS CARNATUM REPAIR      attempted    RECTAL SURGERY      Repair    REFRACTIVE SURGERY      RK/AK both eyes     REFRACTIVE SURGERY      SOMNOPLASTY RADIAL FREQUENCY ABLATION      TONSILLECTOMY      TOTAL HIP ARTHROPLASTY Left 2023    Procedure: TOTAL HIP ARTHROPLASTY ANTERIOR;  Surgeon: Josiah Menon MD;  Location: [facility] MAIN OR;  Service: Orthopedics;  Laterality: Left;    UVULOPALATOPHARYNGOPLASTY     ,   Family History   Problem Relation Age of Onset    Alzheimer's disease Mother     Other Mother         CVA, acute myocardial infarction    Leukemia Father     Other Father         Parkinson's     Heart attack Sister     No Known Problems Brother     Bipolar disorder Daughter     Heart attack Brother     Alcohol abuse Brother     Anxiety disorder Brother     Depression Brother     ADD / ADHD Daughter     Other Daughter         alcohol and drug addiction    HIV Daughter    ,   Social History     Tobacco Use    Smoking status: Former     Packs/day: 0.50     Years: 4.00     Additional pack years: 0.00     Total pack years: 2.00     Types: Cigarettes     Quit date:      Years since quittin.9    Smokeless tobacco: Never   Vaping Use    Vaping Use: Never used   Substance Use Topics    Alcohol use: No    Drug use: No   ,   Prior to Admission medications    Medication Sig Start Date End Date Taking? Authorizing Provider   atorvastatin (LIPITOR) 10 MG tablet Take 1 tablet  by mouth Daily. 10/18/23  Yes Nancy Painting MD   buPROPion XL (WELLBUTRIN XL) 150 MG 24 hr tablet Take 3 tablets by mouth Every Morning. 2/14/23  Yes Nathalia Maria APRN   Calcium Citrate-Vitamin D 500-400 MG-UNIT chewable tablet Chew Daily.   Yes Nancy Painting MD   carvedilol (COREG) 3.125 MG tablet Take 1 tablet by mouth 2 (Two) Times a Day. 2/14/23  Yes Nathalia Maria APRN   clopidogrel (PLAVIX) 75 MG tablet Take 1 tablet by mouth Daily. 2/14/23  Yes Nathalia Maria APRN   coenzyme Q10 100 MG capsule Take 1 capsule by mouth Daily.   Yes Nancy Painting MD   escitalopram (LEXAPRO) 20 MG tablet Take 0.5 tablets by mouth 2 (Two) Times a Day.   Yes Nancy Painting MD   memantine (NAMENDA) 10 MG tablet Take 1 tablet by mouth 2 (Two) Times a Day. 10/23/23  Yes Seipel, Joseph F, MD   Multiple Vitamins-Minerals (Multi-Vitamin Gummies) chewable tablet Chew 1 tablet/day Daily.   Yes Nancy Painting MD   polyethylene glycol (MIRALAX) 17 g packet Take 17 g by mouth Daily for 30 days. 11/18/23 12/18/23 Yes Kate Rodriguez APRN    Allergies:  Patient has no known allergies.    Current Facility-Administered Medications   Medication Dose Route Frequency Provider Last Rate Last Admin    acetaminophen (TYLENOL) tablet 650 mg  650 mg Oral Q4H PRN Koko Diane MD        atorvastatin (LIPITOR) tablet 10 mg  10 mg Oral Daily Moni Long APRN   10 mg at 11/26/23 0954    sennosides-docusate (PERICOLACE) 8.6-50 MG per tablet 2 tablet  2 tablet Oral BID Koko Diane MD   2 tablet at 11/26/23 0831    And    polyethylene glycol (MIRALAX) packet 17 g  17 g Oral Daily PRN Koko Diane MD        And    bisacodyl (DULCOLAX) EC tablet 5 mg  5 mg Oral Daily PRN Koko Diane MD        And    bisacodyl (DULCOLAX) suppository 10 mg  10 mg Rectal Daily PRN Koko Diane MD        buPROPion XL (WELLBUTRIN XL) 24 hr tablet 450 mg  450 mg Oral Moni Perry  APRN   450 mg at 11/26/23 0954    carvedilol (COREG) tablet 3.125 mg  3.125 mg Oral BID TripureMoni APRN   3.125 mg at 11/26/23 0954    clopidogrel (PLAVIX) tablet 75 mg  75 mg Oral Daily Tripure, Moni S, APRN   75 mg at 11/26/23 0954    doxycycline (MONODOX) capsule 100 mg  100 mg Oral Q12H Koko Diane MD   100 mg at 11/26/23 0831    escitalopram (LEXAPRO) tablet 20 mg  20 mg Oral Daily Tripure, Moni S, APRN   20 mg at 11/26/23 0954    melatonin tablet 5 mg  5 mg Oral Nightly PRN Koko Diane MD        memantine (NAMENDA) tablet 10 mg  10 mg Oral BID Tripure, Moni TEMPLE APRN   10 mg at 11/26/23 0954    ondansetron (ZOFRAN) injection 4 mg  4 mg Intravenous Q6H PRN Koko Diane MD        polyethylene glycol (MIRALAX) packet 17 g  17 g Oral Daily Miguel Angelaristeo, Moni TEMPLE APRN   17 g at 11/26/23 0953    sodium chloride 0.9 % flush 10 mL  10 mL Intravenous PRN Koko Diane MD        sodium chloride 0.9 % flush 10 mL  10 mL Intravenous Q12H Koko Diane MD   10 mL at 11/26/23 0832    sodium chloride 0.9 % flush 10 mL  10 mL Intravenous PRN Koko Diane MD        sodium chloride 0.9 % infusion 40 mL  40 mL Intravenous PRN Koko Diane MD            ________________________________________________________        OBJECTIVE:  Upon today's exam, patient resting comfortably in bed in no acute distress     Neurologic Exam    The patient is awake and alert and oriented x times .  She does say that she is at [facility] but she is struggling with exact date  She can name and she can follow commands and repeat    Some hesitancy but no aphasia     Cranial nerve examination demonstrate:  Full fields of vision to confrontation  Pupils are round, reactive to light and accommodation and size of about 3 mm  No ptosis or nystagmus  Funduscopic examination was not successful  Eye movements are conjugate     Sensation on the face and scalp are normal  Muscles of mastication are normal  and symmetric  Muscles of  facial expression are normal and symmetric  Hearing is intact bilaterally  Head turning and shoulder shrugs were unremarkable  Tongue was midline  I could not visualize her oropharynx or uvula     Motor examination:  Normal bulk, tone and strength was 5-/5    Occasionally she has increased tone and she is not relaxing but I do not see any particular pattern.  No tremors were seen  No fasciculations     Sensory examination:  Intact for soft touch, pain and position sensation  Romberg was not evaluated     Reflexes:  0/4     Coordination:  Normal finger-to-nose to finger, rapid alternating movements and toe to finger  As I mentioned above she is slow but she was able to do toe to finger and finger-to-nose to finger but very slow     Gait:  Deferred     Toe signs:  Mute    ________________________________________________________   RESULTS REVIEW:    VITAL SIGNS:   Temp:  [97.3 °F (36.3 °C)-98.6 °F (37 °C)] 97.3 °F (36.3 °C)  Heart Rate:  [59-77] 70  Resp:  [10-16] 10  BP: (102-137)/(46-67) 122/67     LABS:      Lab 11/26/23  0342 11/25/23 1849   WBC 6.70 6.20   HEMOGLOBIN 12.0 13.1   HEMATOCRIT 36.6 39.8   PLATELETS 308 316   NEUTROS ABS 2.50 3.30   LYMPHS ABS 3.10 1.90   MONOS ABS 0.70 0.70   EOS ABS 0.40 0.30   MCV 93.0 93.6   SED RATE  --  37*         Lab 11/26/23  0342 11/25/23  1849   SODIUM 138 142   POTASSIUM 4.2 4.2   CHLORIDE 104 102   CO2 24.0 32.0*   ANION GAP 10.0 8.0   BUN 23 23   CREATININE 0.96 1.21*   EGFR 61.8 46.8*   GLUCOSE 92 133*   CALCIUM 9.3 9.7   MAGNESIUM  --  2.3   TSH  --  1.400         Lab 11/25/23  1849   TOTAL PROTEIN 6.7   ALBUMIN 3.8   GLOBULIN 2.9   ALT (SGPT) 27   AST (SGOT) 21   BILIRUBIN 0.2   ALK PHOS 108         Lab 11/26/23  0342 11/25/23  1849   PROBNP  --  383.9   HSTROP T 16* 15*                 UA          10/2/2023    08:02 11/7/2023    18:00 11/25/2023    19:28   Urinalysis   Squamous Epithelial Cells, UA 0-2  3-6  0-2    Specific Malone, UA  1.017  1.029  1.019    Ketones, UA Negative  Negative  Negative    Blood, UA Negative  Negative  Negative    Leukocytes, UA Large (3+)  Small (1+)  Trace    Nitrite, UA Negative  Negative  Negative    RBC, UA 3-5  None Seen  0-2    WBC, UA 13-20  3-5  0-2    Bacteria, UA 2+  Trace  None Seen        Lab Results   Component Value Date    TSH 1.400 11/25/2023    LDL 87 12/12/2022    HGBA1C 5.6 09/09/2019    [id number] 816 08/04/2023       IMAGING STUDIES:  CT Head Without Contrast    Result Date: 11/25/2023  Impression: 1. No CT evidence of acute or evolving infarct. 2. Features of moderately advanced chronic microvascular disease and moderate generalized atrophy. Electronically Signed: Kyara Pandya MD  11/25/2023 7:56 PM EST  Workstation ID: [id number]    XR Chest 1 View    Result Date: 11/25/2023  Impression: No evidence of pneumonia. There is possible small left pleural effusion Electronically Signed: Servando Medina  11/25/2023 7:01 PM EST  Workstation ID: [id number]     I reviewed the patient's new clinical results.    ________________________________________________________     PROBLEM LIST:    Altered mental status            ASSESSMENT/PLAN:  This is a 73-year-old female with chronic immobilization and memory problems    As I mentioned in HPI, this is a chronic process and it is not a good idea to start any medications while in-house because the hospitalist teams typically do not do follow-ups    And medication sometimes need to be adjusted    I do recommend B12 and folate level and thyroid studies and I recommend outpatient neurological evaluation, it seems like she has it done in the past.  We can try amantadine 100 mg twice daily for her gait abnormalities or hesitancy but again that would be ideally done as outpatient.  She can have full neuropsych evaluation done for classification of her cognitive changes this is not a stroke, seizure or CNS infection        I explained everything to her     Nothing  else to add or change at this time        I discussed the patient's findings and my recommendations with patient and family    Sara Villanueva MD  11/26/23  13:11 EST

## 2023-11-26 NOTE — PLAN OF CARE
Goal Outcome Evaluation:  Plan of Care Reviewed With: patient           Outcome Evaluation: D/C home.  To see neurologist outpatient in 2 weeks.

## 2023-11-26 NOTE — OUTREACH NOTE
Prep Survey      Flowsheet Row Responses   [facility] patient discharged from? [facility]   Is LACE score < 7 ? No   Eligibility [facility]   Date of Admission 11/25/23   Date of Discharge 11/26/23   Discharge Disposition Home or Self Care   Discharge diagnosis Altered mental status   Does the patient have one of the following disease processes/diagnoses(primary or secondary)? Other   Does the patient have Home health ordered? No   Is there a DME ordered? No   Prep survey completed? Yes            Priscila TEMPLE - Registered Nurse

## 2023-11-26 NOTE — ED NOTES
Nursing report ED to floor  Jennifer Blackmon  73 y.o.  female    HPI:   Chief Complaint   Patient presents with    Altered Mental Status       Admitting doctor:   Koko Diane MD    Admitting diagnosis:   The primary encounter diagnosis was Altered mental status, unspecified altered mental status type. Diagnoses of Acute bronchitis, unspecified organism and Parkinson's disease with dyskinesia and fluctuating manifestations were also pertinent to this visit.    Code status:   Current Code Status       Date Active Code Status Order ID Comments User Context       Prior            Allergies:   Patient has no known allergies.    Isolation:  No active isolations     Fall Risk:  Fall Risk Assessment was completed, and patient is at moderate risk for falls.   Predictive Model Details         9 (Low) Factor Value    Calculated 11/25/2023 22:46 Age 73    Risk of Fall Model Musculoskeletal Assessment WDL     Active Peripheral IV Present     Imaging order in this encounter Present     Magnesium 2.3 mg/dL     Skin Assessment WDL     Financial Class Other     Diastolic BP 61     Drug Use No     Tobacco Use Quit     Aleksandar Scale not on file     Peripheral Vascular Assessment WDL     Creatinine 1.21 mg/dL     Albumin 3.8 g/dL     Gastrointestinal Assessment WDL     Chloride 102 mmol/L     Cardiac Assessment WDL     ALT 27 U/L     Respiratory Rate 16     Days after Admission 0.188     Potassium 4.2 mmol/L     Calcium 9.7 mg/dL     Total Bilirubin 0.2 mg/dL         Weight:       11/25/23  1750   Weight: 53.5 kg (118 lb)       Intake and Output  No intake or output data in the 24 hours ending 11/25/23 2246    Diet:        Most recent vitals:   Vitals:    11/25/23 2146 11/25/23 2202 11/25/23 2231 11/25/23 2241   BP: 128/57 104/58 104/61    BP Location:       Patient Position:       Pulse: 69 68 66 66   Resp:       Temp:       TempSrc:       SpO2: 97% 97% 95% 96%   Weight:       Height:           Active LDAs/IV Access:   Lines, Drains  & Airways       Active LDAs       Name Placement date Placement time Site Days    Peripheral IV 11/25/23 1928 Anterior;Right Forearm 11/25/23 1928  Forearm  less than 1                    Skin Condition:   Skin Assessments (last day)       None             Labs (abnormal labs have a star):   Labs Reviewed   COMPREHENSIVE METABOLIC PANEL - Abnormal; Notable for the following components:       Result Value    Glucose 133 (*)     Creatinine 1.21 (*)     CO2 32.0 (*)     eGFR 46.8 (*)     All other components within normal limits    Narrative:     GFR Normal >60  Chronic Kidney Disease <60  Kidney Failure <15    The GFR formula is only valid for adults with stable renal function between ages 18 and 70.   URINALYSIS W/ CULTURE IF INDICATED - Abnormal; Notable for the following components:    Appearance, UA Hazy (*)     Leuk Esterase, UA Trace (*)     All other components within normal limits    Narrative:     In absence of clinical symptoms, the presence of pyuria, bacteria, and/or nitrites on the urinalysis result does not correlate with infection.   SINGLE HSTROPONIN T - Abnormal; Notable for the following components:    HS Troponin T 15 (*)     All other components within normal limits    Narrative:     High Sensitive Troponin T Reference Range:  <14.0 ng/L- Negative Female for AMI  <22.0 ng/L- Negative Male for AMI  >=14 - Abnormal Female indicating possible myocardial injury.  >=22 - Abnormal Male indicating possible myocardial injury.   Clinicians would have to utilize clinical acumen, EKG, Troponin, and serial changes to determine if it is an Acute Myocardial Infarction or myocardial injury due to an underlying chronic condition.        SEDIMENTATION RATE - Abnormal; Notable for the following components:    Sed Rate 37 (*)     All other components within normal limits   COVID-19 AND FLU A/B, NP SWAB IN TRANSPORT MEDIA 1 HR TAT - Normal    Narrative:     Fact sheet for providers:  https://www.fda.gov/media/831639/download    Fact sheet for patients: https://www.fda.gov/media/575607/download    Test performed by PCR.   BNP (IN-HOUSE) - Normal    Narrative:     This assay is used as an aid in the diagnosis of individuals suspected of having heart failure. It can be used as an aid in the diagnosis of acute decompensated heart failure (ADHF) in patients presenting with signs and symptoms of ADHF to the emergency department (ED). In addition, NT-proBNP of <300 pg/mL indicates ADHF is not likely.    Age Range Result Interpretation  NT-proBNP Concentration (pg/mL:      <50             Positive            >450                   Gray                 300-450                    Negative             <300    50-75           Positive            >900                  Gray                300-900                  Negative            <300      >75             Positive            >1800                  Gray                300-1800                  Negative            <300   MAGNESIUM - Normal   CK - Normal   AMMONIA - Normal   TSH - Normal   CBC WITH AUTO DIFFERENTIAL - Normal   BLOOD CULTURE   BLOOD CULTURE   URINALYSIS, MICROSCOPIC ONLY   CBC AND DIFFERENTIAL    Narrative:     The following orders were created for panel order CBC & Differential.  Procedure                               Abnormality         Status                     ---------                               -----------         ------                     CBC Auto Differential[[id number]]        Normal              Final result                 Please view results for these tests on the individual orders.       LOC: Person, Place, and Time    Telemetry:  Observation Unit    Cardiac Monitoring Ordered: yes    EKG:   ECG 12 Lead Altered Mental Status   Preliminary Result   HEART RATE= 77  bpm   RR Interval= 781  ms   IA Interval=   ms   P Horizontal Axis=   deg   P Front Axis=   deg   QRSD Interval= 97  ms   QT Interval= 379  ms   QTcB= 429  ms   QRS  Axis= 65  deg   T Wave Axis= 65  deg   - ABNORMAL ECG -   Atrial fibrillation   Anteroseptal infarct, age indeterminate   When compared with ECG of 26-May-2023 16:47:45,   Significant change in rhythm: previously sinus   New or worsened ischemia or infarction   Significant axis, voltage or hypertrophy change   Electronically Signed By:    Date and Time of Study: 2023 18:00:27          Medications Given in the ED:   Medications   sodium chloride 0.9 % flush 10 mL (has no administration in time range)       Imaging results:  CT Head Without Contrast    Result Date: 2023  Impression: 1. No CT evidence of acute or evolving infarct. 2. Features of moderately advanced chronic microvascular disease and moderate generalized atrophy. Electronically Signed: Kyara Pandya MD  2023 7:56 PM EST  Workstation ID: [id number]    XR Chest 1 View    Result Date: 2023  Impression: No evidence of pneumonia. There is possible small left pleural effusion Electronically Signed: Servando Medina  2023 7:01 PM EST  Workstation ID: [id number]     Social issues:   Social History     Socioeconomic History    Marital status:     Number of children: 2   Tobacco Use    Smoking status: Former     Packs/day: 0.50     Years: 4.00     Additional pack years: 0.00     Total pack years: 2.00     Types: Cigarettes     Quit date:      Years since quittin.9    Smokeless tobacco: Never   Vaping Use    Vaping Use: Never used   Substance and Sexual Activity    Alcohol use: No    Drug use: No    Sexual activity: Not Currently       NIH Stroke Scale:  Interval: (not recorded)  1a. Level of Consciousness: (not recorded)  1b. LOC Questions: (not recorded)  1c. LOC Commands: (not recorded)  2. Best Gaze: (not recorded)  3. Visual: (not recorded)  4. Facial Palsy: (not recorded)  5a. Motor Arm, Left: (not recorded)  5b. Motor Arm, Right: (not recorded)  6a. Motor Leg, Left: (not recorded)  6b. Motor Leg, Right: (not  recorded)  7. Limb Ataxia: (not recorded)  8. Sensory: (not recorded)  9. Best Language: (not recorded)  10. Dysarthria: (not recorded)  11. Extinction and Inattention (formerly Neglect): (not recorded)    Total (NIH Stroke Scale): (not recorded)     Additional notable assessment information:A&OX3     Nursing report ED to floor:  Report called to VANNESA Nunez RN   11/25/23 22:46 EST

## 2023-11-27 ENCOUNTER — TELEPHONE (OUTPATIENT)
Dept: NEUROLOGY | Facility: CLINIC | Age: 73
End: 2023-11-27

## 2023-11-27 ENCOUNTER — TRANSITIONAL CARE MANAGEMENT TELEPHONE ENCOUNTER (OUTPATIENT)
Dept: CALL CENTER | Facility: HOSPITAL | Age: 73
End: 2023-11-27
Payer: MEDICARE

## 2023-11-27 NOTE — TELEPHONE ENCOUNTER
HOSPITAL FOLLOW-UP REQUEST    Caller: ROMEO CASTRO    Relationship to patient: Emergency Contact; SPOUSE    Best call back number: [phone]    New or established patient? [] New  [x] Established    Date of admission: 11/25/2023    Date of discharge: 11/26/2023    Length of stay (If applicable): 1 DAY    Facility discharged from: [facility]    Diagnosis/Symptoms: ALTERED MENTAL STATUS, MEMORY LOSS    Suggested follow-up timeframe: 2 WEEKS W/ DR. SEIPEL    Specialty Only: Did you see a [facility] provider?    [x] Yes  [] No    If so, who? DR. HEADLEY    Additional notes: PER DR. HEADLEY'S CONSULT NOTE, PT RECOMMENDED TO F/U IN 2 WEEKS W/ DR. SEIPEL. SOONEST AVAILABLE APPTS IN JUNE 2024 AND PT HAS F/U APPT ALREADY SCHEDULED FOR 8/6/2024.    SENDING ENCOUNTER TO BE ADVISED IF APPT SHOULD BE MOVED TO JUNE 2024 OR IF OFFICE IS ABLE TO SCHEDULE WITHIN THE 2 WEEK SUGGESTED F/U TIMEFRAME.    PLEASE REVIEW AND ADVISE.

## 2023-11-27 NOTE — OUTREACH NOTE
Call Center TCM Note      Flowsheet Row Responses   [facility] patient discharged from? [facility]   Does the patient have one of the following disease processes/diagnoses(primary or secondary)? Other   TCM attempt successful? Yes   Call start time 1602   Call end time 1604   Discharge diagnosis Altered mental status   Is patient permission given to speak with other caregiver? Yes   List who call center can speak with Norma Blackmon   Person spoke with today (if not patient) and relationship Norma Blackmon-    Meds reviewed with patient/caregiver? Yes   Is the patient having any side effects they believe may be caused by any medication additions or changes? No   Does the patient have all medications ordered at discharge? Yes   Is the patient taking all medications as directed (includes completed medication regime)? Yes   Does the patient have an appointment with their PCP within 7-14 days of discharge? No   Nursing Interventions Patient declined scheduling/rescheduling appointment at this time   Has home health visited the patient within 72 hours of discharge? N/A   Psychosocial issues? No   Did the patient receive a copy of their discharge instructions? Yes   Nursing interventions Reviewed instructions with patient   What is the patient's perception of their health status since discharge? Improving   Is the patient/caregiver able to teach back signs and symptoms related to disease process for when to call PCP? Yes   Is the patient/caregiver able to teach back signs and symptoms related to disease process for when to call 911? Yes   Is the patient/caregiver able to teach back the hierarchy of who to call/visit for symptoms/problems? PCP, Specialist, Home health nurse, Urgent Care, ED, 911 Yes   If the patient is a current smoker, are they able to teach back resources for cessation? Not a smoker   TCM call completed? Yes   Call end time 1604   Would this patient benefit from a Referral to [facility] Social Work?  No   Is the patient interested in additional calls from an ambulatory ? No            Jacquelin Franco LPN    11/27/2023, 16:06 EST

## 2023-11-30 LAB
BACTERIA SPEC AEROBE CULT: NORMAL
BACTERIA SPEC AEROBE CULT: NORMAL

## 2023-12-01 NOTE — TELEPHONE ENCOUNTER
Name: ROMEO CASTRO    Relationship: Emergency Contact    Best Callback Number: [phone]    HUB PROVIDED THE RELAY MESSAGE FROM THE OFFICE   PATIENT HAS FURTHER QUESTIONS AND WOULD LIKE A CALL BACK AT THE FOLLOWING PHONE NUMBER [phone]    ADDITIONAL INFORMATION: PATIENT IS REQUESTING A CALL BACK NEXT WEEK     PLEASE REVIEW    THANK YOU

## 2023-12-07 NOTE — TELEPHONE ENCOUNTER
Caller: ROMEO CASTRO    Relationship: Emergency Contact    Best call back number: [phone]    What was the call regarding: SPOKE WITH PATIENT'S  AND ASKED IF PATIENT WILL STILL BE USING THE [employer] MEDICARE INS. HE STATED SHE WILL BE AND EXPLAINED THAT BH IS OON WITH [employer] MEDICARE AND THAT HER COSTS WOULD BE HIGHER AND WE NEED TO KNOW IF SHE IS OKAY WITH THAT TO SCHEDULED. HE STATED HE WILL CALL HER PCP AND SEE WHAT OTHER OPTIONS THE PATIENT COULD HAVE

## 2023-12-13 RX ORDER — CARVEDILOL 3.12 MG/1
3.12 TABLET ORAL 2 TIMES DAILY
Qty: 180 TABLET | Refills: 3 | Status: SHIPPED | OUTPATIENT
Start: 2023-12-13

## 2023-12-13 RX ORDER — BUPROPION HYDROCHLORIDE 150 MG/1
TABLET ORAL
Qty: 270 TABLET | Refills: 3 | Status: SHIPPED | OUTPATIENT
Start: 2023-12-13

## 2023-12-13 RX ORDER — CLOPIDOGREL BISULFATE 75 MG/1
75 TABLET ORAL DAILY
Qty: 90 TABLET | Refills: 3 | Status: SHIPPED | OUTPATIENT
Start: 2023-12-13

## 2024-01-11 ENCOUNTER — TELEPHONE (OUTPATIENT)
Dept: NEUROLOGY | Facility: CLINIC | Age: 74
End: 2024-01-11
Payer: MEDICARE

## 2024-01-11 NOTE — TELEPHONE ENCOUNTER
Provider: SEIPEL    Caller: ROMEO    Relationship to Patient:     Phone Number: [phone]     Reason for Call: PT'S  CALLED AND STATES THAT PT IS NEEDING TO SEEN SOONER THAN HER ONE YEAR FOLLOW UP. UNABLE TO SCHEDULE FOR A SOONER APPT. PT IS HAVING INCREASE MEMORY PROBLEMS AND WALKING/BALANCE PROBLEMS.  IS WANTING TO KNOW IF THERE IS  AWAY CAN BE SEEN SOONER.    PLEASE REVIEW AND ADVISE.  THANK YOU

## 2024-01-24 NOTE — TELEPHONE ENCOUNTER
Caller: GLORIANOMANELVI    Relationship: Emergency Contact    Best call back number: [phone]    What is the best time to reach you: ANYTIME    Who are you requesting to speak with (clinical staff, provider,  specific staff member): MITA    Do you know the name of the person who called: MITA    What was the call regarding: HEIDI RETURNING A CALL AND ASKED FOR MITA. ADVISED SHE IS OUT OF THE OFFICE TODAY AND HE ASKS IF SHE COULD CALL HIM BACK AT HER EARLIER CONVENIENCE FOR EARLIER APPT.     PLEASE REVIEW  THANK YOU

## 2024-01-26 RX ORDER — MEMANTINE HYDROCHLORIDE 10 MG/1
10 TABLET ORAL 2 TIMES DAILY
Qty: 60 TABLET | Refills: 2 | Status: SHIPPED | OUTPATIENT
Start: 2024-01-26

## 2024-02-15 ENCOUNTER — LAB (OUTPATIENT)
Dept: FAMILY MEDICINE CLINIC | Facility: CLINIC | Age: 74
End: 2024-02-15
Payer: MEDICARE

## 2024-02-15 ENCOUNTER — OFFICE VISIT (OUTPATIENT)
Dept: FAMILY MEDICINE CLINIC | Facility: CLINIC | Age: 74
End: 2024-02-15
Payer: MEDICARE

## 2024-02-15 VITALS
HEART RATE: 68 BPM | RESPIRATION RATE: 18 BRPM | DIASTOLIC BLOOD PRESSURE: 68 MMHG | WEIGHT: 123 LBS | OXYGEN SATURATION: 100 % | SYSTOLIC BLOOD PRESSURE: 118 MMHG | BODY MASS INDEX: 19.77 KG/M2 | HEIGHT: 66 IN

## 2024-02-15 DIAGNOSIS — Z00.00 MEDICARE ANNUAL WELLNESS VISIT, SUBSEQUENT: ICD-10-CM

## 2024-02-15 DIAGNOSIS — Z00.00 MEDICARE ANNUAL WELLNESS VISIT, SUBSEQUENT: Primary | ICD-10-CM

## 2024-02-15 PROCEDURE — G0432 EIA HIV-1/HIV-2 SCREEN: HCPCS | Performed by: NURSE PRACTITIONER

## 2024-02-15 PROCEDURE — 36415 COLL VENOUS BLD VENIPUNCTURE: CPT

## 2024-02-15 PROCEDURE — 83036 HEMOGLOBIN GLYCOSYLATED A1C: CPT | Performed by: NURSE PRACTITIONER

## 2024-02-15 PROCEDURE — 85025 COMPLETE CBC W/AUTO DIFF WBC: CPT | Performed by: NURSE PRACTITIONER

## 2024-02-15 PROCEDURE — 80053 COMPREHEN METABOLIC PANEL: CPT | Performed by: NURSE PRACTITIONER

## 2024-02-15 PROCEDURE — 80061 LIPID PANEL: CPT | Performed by: NURSE PRACTITIONER

## 2024-02-15 PROCEDURE — 84443 ASSAY THYROID STIM HORMONE: CPT | Performed by: NURSE PRACTITIONER

## 2024-02-16 ENCOUNTER — LAB (OUTPATIENT)
Dept: FAMILY MEDICINE CLINIC | Facility: CLINIC | Age: 74
End: 2024-02-16
Payer: MEDICARE

## 2024-02-16 LAB
ALBUMIN SERPL-MCNC: 4.3 G/DL (ref 3.5–5.2)
ALBUMIN/GLOB SERPL: 1.8 G/DL
ALP SERPL-CCNC: 96 U/L (ref 39–117)
ALT SERPL W P-5'-P-CCNC: 59 U/L (ref 1–33)
ANION GAP SERPL CALCULATED.3IONS-SCNC: 8 MMOL/L (ref 5–15)
AST SERPL-CCNC: 34 U/L (ref 1–32)
BASOPHILS # BLD AUTO: 0.03 10*3/MM3 (ref 0–0.2)
BASOPHILS NFR BLD AUTO: 0.4 % (ref 0–1.5)
BILIRUB SERPL-MCNC: 0.3 MG/DL (ref 0–1.2)
BUN SERPL-MCNC: 25 MG/DL (ref 8–23)
BUN/CREAT SERPL: 23.4 (ref 7–25)
CALCIUM SPEC-SCNC: 9.8 MG/DL (ref 8.6–10.5)
CHLORIDE SERPL-SCNC: 103 MMOL/L (ref 98–107)
CHOLEST SERPL-MCNC: 157 MG/DL (ref 0–200)
CO2 SERPL-SCNC: 30 MMOL/L (ref 22–29)
CREAT SERPL-MCNC: 1.07 MG/DL (ref 0.57–1)
DEPRECATED RDW RBC AUTO: 45.6 FL (ref 37–54)
EGFRCR SERPLBLD CKD-EPI 2021: 54.3 ML/MIN/1.73
EOSINOPHIL # BLD AUTO: 0.17 10*3/MM3 (ref 0–0.4)
EOSINOPHIL NFR BLD AUTO: 2.5 % (ref 0.3–6.2)
ERYTHROCYTE [DISTWIDTH] IN BLOOD BY AUTOMATED COUNT: 13.7 % (ref 12.3–15.4)
GLOBULIN UR ELPH-MCNC: 2.4 GM/DL
GLUCOSE SERPL-MCNC: 126 MG/DL (ref 65–99)
HBA1C MFR BLD: 5.2 % (ref 4.8–5.6)
HCT VFR BLD AUTO: 39.8 % (ref 34–46.6)
HDLC SERPL-MCNC: 52 MG/DL (ref 40–60)
HGB BLD-MCNC: 13 G/DL (ref 12–15.9)
HIV 1+2 AB+HIV1 P24 AG SERPL QL IA: NORMAL
IMM GRANULOCYTES # BLD AUTO: 0.02 10*3/MM3 (ref 0–0.05)
IMM GRANULOCYTES NFR BLD AUTO: 0.3 % (ref 0–0.5)
LDLC SERPL CALC-MCNC: 83 MG/DL (ref 0–100)
LDLC/HDLC SERPL: 1.55 {RATIO}
LYMPHOCYTES # BLD AUTO: 1.97 10*3/MM3 (ref 0.7–3.1)
LYMPHOCYTES NFR BLD AUTO: 28.8 % (ref 19.6–45.3)
MCH RBC QN AUTO: 30.4 PG (ref 26.6–33)
MCHC RBC AUTO-ENTMCNC: 32.7 G/DL (ref 31.5–35.7)
MCV RBC AUTO: 93 FL (ref 79–97)
MONOCYTES # BLD AUTO: 0.73 10*3/MM3 (ref 0.1–0.9)
MONOCYTES NFR BLD AUTO: 10.7 % (ref 5–12)
NEUTROPHILS NFR BLD AUTO: 3.92 10*3/MM3 (ref 1.7–7)
NEUTROPHILS NFR BLD AUTO: 57.3 % (ref 42.7–76)
NRBC BLD AUTO-RTO: 0 /100 WBC (ref 0–0.2)
PLATELET # BLD AUTO: 271 10*3/MM3 (ref 140–450)
PMV BLD AUTO: 9.7 FL (ref 6–12)
POTASSIUM SERPL-SCNC: 4.3 MMOL/L (ref 3.5–5.2)
PROT SERPL-MCNC: 6.7 G/DL (ref 6–8.5)
RBC # BLD AUTO: 4.28 10*6/MM3 (ref 3.77–5.28)
SODIUM SERPL-SCNC: 141 MMOL/L (ref 136–145)
TRIGL SERPL-MCNC: 122 MG/DL (ref 0–150)
TSH SERPL DL<=0.05 MIU/L-ACNC: 1.37 UIU/ML (ref 0.27–4.2)
VLDLC SERPL-MCNC: 22 MG/DL (ref 5–40)
WBC NRBC COR # BLD AUTO: 6.84 10*3/MM3 (ref 3.4–10.8)

## 2024-02-16 PROCEDURE — 81001 URINALYSIS AUTO W/SCOPE: CPT | Performed by: NURSE PRACTITIONER

## 2024-02-16 PROCEDURE — 87086 URINE CULTURE/COLONY COUNT: CPT | Performed by: NURSE PRACTITIONER

## 2024-02-17 LAB
BACTERIA UR QL AUTO: ABNORMAL /HPF
BILIRUB UR QL STRIP: NEGATIVE
CLARITY UR: ABNORMAL
COLOR UR: YELLOW
GLUCOSE UR STRIP-MCNC: NEGATIVE MG/DL
HGB UR QL STRIP.AUTO: NEGATIVE
HOLD SPECIMEN: NORMAL
HYALINE CASTS UR QL AUTO: ABNORMAL /LPF
KETONES UR QL STRIP: NEGATIVE
LEUKOCYTE ESTERASE UR QL STRIP.AUTO: NEGATIVE
NITRITE UR QL STRIP: POSITIVE
PH UR STRIP.AUTO: 8 [PH] (ref 5–8)
PROT UR QL STRIP: NEGATIVE
RBC # UR STRIP: ABNORMAL /HPF
REF LAB TEST METHOD: ABNORMAL
SP GR UR STRIP: 1.02 (ref 1–1.03)
SQUAMOUS #/AREA URNS HPF: ABNORMAL /HPF
TRI-PHOS CRY URNS QL MICRO: ABNORMAL /HPF
UROBILINOGEN UR QL STRIP: ABNORMAL
WBC # UR STRIP: ABNORMAL /HPF

## 2024-02-18 LAB — BACTERIA SPEC AEROBE CULT: NO GROWTH

## 2024-02-19 ENCOUNTER — TELEPHONE (OUTPATIENT)
Dept: FAMILY MEDICINE CLINIC | Facility: CLINIC | Age: 74
End: 2024-02-19
Payer: MEDICARE

## 2024-03-05 RX ORDER — ESCITALOPRAM OXALATE 20 MG/1
10 TABLET ORAL 2 TIMES DAILY
Qty: 90 TABLET | Refills: 3 | Status: SHIPPED | OUTPATIENT
Start: 2024-03-05

## 2024-03-11 NOTE — PROGRESS NOTES
"Chief Complaint  Follow-up (MEMORY)    Subjective          Jennifer Blackmon presents to [facility] NEUROLOGY for MEMORY  History of Present Illness  Patient is here to f/u on memory patient states she is just having trouble remembering things, patient states her memory is worse since last visit, she currently takes namenda 10 mg 1 bid    Rivastigmine        Maximum   Score Patient's   Score Questions   5 2 \"What is the year?Season?Date?Day of the week?Month?\"   5 4 \"Where are we now: State?County?Town/city?Hospital?Floor?\"   3 3 3 Unrelated objects Number of trials:___   5 3 Count backward from 100 by sevens or spell WORLD backwards   3 0 Name 3 things from above   2 2 Identify 2 objects   1 1 Repeat the phrase: No ifs, ands,or buts.   3 3 Take paper in right hand, fold it in half, and put it on the floor.   1 1  Please read this and do what it says. \"Close your eyes\"   1 1 Make up and write a sentence about anything. Noun and verb   1 0 Copy this picture 10 angles must be present.   30 20 Total MMSE       =====CT HEAD WO CONTRAST 11/25/23=====  Impression:     1. No CT evidence of acute or evolving infarct.  2. Features of moderately advanced chronic microvascular disease and moderate generalized atrophy.    ====MRI BRAIN WO CONTRAST 10/2/23=====  Impression:  Atrophy and evidence of chronic small vessel ischemic insult. No acute CVA identified.       ====PREV. OV 8/2/23=====  Patient is here to f/u on memory, she currently not taking and medication for memory.  Patient is having more memory problems, since her concussion in May.  Patient is more forgetful and gets agitated easily.     Her mother had alzheimer's     Pt fell recently and hit her head, had CT of head , was normal      She sleeps a lot. No snoring     She tried aricept, caused bad dreams so stopped  taking     She has history of falling and staggering, now uses a walker.            Ct of head showed microvascular changes  Tsh and vit d " "was ok     Current Outpatient Medications:     atorvastatin (LIPITOR) 10 MG tablet, Take 1 tablet by mouth Daily., Disp: , Rfl:     buPROPion XL (WELLBUTRIN XL) 150 MG 24 hr tablet, TAKE 3 TABLETS BY MOUTH IN THE  MORNING, Disp: 270 tablet, Rfl: 3    Calcium Citrate-Vitamin D 500-400 MG-UNIT chewable tablet, Chew Daily., Disp: , Rfl:     carvedilol (COREG) 3.125 MG tablet, TAKE 1 TABLET BY MOUTH TWICE  DAILY, Disp: 180 tablet, Rfl: 3    clopidogrel (PLAVIX) 75 MG tablet, TAKE 1 TABLET BY MOUTH DAILY, Disp: 90 tablet, Rfl: 3    coenzyme Q10 100 MG capsule, Take 1 capsule by mouth Daily., Disp: , Rfl:     escitalopram (LEXAPRO) 20 MG tablet, Take 0.5 tablets by mouth 2 (Two) Times a Day., Disp: 90 tablet, Rfl: 3    memantine (NAMENDA) 10 MG tablet, TAKE 1 TABLET BY MOUTH TWICE DAILY, Disp: 60 tablet, Rfl: 2    Multiple Vitamins-Minerals (Multi-Vitamin Gummies) chewable tablet, Chew 1 tablet/day Daily., Disp: , Rfl:     rivastigmine (EXELON) 1.5 MG capsule, Take 1 capsule by mouth 2 (Two) Times a Day., Disp: 60 capsule, Rfl: 3    Review of Systems   Genitourinary:  Positive for difficulty urinating.   Psychiatric/Behavioral:  Positive for confusion.    All other systems reviewed and are negative.         Objective:    Vital Signs:   /71   Pulse 76   Ht 167.6 cm (65.98\")   Wt 55.8 kg (123 lb)   BMI 19.86 kg/m²     Physical Exam  Vitals reviewed.   Constitutional:       Appearance: Normal appearance.   HENT:      Head: Normocephalic.   Cardiovascular:      Pulses: Normal pulses.   Pulmonary:      Effort: Pulmonary effort is normal. No respiratory distress.   Neurological:      General: No focal deficit present.      Mental Status: She is alert.   Psychiatric:         Mood and Affect: Mood normal.        Result Review :                    Assessment and Plan    Diagnoses and all orders for this visit:    1. Memory loss (Primary)  -     rivastigmine (EXELON) 1.5 MG capsule; Take 1 capsule by mouth 2 (Two) Times a " Day.  Dispense: 60 capsule; Refill: 3    2. S/P total left hip arthroplasty    3. Chronic pain of right knee     Continue Namenda and add Exelon starting at low dose and increase as tolerated.     Continue wellbutrin and lexapro for agitation    Will refer to ortho for co knee and hip pain    Follow Up   Return in about 6 months (around 9/12/2024).  Patient was given instructions and counseling regarding her condition or for health maintenance advice. Please see specific information pulled into the AVS if appropriate.     This document has been electronically signed by Joseph Seipel, MD on March 12, 2024 11:26 EDT

## 2024-03-12 ENCOUNTER — OFFICE VISIT (OUTPATIENT)
Dept: NEUROLOGY | Facility: CLINIC | Age: 74
End: 2024-03-12
Payer: MEDICARE

## 2024-03-12 VITALS
SYSTOLIC BLOOD PRESSURE: 107 MMHG | HEART RATE: 76 BPM | BODY MASS INDEX: 19.77 KG/M2 | HEIGHT: 66 IN | DIASTOLIC BLOOD PRESSURE: 71 MMHG | WEIGHT: 123 LBS

## 2024-03-12 DIAGNOSIS — Z96.642 S/P TOTAL LEFT HIP ARTHROPLASTY: ICD-10-CM

## 2024-03-12 DIAGNOSIS — R41.3 MEMORY LOSS: ICD-10-CM

## 2024-03-12 DIAGNOSIS — M25.561 CHRONIC PAIN OF RIGHT KNEE: ICD-10-CM

## 2024-03-12 DIAGNOSIS — G89.29 CHRONIC PAIN OF RIGHT KNEE: ICD-10-CM

## 2024-03-12 DIAGNOSIS — R41.3 MEMORY LOSS: Primary | ICD-10-CM

## 2024-03-12 PROCEDURE — 1160F RVW MEDS BY RX/DR IN RCRD: CPT | Performed by: PSYCHIATRY & NEUROLOGY

## 2024-03-12 PROCEDURE — 3078F DIAST BP <80 MM HG: CPT | Performed by: PSYCHIATRY & NEUROLOGY

## 2024-03-12 PROCEDURE — 3074F SYST BP LT 130 MM HG: CPT | Performed by: PSYCHIATRY & NEUROLOGY

## 2024-03-12 PROCEDURE — 1159F MED LIST DOCD IN RCRD: CPT | Performed by: PSYCHIATRY & NEUROLOGY

## 2024-03-12 RX ORDER — RIVASTIGMINE TARTRATE 1.5 MG/1
1.5 CAPSULE ORAL 2 TIMES DAILY
Qty: 180 CAPSULE | Refills: 0 | Status: SHIPPED | OUTPATIENT
Start: 2024-03-12

## 2024-03-12 RX ORDER — RIVASTIGMINE TARTRATE 1.5 MG/1
1.5 CAPSULE ORAL 2 TIMES DAILY
Qty: 60 CAPSULE | Refills: 3 | Status: SHIPPED | OUTPATIENT
Start: 2024-03-12 | End: 2024-03-12

## 2024-04-02 ENCOUNTER — OFFICE VISIT (OUTPATIENT)
Dept: ORTHOPEDIC SURGERY | Facility: CLINIC | Age: 74
End: 2024-04-02
Payer: MEDICARE

## 2024-04-02 VITALS — WEIGHT: 123 LBS | HEIGHT: 65 IN | RESPIRATION RATE: 20 BRPM | BODY MASS INDEX: 20.49 KG/M2 | OXYGEN SATURATION: 96 %

## 2024-04-02 DIAGNOSIS — G89.29 CHRONIC PAIN OF RIGHT KNEE: Primary | ICD-10-CM

## 2024-04-02 DIAGNOSIS — M25.561 CHRONIC PAIN OF RIGHT KNEE: Primary | ICD-10-CM

## 2024-04-02 PROCEDURE — 99213 OFFICE O/P EST LOW 20 MIN: CPT | Performed by: NURSE PRACTITIONER

## 2024-04-02 NOTE — PROGRESS NOTES
[facility]        Patient Name: Jennifer Blackmon  : 1950  Primary Care Physician: Nathalia Maria APRN        Chief Complaint:    Chief Complaint   Patient presents with    Right Knee - Initial Evaluation, Pain     Pain for years   Has had a few falls         HPI:   Jennifer Blackmon is a 73 y.o. year old who presents today for evaluation of right knee pain. She presents with her spouse.     She states she had a meniscus injury ~ 60 year ago and the knee has not been quite right since. She has full ROM with the knee. She has frequent falls at home; several have been onto the knee. She denies any knee swelling.           Past Medical/Surgical, Social and Family History:  I have reviewed and/or updated pertinent history as noted in the medical record including:  Past Medical History:   Diagnosis Date    Allergic rhinitis     Basal cell carcinoma (BCC) of nostril     Coronary artery disease involving native coronary artery of native heart with angina pectoris 2019    Status post successful PCI of the coronary stent deployment to high-grade RCA stenosis after presenting with ST segment elevated microinfarction in May 2019    DDD (degenerative disc disease), lumbar     Depression     Depression     Essential hypertension 2019    Femoral neck fracture 2023    Heart attack     2019    Hyperlipidemia     Hyperlipidemia, mixed 2019    Hypertension     Insomnia     Myocardial infarction less than 4 weeks ago 2019    Presented initially with ST segment elevated microinfarction treated emergently in [place] May 2019    Old MI (myocardial infarction) 2019    Presented initially with ST segment elevated microinfarction treated emergently in [place] May 2019    Osteoarthritis     Toenail fungus      Past Surgical History:   Procedure Laterality Date    ADENOIDECTOMY      Adenoids removed    APPENDECTOMY      AUGMENTATION MAMMAPLASTY      BREAST IMPLANT SURGERY       CATARACT EXTRACTION Bilateral 2017    CERVICAL DISC SURGERY      ruptured and removed      SECTION      CHEST SURGERY  196    attempted pectus repair     EYE SURGERY  1997    [place]    HYSTERECTOMY      KNEE ARTHROSCOPY Right     LUMBAR DISC SURGERY      ruptured and removed     NASAL SEPTAL RECONSTRUCTION      PECTUS CARNATUM REPAIR      attempted    RECTAL SURGERY      Repair    REFRACTIVE SURGERY      RK/AK both eyes     REFRACTIVE SURGERY      SOMNOPLASTY RADIAL FREQUENCY ABLATION      TONSILLECTOMY      TOTAL HIP ARTHROPLASTY Left 2023    Procedure: TOTAL HIP ARTHROPLASTY ANTERIOR;  Surgeon: Josiah Menon MD;  Location: [facility] MAIN OR;  Service: Orthopedics;  Laterality: Left;    UVULOPALATOPHARYNGOPLASTY       Social History     Occupational History    Occupation: Retired from [employer]   Tobacco Use    Smoking status: Former     Current packs/day: 0.00     Average packs/day: 0.5 packs/day for 4.0 years (2.0 ttl pk-yrs)     Types: Cigarettes     Start date:      Quit date:      Years since quittin.2    Smokeless tobacco: Never   Vaping Use    Vaping status: Never Used   Substance and Sexual Activity    Alcohol use: No    Drug use: No    Sexual activity: Defer          Allergies: No Known Allergies    Medications:   Home Medications:  Current Outpatient Medications on File Prior to Visit   Medication Sig    atorvastatin (LIPITOR) 10 MG tablet Take 1 tablet by mouth Daily.    buPROPion XL (WELLBUTRIN XL) 150 MG 24 hr tablet TAKE 3 TABLETS BY MOUTH IN THE  MORNING    Calcium Citrate-Vitamin D 500-400 MG-UNIT chewable tablet Chew Daily.    carvedilol (COREG) 3.125 MG tablet TAKE 1 TABLET BY MOUTH TWICE  DAILY    clopidogrel (PLAVIX) 75 MG tablet TAKE 1 TABLET BY MOUTH DAILY    coenzyme Q10 100 MG capsule Take 1 capsule by mouth Daily.    escitalopram (LEXAPRO) 20 MG tablet Take 0.5 tablets by mouth 2 (Two) Times a Day.    memantine (NAMENDA) 10 MG tablet TAKE 1  TABLET BY MOUTH TWICE DAILY    Multiple Vitamins-Minerals (Multi-Vitamin Gummies) chewable tablet Chew 1 tablet/day Daily.    rivastigmine (EXELON) 1.5 MG capsule TAKE 1 CAPSULE BY MOUTH TWICE DAILY     No current facility-administered medications on file prior to visit.         ROS:  Negative unless listed in the HPI    Physical Exam:   73 y.o. female  Body mass index is 20.47 kg/m²., 55.8 kg (123 lb)  Vitals:    04/02/24 1337   Resp: 20   SpO2: 96%     General: Alert, cooperative, appears well and in no observable distress.   HEENT: Normocephalic, atraumatic on external visual inspection. No icterus.   CV: No significant peripheral edema.    Respiratory: Normal respiratory effort.   Skin: Warm & well perfused; appropriate skin turgor.  Psych: Appropriate mood & affect.  Neuro: Gross sensation and motor intact in affected extremity/extremities.  Vascular: Peripheral pulses palpable in affected extremity/extremities.     Ortho Exam   On inspection, healing bruise noted to the lateral knee r/t recent fall  No edema or swelling  Full ROM, 0-130  Negative patellar grind, no crepitus  Negative gricel  Negative anterior/posterior drawer  Negative valgus/varus stress     Radiology:  X-Ray Report:  Right knee(s) X-Ray  Indication: Evaluation of pain  AP, Lateral views  Findings: mild arthritic changes noted, no acute injury  Bony lesion: no  Soft tissues: within normal limits  Joint spaces: within normal limits  Hardware appropriately positioned: not applicable  Prior studies available for comparison: no         Assessment:  Right knee pain  Body mass index is 20.47 kg/m².  BMI consistent with Normal: 18.5-24.9kg/m2  BMI is within normal parameters. No other follow-up for BMI required.        Plan:  Reviewed the above imaging and exam with the patient and her spouse in detail  Recommend OTC Tylenol or topical creams for pain PRN  No concern for acute injury necessitating additional imaging  BMI reviewed  Follow up  PRN  Patient encouraged to call with any questions or concerns in the interim    NIKHIL Leung

## 2024-04-09 ENCOUNTER — TRANSCRIBE ORDERS (OUTPATIENT)
Dept: ADMINISTRATIVE | Facility: HOSPITAL | Age: 74
End: 2024-04-09
Payer: MEDICARE

## 2024-04-09 ENCOUNTER — LAB (OUTPATIENT)
Dept: LAB | Facility: HOSPITAL | Age: 74
End: 2024-04-09
Payer: MEDICARE

## 2024-04-09 DIAGNOSIS — R80.9 PROTEINURIA, UNSPECIFIED TYPE: ICD-10-CM

## 2024-04-09 DIAGNOSIS — M15.9 GENERALIZED OSTEOARTHROSIS, INVOLVING MULTIPLE SITES: ICD-10-CM

## 2024-04-09 DIAGNOSIS — N18.30 STAGE 3 CHRONIC KIDNEY DISEASE, UNSPECIFIED WHETHER STAGE 3A OR 3B CKD: ICD-10-CM

## 2024-04-09 DIAGNOSIS — E55.9 AVITAMINOSIS D: ICD-10-CM

## 2024-04-09 DIAGNOSIS — N18.30 STAGE 3 CHRONIC KIDNEY DISEASE, UNSPECIFIED WHETHER STAGE 3A OR 3B CKD: Primary | ICD-10-CM

## 2024-04-09 LAB
25(OH)D3 SERPL-MCNC: 54.8 NG/ML (ref 30–100)
ANION GAP SERPL CALCULATED.3IONS-SCNC: 8 MMOL/L (ref 5–15)
BASOPHILS # BLD AUTO: 0.03 10*3/MM3 (ref 0–0.2)
BASOPHILS NFR BLD AUTO: 0.5 % (ref 0–1.5)
BUN SERPL-MCNC: 23 MG/DL (ref 8–23)
BUN/CREAT SERPL: 23.2 (ref 7–25)
CALCIUM SPEC-SCNC: 10 MG/DL (ref 8.6–10.5)
CHLORIDE SERPL-SCNC: 104 MMOL/L (ref 98–107)
CO2 SERPL-SCNC: 30 MMOL/L (ref 22–29)
CREAT SERPL-MCNC: 0.99 MG/DL (ref 0.57–1)
DEPRECATED RDW RBC AUTO: 46.5 FL (ref 37–54)
EGFRCR SERPLBLD CKD-EPI 2021: 59.6 ML/MIN/1.73
EOSINOPHIL # BLD AUTO: 0.17 10*3/MM3 (ref 0–0.4)
EOSINOPHIL NFR BLD AUTO: 2.8 % (ref 0.3–6.2)
ERYTHROCYTE [DISTWIDTH] IN BLOOD BY AUTOMATED COUNT: 12.9 % (ref 12.3–15.4)
GLUCOSE SERPL-MCNC: 114 MG/DL (ref 65–99)
HCT VFR BLD AUTO: 43.8 % (ref 34–46.6)
HGB BLD-MCNC: 13.9 G/DL (ref 12–15.9)
IMM GRANULOCYTES # BLD AUTO: 0.02 10*3/MM3 (ref 0–0.05)
IMM GRANULOCYTES NFR BLD AUTO: 0.3 % (ref 0–0.5)
LYMPHOCYTES # BLD AUTO: 1.61 10*3/MM3 (ref 0.7–3.1)
LYMPHOCYTES NFR BLD AUTO: 26.4 % (ref 19.6–45.3)
MCH RBC QN AUTO: 31.1 PG (ref 26.6–33)
MCHC RBC AUTO-ENTMCNC: 31.7 G/DL (ref 31.5–35.7)
MCV RBC AUTO: 98 FL (ref 79–97)
MONOCYTES # BLD AUTO: 0.46 10*3/MM3 (ref 0.1–0.9)
MONOCYTES NFR BLD AUTO: 7.5 % (ref 5–12)
NEUTROPHILS NFR BLD AUTO: 3.81 10*3/MM3 (ref 1.7–7)
NEUTROPHILS NFR BLD AUTO: 62.5 % (ref 42.7–76)
NRBC BLD AUTO-RTO: 0 /100 WBC (ref 0–0.2)
PHOSPHATE SERPL-MCNC: 3.4 MG/DL (ref 2.5–4.5)
PLATELET # BLD AUTO: 254 10*3/MM3 (ref 140–450)
PMV BLD AUTO: 9.2 FL (ref 6–12)
POTASSIUM SERPL-SCNC: 4.6 MMOL/L (ref 3.5–5.2)
PTH-INTACT SERPL-MCNC: 50.2 PG/ML (ref 15–65)
RBC # BLD AUTO: 4.47 10*6/MM3 (ref 3.77–5.28)
SODIUM SERPL-SCNC: 142 MMOL/L (ref 136–145)
URATE SERPL-MCNC: 3.6 MG/DL (ref 2.4–5.7)
WBC NRBC COR # BLD AUTO: 6.1 10*3/MM3 (ref 3.4–10.8)

## 2024-04-09 PROCEDURE — 36415 COLL VENOUS BLD VENIPUNCTURE: CPT

## 2024-04-09 PROCEDURE — 83970 ASSAY OF PARATHORMONE: CPT

## 2024-04-09 PROCEDURE — 84100 ASSAY OF PHOSPHORUS: CPT

## 2024-04-09 PROCEDURE — 85025 COMPLETE CBC W/AUTO DIFF WBC: CPT

## 2024-04-09 PROCEDURE — 82306 VITAMIN D 25 HYDROXY: CPT

## 2024-04-09 PROCEDURE — 84550 ASSAY OF BLOOD/URIC ACID: CPT

## 2024-04-09 PROCEDURE — 80048 BASIC METABOLIC PNL TOTAL CA: CPT

## 2024-04-16 ENCOUNTER — OFFICE VISIT (OUTPATIENT)
Dept: CARDIOLOGY | Facility: CLINIC | Age: 74
End: 2024-04-16
Payer: MEDICARE

## 2024-04-16 VITALS
DIASTOLIC BLOOD PRESSURE: 73 MMHG | HEIGHT: 66 IN | HEART RATE: 70 BPM | WEIGHT: 124 LBS | RESPIRATION RATE: 18 BRPM | BODY MASS INDEX: 19.93 KG/M2 | SYSTOLIC BLOOD PRESSURE: 121 MMHG

## 2024-04-16 DIAGNOSIS — Z00.00 PREVENTATIVE HEALTH CARE: Primary | ICD-10-CM

## 2024-04-22 RX ORDER — MEMANTINE HYDROCHLORIDE 10 MG/1
10 TABLET ORAL 2 TIMES DAILY
Qty: 60 TABLET | Refills: 2 | Status: SHIPPED | OUTPATIENT
Start: 2024-04-22

## 2024-04-25 NOTE — PROGRESS NOTES
cardiology Clinic Note  Gaurav Saravia MD, PhD    Subjective:     Encounter Date:04/16/2024      Patient ID: Jennifer Blackmon is a 73 y.o. female.    Chief Complaint:  Chief Complaint   Patient presents with    Follow-up       HPI:         I the pleasure to see this 73-year-old female in follow-up today after 1 year.  History of MI in the past along with PCI 2019, hypertension hyperlipidemia.  Last stress test was in 2021 which revealed no reversible ischemia, preserved EF overall.  Her CV therapies consist of  atorvastatin Coreg Plavix which she is maintained with low bleeding risk.  She denies any new anginal chest pain heart failure signs or symptoms and her vitals are good today , today on repeat encounter she is euvolemic without heart failure signs or symptoms and expresses no other complaints.  Last EF again was normal 60 to 65% with normal atrial sizes normal RV size and function no significant valvular abnormality and grade 1 diastolic dysfunction only.  no recent falls syncope or palpitations.  Again on encounter she is down about 10 pounds, on prior encounter she has had some weight loss over the last year almost 11 to 12 pounds unintentionally.  We discussed healthy weight of a probably 140 pounds which she is well beneath, again I cautioned against frailty anorexia weight loss over time which is a significant fall precaution with progressive weakness and debility.     Review of systems otherwise negative x14 point review of systems except was mentioned above     She sees nephrology for CKD, oncology as well        Historical data copied forward from previous encounters in EMR including the history, exam, and assessment/plan has been reviewed and is unchanged unless noted otherwise.     Cardiac medicines reviewed with risk, benefits, and necessity of each discussed.     Risk and benefit of cardiac testing reviewed including death heart attack stroke pain bleeding infection need for vascular /cardiovascular  "surgery were discussed and the patient      Objective:         Objective       Vitals reviewed below     Physical Exam  Thin habitus  Regular rate and rhythm no rubs murmurs or gallops  No heave no lift  No clubbing cyanosis or edema  Pulses 2+  Frail  Intact grossly  Walks with a cane  Clear to auscultation  Normocephalic/atraumatic        Assessment:         Assessment          CAD, history of old inferolateral MI, EKG unchanged  Continue  statin and beta-blocker  Continue Plavix monotherapy if needed  Stable without anginal chest pain  No indication for ischemic evaluation  Normal ischemic evaluation or at least low risk 2021     Hypertension well-controlled continue regimen  Hyperlipidemia on high intensity statin therapy     Secondary prevention goals were discussed  She is a non-smoker, abstinent since 1972  She is nondiabetic  Diet and exercise per AHA guidelines      return to clinic in 1 year    Continue present CV treatment plan above     Encourage healthy weight of approx 140 pounds, at least 1500 to 2000 gordon/day intake, supplement as needed, she appears dry today on exam on repeat encounter    Objective:         /73 (BP Location: Left arm, Patient Position: Sitting)   Pulse 70   Resp 18   Ht 167.6 cm (66\")   Wt 56.2 kg (124 lb)   BMI 20.01 kg/m²           The pleasure to be involved in this patient's cardiovascular care.  Please call with any questions or concerns  Gaurav Saravia MD, PhD    Most recent EKG as reviewed and interpreted by me:    ECG 12 Lead    Date/Time: 4/25/2024 1:07 PM  Performed by: Gaurav Saravia MD    Authorized by: Gaurav Saravia MD  Comparison: not compared with previous ECG   Previous ECG: no previous ECG available  Rhythm: sinus rhythm  Rate: normal  QRS axis: right    Clinical impression: non-specific ECG           Most recent echo as reviewed and interpreted by me:  Results for orders placed during the hospital encounter of 03/17/22    Adult " Transthoracic Echo Complete W/ Cont if Necessary Per Protocol    Interpretation Summary  Normal LV size and contractility EF of 60 to 65%  Borderline RV  Mild biatrial enlargement seen.  Agitated saline bubble contrast negative for septal defect  Aortic valve, mitral valve, tricuspid valve appears structurally normal, mild to moderate mitral and mild tricuspid regurgitation seen.  Calculated RV systolic pressure of 32 mmHg  Reveal posterior pericardial effusion seen.  No signs of increased intrapericardial pressures  Proximal aorta appears normal in size.      Most recent stress test as reviewed and interpreted by me:  Results for orders placed during the hospital encounter of 02/26/21    Stress Test With Myocardial Perfusion One Day    Interpretation Summary  · Findings consistent with a normal ECG stress test.  · Left ventricular ejection fraction is hyperdynamic (Calculated EF > 70%). .  · Myocardial perfusion imaging indicates a normal myocardial perfusion study with no evidence of ischemia.  · Impressions are consistent with a low risk study.  · This is normal Cardiolite imaging stress test with no evidence of ischemia or myocardial infarction. Left ventricle size and function is normal on gated SPECT imaging. No wall motion abnormality was noted. Clinical correlation recommended. Further recommendation as per ordering physician..      Most recent cardiac catheterization as reviewed interpreted by me:  No results found for this or any previous visit.    The following portions of the patient's history were reviewed and updated as appropriate: allergies, current medications, past family history, past medical history, past social history, past surgical history, and problem list.      ROS:  14 point review of systems negative except as mentioned above    Current Outpatient Medications:     atorvastatin (LIPITOR) 10 MG tablet, Take 1 tablet by mouth Daily., Disp: , Rfl:     buPROPion XL (WELLBUTRIN XL) 150 MG 24 hr  tablet, TAKE 3 TABLETS BY MOUTH IN THE  MORNING, Disp: 270 tablet, Rfl: 3    Calcium Citrate-Vitamin D 500-400 MG-UNIT chewable tablet, Chew Daily., Disp: , Rfl:     carvedilol (COREG) 3.125 MG tablet, TAKE 1 TABLET BY MOUTH TWICE  DAILY, Disp: 180 tablet, Rfl: 3    clopidogrel (PLAVIX) 75 MG tablet, TAKE 1 TABLET BY MOUTH DAILY, Disp: 90 tablet, Rfl: 3    coenzyme Q10 100 MG capsule, Take 1 capsule by mouth Daily., Disp: , Rfl:     escitalopram (LEXAPRO) 20 MG tablet, Take 0.5 tablets by mouth 2 (Two) Times a Day., Disp: 90 tablet, Rfl: 3    Multiple Vitamins-Minerals (Multi-Vitamin Gummies) chewable tablet, Chew 1 tablet/day Daily., Disp: , Rfl:     rivastigmine (EXELON) 1.5 MG capsule, TAKE 1 CAPSULE BY MOUTH TWICE DAILY, Disp: 180 capsule, Rfl: 0    memantine (NAMENDA) 10 MG tablet, TAKE 1 TABLET BY MOUTH TWICE DAILY, Disp: 60 tablet, Rfl: 2    Problem List:  Patient Active Problem List   Diagnosis    Old MI (myocardial infarction)    Coronary artery disease involving native coronary artery of native heart with angina pectoris    Essential hypertension    Chest pain, atypical    Degeneration of lumbar intervertebral disc    Low back pain    Lumbar spondylosis    Arthritis    Cellulitis of face    Depressive disorder    Near syncope    Stage 3a chronic kidney disease    Left displaced femoral neck fracture    S/P total left hip arthroplasty    Memory loss    Generalized weakness    Altered mental status     Past Medical History:  Past Medical History:   Diagnosis Date    Allergic rhinitis     Basal cell carcinoma (BCC) of nostril     Coronary artery disease involving native coronary artery of native heart with angina pectoris 06/18/2019    Status post successful PCI of the coronary stent deployment to high-grade RCA stenosis after presenting with ST segment elevated microinfarction in May 2019    DDD (degenerative disc disease), lumbar     Depression     Depression     Essential hypertension 06/18/2019    Femoral  neck fracture 2023    Heart attack     2019    Hyperlipidemia     Hyperlipidemia, mixed 2019    Hypertension     Insomnia     Myocardial infarction less than 4 weeks ago 2019    Presented initially with ST segment elevated microinfarction treated emergently in [place] May 2019    Old MI (myocardial infarction) 2019    Presented initially with ST segment elevated microinfarction treated emergently in [place] May 2019    Osteoarthritis     Toenail fungus      Past Surgical History:  Past Surgical History:   Procedure Laterality Date    ADENOIDECTOMY      Adenoids removed    APPENDECTOMY      AUGMENTATION MAMMAPLASTY      BREAST IMPLANT SURGERY  1984    CATARACT EXTRACTION Bilateral 2017    CERVICAL DISC SURGERY      ruptured and removed      SECTION      CHEST SURGERY      attempted pectus repair     EYE SURGERY  1997    AK    HYSTERECTOMY      KNEE ARTHROSCOPY Right     LUMBAR DISC SURGERY      ruptured and removed     NASAL SEPTAL RECONSTRUCTION      PECTUS CARNATUM REPAIR      attempted    RECTAL SURGERY      Repair    REFRACTIVE SURGERY      RK/AK both eyes     REFRACTIVE SURGERY      SOMNOPLASTY RADIAL FREQUENCY ABLATION      TONSILLECTOMY      TOTAL HIP ARTHROPLASTY Left 2023    Procedure: TOTAL HIP ARTHROPLASTY ANTERIOR;  Surgeon: Josiah Menon MD;  Location: [facility];  Service: Orthopedics;  Laterality: Left;    UVULOPALATOPHARYNGOPLASTY       Social History:  Social History     Socioeconomic History    Marital status:     Number of children: 2   Tobacco Use    Smoking status: Former     Current packs/day: 0.00     Average packs/day: 0.5 packs/day for 4.0 years (2.0 ttl pk-yrs)     Types: Cigarettes     Start date:      Quit date:      Years since quittin.3    Smokeless tobacco: Never   Vaping Use    Vaping status: Never Used   Substance and Sexual Activity    Alcohol use: No    Drug use: No    Sexual activity: Defer      Allergies:  No Known Allergies  Immunizations:  Immunization History   Administered Date(s) Administered    COVID-19 (MODERNA) 1st,2nd,3rd Dose Monovalent 02/04/2021, 03/08/2021    FLUAD TRI 65YR+ 10/19/2017    Flu Vaccine Quad PF 6-35MO 10/27/2016    Flu Vaccine Quad PF >36MO 10/27/2016    Fluad Quad 65+ 10/09/2019, 10/20/2020    Fluzone High Dose =>65 Years ([employer] ONLY) 11/13/2015, 10/19/2017, 10/15/2018    Fluzone High-Dose 65+yrs 10/20/2021, 10/26/2022, 11/07/2023    Influenza, Unspecified 10/09/2019    Pneumococcal Conjugate 13-Valent (PCV13) 04/16/2018    Pneumococcal Polysaccharide (PPSV23) 12/07/2021            In-Office Procedure(s):  No orders to display        ASCVD RIsk Score::  The 10-year ASCVD risk score (Elle DK, et al., 2019) is: 18.6%    Values used to calculate the score:      Age: 73 years      Sex: Female      Is Non- : No      Diabetic: No      Tobacco smoker: No      Systolic Blood Pressure: 121 mmHg      Is BP treated: Yes      HDL Cholesterol: 52 mg/dL      Total Cholesterol: 157 mg/dL    Imaging:    Results for orders placed in visit on 04/02/24    XR Knee 3 View Right    Narrative  XR KNEE 3 VW RIGHT    Date of Exam: 4/2/2024 1:44 PM EDT    Indication: pain in right knee for years, ongoing pain , new problem    Comparison: None available.    Findings:  3 films. Both knees are included on the AP field-of-view. There is moderate medial and mild lateral joint compartment narrowing bilaterally. There is some calcification in the right medial collateral ligament complex. There is mild narrowing of the right  patellofemoral compartment. The right patella is normally aligned.    Impression  Impression:  Degenerative osteoarthritis as detailed. Calcification of portions of the right medial collateral ligament.      Electronically Signed: Patrizia Goldman MD  4/3/2024 9:21 AM EDT  Workstation ID: [id number]       Results for orders placed during the hospital encounter  of 11/25/23    CT Head Without Contrast    Narrative  CT HEAD WO CONTRAST    Date of Exam: 11/25/2023 7:46 PM EST    Indication: altered.    Comparison: MRI brain without contrast 10/2/2023    Technique: Axial CT images were obtained of the head without contrast administration.  Coronal reconstructions were performed.  Automated exposure control and iterative reconstruction methods were used.      Findings:  Extensive deep white matter hypodensities are nonspecific but favored to represent changes of moderately advanced chronic microvascular disease. These findings correspond to the MRI brain from 10/2/2023. Preservation of gray matter-white matter junction  distinction, without convincing evidence of acute or evolving infarct. There is moderate generalized atrophy. Stable mild ventricular prominence. No gross hydrocephalus. No mass lesion or mass effect or midline shift is seen. Paranasal sinuses and  mastoid air cells are clear. No acute calvarial abnormality is identified.    Impression  Impression:    1. No CT evidence of acute or evolving infarct.  2. Features of moderately advanced chronic microvascular disease and moderate generalized atrophy.      Electronically Signed: Kyara Pandya MD  11/25/2023 7:56 PM EST  Workstation ID: [id number]      Results for orders placed during the hospital encounter of 11/25/23    CT Head Without Contrast    Narrative  CT HEAD WO CONTRAST    Date of Exam: 11/25/2023 7:46 PM EST    Indication: altered.    Comparison: MRI brain without contrast 10/2/2023    Technique: Axial CT images were obtained of the head without contrast administration.  Coronal reconstructions were performed.  Automated exposure control and iterative reconstruction methods were used.      Findings:  Extensive deep white matter hypodensities are nonspecific but favored to represent changes of moderately advanced chronic microvascular disease. These findings correspond to the MRI brain from 10/2/2023.  Preservation of gray matter-white matter junction  distinction, without convincing evidence of acute or evolving infarct. There is moderate generalized atrophy. Stable mild ventricular prominence. No gross hydrocephalus. No mass lesion or mass effect or midline shift is seen. Paranasal sinuses and  mastoid air cells are clear. No acute calvarial abnormality is identified.    Impression  Impression:    1. No CT evidence of acute or evolving infarct.  2. Features of moderately advanced chronic microvascular disease and moderate generalized atrophy.      Electronically Signed: Kyara Pandya MD  11/25/2023 7:56 PM EST  Workstation ID: [id number]      Lab Review:   Lab on 04/09/2024   Component Date Value    Glucose 04/09/2024 114 (H)     BUN 04/09/2024 23     Creatinine 04/09/2024 0.99     Sodium 04/09/2024 142     Potassium 04/09/2024 4.6     Chloride 04/09/2024 104     CO2 04/09/2024 30.0 (H)     Calcium 04/09/2024 10.0     BUN/Creatinine Ratio 04/09/2024 23.2     Anion Gap 04/09/2024 8.0     eGFR 04/09/2024 59.6 (L)     Phosphorus 04/09/2024 3.4     PTH, Intact 04/09/2024 50.2     Uric Acid 04/09/2024 3.6     25 Hydroxy, Vitamin D 04/09/2024 54.8     WBC 04/09/2024 6.10     RBC 04/09/2024 4.47     Hemoglobin 04/09/2024 13.9     Hematocrit 04/09/2024 43.8     MCV 04/09/2024 98.0 (H)     MCH 04/09/2024 31.1     MCHC 04/09/2024 31.7     RDW 04/09/2024 12.9     RDW-SD 04/09/2024 46.5     MPV 04/09/2024 9.2     Platelets 04/09/2024 254     Neutrophil % 04/09/2024 62.5     Lymphocyte % 04/09/2024 26.4     Monocyte % 04/09/2024 7.5     Eosinophil % 04/09/2024 2.8     Basophil % 04/09/2024 0.5     Immature Grans % 04/09/2024 0.3     Neutrophils, Absolute 04/09/2024 3.81     Lymphocytes, Absolute 04/09/2024 1.61     Monocytes, Absolute 04/09/2024 0.46     Eosinophils, Absolute 04/09/2024 0.17     Basophils, Absolute 04/09/2024 0.03     Immature Grans, Absolute 04/09/2024 0.02     nRBC 04/09/2024 0.0    Lab on 02/15/2024    Component Date Value    Glucose 02/15/2024 126 (H)     BUN 02/15/2024 25 (H)     Creatinine 02/15/2024 1.07 (H)     Sodium 02/15/2024 141     Potassium 02/15/2024 4.3     Chloride 02/15/2024 103     CO2 02/15/2024 30.0 (H)     Calcium 02/15/2024 9.8     Total Protein 02/15/2024 6.7     Albumin 02/15/2024 4.3     ALT (SGPT) 02/15/2024 59 (H)     AST (SGOT) 02/15/2024 34 (H)     Alkaline Phosphatase 02/15/2024 96     Total Bilirubin 02/15/2024 0.3     Globulin 02/15/2024 2.4     A/G Ratio 02/15/2024 1.8     BUN/Creatinine Ratio 02/15/2024 23.4     Anion Gap 02/15/2024 8.0     eGFR 02/15/2024 54.3 (L)     Hemoglobin A1C 02/15/2024 5.20     Total Cholesterol 02/15/2024 157     Triglycerides 02/15/2024 122     HDL Cholesterol 02/15/2024 52     LDL Cholesterol  02/15/2024 83     VLDL Cholesterol 02/15/2024 22     LDL/HDL Ratio 02/15/2024 1.55     TSH 02/15/2024 1.370     Color, UA 02/16/2024 Yellow     Appearance, UA 02/16/2024 Cloudy (A)     pH, UA 02/16/2024 8.0     Specific Gravity, UA 02/16/2024 1.017     Glucose, UA 02/16/2024 Negative     Ketones, UA 02/16/2024 Negative     Bilirubin, UA 02/16/2024 Negative     Blood, UA 02/16/2024 Negative     Protein, UA 02/16/2024 Negative     Leuk Esterase, UA 02/16/2024 Negative     Nitrite, UA 02/16/2024 Positive (A)     Urobilinogen, UA 02/16/2024 0.2 E.U./dL     WBC 02/15/2024 6.84     RBC 02/15/2024 4.28     Hemoglobin 02/15/2024 13.0     Hematocrit 02/15/2024 39.8     MCV 02/15/2024 93.0     MCH 02/15/2024 30.4     MCHC 02/15/2024 32.7     RDW 02/15/2024 13.7     RDW-SD 02/15/2024 45.6     MPV 02/15/2024 9.7     Platelets 02/15/2024 271     Neutrophil % 02/15/2024 57.3     Lymphocyte % 02/15/2024 28.8     Monocyte % 02/15/2024 10.7     Eosinophil % 02/15/2024 2.5     Basophil % 02/15/2024 0.4     Immature Grans % 02/15/2024 0.3     Neutrophils, Absolute 02/15/2024 3.92     Lymphocytes, Absolute 02/15/2024 1.97     Monocytes, Absolute 02/15/2024 0.73     Eosinophils,  Absolute 02/15/2024 0.17     Basophils, Absolute 02/15/2024 0.03     Immature Grans, Absolute 02/15/2024 0.02     nRBC 02/15/2024 0.0     HIV DUO 02/15/2024 Non-Reactive     Extra Tube 02/16/2024 Hold for add-ons.     Urine Culture 02/16/2024 No growth     RBC, UA 02/16/2024 0-2     WBC, UA 02/16/2024 0-2     Bacteria, UA 02/16/2024 4+ (A)     Squamous Epithelial Cell* 02/16/2024 0-2     Hyaline Casts, UA 02/16/2024 None Seen     Triple Phosphate Crystal* 02/16/2024 Small/1+     Methodology 02/16/2024 Manual Light Microscopy    Admission on 11/25/2023, Discharged on 11/26/2023   Component Date Value    QT Interval 11/25/2023 379     QTC Interval 11/25/2023 429     COVID19 11/25/2023 Not Detected     Influenza A PCR 11/25/2023 Not Detected     Influenza B PCR 11/25/2023 Not Detected     Glucose 11/25/2023 133 (H)     BUN 11/25/2023 23     Creatinine 11/25/2023 1.21 (H)     Sodium 11/25/2023 142     Potassium 11/25/2023 4.2     Chloride 11/25/2023 102     CO2 11/25/2023 32.0 (H)     Calcium 11/25/2023 9.7     Total Protein 11/25/2023 6.7     Albumin 11/25/2023 3.8     ALT (SGPT) 11/25/2023 27     AST (SGOT) 11/25/2023 21     Alkaline Phosphatase 11/25/2023 108     Total Bilirubin 11/25/2023 0.2     Globulin 11/25/2023 2.9     A/G Ratio 11/25/2023 1.3     BUN/Creatinine Ratio 11/25/2023 19.0     Anion Gap 11/25/2023 8.0     eGFR 11/25/2023 46.8 (L)     Color, UA 11/25/2023 Yellow     Appearance, UA 11/25/2023 Hazy (A)     pH, UA 11/25/2023 7.5     Specific Marshall, UA 11/25/2023 1.019     Glucose, UA 11/25/2023 Negative     Ketones, UA 11/25/2023 Negative     Bilirubin, UA 11/25/2023 Negative     Blood, UA 11/25/2023 Negative     Protein, UA 11/25/2023 Negative     Leuk Esterase, UA 11/25/2023 Trace (A)     Nitrite, UA 11/25/2023 Negative     Urobilinogen, UA 11/25/2023 1.0 E.U./dL     HS Troponin T 11/25/2023 15 (H)     proBNP 11/25/2023 383.9     Blood Culture 11/25/2023 No growth at 5 days     Blood Culture  11/25/2023 No growth at 5 days     Sed Rate 11/25/2023 37 (H)     Magnesium 11/25/2023 2.3     Creatine Kinase 11/25/2023 42     Ammonia 11/25/2023 15     TSH 11/25/2023 1.400     WBC 11/25/2023 6.20     RBC 11/25/2023 4.25     Hemoglobin 11/25/2023 13.1     Hematocrit 11/25/2023 39.8     MCV 11/25/2023 93.6     MCH 11/25/2023 30.8     MCHC 11/25/2023 33.0     RDW 11/25/2023 14.3     RDW-SD 11/25/2023 46.4     MPV 11/25/2023 7.4     Platelets 11/25/2023 316     Neutrophil % 11/25/2023 53.0     Lymphocyte % 11/25/2023 31.3     Monocyte % 11/25/2023 10.7     Eosinophil % 11/25/2023 4.4     Basophil % 11/25/2023 0.6     Neutrophils, Absolute 11/25/2023 3.30     Lymphocytes, Absolute 11/25/2023 1.90     Monocytes, Absolute 11/25/2023 0.70     Eosinophils, Absolute 11/25/2023 0.30     Basophils, Absolute 11/25/2023 0.00     nRBC 11/25/2023 0.0     RBC, UA 11/25/2023 0-2     WBC, UA 11/25/2023 0-2     Bacteria, UA 11/25/2023 None Seen     Squamous Epithelial Cell* 11/25/2023 0-2     Hyaline Casts, UA 11/25/2023 0-2     Methodology 11/25/2023 Automated Microscopy     Glucose 11/26/2023 92     BUN 11/26/2023 23     Creatinine 11/26/2023 0.96     Sodium 11/26/2023 138     Potassium 11/26/2023 4.2     Chloride 11/26/2023 104     CO2 11/26/2023 24.0     Calcium 11/26/2023 9.3     BUN/Creatinine Ratio 11/26/2023 24.0     Anion Gap 11/26/2023 10.0     eGFR 11/26/2023 61.8     WBC 11/26/2023 6.70     RBC 11/26/2023 3.94     Hemoglobin 11/26/2023 12.0     Hematocrit 11/26/2023 36.6     MCV 11/26/2023 93.0     MCH 11/26/2023 30.4     MCHC 11/26/2023 32.6     RDW 11/26/2023 14.7     RDW-SD 11/26/2023 51.2     MPV 11/26/2023 8.0     Platelets 11/26/2023 308     Neutrophil % 11/26/2023 36.8 (L)     Lymphocyte % 11/26/2023 46.4 (H)     Monocyte % 11/26/2023 10.4     Eosinophil % 11/26/2023 5.4     Basophil % 11/26/2023 1.0     Neutrophils, Absolute 11/26/2023 2.50     Lymphocytes, Absolute 11/26/2023 3.10     Monocytes, Absolute  11/26/2023 0.70     Eosinophils, Absolute 11/26/2023 0.40     Basophils, Absolute 11/26/2023 0.10     nRBC 11/26/2023 0.1     HS Troponin T 11/26/2023 16 (H)     ADENOVIRUS, PCR 11/26/2023 Not Detected     Coronavirus 229E 11/26/2023 Not Detected     Coronavirus HKU1 11/26/2023 Not Detected     Coronavirus NL63 11/26/2023 Not Detected     Coronavirus OC43 11/26/2023 Not Detected     COVID19 11/26/2023 Not Detected     Human Metapneumovirus 11/26/2023 Not Detected     Human Rhinovirus/Enterov* 11/26/2023 Not Detected     Influenza A PCR 11/26/2023 Not Detected     Influenza B PCR 11/26/2023 Not Detected     Parainfluenza Virus 1 11/26/2023 Detected (A)     Parainfluenza Virus 2 11/26/2023 Not Detected     Parainfluenza Virus 3 11/26/2023 Not Detected     Parainfluenza Virus 4 11/26/2023 Not Detected     RSV, PCR 11/26/2023 Not Detected     Bordetella pertussis pcr 11/26/2023 Not Detected     Bordetella parapertussis* 11/26/2023 Not Detected     Chlamydophila pneumoniae* 11/26/2023 Not Detected     Mycoplasma pneumo by PCR 11/26/2023 Not Detected     Vitamin B-12 11/26/2023 904     Folate 11/26/2023 >20.00     Free T4 11/25/2023 1.09     T3, Free 11/25/2023 2.85    Lab on 11/08/2023   Component Date Value    Color, UA 11/07/2023 Yellow     Appearance, UA 11/07/2023 Clear     pH, UA 11/07/2023 6.0     Specific Gravity, UA 11/07/2023 1.029     Glucose, UA 11/07/2023 Negative     Ketones, UA 11/07/2023 Negative     Bilirubin, UA 11/07/2023 Negative     Blood, UA 11/07/2023 Negative     Protein, UA 11/07/2023 Trace (A)     Leuk Esterase, UA 11/07/2023 Small (1+) (A)     Nitrite, UA 11/07/2023 Negative     Urobilinogen, UA 11/07/2023 0.2 E.U./dL     Extra Tube 11/07/2023 Hold for add-ons.     RBC, UA 11/07/2023 None Seen     WBC, UA 11/07/2023 3-5 (A)     Bacteria, UA 11/07/2023 Trace (A)     Squamous Epithelial Cell* 11/07/2023 3-6 (A)     Hyaline Casts, UA 11/07/2023 None Seen     Calcium Oxalate Crystals* 11/07/2023  Small/1+     Methodology 11/07/2023 Manual Light Microscopy      Recent labs reviewed and interpreted for clinical significance and application            Level of Care:           Gaurav Saravia MD  04/25/24  .

## 2024-07-22 DIAGNOSIS — R41.3 MEMORY LOSS: ICD-10-CM

## 2024-07-22 RX ORDER — RIVASTIGMINE TARTRATE 1.5 MG/1
1.5 CAPSULE ORAL 2 TIMES DAILY
Qty: 180 CAPSULE | Refills: 0 | Status: SHIPPED | OUTPATIENT
Start: 2024-07-22

## 2024-08-01 RX ORDER — MEMANTINE HYDROCHLORIDE 10 MG/1
10 TABLET ORAL 2 TIMES DAILY
Qty: 60 TABLET | Refills: 2 | Status: SHIPPED | OUTPATIENT
Start: 2024-08-01

## 2024-08-29 ENCOUNTER — OFFICE VISIT (OUTPATIENT)
Dept: FAMILY MEDICINE CLINIC | Facility: CLINIC | Age: 74
End: 2024-08-29
Payer: MEDICARE

## 2024-08-29 VITALS
HEART RATE: 77 BPM | SYSTOLIC BLOOD PRESSURE: 105 MMHG | RESPIRATION RATE: 18 BRPM | DIASTOLIC BLOOD PRESSURE: 70 MMHG | WEIGHT: 129.2 LBS | BODY MASS INDEX: 20.76 KG/M2 | HEIGHT: 66 IN | OXYGEN SATURATION: 97 %

## 2024-08-29 DIAGNOSIS — H61.23 BILATERAL IMPACTED CERUMEN: ICD-10-CM

## 2024-08-29 DIAGNOSIS — K59.04 CHRONIC IDIOPATHIC CONSTIPATION: ICD-10-CM

## 2024-08-29 DIAGNOSIS — K64.4 EXTERNAL HEMORRHOID: ICD-10-CM

## 2024-08-29 PROCEDURE — 99213 OFFICE O/P EST LOW 20 MIN: CPT | Performed by: NURSE PRACTITIONER

## 2024-08-29 PROCEDURE — 1125F AMNT PAIN NOTED PAIN PRSNT: CPT | Performed by: NURSE PRACTITIONER

## 2024-08-29 PROCEDURE — 3078F DIAST BP <80 MM HG: CPT | Performed by: NURSE PRACTITIONER

## 2024-08-29 PROCEDURE — 69209 REMOVE IMPACTED EAR WAX UNI: CPT | Performed by: NURSE PRACTITIONER

## 2024-08-29 PROCEDURE — 3074F SYST BP LT 130 MM HG: CPT | Performed by: NURSE PRACTITIONER

## 2024-08-29 PROCEDURE — 1159F MED LIST DOCD IN RCRD: CPT | Performed by: NURSE PRACTITIONER

## 2024-08-29 PROCEDURE — 1160F RVW MEDS BY RX/DR IN RCRD: CPT | Performed by: NURSE PRACTITIONER

## 2024-08-29 NOTE — PROGRESS NOTES
"Chief Complaint  Hemorrhoids  Subjective        Jennifer Blackmon presents to [facility]  History of Present Illness  Pt comes in today with c/o hemorrhoids. No pain. No bleeding.  Daughter wanted to get checked out  States it causes her issues having a BM  Does have some constipation.  Recently started taking miralax and that is helping.   Denies any bleeding.   Daughter is concerned because when she has a BM it is messy and feels the hemorrhoids are contributing to that.     She is also with c/o ear fullness, pain, and trouble hearing.   Hemorrhoids      Review of Systems   Gastrointestinal:  Positive for hemorrhoids.     Objective     Vital Signs:   /70   Pulse 77   Resp 18   Ht 167.6 cm (65.98\")   Wt 58.6 kg (129 lb 3.2 oz)   SpO2 97%   BMI 20.86 kg/m²       BP Readings from Last 3 Encounters:   08/29/24 105/70   04/16/24 121/73   03/12/24 107/71       Wt Readings from Last 3 Encounters:   08/29/24 58.6 kg (129 lb 3.2 oz)   04/16/24 56.2 kg (124 lb)   04/02/24 55.8 kg (123 lb)     Physical Exam  Constitutional:       Appearance: She is well-developed.   HENT:      Right Ear: There is impacted cerumen.      Left Ear: There is impacted cerumen.   Eyes:      Pupils: Pupils are equal, round, and reactive to light.   Cardiovascular:      Rate and Rhythm: Normal rate and regular rhythm.   Pulmonary:      Effort: Pulmonary effort is normal.      Breath sounds: Normal breath sounds.   Genitourinary:     Rectum: External hemorrhoid present. No tenderness or anal fissure.   Neurological:      Mental Status: She is alert and oriented to person, place, and time.        Result Review :          Ear Cerumen Removal    Date/Time: 8/29/2024 1:47 PM    Performed by: Nathalia Maria APRN  Authorized by: Nathalia Maria APRN    Anesthesia:  Local Anesthetic: none  Ceruminolytics applied: Ceruminolytics applied prior to the procedure.  Location details: left ear and right ear  Patient " tolerance: patient tolerated the procedure well with no immediate complications  Procedure type: irrigation   Sedation:  Patient sedated: no              Assessment and Plan    Diagnoses and all orders for this visit:    1. Chronic idiopathic constipation    2. External hemorrhoid    3. Bilateral impacted cerumen    Other orders  -     Ear Cerumen Removal    Recommend miralax daily   Drink more water  Arnoldo ear lavage today  Debrox gtts prn  During this office visit, we discussed the pertinent aspects of the visit and treatment recommendations. Pt verbalizes understanding. Follow up was discussed. Patient was given the opportunity to ask questions and discuss other concerns.         Follow Up   No follow-ups on file.  Patient was given instructions and counseling regarding her condition or for health maintenance advice. Please see specific information pulled into the AVS if appropriate.

## 2024-09-16 ENCOUNTER — OFFICE VISIT (OUTPATIENT)
Dept: NEUROLOGY | Facility: CLINIC | Age: 74
End: 2024-09-16
Payer: MEDICARE

## 2024-09-16 VITALS
HEART RATE: 74 BPM | SYSTOLIC BLOOD PRESSURE: 98 MMHG | BODY MASS INDEX: 20.73 KG/M2 | HEIGHT: 66 IN | DIASTOLIC BLOOD PRESSURE: 61 MMHG | WEIGHT: 129 LBS

## 2024-09-16 DIAGNOSIS — R41.3 MEMORY LOSS: Primary | ICD-10-CM

## 2024-09-16 PROCEDURE — 1159F MED LIST DOCD IN RCRD: CPT | Performed by: PSYCHIATRY & NEUROLOGY

## 2024-09-16 PROCEDURE — 3078F DIAST BP <80 MM HG: CPT | Performed by: PSYCHIATRY & NEUROLOGY

## 2024-09-16 PROCEDURE — 99214 OFFICE O/P EST MOD 30 MIN: CPT | Performed by: PSYCHIATRY & NEUROLOGY

## 2024-09-16 PROCEDURE — 1160F RVW MEDS BY RX/DR IN RCRD: CPT | Performed by: PSYCHIATRY & NEUROLOGY

## 2024-09-16 PROCEDURE — 3074F SYST BP LT 130 MM HG: CPT | Performed by: PSYCHIATRY & NEUROLOGY

## 2024-09-16 RX ORDER — TAMSULOSIN HYDROCHLORIDE 0.4 MG/1
CAPSULE ORAL
COMMUNITY
Start: 2024-07-25

## 2024-09-16 RX ORDER — RIVASTIGMINE TARTRATE 1.5 MG/1
1.5 CAPSULE ORAL 2 TIMES DAILY
Qty: 180 CAPSULE | Refills: 0 | Status: SHIPPED | OUTPATIENT
Start: 2024-09-16 | End: 2024-09-18

## 2024-09-18 ENCOUNTER — TELEPHONE (OUTPATIENT)
Dept: NEUROLOGY | Facility: CLINIC | Age: 74
End: 2024-09-18
Payer: MEDICARE

## 2024-09-18 DIAGNOSIS — R41.3 MEMORY LOSS: ICD-10-CM

## 2024-09-18 RX ORDER — RIVASTIGMINE 4.6 MG/24H
PATCH, EXTENDED RELEASE TRANSDERMAL
Qty: 90 PATCH | Refills: 0 | Status: SHIPPED | OUTPATIENT
Start: 2024-09-18

## 2024-09-18 RX ORDER — RIVASTIGMINE 4.6 MG/24H
1 PATCH, EXTENDED RELEASE TRANSDERMAL DAILY
Qty: 30 PATCH | Refills: 3 | Status: SHIPPED | OUTPATIENT
Start: 2024-09-18 | End: 2024-09-18

## 2024-09-25 DIAGNOSIS — R41.3 MEMORY LOSS: ICD-10-CM

## 2024-09-25 RX ORDER — RIVASTIGMINE 4.6 MG/24H
1 PATCH, EXTENDED RELEASE TRANSDERMAL DAILY
Qty: 90 PATCH | Refills: 1 | Status: SHIPPED | OUTPATIENT
Start: 2024-09-25

## 2024-10-17 RX ORDER — MEMANTINE HYDROCHLORIDE 10 MG/1
10 TABLET ORAL 2 TIMES DAILY
Qty: 60 TABLET | Refills: 2 | Status: SHIPPED | OUTPATIENT
Start: 2024-10-17

## 2024-10-21 RX ORDER — CLOPIDOGREL BISULFATE 75 MG/1
75 TABLET ORAL DAILY
Qty: 90 TABLET | Refills: 3 | Status: SHIPPED | OUTPATIENT
Start: 2024-10-21

## 2024-10-21 RX ORDER — BUPROPION HYDROCHLORIDE 150 MG/1
TABLET ORAL
Qty: 270 TABLET | Refills: 3 | Status: SHIPPED | OUTPATIENT
Start: 2024-10-21

## 2024-10-21 RX ORDER — CARVEDILOL 3.12 MG/1
3.12 TABLET ORAL 2 TIMES DAILY
Qty: 180 TABLET | Refills: 3 | Status: SHIPPED | OUTPATIENT
Start: 2024-10-21

## 2025-01-13 RX ORDER — ESCITALOPRAM OXALATE 20 MG/1
10 TABLET ORAL 2 TIMES DAILY
Qty: 90 TABLET | Refills: 3 | Status: SHIPPED | OUTPATIENT
Start: 2025-01-13

## 2025-01-24 ENCOUNTER — OFFICE VISIT (OUTPATIENT)
Dept: FAMILY MEDICINE CLINIC | Facility: CLINIC | Age: 75
End: 2025-01-24
Payer: MEDICARE

## 2025-01-24 VITALS
DIASTOLIC BLOOD PRESSURE: 66 MMHG | BODY MASS INDEX: 21.12 KG/M2 | HEART RATE: 67 BPM | OXYGEN SATURATION: 95 % | HEIGHT: 66 IN | SYSTOLIC BLOOD PRESSURE: 104 MMHG | WEIGHT: 131.4 LBS

## 2025-01-24 DIAGNOSIS — R05.8 POST-VIRAL COUGH SYNDROME: Primary | ICD-10-CM

## 2025-01-24 PROCEDURE — 1125F AMNT PAIN NOTED PAIN PRSNT: CPT | Performed by: STUDENT IN AN ORGANIZED HEALTH CARE EDUCATION/TRAINING PROGRAM

## 2025-01-24 PROCEDURE — 3078F DIAST BP <80 MM HG: CPT | Performed by: STUDENT IN AN ORGANIZED HEALTH CARE EDUCATION/TRAINING PROGRAM

## 2025-01-24 PROCEDURE — 3074F SYST BP LT 130 MM HG: CPT | Performed by: STUDENT IN AN ORGANIZED HEALTH CARE EDUCATION/TRAINING PROGRAM

## 2025-01-24 PROCEDURE — 99213 OFFICE O/P EST LOW 20 MIN: CPT | Performed by: STUDENT IN AN ORGANIZED HEALTH CARE EDUCATION/TRAINING PROGRAM

## 2025-01-24 RX ORDER — GUAIFENESIN/DEXTROMETHORPHAN 100-10MG/5
5 SYRUP ORAL 3 TIMES DAILY PRN
Qty: 118 ML | Refills: 0 | Status: SHIPPED | OUTPATIENT
Start: 2025-01-24

## 2025-01-24 NOTE — PROGRESS NOTES
"Chief Complaint  Chief Complaint   Patient presents with    Cough    Fever    Headache    Generalized Body Aches       Subjective        Jennifer Blackmon is a 74 y.o. female who presents to [facility].  History of Present Illness    Jennifer is a 74 year old female here for cough.      Cough   diagnosed with influenza A on 1/13  Around that same time she became ill with similar symptoms-cough, congestion, fever, diarrhea  Since that time most of her symptoms have resolved but still has persistent cough as well as fatigue  Denies any recent fever or shortness of breath        Objective   /66   Pulse 67   Ht 167.6 cm (65.98\")   Wt 59.6 kg (131 lb 6.4 oz)   SpO2 95%   BMI 21.22 kg/m²     Estimated body mass index is 21.22 kg/m² as calculated from the following:    Height as of this encounter: 167.6 cm (65.98\").    Weight as of this encounter: 59.6 kg (131 lb 6.4 oz).     Physical Exam   GEN: In no acute distress, non toxic appearing  HEENT: EOMI. Mucous membranes moist. Oropharynx without erythema or exudate.  TM normal in appearance bilaterally.  CV: Regular rate and rhythm, no murmurs. No extremity edema.    RESP: Lungs clear to auscultation bilaterally.  No signs of respiratory distress on room air.  SKIN: No rashes  MSK: No joint erythema, deformity, or effusion.   NEURO: Alert and appropriate. CN 2-12 intact grossly.       PHQ-2 Depression Screening  Little interest or pleasure in doing things? Not at all   Feeling down, depressed, or hopeless? Not at all   PHQ-2 Total Score 0         Result Review :              Assessment and Plan     Diagnoses and all orders for this visit:    1. Post-viral cough syndrome (Primary)  Unremarkable physical exam, no signs of secondary bacterial infection  Given her 's recent diagnosis of influenza A I suspect she also had influenza  Cough and fatigue are likely secondary to recent influenza infection  Recommend plenty of rest, " fluids  Can try Robitussin DM for symptomatic relief  Reasons to return to the office discussed with patient    Other orders  -     guaiFENesin-dextromethorphan (ROBITUSSIN DM) 100-10 MG/5ML syrup; Take 5 mL by mouth 3 (Three) Times a Day As Needed for Cough.  Dispense: 118 mL; Refill: 0            Follow Up     No follow-ups on file.

## 2025-01-31 ENCOUNTER — TELEPHONE (OUTPATIENT)
Dept: NEUROLOGY | Facility: CLINIC | Age: 75
End: 2025-01-31
Payer: MEDICARE

## 2025-01-31 DIAGNOSIS — R41.3 MEMORY LOSS: ICD-10-CM

## 2025-01-31 NOTE — TELEPHONE ENCOUNTER
Caller: ROMEO CASTRO    Relationship to Patient: Emergency Contact    Pharmacy: [id number] - [place] [address] [address] [address] - [phone] [facility] [phone] FX      Phone Number: [phone]     Reason for Call: THEY CALLED TO INFORM DR SEIPEL THE PT IS DOING WELL WITH THE RX OF RIVASTIGMINE (EXELON) 4.6 MG PATCH. DR SEIPEL NEEDED TO KNOW DUE TO HE WANTED TO INCREASE THE DOSAGE.     PLEASE REVIEW AND ADVISE  THANK YOU

## 2025-02-03 RX ORDER — RIVASTIGMINE 9.5 MG/24H
1 PATCH, EXTENDED RELEASE TRANSDERMAL DAILY
Qty: 30 PATCH | Refills: 11 | Status: SHIPPED | OUTPATIENT
Start: 2025-02-03

## 2025-04-10 NOTE — PROGRESS NOTES
"Chief Complaint  Memory Loss    Subjective          Jennifer Blackmon presents to [facility] for [facility]  History of Present Illness  Patient is her to f/u on her memory, she currently takes namenda 10 mg bid and uses exelon 9.5mg patch    Memory is worse  More touble with confusing objects      Maximum   Score Patient's   Score Questions   5 0 \"What is the year?Season?Date?Day of the week?Month?\"   5 5 \"Where are we now: State?County?Town/city?Hospital?Floor?\"   3 3 3 Unrelated objects Number of trials:___   5 1 Count backward from 100 by sevens or spell WORLD backwards   3 0 Name 3 things from above   2 2 Identify 2 objects   1 1 Repeat the phrase: No ifs, ands,or buts.   3 0 Take paper in right hand, fold it in half, and put it on the floor.   1 0 Please read this and do what it says. \"Close your eyes\"   1 0 Make up and write a sentence about anything. Noun and verb   1 0 Copy this picture 10 angles must be present.   30 12 Total MMSE       ====PREV. OV 9/16/24=====  Patient is her to f/u on her memory, at her last visit exelon 1.5 mg 1 bid was added to her medication (namenda 10 mg bid) per spouse patient memory has been about the same.  Pt never did get the Exelon patch so will reorder at this time     Pt has poor balance  walks with walker              Maximum   Score Patient's   Score Questions   5 2 \"What is the year?Season?Date?Day of the week?Month?\"   5 5 \"Where are we now: State?County?Town/city?Hospital?Floor?\"   3 3 3 Unrelated objects Number of trials:___   5 5 Count backward from 100 by sevens or spell WORLD backwards   3 0 Name 3 things from above   2 2 Identify 2 objects   1 1 Repeat the phrase: No ifs, ands,or buts.   3 3 Take paper in right hand, fold it in half, and put it on the floor.   1 1  Please read this and do what it says. \"Close your eyes\"   1 1 Make up and write a sentence about anything. Noun and verb   1 0 Copy this picture 10 angles must be present.   30 23 " "Total MMSE        Current Outpatient Medications:     atorvastatin (LIPITOR) 10 MG tablet, Take 1 tablet by mouth Daily., Disp: , Rfl:     buPROPion XL (WELLBUTRIN XL) 150 MG 24 hr tablet, TAKE 3 TABLETS BY MOUTH IN THE  MORNING, Disp: 270 tablet, Rfl: 3    Calcium Citrate-Vitamin D 500-400 MG-UNIT chewable tablet, Chew Daily., Disp: , Rfl:     carvedilol (COREG) 3.125 MG tablet, TAKE 1 TABLET BY MOUTH TWICE  DAILY, Disp: 180 tablet, Rfl: 3    clopidogrel (PLAVIX) 75 MG tablet, TAKE 1 TABLET BY MOUTH DAILY, Disp: 90 tablet, Rfl: 3    coenzyme Q10 100 MG capsule, Take 1 capsule by mouth Daily., Disp: , Rfl:     escitalopram (LEXAPRO) 20 MG tablet, TAKE ONE-HALF TABLET BY MOUTH  TWICE DAILY, Disp: 90 tablet, Rfl: 3    guaiFENesin-dextromethorphan (ROBITUSSIN DM) 100-10 MG/5ML syrup, Take 5 mL by mouth 3 (Three) Times a Day As Needed for Cough., Disp: 118 mL, Rfl: 0    Multiple Vitamins-Minerals (Multi-Vitamin Gummies) chewable tablet, Chew 1 tablet/day Daily., Disp: , Rfl:     rivastigmine (EXELON) 9.5 MG/24HR patch, Place 1 patch on the skin as directed by provider Daily., Disp: 30 patch, Rfl: 11    tamsulosin (FLOMAX) 0.4 MG capsule 24 hr capsule, TAKE 1 CAPSULE BY MOUTH EVERY DAY 30 MINUTES AFTER THE SAME MEAL, Disp: , Rfl:     memantine (NAMENDA) 10 MG tablet, TAKE 1 TABLET BY MOUTH TWICE DAILY, Disp: 180 tablet, Rfl: 3    Review of Systems   Constitutional:  Negative for fatigue.   Respiratory:  Negative for shortness of breath.    Neurological:  Negative for seizures.   Psychiatric/Behavioral:  Negative for sleep disturbance.    All other systems reviewed and are negative.         Objective:    Vital Signs:   /65   Pulse 71   Ht 167.6 cm (65.98\")   BMI 21.22 kg/m²     Physical Exam  Vitals reviewed.   HENT:      Head: Normocephalic.   Cardiovascular:      Rate and Rhythm: Normal rate.   Pulmonary:      Effort: Pulmonary effort is normal. No respiratory distress.   Neurological:      Mental Status: She " is alert and oriented to person, place, and time. Mental status is at baseline.   Psychiatric:         Mood and Affect: Mood normal.      Result Review :                Neurological Exam  Mental Status  Alert. Oriented to person, place, and time.      Assessment and Plan    Diagnoses and all orders for this visit:    1. Memory loss (Primary)     Progressive memory impairment  Continue namenda 10mg bid , increase the patch to 13mg rivastigmine    Follow Up   Return in about 1 year (around 4/15/2026).  Patient was given instructions and counseling regarding her condition or for health maintenance advice. Please see specific information pulled into the AVS if appropriate.     This document has been electronically signed by Joseph Seipel, MD on April 15, 2025 14:52 EDT

## 2025-04-15 ENCOUNTER — OFFICE VISIT (OUTPATIENT)
Dept: NEUROLOGY | Facility: CLINIC | Age: 75
End: 2025-04-15
Payer: MEDICARE

## 2025-04-15 VITALS
HEART RATE: 71 BPM | DIASTOLIC BLOOD PRESSURE: 65 MMHG | BODY MASS INDEX: 21.22 KG/M2 | SYSTOLIC BLOOD PRESSURE: 101 MMHG | HEIGHT: 66 IN

## 2025-04-15 DIAGNOSIS — R41.3 MEMORY LOSS: Primary | ICD-10-CM

## 2025-04-15 RX ORDER — MEMANTINE HYDROCHLORIDE 10 MG/1
10 TABLET ORAL 2 TIMES DAILY
Qty: 180 TABLET | Refills: 3 | Status: SHIPPED | OUTPATIENT
Start: 2025-04-15

## 2025-04-15 RX ORDER — RIVASTIGMINE 13.3 MG/24H
1 PATCH, EXTENDED RELEASE TRANSDERMAL DAILY
Qty: 30 PATCH | Refills: 11 | Status: SHIPPED | OUTPATIENT
Start: 2025-04-15

## 2025-04-22 ENCOUNTER — OFFICE VISIT (OUTPATIENT)
Dept: CARDIOLOGY | Facility: CLINIC | Age: 75
End: 2025-04-22
Payer: MEDICARE

## 2025-04-22 VITALS
DIASTOLIC BLOOD PRESSURE: 58 MMHG | RESPIRATION RATE: 18 BRPM | HEART RATE: 70 BPM | BODY MASS INDEX: 20.89 KG/M2 | WEIGHT: 130 LBS | OXYGEN SATURATION: 93 % | SYSTOLIC BLOOD PRESSURE: 92 MMHG | HEIGHT: 66 IN

## 2025-04-22 DIAGNOSIS — R07.89 CHEST PAIN, ATYPICAL: ICD-10-CM

## 2025-04-22 DIAGNOSIS — I10 ESSENTIAL HYPERTENSION: ICD-10-CM

## 2025-04-22 DIAGNOSIS — I25.119 CORONARY ARTERY DISEASE INVOLVING NATIVE CORONARY ARTERY OF NATIVE HEART WITH ANGINA PECTORIS: ICD-10-CM

## 2025-04-22 DIAGNOSIS — I25.2 OLD MI (MYOCARDIAL INFARCTION): Primary | ICD-10-CM

## 2025-04-22 DIAGNOSIS — Z00.00 PREVENTATIVE HEALTH CARE: ICD-10-CM

## 2025-04-22 RX ORDER — METOPROLOL SUCCINATE 25 MG/1
25 TABLET, EXTENDED RELEASE ORAL DAILY
Qty: 90 TABLET | Refills: 3 | Status: SHIPPED | OUTPATIENT
Start: 2025-04-22

## 2025-04-22 NOTE — PROGRESS NOTES
Cardiology Clinic Note  Gaurav Saravia MD, PhD    Subjective:     Encounter Date:04/22/2025      Patient ID: Jennifer Blackmon is a 74 y.o. female.    Chief Complaint:  Chief Complaint   Patient presents with    Follow-up       HPI:         I the pleasure to see this 73-year-old female in follow-up today after 1 year.  History of MI in the past along with PCI 2019, hypertension hyperlipidemia.  Last stress test was in 2021 which revealed no reversible ischemia, preserved EF overall.  Her CV therapies consist of  atorvastatin Coreg Plavix which she is maintained with low bleeding risk.  She denies any new anginal chest pain heart failure signs or symptoms and her vitals are good today , today on repeat encounter she is euvolemic without heart failure signs or symptoms and expresses no other complaints.  Last EF again was normal 60 to 65% with normal atrial sizes normal RV size and function no significant valvular abnormality and grade 1 diastolic dysfunction only.  no recent falls syncope or palpitations.  Again on encounter she is down about 10 pounds, on prior encounter she has had some weight loss over the last year almost 11 to 12 pounds unintentionally.  We discussed healthy weight of a probably 140 pounds which she is well beneath, again I cautioned against frailty anorexia weight loss over time which is a significant fall precaution with progressive weakness and debility on prior clinic encounter.    Today she is back, maintaining sinus rhythm by her EKG, she has some low blood pressures today 90s over 50s although she has been little bit of weight with better nutrition.  We discussed decreasing her carvedilol in fact discontinuing with starting metoprolol at this place which is less insulting for blood pressure, she has no chest pain or significant shortness of breath although she does have orthostasis and dizziness with standing likely secondary to lower blood pressures.  She is on no other  "antihypertensives    EKG sinus rhythm normal conduction     Review of systems otherwise negative x14 point review of systems except was mentioned above     She sees nephrology for CKD, oncology as well        Historical data copied forward from previous encounters in EMR including the history, exam, and assessment/plan has been reviewed and is unchanged unless noted otherwise.     Cardiac medicines reviewed with risk, benefits, and necessity of each discussed.     Risk and benefit of cardiac testing reviewed including death heart attack stroke pain bleeding infection need for vascular /cardiovascular surgery were discussed and the patient      Objective:         Objective       Vitals reviewed below     Physical Exam  Thin habitus  Regular rate and rhythm no rubs murmurs or gallops  No heave no lift  No clubbing cyanosis or edema  Pulses 2+  Frail  Intact grossly  Walks with a cane  Clear to auscultation  Normocephalic/atraumatic        Assessment:              CAD, history of old inferolateral MI, EKG unchanged  Continue  statin and beta-blocker  Continue Plavix monotherapy if needed  Stable without anginal chest pain  No indication for ischemic evaluation  Normal ischemic evaluation or at least low risk 2021     Hypertension, lower pressures, stop carvedilol  Changed to low-dose metoprolol  Follow blood pressures if still less than 100 systolic notify clinic  Encourage nutrition and hydration, she is not any diuretics    Hyperlipidemia on high intensity statin therapy     Secondary prevention goals were discussed  She is a non-smoker, abstinent since 1972  She is nondiabetic  Diet and exercise per AHA guidelines        return to clinic in 1 year     Continue present CV treatment plan above    Objective:         BP 92/58 (BP Location: Left arm, Patient Position: Sitting)   Pulse 70   Resp 18   Ht 167.6 cm (66\")   Wt 59 kg (130 lb)   SpO2 93%   BMI 20.98 kg/m²     Physical Exam    Assessment:         There are " no diagnoses linked to this encounter.       Plan:              The pleasure to be involved in this patient's cardiovascular care.  Please call with any questions or concerns  Gaurav Saravia MD, PhD    Most recent EKG as reviewed and interpreted by me:  Procedures     Most recent echo as reviewed and interpreted by me:  Results for orders placed during the hospital encounter of 03/17/22    Adult Transthoracic Echo Complete W/ Cont if Necessary Per Protocol    Interpretation Summary  Normal LV size and contractility EF of 60 to 65%  Borderline RV  Mild biatrial enlargement seen.  Agitated saline bubble contrast negative for septal defect  Aortic valve, mitral valve, tricuspid valve appears structurally normal, mild to moderate mitral and mild tricuspid regurgitation seen.  Calculated RV systolic pressure of 32 mmHg  Reveal posterior pericardial effusion seen.  No signs of increased intrapericardial pressures  Proximal aorta appears normal in size.      Most recent stress test as reviewed and interpreted by me:  Results for orders placed during the hospital encounter of 02/26/21    Stress Test With Myocardial Perfusion One Day    Interpretation Summary  · Findings consistent with a normal ECG stress test.  · Left ventricular ejection fraction is hyperdynamic (Calculated EF > 70%). .  · Myocardial perfusion imaging indicates a normal myocardial perfusion study with no evidence of ischemia.  · Impressions are consistent with a low risk study.  · This is normal Cardiolite imaging stress test with no evidence of ischemia or myocardial infarction. Left ventricle size and function is normal on gated SPECT imaging. No wall motion abnormality was noted. Clinical correlation recommended. Further recommendation as per ordering physician..      Most recent cardiac catheterization as reviewed interpreted by me:  No results found for this or any previous visit.    The following portions of the patient's history were reviewed and  updated as appropriate: allergies, current medications, past family history, past medical history, past social history, past surgical history, and problem list.      ROS:  14 point review of systems negative except as mentioned above    Current Outpatient Medications:     atorvastatin (LIPITOR) 10 MG tablet, Take 1 tablet by mouth Daily., Disp: , Rfl:     buPROPion XL (WELLBUTRIN XL) 150 MG 24 hr tablet, TAKE 3 TABLETS BY MOUTH IN THE  MORNING, Disp: 270 tablet, Rfl: 3    Calcium Citrate-Vitamin D 500-400 MG-UNIT chewable tablet, Chew Daily., Disp: , Rfl:     carvedilol (COREG) 3.125 MG tablet, TAKE 1 TABLET BY MOUTH TWICE  DAILY, Disp: 180 tablet, Rfl: 3    clopidogrel (PLAVIX) 75 MG tablet, TAKE 1 TABLET BY MOUTH DAILY, Disp: 90 tablet, Rfl: 3    coenzyme Q10 100 MG capsule, Take 1 capsule by mouth Daily., Disp: , Rfl:     escitalopram (LEXAPRO) 20 MG tablet, TAKE ONE-HALF TABLET BY MOUTH  TWICE DAILY, Disp: 90 tablet, Rfl: 3    guaiFENesin-dextromethorphan (ROBITUSSIN DM) 100-10 MG/5ML syrup, Take 5 mL by mouth 3 (Three) Times a Day As Needed for Cough., Disp: 118 mL, Rfl: 0    Multiple Vitamins-Minerals (Multi-Vitamin Gummies) chewable tablet, Chew 1 tablet/day Daily., Disp: , Rfl:     rivastigmine (EXELON) 13.3 MG/24HR patch, Place 1 patch on the skin as directed by provider Daily., Disp: 30 patch, Rfl: 11    tamsulosin (FLOMAX) 0.4 MG capsule 24 hr capsule, TAKE 1 CAPSULE BY MOUTH EVERY DAY 30 MINUTES AFTER THE SAME MEAL, Disp: , Rfl:     memantine (NAMENDA) 10 MG tablet, TAKE 1 TABLET BY MOUTH TWICE DAILY (Patient not taking: Reported on 4/22/2025), Disp: 180 tablet, Rfl: 3    Problem List:  Patient Active Problem List   Diagnosis    Old MI (myocardial infarction)    Coronary artery disease involving native coronary artery of native heart with angina pectoris    Essential hypertension    Chest pain, atypical    Degeneration of lumbar intervertebral disc    Low back pain    Lumbar spondylosis    Arthritis     Cellulitis of face    Depressive disorder    Near syncope    Stage 3a chronic kidney disease    Left displaced femoral neck fracture    S/P total left hip arthroplasty    Memory loss    Generalized weakness    Altered mental status     Past Medical History:  Past Medical History:   Diagnosis Date    Allergic rhinitis     Basal cell carcinoma (BCC) of nostril     Coronary artery disease involving native coronary artery of native heart with angina pectoris 2019    Status post successful PCI of the coronary stent deployment to high-grade RCA stenosis after presenting with ST segment elevated microinfarction in May 2019    DDD (degenerative disc disease), lumbar     Depression     Depression     Essential hypertension 2019    Femoral neck fracture 2023    Heart attack     2019    Hyperlipidemia     Hyperlipidemia, mixed 2019    Hypertension     Insomnia     Myocardial infarction less than 4 weeks ago 2019    Presented initially with ST segment elevated microinfarction treated emergently in [place] May 2019    Old MI (myocardial infarction) 2019    Presented initially with ST segment elevated microinfarction treated emergently in [place] May 2019    Osteoarthritis     Toenail fungus      Past Surgical History:  Past Surgical History:   Procedure Laterality Date    ADENOIDECTOMY      Adenoids removed    APPENDECTOMY      AUGMENTATION MAMMAPLASTY      BREAST IMPLANT SURGERY  1984    CATARACT EXTRACTION Bilateral 2017    CERVICAL DISC SURGERY      ruptured and removed      SECTION      CHEST SURGERY      attempted pectus repair     EYE SURGERY  1997    AK    HYSTERECTOMY      KNEE ARTHROSCOPY Right     LUMBAR DISC SURGERY      ruptured and removed     NASAL SEPTAL RECONSTRUCTION      PECTUS CARNATUM REPAIR      attempted    RECTAL SURGERY      Repair    REFRACTIVE SURGERY      RK/AK both eyes     REFRACTIVE SURGERY      SOMNOPLASTY RADIAL FREQUENCY  ABLATION      TONSILLECTOMY      TOTAL HIP ARTHROPLASTY Left 2023    Procedure: TOTAL HIP ARTHROPLASTY ANTERIOR;  Surgeon: Josiah Menon MD;  Location: [facility] OR;  Service: Orthopedics;  Laterality: Left;    UVULOPALATOPHARYNGOPLASTY       Social History:  Social History     Socioeconomic History    Marital status:     Number of children: 2   Tobacco Use    Smoking status: Former     Current packs/day: 0.00     Average packs/day: 0.5 packs/day for 4.0 years (2.0 ttl pk-yrs)     Types: Cigarettes     Start date:      Quit date:      Years since quittin.3    Smokeless tobacco: Never   Vaping Use    Vaping status: Never Used   Substance and Sexual Activity    Alcohol use: No    Drug use: No    Sexual activity: Defer     Allergies:  No Known Allergies  Immunizations:  Immunization History   Administered Date(s) Administered    COVID-19 (MODERNA) 1st,2nd,3rd Dose Monovalent 2021, 2021    FLUAD TRI 65YR+ 10/19/2017    Flu Vaccine Quad PF 6-35MO 10/27/2016    Flu Vaccine Quad PF >36MO 10/27/2016    Fluad Quad 65+ 10/09/2019, 10/20/2020    Fluzone High-Dose 65+YRS 2015, 10/19/2017, 10/15/2018    Fluzone High-Dose 65+yrs 10/20/2021, 10/26/2022, 2023    Influenza, Unspecified 10/09/2019    Pneumococcal Conjugate 13-Valent (PCV13) 2018    Pneumococcal Polysaccharide (PPSV23) 2021            In-Office Procedure(s):  No orders to display        ASCVD RIsk Score::  The ASCVD Risk score (Elle GUTIERREZ, et al., 2019) failed to calculate for the following reasons:    Risk score cannot be calculated because patient has a medical history suggesting prior/existing ASCVD    Imaging:    Results for orders placed in visit on 24    XR Knee 3 View Right    Narrative  XR KNEE 3 VW RIGHT    Date of Exam: 2024 1:44 PM EDT    Indication: pain in right knee for years, ongoing pain , new problem    Comparison: None available.    Findings:  3 films. Both knees are included on  the AP field-of-view. There is moderate medial and mild lateral joint compartment narrowing bilaterally. There is some calcification in the right medial collateral ligament complex. There is mild narrowing of the right  patellofemoral compartment. The right patella is normally aligned.    Impression  Impression:  Degenerative osteoarthritis as detailed. Calcification of portions of the right medial collateral ligament.      Electronically Signed: Patrizia Goldman MD  4/3/2024 9:21 AM EDT  Workstation ID: [id number]       Results for orders placed during the hospital encounter of 11/25/23    CT Head Without Contrast    Narrative  CT HEAD WO CONTRAST    Date of Exam: 11/25/2023 7:46 PM EST    Indication: altered.    Comparison: MRI brain without contrast 10/2/2023    Technique: Axial CT images were obtained of the head without contrast administration.  Coronal reconstructions were performed.  Automated exposure control and iterative reconstruction methods were used.      Findings:  Extensive deep white matter hypodensities are nonspecific but favored to represent changes of moderately advanced chronic microvascular disease. These findings correspond to the MRI brain from 10/2/2023. Preservation of gray matter-white matter junction  distinction, without convincing evidence of acute or evolving infarct. There is moderate generalized atrophy. Stable mild ventricular prominence. No gross hydrocephalus. No mass lesion or mass effect or midline shift is seen. Paranasal sinuses and  mastoid air cells are clear. No acute calvarial abnormality is identified.    Impression  Impression:    1. No CT evidence of acute or evolving infarct.  2. Features of moderately advanced chronic microvascular disease and moderate generalized atrophy.      Electronically Signed: Kyara Pandya MD  11/25/2023 7:56 PM EST  Workstation ID: [id number]      Results for orders placed during the hospital encounter of 11/25/23    CT Head Without  Contrast    Narrative  CT HEAD WO CONTRAST    Date of Exam: 11/25/2023 7:46 PM EST    Indication: altered.    Comparison: MRI brain without contrast 10/2/2023    Technique: Axial CT images were obtained of the head without contrast administration.  Coronal reconstructions were performed.  Automated exposure control and iterative reconstruction methods were used.      Findings:  Extensive deep white matter hypodensities are nonspecific but favored to represent changes of moderately advanced chronic microvascular disease. These findings correspond to the MRI brain from 10/2/2023. Preservation of gray matter-white matter junction  distinction, without convincing evidence of acute or evolving infarct. There is moderate generalized atrophy. Stable mild ventricular prominence. No gross hydrocephalus. No mass lesion or mass effect or midline shift is seen. Paranasal sinuses and  mastoid air cells are clear. No acute calvarial abnormality is identified.    Impression  Impression:    1. No CT evidence of acute or evolving infarct.  2. Features of moderately advanced chronic microvascular disease and moderate generalized atrophy.      Electronically Signed: Kyara Pandya MD  11/25/2023 7:56 PM EST  Workstation ID: [id number]      Lab Review:   No visits with results within 6 Month(s) from this visit.   Latest known visit with results is:   Lab on 04/09/2024   Component Date Value    Glucose 04/09/2024 114 (H)     BUN 04/09/2024 23     Creatinine 04/09/2024 0.99     Sodium 04/09/2024 142     Potassium 04/09/2024 4.6     Chloride 04/09/2024 104     CO2 04/09/2024 30.0 (H)     Calcium 04/09/2024 10.0     BUN/Creatinine Ratio 04/09/2024 23.2     Anion Gap 04/09/2024 8.0     eGFR 04/09/2024 59.6 (L)     Phosphorus 04/09/2024 3.4     PTH, Intact 04/09/2024 50.2     Uric Acid 04/09/2024 3.6     25 Hydroxy, Vitamin D 04/09/2024 54.8     WBC 04/09/2024 6.10     RBC 04/09/2024 4.47     Hemoglobin 04/09/2024 13.9     Hematocrit  04/09/2024 43.8     MCV 04/09/2024 98.0 (H)     MCH 04/09/2024 31.1     MCHC 04/09/2024 31.7     RDW 04/09/2024 12.9     RDW-SD 04/09/2024 46.5     MPV 04/09/2024 9.2     Platelets 04/09/2024 254     Neutrophil % 04/09/2024 62.5     Lymphocyte % 04/09/2024 26.4     Monocyte % 04/09/2024 7.5     Eosinophil % 04/09/2024 2.8     Basophil % 04/09/2024 0.5     Immature Grans % 04/09/2024 0.3     Neutrophils, Absolute 04/09/2024 3.81     Lymphocytes, Absolute 04/09/2024 1.61     Monocytes, Absolute 04/09/2024 0.46     Eosinophils, Absolute 04/09/2024 0.17     Basophils, Absolute 04/09/2024 0.03     Immature Grans, Absolute 04/09/2024 0.02     nRBC 04/09/2024 0.0      Recent labs reviewed and interpreted for clinical significance and application            Level of Care:           Gaurav Saravia MD  04/22/25  .

## (undated) DEVICE — GOWN,REINFORCE,POLY,SIRUS,BREATH SLV,LG: Brand: MEDLINE

## (undated) DEVICE — PICO 7 10CM X 30CM: Brand: PICO™ 7

## (undated) DEVICE — NEEDLE, QUINCKE, 18GX3.5": Brand: MEDLINE

## (undated) DEVICE — DUAL CUT SAGITTAL BLADE

## (undated) DEVICE — SOL IRR NACL 0.9PCT ARTHROMATIC 3000ML

## (undated) DEVICE — PK TOTL HIP 50

## (undated) DEVICE — ADHS SKIN PREMIERPRO EXOFIN TOPICAL HI/VISC .5ML

## (undated) DEVICE — PLASMABLADE PS210-030S 3.0S LOCK: Brand: PLASMABLADE™

## (undated) DEVICE — ZIPPERED TOGA, 2X LARGE: Brand: FLYTE

## (undated) DEVICE — BIPOLAR SEALER 23-112-1 AQM 6.0: Brand: AQUAMANTYS™

## (undated) DEVICE — IRRIGATOR BULB ASEPTO 60CC STRL

## (undated) DEVICE — C-ARM: Brand: UNBRANDED

## (undated) DEVICE — DRAPE,U/ SHT,SPLIT,PLAS,STERIL: Brand: MEDLINE

## (undated) DEVICE — DRAPE,C-SECTION,CLR SCREEN,WIRE: Brand: MEDLINE

## (undated) DEVICE — SYR LUERLOK 30CC

## (undated) DEVICE — GLV SURG SENSICARE PI ORTHO SZ8 LF STRL

## (undated) DEVICE — CELLERATERX SURGICAL HYDROLYZED COLLAGEN 1G TYPE I BOVINE COLLAGEN FOR CHRONIC AND ACUTE WOUNDS, PARTIAL AND FULL THICKNESS WOUNDS, PRESSURE INJURIES I-IV, VENOUS STASIS ULCERS, ARTERIAL ULCERS, DIABETIC ULCERS, TRAUMATIC WOUNDS, FIRST AND SECOND DEGREE BURNS, SUPERFICIAL WOUNDS AND SURGICAL WOUNDS. STERILE UNTIL OPENED.  STORE AT ROOM TEMPERATURE. FOR USE ON MODERATELY TO HEAVILY EXUDATIVE WOUNDS. CLEANSE THE WOUND PER FACILITY PROTOCOL.  APPLY CELLERATERX POWDER OVER THE ENTIRE WOUND BED AND THE EDGES OF THE WOUND.  COVER WITH AN APPROPRIATE DRESSING.  REAPPLY 2-3 TIMES A WEEK, OR WITH EACH DRESSING CHANGE, TAKING CARE NOT TO DISRUPT WOUND SITE. HTTPS://SANARAMEDTECH.COM/SURGICAL/CELLERATERX-SURGICAL/: Brand: CELLERATERX SURGICAL

## (undated) DEVICE — ANTIBACTERIAL UNDYED BRAIDED (POLYGLACTIN 910), SYNTHETIC ABSORBABLE SUTURE: Brand: COATED VICRYL

## (undated) DEVICE — SOLUTION,WATER,IRRIGATION,1000ML,STERILE: Brand: MEDLINE

## (undated) DEVICE — GLV SURG SENSICARE PI LF PF 8 GRN STRL

## (undated) DEVICE — KT SURG TURNOVER 050

## (undated) DEVICE — GLV SURG SIGNATURE ESSENTIAL PF LTX SZ8.5

## (undated) DEVICE — CVR HNDL LT SURG ACCSSRY BLU STRL

## (undated) DEVICE — INTEGUSEAL MICROBIAL SEALANT: Brand: AVANOS

## (undated) DEVICE — TUBING, SUCTION, 1/4" X 12', STRAIGHT: Brand: MEDLINE

## (undated) DEVICE — KT PT POSITION SUPINE HANA/PROFX TABL

## (undated) DEVICE — GLV SURG SENSICARE PI MIC PF SZ7.5 LF STRL